# Patient Record
Sex: MALE | Race: WHITE | Employment: OTHER | URBAN - NONMETROPOLITAN AREA
[De-identification: names, ages, dates, MRNs, and addresses within clinical notes are randomized per-mention and may not be internally consistent; named-entity substitution may affect disease eponyms.]

---

## 2017-04-12 DIAGNOSIS — M19.90 OSTEOARTHRITIS, UNSPECIFIED OSTEOARTHRITIS TYPE, UNSPECIFIED SITE: ICD-10-CM

## 2017-04-12 RX ORDER — MELOXICAM 15 MG/1
TABLET ORAL
Qty: 30 TABLET | Refills: 5 | Status: SHIPPED | OUTPATIENT
Start: 2017-04-12 | End: 2017-10-23 | Stop reason: SDUPTHER

## 2017-04-17 ENCOUNTER — OFFICE VISIT (OUTPATIENT)
Dept: FAMILY MEDICINE CLINIC | Age: 62
End: 2017-04-17

## 2017-04-17 VITALS
SYSTOLIC BLOOD PRESSURE: 138 MMHG | WEIGHT: 205.6 LBS | HEART RATE: 75 BPM | BODY MASS INDEX: 27.25 KG/M2 | HEIGHT: 73 IN | DIASTOLIC BLOOD PRESSURE: 80 MMHG | OXYGEN SATURATION: 98 %

## 2017-04-17 DIAGNOSIS — R53.81 OTHER MALAISE AND FATIGUE: ICD-10-CM

## 2017-04-17 DIAGNOSIS — E11.9 TYPE 2 DIABETES MELLITUS WITHOUT COMPLICATION, WITHOUT LONG-TERM CURRENT USE OF INSULIN (HCC): Primary | ICD-10-CM

## 2017-04-17 DIAGNOSIS — I10 ESSENTIAL HYPERTENSION: ICD-10-CM

## 2017-04-17 DIAGNOSIS — R53.83 OTHER MALAISE AND FATIGUE: ICD-10-CM

## 2017-04-17 DIAGNOSIS — E11.9 TYPE 2 DIABETES MELLITUS WITHOUT COMPLICATION, WITHOUT LONG-TERM CURRENT USE OF INSULIN (HCC): ICD-10-CM

## 2017-04-17 DIAGNOSIS — G45.8 OTHER SPECIFIED TRANSIENT CEREBRAL ISCHEMIAS: ICD-10-CM

## 2017-04-17 LAB
ALBUMIN SERPL-MCNC: 4.2 G/DL (ref 3.9–4.9)
ALP BLD-CCNC: 92 U/L (ref 35–104)
ALT SERPL-CCNC: 60 U/L (ref 0–41)
ANION GAP SERPL CALCULATED.3IONS-SCNC: 12 MEQ/L (ref 7–13)
AST SERPL-CCNC: 45 U/L (ref 0–40)
BILIRUB SERPL-MCNC: 0.6 MG/DL (ref 0–1.2)
BUN BLDV-MCNC: 15 MG/DL (ref 8–23)
CALCIUM SERPL-MCNC: 9.4 MG/DL (ref 8.6–10.2)
CHLORIDE BLD-SCNC: 101 MEQ/L (ref 98–107)
CO2: 28 MEQ/L (ref 22–29)
CREAT SERPL-MCNC: 0.77 MG/DL (ref 0.7–1.2)
GFR AFRICAN AMERICAN: >60
GFR NON-AFRICAN AMERICAN: >60
GLOBULIN: 2.7 G/DL (ref 2.3–3.5)
GLUCOSE BLD-MCNC: 104 MG/DL (ref 74–109)
HBA1C MFR BLD: 7.7 % (ref 4.8–5.9)
HCT VFR BLD CALC: 40.9 % (ref 42–52)
HEMOGLOBIN: 13.8 G/DL (ref 14–18)
MCH RBC QN AUTO: 28.9 PG (ref 27–31.3)
MCHC RBC AUTO-ENTMCNC: 33.6 % (ref 33–37)
MCV RBC AUTO: 86 FL (ref 80–100)
PDW BLD-RTO: 13.9 % (ref 11.5–14.5)
PLATELET # BLD: 164 K/UL (ref 130–400)
POTASSIUM SERPL-SCNC: 3.5 MEQ/L (ref 3.5–5.1)
RBC # BLD: 4.75 M/UL (ref 4.7–6.1)
SODIUM BLD-SCNC: 141 MEQ/L (ref 132–144)
TOTAL PROTEIN: 6.9 G/DL (ref 6.4–8.1)
TSH SERPL DL<=0.05 MIU/L-ACNC: 1.76 UIU/ML (ref 0.27–4.2)
WBC # BLD: 8.1 K/UL (ref 4.8–10.8)

## 2017-04-17 PROCEDURE — 99213 OFFICE O/P EST LOW 20 MIN: CPT | Performed by: FAMILY MEDICINE

## 2017-04-17 ASSESSMENT — PATIENT HEALTH QUESTIONNAIRE - PHQ9
SUM OF ALL RESPONSES TO PHQ QUESTIONS 1-9: 0
1. LITTLE INTEREST OR PLEASURE IN DOING THINGS: 0
2. FEELING DOWN, DEPRESSED OR HOPELESS: 0
SUM OF ALL RESPONSES TO PHQ9 QUESTIONS 1 & 2: 0

## 2017-04-18 DIAGNOSIS — R79.89 ELEVATED LIVER FUNCTION TESTS: Primary | ICD-10-CM

## 2017-04-24 ENCOUNTER — HOSPITAL ENCOUNTER (OUTPATIENT)
Dept: ULTRASOUND IMAGING | Age: 62
Discharge: HOME | End: 2017-04-24
Payer: COMMERCIAL

## 2017-04-24 DIAGNOSIS — R79.89 ELEVATED LIVER FUNCTION TESTS: ICD-10-CM

## 2017-04-24 PROCEDURE — 76705 ECHO EXAM OF ABDOMEN: CPT

## 2017-05-31 ENCOUNTER — TELEPHONE (OUTPATIENT)
Dept: FAMILY MEDICINE CLINIC | Age: 62
End: 2017-05-31

## 2017-05-31 RX ORDER — AMOXICILLIN 875 MG/1
875 TABLET, COATED ORAL 2 TIMES DAILY
Qty: 20 TABLET | Refills: 0 | Status: SHIPPED | OUTPATIENT
Start: 2017-05-31 | End: 2017-06-10

## 2017-07-17 DIAGNOSIS — I10 ESSENTIAL HYPERTENSION: ICD-10-CM

## 2017-07-17 RX ORDER — AMLODIPINE BESYLATE 2.5 MG/1
TABLET ORAL
Qty: 90 TABLET | Refills: 2 | Status: SHIPPED | OUTPATIENT
Start: 2017-07-17 | End: 2018-04-22 | Stop reason: SDUPTHER

## 2017-08-28 RX ORDER — SITAGLIPTIN 100 MG/1
TABLET, FILM COATED ORAL
Qty: 30 TABLET | Refills: 3 | Status: SHIPPED | OUTPATIENT
Start: 2017-08-28 | End: 2017-11-27 | Stop reason: SDUPTHER

## 2017-08-30 DIAGNOSIS — I10 ESSENTIAL HYPERTENSION: ICD-10-CM

## 2017-08-30 DIAGNOSIS — E11.65 TYPE 2 DIABETES MELLITUS WITH HYPERGLYCEMIA, WITHOUT LONG-TERM CURRENT USE OF INSULIN (HCC): ICD-10-CM

## 2017-08-31 RX ORDER — LISINOPRIL AND HYDROCHLOROTHIAZIDE 20; 12.5 MG/1; MG/1
TABLET ORAL
Qty: 180 TABLET | Refills: 2 | Status: SHIPPED | OUTPATIENT
Start: 2017-08-31 | End: 2018-05-21 | Stop reason: SDUPTHER

## 2017-10-23 DIAGNOSIS — M19.90 OSTEOARTHRITIS, UNSPECIFIED OSTEOARTHRITIS TYPE, UNSPECIFIED SITE: ICD-10-CM

## 2017-10-23 RX ORDER — MELOXICAM 15 MG/1
TABLET ORAL
Qty: 30 TABLET | Refills: 5 | Status: SHIPPED | OUTPATIENT
Start: 2017-10-23 | End: 2017-11-27 | Stop reason: ALTCHOICE

## 2017-11-27 ENCOUNTER — OFFICE VISIT (OUTPATIENT)
Dept: FAMILY MEDICINE CLINIC | Age: 62
End: 2017-11-27

## 2017-11-27 VITALS
DIASTOLIC BLOOD PRESSURE: 72 MMHG | HEIGHT: 74 IN | TEMPERATURE: 97.3 F | HEART RATE: 84 BPM | WEIGHT: 204 LBS | BODY MASS INDEX: 26.18 KG/M2 | OXYGEN SATURATION: 98 % | SYSTOLIC BLOOD PRESSURE: 138 MMHG

## 2017-11-27 DIAGNOSIS — J01.40 ACUTE PANSINUSITIS, RECURRENCE NOT SPECIFIED: ICD-10-CM

## 2017-11-27 DIAGNOSIS — M25.551 PAIN OF RIGHT HIP JOINT: ICD-10-CM

## 2017-11-27 DIAGNOSIS — E11.9 TYPE 2 DIABETES MELLITUS WITHOUT COMPLICATION, WITHOUT LONG-TERM CURRENT USE OF INSULIN (HCC): ICD-10-CM

## 2017-11-27 DIAGNOSIS — M54.32 BILATERAL SCIATICA: Primary | ICD-10-CM

## 2017-11-27 DIAGNOSIS — M54.31 BILATERAL SCIATICA: Primary | ICD-10-CM

## 2017-11-27 LAB
ALBUMIN SERPL-MCNC: 4.5 G/DL (ref 3.9–4.9)
ALP BLD-CCNC: 102 U/L (ref 35–104)
ALT SERPL-CCNC: 61 U/L (ref 0–41)
ANION GAP SERPL CALCULATED.3IONS-SCNC: 17 MEQ/L (ref 7–13)
AST SERPL-CCNC: 45 U/L (ref 0–40)
BILIRUB SERPL-MCNC: 0.5 MG/DL (ref 0–1.2)
BUN BLDV-MCNC: 13 MG/DL (ref 8–23)
CALCIUM SERPL-MCNC: 9.5 MG/DL (ref 8.6–10.2)
CHLORIDE BLD-SCNC: 102 MEQ/L (ref 98–107)
CO2: 26 MEQ/L (ref 22–29)
CREAT SERPL-MCNC: 0.76 MG/DL (ref 0.7–1.2)
CREATININE URINE: 68.6 MG/DL
GFR AFRICAN AMERICAN: >60
GFR NON-AFRICAN AMERICAN: >60
GLOBULIN: 3 G/DL (ref 2.3–3.5)
GLUCOSE BLD-MCNC: 170 MG/DL (ref 74–109)
HBA1C MFR BLD: 6.6 % (ref 4.8–5.9)
MICROALBUMIN UR-MCNC: <1.2 MG/DL
MICROALBUMIN/CREAT UR-RTO: NORMAL MG/G (ref 0–30)
POTASSIUM SERPL-SCNC: 4.2 MEQ/L (ref 3.5–5.1)
SODIUM BLD-SCNC: 145 MEQ/L (ref 132–144)
TOTAL PROTEIN: 7.5 G/DL (ref 6.4–8.1)

## 2017-11-27 PROCEDURE — 1036F TOBACCO NON-USER: CPT | Performed by: NURSE PRACTITIONER

## 2017-11-27 PROCEDURE — G8484 FLU IMMUNIZE NO ADMIN: HCPCS | Performed by: NURSE PRACTITIONER

## 2017-11-27 PROCEDURE — 3045F PR MOST RECENT HEMOGLOBIN A1C LEVEL 7.0-9.0%: CPT | Performed by: NURSE PRACTITIONER

## 2017-11-27 PROCEDURE — 3017F COLORECTAL CA SCREEN DOC REV: CPT | Performed by: NURSE PRACTITIONER

## 2017-11-27 PROCEDURE — G8419 CALC BMI OUT NRM PARAM NOF/U: HCPCS | Performed by: NURSE PRACTITIONER

## 2017-11-27 PROCEDURE — 99214 OFFICE O/P EST MOD 30 MIN: CPT | Performed by: NURSE PRACTITIONER

## 2017-11-27 PROCEDURE — G8427 DOCREV CUR MEDS BY ELIG CLIN: HCPCS | Performed by: NURSE PRACTITIONER

## 2017-11-27 RX ORDER — AMOXICILLIN AND CLAVULANATE POTASSIUM 875; 125 MG/1; MG/1
1 TABLET, FILM COATED ORAL 2 TIMES DAILY
Qty: 20 TABLET | Refills: 0 | Status: SHIPPED | OUTPATIENT
Start: 2017-11-27 | End: 2017-12-07

## 2017-11-27 RX ORDER — METHYLPREDNISOLONE 4 MG/1
TABLET ORAL
Qty: 21 TABLET | Refills: 0 | Status: SHIPPED | OUTPATIENT
Start: 2017-11-27 | End: 2017-12-03

## 2017-11-27 RX ORDER — MELOXICAM 15 MG/1
15 TABLET ORAL DAILY
COMMUNITY
End: 2017-12-11 | Stop reason: ALTCHOICE

## 2017-11-27 ASSESSMENT — ENCOUNTER SYMPTOMS
BACK PAIN: 0
SHORTNESS OF BREATH: 0
GASTROINTESTINAL NEGATIVE: 1
SINUS PRESSURE: 1
COUGH: 0

## 2017-11-27 NOTE — PROGRESS NOTES
long-term current use of insulin (HCC)  Hemoglobin A1C    Microalbumin / Creatinine Urine Ratio    Comprehensive Metabolic Panel   4. Acute pansinusitis, recurrence not specified  amoxicillin-clavulanate (AUGMENTIN) 875-125 MG per tablet       Orders Placed This Encounter   Procedures    XR LUMBAR SPINE (2-3 VIEWS)     Standing Status:   Future     Standing Expiration Date:   11/27/2018     Order Specific Question:   Reason for exam:     Answer:   pain down both legs    XR HIP RIGHT (2-3 VIEWS)     Standing Status:   Future     Standing Expiration Date:   11/27/2018     Order Specific Question:   Reason for exam:     Answer:   pain    Hemoglobin A1C     Standing Status:   Future     Standing Expiration Date:   11/27/2018    Microalbumin / Creatinine Urine Ratio     Standing Status:   Future     Standing Expiration Date:   12/27/2017    Comprehensive Metabolic Panel     Standing Status:   Future     Standing Expiration Date:   11/27/2018       Orders Placed This Encounter   Medications    methylPREDNISolone (MEDROL DOSEPACK) 4 MG tablet     Sig: Take by mouth. Dispense:  21 tablet     Refill:  0    amoxicillin-clavulanate (AUGMENTIN) 875-125 MG per tablet     Sig: Take 1 tablet by mouth 2 times daily for 10 days     Dispense:  20 tablet     Refill:  0       discussed need for new imaging, possible referral to ortho for hip and back issues. New labs needed and importance of BP and DM meds needed at this time. It was stressed to the patient not using Mobic long term for pain. Dizziness likely a result of fluid in ears and sinus pressure. To return to clinic after merdol pack complete if no relief noted for additional referral or testing. Patient will be notified of imaging results and labs.        Side effects, adverse effects of the medication prescribed today, as well as treatment plan/ rationale and result expectations have been discussed with the patient who expresses understanding and desires to proceed. Close follow up to evaluate treatment results and for coordination of care. I have reviewed the patient's medical history in detail and updated the computerized patient record. As always, patient is advised that if symptoms worsen in any way they will proceed to the nearest emergency room.      Follow up 2 weeks    Dayana Cordova NP

## 2017-12-11 ENCOUNTER — OFFICE VISIT (OUTPATIENT)
Dept: FAMILY MEDICINE CLINIC | Age: 62
End: 2017-12-11

## 2017-12-11 VITALS
BODY MASS INDEX: 26.05 KG/M2 | TEMPERATURE: 97 F | HEART RATE: 78 BPM | SYSTOLIC BLOOD PRESSURE: 144 MMHG | OXYGEN SATURATION: 98 % | DIASTOLIC BLOOD PRESSURE: 78 MMHG | WEIGHT: 203 LBS | HEIGHT: 74 IN

## 2017-12-11 DIAGNOSIS — E11.9 TYPE 2 DIABETES MELLITUS WITHOUT COMPLICATION, WITHOUT LONG-TERM CURRENT USE OF INSULIN (HCC): Primary | ICD-10-CM

## 2017-12-11 DIAGNOSIS — Z23 NEED FOR INFLUENZA VACCINATION: ICD-10-CM

## 2017-12-11 DIAGNOSIS — M25.551 ARTHRALGIA OF RIGHT HIP: ICD-10-CM

## 2017-12-11 DIAGNOSIS — I10 ESSENTIAL HYPERTENSION: ICD-10-CM

## 2017-12-11 PROCEDURE — G8419 CALC BMI OUT NRM PARAM NOF/U: HCPCS | Performed by: NURSE PRACTITIONER

## 2017-12-11 PROCEDURE — 3017F COLORECTAL CA SCREEN DOC REV: CPT | Performed by: NURSE PRACTITIONER

## 2017-12-11 PROCEDURE — 1036F TOBACCO NON-USER: CPT | Performed by: NURSE PRACTITIONER

## 2017-12-11 PROCEDURE — G8484 FLU IMMUNIZE NO ADMIN: HCPCS | Performed by: NURSE PRACTITIONER

## 2017-12-11 PROCEDURE — 99213 OFFICE O/P EST LOW 20 MIN: CPT | Performed by: NURSE PRACTITIONER

## 2017-12-11 PROCEDURE — 90688 IIV4 VACCINE SPLT 0.5 ML IM: CPT | Performed by: NURSE PRACTITIONER

## 2017-12-11 PROCEDURE — G8427 DOCREV CUR MEDS BY ELIG CLIN: HCPCS | Performed by: NURSE PRACTITIONER

## 2017-12-11 PROCEDURE — 3044F HG A1C LEVEL LT 7.0%: CPT | Performed by: NURSE PRACTITIONER

## 2017-12-11 PROCEDURE — 90471 IMMUNIZATION ADMIN: CPT | Performed by: NURSE PRACTITIONER

## 2017-12-11 NOTE — PROGRESS NOTES
Subjective  Chief Complaint   Patient presents with    Follow-up     2 week follow up per NL    Diabetes    Hip Pain     pt states that he never heard from Ortho. notes show that they were attempting to send to [facility]?  Discuss Labs     further discuss labs and imaging        HPI   Patient here for a follow up. He reports significant improvement in his hip pain after completing the medrol alexandrea. Asked about the results of the hip xrays and referral to ortho    Asking for results of testing preformed at the last visit.   Patient states he stopped drinking pop and asking about his AIC  Also states he has been drinking a lot of coffee today- BP elevated      Patient Active Problem List    Diagnosis Date Noted    TIA (transient ischemic attack) 09/30/2014    Allergic rhinosinusitis 09/15/2014    Lumbar paraspinal muscle spasm 05/20/2014    HTN (hypertension)     Type 2 diabetes mellitus without complication ([facility] [address]      Past Medical History:   Diagnosis Date    History of skin cancer     HTN (hypertension)     Type II or unspecified type diabetes mellitus without mention of complication, not stated as uncontrolled      Past Surgical History:   Procedure Laterality Date    COLONOSCOPY  02/20/2015    DR Los Richardson    HEMORRHOID SURGERY       Family History   Problem Relation Age of Onset    Cancer Mother      lung cancer     Social History     Social History    Marital status:      Spouse name: N/A    Number of children: N/A    Years of education: N/A     Social History Main Topics    Smoking status: Never Smoker    Smokeless tobacco: Never Used    Alcohol use No    Drug use: No    Sexual activity: Yes     Partners: Female     Other Topics Concern    None     Social History Narrative    None     Current Outpatient Prescriptions on File Prior to Visit   Medication Sig Dispense Refill    SITagliptin (JANUVIA) 100 MG tablet TAKE 1 TABLET BY MOUTH DAILY 30 tablet 3    metFORMIN (GLUCOPHAGE) 1000 MG tablet TAKE 1 TABLET BY MOUTH TWICE DAILY WITH MEALS 180 tablet 2    lisinopril-hydrochlorothiazide (PRINZIDE;ZESTORETIC) 20-12.5 MG per tablet TAKE 1 TABLET BY MOUTH TWICE DAILY 180 tablet 2    amLODIPine (NORVASC) 2.5 MG tablet TAKE 1 TABLET BY MOUTH DAILY 90 tablet 2    aspirin 81 MG tablet Take 1 tablet by mouth daily. 30 tablet 3     No current facility-administered medications on file prior to visit. No Known Allergies    Review of Systems   Constitutional: Negative. HENT: Negative. Improved sinus pressure     Endocrine: Negative for polydipsia, polyphagia and polyuria. Musculoskeletal: Positive for arthralgias (right hip pain- improving). Neurological: Negative for dizziness and headaches. Objective  Vitals:    12/11/17 1534 12/11/17 1543   BP: (!) 148/80 (!) 144/78   Site: Left Arm Left Arm   Position: Sitting Sitting   Cuff Size: Medium Adult Medium Adult   Pulse: 78    Temp: 97 °F (36.1 °C)    TempSrc: Tympanic    SpO2: 98%    Weight: 203 lb (92.1 kg)    Height: 6' 1.5\" (1.867 m)      Physical Exam   Constitutional: He is oriented to person, place, and time. He appears well-developed and well-nourished. HENT:   Head: Normocephalic. Eyes: Conjunctivae are normal. Pupils are equal, round, and reactive to light. Neck: Normal range of motion. Pulmonary/Chest: Effort normal.   Abdominal: Soft. Musculoskeletal: Normal range of motion. Right hip: He exhibits tenderness. He exhibits normal range of motion and normal strength. Neurological: He is alert and oriented to person, place, and time. Skin: Skin is warm and dry. Psychiatric: He has a normal mood and affect. His behavior is normal. Judgment and thought content normal.   Nursing note and vitals reviewed. Assessment & Plan    1. Type 2 diabetes mellitus without complication, without long-term current use of insulin ([facility])  HM DIABETES FOOT EXAM   2.  Need for influenza vaccination  Gtao Bowles, 3 YRS

## 2018-04-22 DIAGNOSIS — M19.90 OSTEOARTHRITIS, UNSPECIFIED OSTEOARTHRITIS TYPE, UNSPECIFIED SITE: ICD-10-CM

## 2018-04-22 DIAGNOSIS — I10 ESSENTIAL HYPERTENSION: ICD-10-CM

## 2018-04-23 RX ORDER — MELOXICAM 15 MG/1
TABLET ORAL
Qty: 30 TABLET | Refills: 5 | Status: SHIPPED | OUTPATIENT
Start: 2018-04-23 | End: 2019-02-07 | Stop reason: SDUPTHER

## 2018-04-23 RX ORDER — AMLODIPINE BESYLATE 2.5 MG/1
TABLET ORAL
Qty: 90 TABLET | Refills: 2 | Status: SHIPPED | OUTPATIENT
Start: 2018-04-23 | End: 2019-01-26 | Stop reason: SDUPTHER

## 2018-04-23 RX ORDER — SITAGLIPTIN 100 MG/1
TABLET, FILM COATED ORAL
Qty: 30 TABLET | Refills: 3 | Status: SHIPPED | OUTPATIENT
Start: 2018-04-23 | End: 2018-08-20 | Stop reason: SDUPTHER

## 2018-05-21 DIAGNOSIS — E11.65 TYPE 2 DIABETES MELLITUS WITH HYPERGLYCEMIA, WITHOUT LONG-TERM CURRENT USE OF INSULIN (HCC): ICD-10-CM

## 2018-05-21 DIAGNOSIS — I10 ESSENTIAL HYPERTENSION: ICD-10-CM

## 2018-05-21 RX ORDER — LISINOPRIL AND HYDROCHLOROTHIAZIDE 20; 12.5 MG/1; MG/1
TABLET ORAL
Qty: 180 TABLET | Refills: 2 | Status: SHIPPED | OUTPATIENT
Start: 2018-05-21 | End: 2019-02-07 | Stop reason: SDUPTHER

## 2018-08-20 RX ORDER — SITAGLIPTIN 100 MG/1
TABLET, FILM COATED ORAL
Qty: 30 TABLET | Refills: 5 | Status: SHIPPED | OUTPATIENT
Start: 2018-08-20 | End: 2019-02-07

## 2018-11-07 LAB
AVERAGE GLUCOSE: NORMAL
HBA1C MFR BLD: 6.2 %

## 2019-01-26 DIAGNOSIS — I10 ESSENTIAL HYPERTENSION: ICD-10-CM

## 2019-01-28 RX ORDER — AMLODIPINE BESYLATE 2.5 MG/1
TABLET ORAL
Qty: 90 TABLET | Refills: 2 | Status: SHIPPED | OUTPATIENT
Start: 2019-01-28 | End: 2019-02-07 | Stop reason: SDUPTHER

## 2019-02-07 ENCOUNTER — OFFICE VISIT (OUTPATIENT)
Dept: FAMILY MEDICINE CLINIC | Age: 64
End: 2019-02-07
Payer: COMMERCIAL

## 2019-02-07 VITALS
SYSTOLIC BLOOD PRESSURE: 130 MMHG | OXYGEN SATURATION: 97 % | BODY MASS INDEX: 25.77 KG/M2 | HEART RATE: 77 BPM | DIASTOLIC BLOOD PRESSURE: 72 MMHG | HEIGHT: 74 IN | WEIGHT: 200.8 LBS

## 2019-02-07 DIAGNOSIS — I10 ESSENTIAL HYPERTENSION: ICD-10-CM

## 2019-02-07 DIAGNOSIS — M19.90 OSTEOARTHRITIS, UNSPECIFIED OSTEOARTHRITIS TYPE, UNSPECIFIED SITE: ICD-10-CM

## 2019-02-07 DIAGNOSIS — E11.65 TYPE 2 DIABETES MELLITUS WITH HYPERGLYCEMIA, WITHOUT LONG-TERM CURRENT USE OF INSULIN (HCC): ICD-10-CM

## 2019-02-07 PROCEDURE — 99213 OFFICE O/P EST LOW 20 MIN: CPT | Performed by: FAMILY MEDICINE

## 2019-02-07 RX ORDER — MELOXICAM 15 MG/1
TABLET ORAL
Qty: 30 TABLET | Refills: 5 | Status: SHIPPED | OUTPATIENT
Start: 2019-02-07 | End: 2019-09-13 | Stop reason: SDUPTHER

## 2019-02-07 RX ORDER — LISINOPRIL AND HYDROCHLOROTHIAZIDE 20; 12.5 MG/1; MG/1
TABLET ORAL
Qty: 60 TABLET | Refills: 5 | Status: SHIPPED | OUTPATIENT
Start: 2019-02-07 | End: 2019-09-13 | Stop reason: SDUPTHER

## 2019-02-07 RX ORDER — AMLODIPINE BESYLATE 2.5 MG/1
TABLET ORAL
Qty: 30 TABLET | Refills: 5 | Status: SHIPPED | OUTPATIENT
Start: 2019-02-07 | End: 2019-09-13 | Stop reason: SDUPTHER

## 2019-02-07 ASSESSMENT — PATIENT HEALTH QUESTIONNAIRE - PHQ9
SUM OF ALL RESPONSES TO PHQ QUESTIONS 1-9: 0
SUM OF ALL RESPONSES TO PHQ QUESTIONS 1-9: 0
SUM OF ALL RESPONSES TO PHQ9 QUESTIONS 1 & 2: 0
1. LITTLE INTEREST OR PLEASURE IN DOING THINGS: 0
2. FEELING DOWN, DEPRESSED OR HOPELESS: 0

## 2019-02-23 DIAGNOSIS — I10 ESSENTIAL HYPERTENSION: ICD-10-CM

## 2019-02-23 DIAGNOSIS — E11.65 TYPE 2 DIABETES MELLITUS WITH HYPERGLYCEMIA, WITHOUT LONG-TERM CURRENT USE OF INSULIN (HCC): ICD-10-CM

## 2019-02-25 RX ORDER — LISINOPRIL AND HYDROCHLOROTHIAZIDE 20; 12.5 MG/1; MG/1
TABLET ORAL
Qty: 180 TABLET | Refills: 2 | Status: SHIPPED | OUTPATIENT
Start: 2019-02-25 | End: 2019-08-08 | Stop reason: SDUPTHER

## 2019-08-08 ENCOUNTER — OFFICE VISIT (OUTPATIENT)
Dept: FAMILY MEDICINE CLINIC | Age: 64
End: 2019-08-08
Payer: COMMERCIAL

## 2019-08-08 VITALS
DIASTOLIC BLOOD PRESSURE: 62 MMHG | HEIGHT: 74 IN | WEIGHT: 197.2 LBS | OXYGEN SATURATION: 96 % | SYSTOLIC BLOOD PRESSURE: 132 MMHG | BODY MASS INDEX: 25.31 KG/M2 | HEART RATE: 87 BPM

## 2019-08-08 DIAGNOSIS — R53.83 FATIGUE, UNSPECIFIED TYPE: ICD-10-CM

## 2019-08-08 DIAGNOSIS — M19.90 OSTEOARTHRITIS, UNSPECIFIED OSTEOARTHRITIS TYPE, UNSPECIFIED SITE: ICD-10-CM

## 2019-08-08 DIAGNOSIS — Z12.5 SCREENING PSA (PROSTATE SPECIFIC ANTIGEN): ICD-10-CM

## 2019-08-08 DIAGNOSIS — C44.91 BASAL CELL CARCINOMA (BCC), UNSPECIFIED SITE: ICD-10-CM

## 2019-08-08 DIAGNOSIS — E11.65 TYPE 2 DIABETES MELLITUS WITH HYPERGLYCEMIA, WITHOUT LONG-TERM CURRENT USE OF INSULIN (HCC): Primary | ICD-10-CM

## 2019-08-08 DIAGNOSIS — F51.01 PRIMARY INSOMNIA: ICD-10-CM

## 2019-08-08 DIAGNOSIS — I10 ESSENTIAL HYPERTENSION: ICD-10-CM

## 2019-08-08 PROCEDURE — 99214 OFFICE O/P EST MOD 30 MIN: CPT | Performed by: FAMILY MEDICINE

## 2019-08-08 RX ORDER — TRAZODONE HYDROCHLORIDE 50 MG/1
50 TABLET ORAL NIGHTLY PRN
Qty: 30 TABLET | Refills: 5 | Status: SHIPPED | OUTPATIENT
Start: 2019-08-08 | End: 2019-10-04 | Stop reason: ALTCHOICE

## 2019-08-09 ENCOUNTER — TELEPHONE (OUTPATIENT)
Dept: FAMILY MEDICINE CLINIC | Age: 64
End: 2019-08-09

## 2019-08-09 DIAGNOSIS — E11.65 TYPE 2 DIABETES MELLITUS WITH HYPERGLYCEMIA, WITHOUT LONG-TERM CURRENT USE OF INSULIN (HCC): ICD-10-CM

## 2019-08-09 DIAGNOSIS — Z12.5 SCREENING PSA (PROSTATE SPECIFIC ANTIGEN): ICD-10-CM

## 2019-08-09 DIAGNOSIS — R53.83 FATIGUE, UNSPECIFIED TYPE: ICD-10-CM

## 2019-08-09 LAB
ALBUMIN SERPL-MCNC: 4.5 G/DL (ref 3.5–4.6)
ALP BLD-CCNC: 94 U/L (ref 35–104)
ALT SERPL-CCNC: 56 U/L (ref 0–41)
ANION GAP SERPL CALCULATED.3IONS-SCNC: 12 MEQ/L (ref 9–15)
AST SERPL-CCNC: 52 U/L (ref 0–40)
BILIRUB SERPL-MCNC: 0.8 MG/DL (ref 0.2–0.7)
BUN BLDV-MCNC: 24 MG/DL (ref 8–23)
CALCIUM SERPL-MCNC: 9.6 MG/DL (ref 8.5–9.9)
CHLORIDE BLD-SCNC: 104 MEQ/L (ref 95–107)
CHOLESTEROL, TOTAL: 171 MG/DL (ref 0–199)
CO2: 27 MEQ/L (ref 20–31)
CREAT SERPL-MCNC: 0.78 MG/DL (ref 0.7–1.2)
CREATININE URINE: 365.4 MG/DL
GFR AFRICAN AMERICAN: >60
GFR NON-AFRICAN AMERICAN: >60
GLOBULIN: 3.4 G/DL (ref 2.3–3.5)
GLUCOSE BLD-MCNC: 194 MG/DL (ref 70–99)
HBA1C MFR BLD: 8.3 % (ref 4.8–5.9)
HCT VFR BLD CALC: 40.6 % (ref 42–52)
HDLC SERPL-MCNC: 50 MG/DL (ref 40–59)
HEMOGLOBIN: 13.8 G/DL (ref 14–18)
LDL CHOLESTEROL CALCULATED: 101 MG/DL (ref 0–129)
MCH RBC QN AUTO: 29.4 PG (ref 27–31.3)
MCHC RBC AUTO-ENTMCNC: 33.9 % (ref 33–37)
MCV RBC AUTO: 86.8 FL (ref 80–100)
MICROALBUMIN UR-MCNC: 1.7 MG/DL
MICROALBUMIN/CREAT UR-RTO: 4.7 MG/G (ref 0–30)
PDW BLD-RTO: 14.1 % (ref 11.5–14.5)
PLATELET # BLD: 164 K/UL (ref 130–400)
POTASSIUM SERPL-SCNC: 4 MEQ/L (ref 3.4–4.9)
PROSTATE SPECIFIC ANTIGEN: 0.89 NG/ML (ref 0–5.4)
RBC # BLD: 4.67 M/UL (ref 4.7–6.1)
SODIUM BLD-SCNC: 143 MEQ/L (ref 135–144)
TOTAL PROTEIN: 7.9 G/DL (ref 6.3–8)
TRIGL SERPL-MCNC: 98 MG/DL (ref 0–150)
TSH SERPL DL<=0.05 MIU/L-ACNC: 1.54 UIU/ML (ref 0.44–3.86)
WBC # BLD: 6.3 K/UL (ref 4.8–10.8)

## 2019-08-11 LAB — TESTOSTERONE TOTAL-MALE: 577 NG/DL (ref 300–720)

## 2019-08-12 DIAGNOSIS — E11.9 TYPE 2 DIABETES MELLITUS WITHOUT COMPLICATION, WITHOUT LONG-TERM CURRENT USE OF INSULIN (HCC): Primary | ICD-10-CM

## 2019-09-13 DIAGNOSIS — E11.65 TYPE 2 DIABETES MELLITUS WITH HYPERGLYCEMIA, WITHOUT LONG-TERM CURRENT USE OF INSULIN (HCC): ICD-10-CM

## 2019-09-13 DIAGNOSIS — I10 ESSENTIAL HYPERTENSION: ICD-10-CM

## 2019-09-13 DIAGNOSIS — M19.90 OSTEOARTHRITIS, UNSPECIFIED OSTEOARTHRITIS TYPE, UNSPECIFIED SITE: ICD-10-CM

## 2019-09-16 RX ORDER — MELOXICAM 15 MG/1
TABLET ORAL
Qty: 30 TABLET | Refills: 5 | Status: SHIPPED | OUTPATIENT
Start: 2019-09-16 | End: 2020-03-02

## 2019-09-16 RX ORDER — AMLODIPINE BESYLATE 2.5 MG/1
TABLET ORAL
Qty: 30 TABLET | Refills: 5 | Status: SHIPPED | OUTPATIENT
Start: 2019-09-16 | End: 2020-03-02

## 2019-09-16 RX ORDER — LISINOPRIL AND HYDROCHLOROTHIAZIDE 20; 12.5 MG/1; MG/1
TABLET ORAL
Qty: 60 TABLET | Refills: 5 | Status: SHIPPED | OUTPATIENT
Start: 2019-09-16 | End: 2020-03-02

## 2019-10-04 ENCOUNTER — OFFICE VISIT (OUTPATIENT)
Dept: FAMILY MEDICINE CLINIC | Age: 64
End: 2019-10-04
Payer: COMMERCIAL

## 2019-10-04 ENCOUNTER — NURSE TRIAGE (OUTPATIENT)
Dept: OTHER | Facility: CLINIC | Age: 64
End: 2019-10-04

## 2019-10-04 VITALS
OXYGEN SATURATION: 98 % | BODY MASS INDEX: 25.41 KG/M2 | TEMPERATURE: 97.4 F | DIASTOLIC BLOOD PRESSURE: 84 MMHG | HEIGHT: 74 IN | HEART RATE: 78 BPM | WEIGHT: 198 LBS | SYSTOLIC BLOOD PRESSURE: 130 MMHG

## 2019-10-04 DIAGNOSIS — R20.0 NUMBNESS AND TINGLING OF BOTH LEGS: ICD-10-CM

## 2019-10-04 DIAGNOSIS — R53.83 FATIGUE, UNSPECIFIED TYPE: ICD-10-CM

## 2019-10-04 DIAGNOSIS — R20.2 NUMBNESS AND TINGLING OF BOTH LEGS: ICD-10-CM

## 2019-10-04 DIAGNOSIS — Z23 INFLUENZA VACCINE NEEDED: ICD-10-CM

## 2019-10-04 DIAGNOSIS — M54.41 CHRONIC RIGHT-SIDED LOW BACK PAIN WITH RIGHT-SIDED SCIATICA: Primary | ICD-10-CM

## 2019-10-04 DIAGNOSIS — G89.29 CHRONIC RIGHT-SIDED LOW BACK PAIN WITH RIGHT-SIDED SCIATICA: Primary | ICD-10-CM

## 2019-10-04 LAB
VITAMIN B-12: 424 PG/ML (ref 232–1245)
VITAMIN D 25-HYDROXY: 42.4 NG/ML (ref 30–100)

## 2019-10-04 PROCEDURE — 99213 OFFICE O/P EST LOW 20 MIN: CPT | Performed by: NURSE PRACTITIONER

## 2019-10-04 PROCEDURE — 90688 IIV4 VACCINE SPLT 0.5 ML IM: CPT | Performed by: NURSE PRACTITIONER

## 2019-10-04 PROCEDURE — 90471 IMMUNIZATION ADMIN: CPT | Performed by: NURSE PRACTITIONER

## 2019-10-04 ASSESSMENT — ENCOUNTER SYMPTOMS
CHEST TIGHTNESS: 0
ABDOMINAL PAIN: 0
BACK PAIN: 1
SHORTNESS OF BREATH: 0
COUGH: 0

## 2019-11-06 ENCOUNTER — OFFICE VISIT (OUTPATIENT)
Dept: FAMILY MEDICINE CLINIC | Age: 64
End: 2019-11-06
Payer: COMMERCIAL

## 2019-11-06 VITALS
TEMPERATURE: 98.5 F | WEIGHT: 202.8 LBS | SYSTOLIC BLOOD PRESSURE: 140 MMHG | BODY MASS INDEX: 26.03 KG/M2 | OXYGEN SATURATION: 98 % | DIASTOLIC BLOOD PRESSURE: 80 MMHG | HEART RATE: 91 BPM | HEIGHT: 74 IN

## 2019-11-06 DIAGNOSIS — R07.0 BURNING SENSATION OF THROAT: ICD-10-CM

## 2019-11-06 DIAGNOSIS — J06.9 URI WITH COUGH AND CONGESTION: Primary | ICD-10-CM

## 2019-11-06 PROCEDURE — 99213 OFFICE O/P EST LOW 20 MIN: CPT | Performed by: NURSE PRACTITIONER

## 2019-11-06 RX ORDER — GUAIFENESIN 600 MG/1
600 TABLET, EXTENDED RELEASE ORAL 2 TIMES DAILY
Qty: 28 TABLET | Refills: 0 | Status: SHIPPED | OUTPATIENT
Start: 2019-11-06 | End: 2019-11-20

## 2019-11-06 RX ORDER — AZITHROMYCIN 250 MG/1
TABLET, FILM COATED ORAL
Qty: 6 TABLET | Refills: 0 | Status: SHIPPED | OUTPATIENT
Start: 2019-11-06 | End: 2019-11-11

## 2019-11-06 ASSESSMENT — ENCOUNTER SYMPTOMS
SINUS PRESSURE: 1
SORE THROAT: 1
COUGH: 1
BACK PAIN: 1

## 2019-11-08 LAB — THROAT CULTURE: NORMAL

## 2019-11-18 ASSESSMENT — ENCOUNTER SYMPTOMS
DIARRHEA: 0
ABDOMINAL PAIN: 0
BLOOD IN STOOL: 0
SHORTNESS OF BREATH: 0
WHEEZING: 1
EYE ITCHING: 1

## 2019-12-10 ENCOUNTER — APPOINTMENT (OUTPATIENT)
Dept: CT IMAGING | Age: 64
DRG: 392 | End: 2019-12-10
Payer: COMMERCIAL

## 2019-12-10 ENCOUNTER — HOSPITAL ENCOUNTER (INPATIENT)
Age: 64
LOS: 1 days | Discharge: HOME | DRG: 392 | End: 2019-12-11
Attending: STUDENT IN AN ORGANIZED HEALTH CARE EDUCATION/TRAINING PROGRAM | Admitting: INTERNAL MEDICINE
Payer: COMMERCIAL

## 2019-12-10 ENCOUNTER — OFFICE VISIT (OUTPATIENT)
Dept: FAMILY MEDICINE CLINIC | Age: 64
End: 2019-12-10
Payer: COMMERCIAL

## 2019-12-10 ENCOUNTER — APPOINTMENT (OUTPATIENT)
Dept: GENERAL RADIOLOGY | Age: 64
DRG: 392 | End: 2019-12-10
Payer: COMMERCIAL

## 2019-12-10 VITALS
WEIGHT: 200.8 LBS | HEART RATE: 113 BPM | SYSTOLIC BLOOD PRESSURE: 132 MMHG | HEIGHT: 77 IN | DIASTOLIC BLOOD PRESSURE: 70 MMHG | BODY MASS INDEX: 23.71 KG/M2 | OXYGEN SATURATION: 98 % | TEMPERATURE: 100.2 F

## 2019-12-10 DIAGNOSIS — R14.0 ABDOMINAL BLOATING: Primary | ICD-10-CM

## 2019-12-10 DIAGNOSIS — K85.00 IDIOPATHIC ACUTE PANCREATITIS WITHOUT INFECTION OR NECROSIS: ICD-10-CM

## 2019-12-10 DIAGNOSIS — R11.2 INTRACTABLE VOMITING WITH NAUSEA, UNSPECIFIED VOMITING TYPE: ICD-10-CM

## 2019-12-10 DIAGNOSIS — R06.89 DYSPNEA AND RESPIRATORY ABNORMALITIES: ICD-10-CM

## 2019-12-10 DIAGNOSIS — R10.13 EPIGASTRIC BURNING SENSATION: ICD-10-CM

## 2019-12-10 DIAGNOSIS — R06.00 DYSPNEA AND RESPIRATORY ABNORMALITIES: ICD-10-CM

## 2019-12-10 DIAGNOSIS — I20.8 OTHER FORMS OF ANGINA PECTORIS (HCC): Primary | ICD-10-CM

## 2019-12-10 PROBLEM — R10.9 ABDOMINAL PAIN: Status: ACTIVE | Noted: 2019-12-10

## 2019-12-10 LAB
ALBUMIN SERPL-MCNC: 4.2 G/DL (ref 3.5–4.6)
ALP BLD-CCNC: 86 U/L (ref 35–104)
ALT SERPL-CCNC: 71 U/L (ref 0–41)
AMYLASE: 63 U/L (ref 22–93)
ANION GAP SERPL CALCULATED.3IONS-SCNC: 13 MEQ/L (ref 9–15)
APTT: 30.6 SEC (ref 24.4–36.8)
AST SERPL-CCNC: 72 U/L (ref 0–40)
BASOPHILS ABSOLUTE: 0 K/UL (ref 0–0.2)
BASOPHILS RELATIVE PERCENT: 0.3 %
BILIRUB SERPL-MCNC: 1.1 MG/DL (ref 0.2–0.7)
BUN BLDV-MCNC: 19 MG/DL (ref 8–23)
CALCIUM SERPL-MCNC: 9.3 MG/DL (ref 8.5–9.9)
CHLORIDE BLD-SCNC: 98 MEQ/L (ref 95–107)
CO2: 26 MEQ/L (ref 20–31)
CREAT SERPL-MCNC: 0.78 MG/DL (ref 0.7–1.2)
D DIMER: 0.5 MG/L FEU (ref 0–0.5)
EOSINOPHILS ABSOLUTE: 0 K/UL (ref 0–0.7)
EOSINOPHILS RELATIVE PERCENT: 0.3 %
GFR AFRICAN AMERICAN: >60
GFR NON-AFRICAN AMERICAN: >60
GLOBULIN: 3.7 G/DL (ref 2.3–3.5)
GLUCOSE BLD-MCNC: 132 MG/DL (ref 60–115)
GLUCOSE BLD-MCNC: 139 MG/DL (ref 60–115)
GLUCOSE BLD-MCNC: 179 MG/DL (ref 70–99)
HCT VFR BLD CALC: 42.6 % (ref 42–52)
HEMOGLOBIN: 14.3 G/DL (ref 14–18)
INFLUENZA A BY PCR: NEGATIVE
INFLUENZA B BY PCR: NEGATIVE
INR BLD: 1.1
LACTIC ACID: 2.6 MMOL/L (ref 0.5–2.2)
LACTIC ACID: 2.9 MMOL/L (ref 0.5–2.2)
LIPASE: 168 U/L (ref 12–95)
LYMPHOCYTES ABSOLUTE: 0.4 K/UL (ref 1–4.8)
LYMPHOCYTES RELATIVE PERCENT: 3.2 %
MAGNESIUM: 1.8 MG/DL (ref 1.7–2.4)
MCH RBC QN AUTO: 28.7 PG (ref 27–31.3)
MCHC RBC AUTO-ENTMCNC: 33.5 % (ref 33–37)
MCV RBC AUTO: 85.6 FL (ref 80–100)
MONOCYTES ABSOLUTE: 0.7 K/UL (ref 0.2–0.8)
MONOCYTES RELATIVE PERCENT: 4.9 %
NEUTROPHILS ABSOLUTE: 12.5 K/UL (ref 1.4–6.5)
NEUTROPHILS RELATIVE PERCENT: 91.3 %
PDW BLD-RTO: 14.2 % (ref 11.5–14.5)
PERFORMED ON: ABNORMAL
PERFORMED ON: ABNORMAL
PLATELET # BLD: 152 K/UL (ref 130–400)
POTASSIUM SERPL-SCNC: 3.9 MEQ/L (ref 3.4–4.9)
PROTHROMBIN TIME: 14.2 SEC (ref 12.3–14.9)
RBC # BLD: 4.98 M/UL (ref 4.7–6.1)
SODIUM BLD-SCNC: 137 MEQ/L (ref 135–144)
TOTAL CK: 145 U/L (ref 0–190)
TOTAL PROTEIN: 7.9 G/DL (ref 6.3–8)
TROPONIN: <0.01 NG/ML (ref 0–0.01)
WBC # BLD: 13.7 K/UL (ref 4.8–10.8)

## 2019-12-10 PROCEDURE — 1210000000 HC MED SURG R&B

## 2019-12-10 PROCEDURE — 96372 THER/PROPH/DIAG INJ SC/IM: CPT

## 2019-12-10 PROCEDURE — G0378 HOSPITAL OBSERVATION PER HR: HCPCS

## 2019-12-10 PROCEDURE — 96372 THER/PROPH/DIAG INJ SC/IM: CPT | Performed by: NURSE PRACTITIONER

## 2019-12-10 PROCEDURE — 96375 TX/PRO/DX INJ NEW DRUG ADDON: CPT

## 2019-12-10 PROCEDURE — 6360000002 HC RX W HCPCS: Performed by: STUDENT IN AN ORGANIZED HEALTH CARE EDUCATION/TRAINING PROGRAM

## 2019-12-10 PROCEDURE — 85025 COMPLETE CBC W/AUTO DIFF WBC: CPT

## 2019-12-10 PROCEDURE — 6360000002 HC RX W HCPCS: Performed by: NURSE PRACTITIONER

## 2019-12-10 PROCEDURE — 2580000003 HC RX 258: Performed by: NURSE PRACTITIONER

## 2019-12-10 PROCEDURE — 6370000000 HC RX 637 (ALT 250 FOR IP): Performed by: STUDENT IN AN ORGANIZED HEALTH CARE EDUCATION/TRAINING PROGRAM

## 2019-12-10 PROCEDURE — 81003 URINALYSIS AUTO W/O SCOPE: CPT

## 2019-12-10 PROCEDURE — 82550 ASSAY OF CK (CPK): CPT

## 2019-12-10 PROCEDURE — 74177 CT ABD & PELVIS W/CONTRAST: CPT

## 2019-12-10 PROCEDURE — 71275 CT ANGIOGRAPHY CHEST: CPT

## 2019-12-10 PROCEDURE — 85379 FIBRIN DEGRADATION QUANT: CPT

## 2019-12-10 PROCEDURE — 87502 INFLUENZA DNA AMP PROBE: CPT

## 2019-12-10 PROCEDURE — 83690 ASSAY OF LIPASE: CPT

## 2019-12-10 PROCEDURE — 36415 COLL VENOUS BLD VENIPUNCTURE: CPT

## 2019-12-10 PROCEDURE — 85610 PROTHROMBIN TIME: CPT

## 2019-12-10 PROCEDURE — 82150 ASSAY OF AMYLASE: CPT

## 2019-12-10 PROCEDURE — 83735 ASSAY OF MAGNESIUM: CPT

## 2019-12-10 PROCEDURE — 6360000004 HC RX CONTRAST MEDICATION: Performed by: STUDENT IN AN ORGANIZED HEALTH CARE EDUCATION/TRAINING PROGRAM

## 2019-12-10 PROCEDURE — 99212 OFFICE O/P EST SF 10 MIN: CPT | Performed by: NURSE PRACTITIONER

## 2019-12-10 PROCEDURE — 99285 EMERGENCY DEPT VISIT HI MDM: CPT

## 2019-12-10 PROCEDURE — 85730 THROMBOPLASTIN TIME PARTIAL: CPT

## 2019-12-10 PROCEDURE — 80053 COMPREHEN METABOLIC PANEL: CPT

## 2019-12-10 PROCEDURE — 84484 ASSAY OF TROPONIN QUANT: CPT

## 2019-12-10 PROCEDURE — 96374 THER/PROPH/DIAG INJ IV PUSH: CPT

## 2019-12-10 PROCEDURE — 71046 X-RAY EXAM CHEST 2 VIEWS: CPT

## 2019-12-10 PROCEDURE — 93005 ELECTROCARDIOGRAM TRACING: CPT | Performed by: STUDENT IN AN ORGANIZED HEALTH CARE EDUCATION/TRAINING PROGRAM

## 2019-12-10 PROCEDURE — 83605 ASSAY OF LACTIC ACID: CPT

## 2019-12-10 RX ORDER — ONDANSETRON 2 MG/ML
4 INJECTION INTRAMUSCULAR; INTRAVENOUS EVERY 6 HOURS PRN
Status: DISCONTINUED | OUTPATIENT
Start: 2019-12-10 | End: 2019-12-11 | Stop reason: HOSPADM

## 2019-12-10 RX ORDER — ASPIRIN 81 MG/1
81 TABLET, CHEWABLE ORAL DAILY
Status: DISCONTINUED | OUTPATIENT
Start: 2019-12-11 | End: 2019-12-11 | Stop reason: HOSPADM

## 2019-12-10 RX ORDER — SODIUM CHLORIDE 0.9 % (FLUSH) 0.9 %
10 SYRINGE (ML) INJECTION PRN
Status: DISCONTINUED | OUTPATIENT
Start: 2019-12-10 | End: 2019-12-11 | Stop reason: HOSPADM

## 2019-12-10 RX ORDER — PROMETHAZINE HYDROCHLORIDE 25 MG/ML
25 INJECTION, SOLUTION INTRAMUSCULAR; INTRAVENOUS ONCE
Status: COMPLETED | OUTPATIENT
Start: 2019-12-10 | End: 2019-12-10

## 2019-12-10 RX ORDER — KETOROLAC TROMETHAMINE 30 MG/ML
30 INJECTION, SOLUTION INTRAMUSCULAR; INTRAVENOUS EVERY 6 HOURS PRN
Status: DISCONTINUED | OUTPATIENT
Start: 2019-12-10 | End: 2019-12-11 | Stop reason: HOSPADM

## 2019-12-10 RX ORDER — ONDANSETRON 2 MG/ML
4 INJECTION INTRAMUSCULAR; INTRAVENOUS ONCE
Status: COMPLETED | OUTPATIENT
Start: 2019-12-10 | End: 2019-12-10

## 2019-12-10 RX ORDER — ACETAMINOPHEN 325 MG/1
650 TABLET ORAL EVERY 4 HOURS PRN
Status: DISCONTINUED | OUTPATIENT
Start: 2019-12-10 | End: 2019-12-11 | Stop reason: HOSPADM

## 2019-12-10 RX ORDER — ATORVASTATIN CALCIUM 40 MG/1
40 TABLET, FILM COATED ORAL NIGHTLY
Status: CANCELLED | OUTPATIENT
Start: 2019-12-10

## 2019-12-10 RX ORDER — NITROGLYCERIN 0.4 MG/1
0.4 TABLET SUBLINGUAL EVERY 5 MIN PRN
Status: DISCONTINUED | OUTPATIENT
Start: 2019-12-10 | End: 2019-12-11 | Stop reason: HOSPADM

## 2019-12-10 RX ORDER — DEXTROSE MONOHYDRATE 50 MG/ML
100 INJECTION, SOLUTION INTRAVENOUS PRN
Status: DISCONTINUED | OUTPATIENT
Start: 2019-12-10 | End: 2019-12-11 | Stop reason: HOSPADM

## 2019-12-10 RX ORDER — ASPIRIN 81 MG/1
162 TABLET, CHEWABLE ORAL ONCE
Status: COMPLETED | OUTPATIENT
Start: 2019-12-10 | End: 2019-12-10

## 2019-12-10 RX ORDER — SODIUM CHLORIDE 450 MG/100ML
INJECTION, SOLUTION INTRAVENOUS ONCE
Status: COMPLETED | OUTPATIENT
Start: 2019-12-10 | End: 2019-12-10

## 2019-12-10 RX ORDER — DEXTROSE MONOHYDRATE 25 G/50ML
12.5 INJECTION, SOLUTION INTRAVENOUS PRN
Status: DISCONTINUED | OUTPATIENT
Start: 2019-12-10 | End: 2019-12-11 | Stop reason: HOSPADM

## 2019-12-10 RX ORDER — SODIUM CHLORIDE 0.9 % (FLUSH) 0.9 %
10 SYRINGE (ML) INJECTION EVERY 12 HOURS SCHEDULED
Status: DISCONTINUED | OUTPATIENT
Start: 2019-12-10 | End: 2019-12-11 | Stop reason: HOSPADM

## 2019-12-10 RX ORDER — NICOTINE POLACRILEX 4 MG
15 LOZENGE BUCCAL PRN
Status: DISCONTINUED | OUTPATIENT
Start: 2019-12-10 | End: 2019-12-11 | Stop reason: HOSPADM

## 2019-12-10 RX ORDER — LISINOPRIL AND HYDROCHLOROTHIAZIDE 20; 12.5 MG/1; MG/1
1 TABLET ORAL DAILY
Status: DISCONTINUED | OUTPATIENT
Start: 2019-12-11 | End: 2019-12-11 | Stop reason: HOSPADM

## 2019-12-10 RX ORDER — FENTANYL CITRATE 50 UG/ML
50 INJECTION, SOLUTION INTRAMUSCULAR; INTRAVENOUS ONCE
Status: COMPLETED | OUTPATIENT
Start: 2019-12-10 | End: 2019-12-10

## 2019-12-10 RX ORDER — AMLODIPINE BESYLATE 2.5 MG/1
2.5 TABLET ORAL DAILY
Status: DISCONTINUED | OUTPATIENT
Start: 2019-12-11 | End: 2019-12-11 | Stop reason: HOSPADM

## 2019-12-10 RX ADMIN — IOPAMIDOL 100 ML: 755 INJECTION, SOLUTION INTRAVENOUS at 16:14

## 2019-12-10 RX ADMIN — SODIUM CHLORIDE: 4.5 INJECTION, SOLUTION INTRAVENOUS at 20:41

## 2019-12-10 RX ADMIN — Medication 10 ML: at 20:41

## 2019-12-10 RX ADMIN — ASPIRIN 81 MG 162 MG: 81 TABLET ORAL at 17:18

## 2019-12-10 RX ADMIN — PROMETHAZINE HYDROCHLORIDE 25 MG: 25 INJECTION, SOLUTION INTRAMUSCULAR; INTRAVENOUS at 14:39

## 2019-12-10 RX ADMIN — FENTANYL CITRATE 50 MCG: 50 INJECTION, SOLUTION INTRAMUSCULAR; INTRAVENOUS at 15:39

## 2019-12-10 RX ADMIN — ONDANSETRON 4 MG: 2 INJECTION INTRAMUSCULAR; INTRAVENOUS at 15:39

## 2019-12-10 RX ADMIN — ENOXAPARIN SODIUM 40 MG: 40 INJECTION SUBCUTANEOUS at 20:40

## 2019-12-10 ASSESSMENT — ENCOUNTER SYMPTOMS
ABDOMINAL PAIN: 1
BLOOD IN STOOL: 0
SHORTNESS OF BREATH: 1
TROUBLE SWALLOWING: 0
CONSTIPATION: 0
BACK PAIN: 0
VOMITING: 1
DIARRHEA: 1
ABDOMINAL DISTENTION: 1
ABDOMINAL PAIN: 1
NAUSEA: 1
DIARRHEA: 1
SINUS PRESSURE: 0
CHEST TIGHTNESS: 0
COUGH: 0
NAUSEA: 1
VOMITING: 1
BACK PAIN: 0

## 2019-12-10 ASSESSMENT — PAIN DESCRIPTION - LOCATION
LOCATION: BACK
LOCATION: BACK

## 2019-12-10 ASSESSMENT — PAIN SCALES - GENERAL
PAINLEVEL_OUTOF10: 4
PAINLEVEL_OUTOF10: 7
PAINLEVEL_OUTOF10: 7

## 2019-12-10 ASSESSMENT — PAIN DESCRIPTION - ORIENTATION: ORIENTATION: UPPER

## 2019-12-10 ASSESSMENT — HEART SCORE: ECG: 1

## 2019-12-11 VITALS
HEART RATE: 99 BPM | TEMPERATURE: 98.1 F | HEIGHT: 74 IN | OXYGEN SATURATION: 97 % | SYSTOLIC BLOOD PRESSURE: 143 MMHG | BODY MASS INDEX: 25.89 KG/M2 | RESPIRATION RATE: 16 BRPM | WEIGHT: 201.72 LBS | DIASTOLIC BLOOD PRESSURE: 79 MMHG

## 2019-12-11 LAB
AMYLASE: 41 U/L (ref 22–93)
ANION GAP SERPL CALCULATED.3IONS-SCNC: 13 MEQ/L (ref 9–15)
BILIRUBIN URINE: NEGATIVE
BLOOD, URINE: NEGATIVE
BUN BLDV-MCNC: 23 MG/DL (ref 8–23)
CALCIUM SERPL-MCNC: 8.3 MG/DL (ref 8.5–9.9)
CHLORIDE BLD-SCNC: 98 MEQ/L (ref 95–107)
CLARITY: CLEAR
CO2: 25 MEQ/L (ref 20–31)
COLOR: ABNORMAL
CREAT SERPL-MCNC: 0.62 MG/DL (ref 0.7–1.2)
EKG ATRIAL RATE: 100 BPM
EKG ATRIAL RATE: 91 BPM
EKG P AXIS: 59 DEGREES
EKG P AXIS: 62 DEGREES
EKG P-R INTERVAL: 144 MS
EKG P-R INTERVAL: 150 MS
EKG Q-T INTERVAL: 354 MS
EKG Q-T INTERVAL: 370 MS
EKG QRS DURATION: 88 MS
EKG QRS DURATION: 96 MS
EKG QTC CALCULATION (BAZETT): 455 MS
EKG QTC CALCULATION (BAZETT): 456 MS
EKG R AXIS: 40 DEGREES
EKG R AXIS: 49 DEGREES
EKG T AXIS: 21 DEGREES
EKG T AXIS: 55 DEGREES
EKG VENTRICULAR RATE: 100 BPM
EKG VENTRICULAR RATE: 91 BPM
GFR AFRICAN AMERICAN: >60
GFR AFRICAN AMERICAN: >60
GFR NON-AFRICAN AMERICAN: >60
GFR NON-AFRICAN AMERICAN: >60
GLUCOSE BLD-MCNC: 143 MG/DL (ref 60–115)
GLUCOSE BLD-MCNC: 145 MG/DL (ref 60–115)
GLUCOSE BLD-MCNC: 150 MG/DL (ref 70–99)
GLUCOSE BLD-MCNC: 162 MG/DL (ref 60–115)
GLUCOSE URINE: >=1000 MG/DL
HBA1C MFR BLD: 8.4 % (ref 4.8–5.9)
HCT VFR BLD CALC: 39.3 % (ref 42–52)
HEMOGLOBIN: 13.2 G/DL (ref 14–18)
KETONES, URINE: ABNORMAL MG/DL
LACTIC ACID: 1.2 MMOL/L (ref 0.5–2.2)
LEUKOCYTE ESTERASE, URINE: NEGATIVE
LIPASE: 39 U/L (ref 12–95)
LV EF: 65 %
LVEF MODALITY: NORMAL
MAGNESIUM: 1.8 MG/DL (ref 1.7–2.4)
MCH RBC QN AUTO: 28.8 PG (ref 27–31.3)
MCHC RBC AUTO-ENTMCNC: 33.7 % (ref 33–37)
MCV RBC AUTO: 85.5 FL (ref 80–100)
NITRITE, URINE: NEGATIVE
PDW BLD-RTO: 14 % (ref 11.5–14.5)
PERFORMED ON: ABNORMAL
PERFORMED ON: NORMAL
PH UA: 5 (ref 5–9)
PLATELET # BLD: 119 K/UL (ref 130–400)
POC CREATININE: 0.8 MG/DL (ref 0.8–1.3)
POC SAMPLE TYPE: NORMAL
POTASSIUM REFLEX MAGNESIUM: 2.9 MEQ/L (ref 3.4–4.9)
PROTEIN UA: NEGATIVE MG/DL
RBC # BLD: 4.59 M/UL (ref 4.7–6.1)
SLIDE REVIEW: ABNORMAL
SODIUM BLD-SCNC: 136 MEQ/L (ref 135–144)
SPECIFIC GRAVITY UA: 1.06 (ref 1–1.03)
URINE REFLEX TO CULTURE: ABNORMAL
UROBILINOGEN, URINE: 0.2 E.U./DL
WBC # BLD: 6.8 K/UL (ref 4.8–10.8)

## 2019-12-11 PROCEDURE — 36415 COLL VENOUS BLD VENIPUNCTURE: CPT

## 2019-12-11 PROCEDURE — 6360000002 HC RX W HCPCS: Performed by: INTERNAL MEDICINE

## 2019-12-11 PROCEDURE — 6370000000 HC RX 637 (ALT 250 FOR IP): Performed by: INTERNAL MEDICINE

## 2019-12-11 PROCEDURE — 6370000000 HC RX 637 (ALT 250 FOR IP): Performed by: NURSE PRACTITIONER

## 2019-12-11 PROCEDURE — 83036 HEMOGLOBIN GLYCOSYLATED A1C: CPT

## 2019-12-11 PROCEDURE — 99255 IP/OBS CONSLTJ NEW/EST HI 80: CPT | Performed by: INTERNAL MEDICINE

## 2019-12-11 PROCEDURE — G0378 HOSPITAL OBSERVATION PER HR: HCPCS

## 2019-12-11 PROCEDURE — 93306 TTE W/DOPPLER COMPLETE: CPT

## 2019-12-11 PROCEDURE — APPNB45 APP NON BILLABLE 31-45 MINUTES: Performed by: PHYSICIAN ASSISTANT

## 2019-12-11 PROCEDURE — 80048 BASIC METABOLIC PNL TOTAL CA: CPT

## 2019-12-11 PROCEDURE — 83605 ASSAY OF LACTIC ACID: CPT

## 2019-12-11 PROCEDURE — 82150 ASSAY OF AMYLASE: CPT

## 2019-12-11 PROCEDURE — 83690 ASSAY OF LIPASE: CPT

## 2019-12-11 PROCEDURE — 93005 ELECTROCARDIOGRAM TRACING: CPT | Performed by: NURSE PRACTITIONER

## 2019-12-11 PROCEDURE — 6360000002 HC RX W HCPCS: Performed by: NURSE PRACTITIONER

## 2019-12-11 PROCEDURE — 83735 ASSAY OF MAGNESIUM: CPT

## 2019-12-11 PROCEDURE — 96372 THER/PROPH/DIAG INJ SC/IM: CPT

## 2019-12-11 PROCEDURE — 85027 COMPLETE CBC AUTOMATED: CPT

## 2019-12-11 RX ORDER — POTASSIUM CHLORIDE 20 MEQ/1
60 TABLET, EXTENDED RELEASE ORAL ONCE
Status: COMPLETED | OUTPATIENT
Start: 2019-12-11 | End: 2019-12-11

## 2019-12-11 RX ORDER — POTASSIUM CHLORIDE 7.45 MG/ML
10 INJECTION INTRAVENOUS
Status: COMPLETED | OUTPATIENT
Start: 2019-12-11 | End: 2019-12-11

## 2019-12-11 RX ADMIN — ASPIRIN 81 MG 81 MG: 81 TABLET ORAL at 08:51

## 2019-12-11 RX ADMIN — ENOXAPARIN SODIUM 40 MG: 40 INJECTION SUBCUTANEOUS at 08:51

## 2019-12-11 RX ADMIN — POTASSIUM CHLORIDE 10 MEQ: 7.46 INJECTION, SOLUTION INTRAVENOUS at 12:37

## 2019-12-11 RX ADMIN — POTASSIUM CHLORIDE 60 MEQ: 20 TABLET, EXTENDED RELEASE ORAL at 10:32

## 2019-12-11 RX ADMIN — POTASSIUM CHLORIDE 10 MEQ: 7.46 INJECTION, SOLUTION INTRAVENOUS at 14:00

## 2019-12-11 RX ADMIN — AMLODIPINE BESYLATE 2.5 MG: 2.5 TABLET ORAL at 08:50

## 2019-12-11 RX ADMIN — POTASSIUM CHLORIDE 10 MEQ: 7.46 INJECTION, SOLUTION INTRAVENOUS at 11:28

## 2019-12-11 RX ADMIN — METFORMIN HYDROCHLORIDE 1000 MG: 500 TABLET ORAL at 08:50

## 2019-12-11 RX ADMIN — LISINOPRIL AND HYDROCHLOROTHIAZIDE 1 TABLET: 12.5; 2 TABLET ORAL at 08:50

## 2019-12-11 RX ADMIN — LINAGLIPTIN 5 MG: 5 TABLET, FILM COATED ORAL at 08:52

## 2019-12-11 ASSESSMENT — ENCOUNTER SYMPTOMS
DIARRHEA: 1
VOMITING: 1
ABDOMINAL PAIN: 1
NAUSEA: 1
SHORTNESS OF BREATH: 0
CHEST TIGHTNESS: 0
ABDOMINAL DISTENTION: 1
COLOR CHANGE: 0

## 2019-12-12 PROCEDURE — 93010 ELECTROCARDIOGRAM REPORT: CPT | Performed by: INTERNAL MEDICINE

## 2019-12-17 ENCOUNTER — ANESTHESIA (OUTPATIENT)
Dept: OPERATING ROOM | Age: 64
End: 2019-12-17
Payer: COMMERCIAL

## 2019-12-17 ENCOUNTER — ANESTHESIA EVENT (OUTPATIENT)
Dept: OPERATING ROOM | Age: 64
End: 2019-12-17
Payer: COMMERCIAL

## 2019-12-17 ENCOUNTER — HOSPITAL ENCOUNTER (OUTPATIENT)
Age: 64
Setting detail: OUTPATIENT SURGERY
Discharge: HOME | End: 2019-12-17
Attending: INTERNAL MEDICINE | Admitting: INTERNAL MEDICINE
Payer: COMMERCIAL

## 2019-12-17 VITALS
TEMPERATURE: 97.9 F | WEIGHT: 200 LBS | HEIGHT: 74 IN | HEART RATE: 75 BPM | BODY MASS INDEX: 25.67 KG/M2 | RESPIRATION RATE: 16 BRPM | OXYGEN SATURATION: 99 % | DIASTOLIC BLOOD PRESSURE: 78 MMHG | SYSTOLIC BLOOD PRESSURE: 145 MMHG

## 2019-12-17 VITALS — DIASTOLIC BLOOD PRESSURE: 62 MMHG | SYSTOLIC BLOOD PRESSURE: 129 MMHG | OXYGEN SATURATION: 99 %

## 2019-12-17 LAB
GLUCOSE BLD-MCNC: 129 MG/DL (ref 60–115)
GLUCOSE BLD-MCNC: 162 MG/DL (ref 60–115)
PERFORMED ON: ABNORMAL
PERFORMED ON: ABNORMAL

## 2019-12-17 PROCEDURE — 88305 TISSUE EXAM BY PATHOLOGIST: CPT

## 2019-12-17 PROCEDURE — 7100000001 HC PACU RECOVERY - ADDTL 15 MIN: Performed by: INTERNAL MEDICINE

## 2019-12-17 PROCEDURE — 2500000003 HC RX 250 WO HCPCS: Performed by: NURSE ANESTHETIST, CERTIFIED REGISTERED

## 2019-12-17 PROCEDURE — 3700000001 HC ADD 15 MINUTES (ANESTHESIA): Performed by: INTERNAL MEDICINE

## 2019-12-17 PROCEDURE — 43242 EGD US FINE NEEDLE BX/ASPIR: CPT | Performed by: INTERNAL MEDICINE

## 2019-12-17 PROCEDURE — 3609018500 HC EGD US SCOPE W/ADJACENT STRUCTURES: Performed by: INTERNAL MEDICINE

## 2019-12-17 PROCEDURE — 2580000003 HC RX 258: Performed by: NURSE ANESTHETIST, CERTIFIED REGISTERED

## 2019-12-17 PROCEDURE — 7100000011 HC PHASE II RECOVERY - ADDTL 15 MIN: Performed by: INTERNAL MEDICINE

## 2019-12-17 PROCEDURE — 7100000010 HC PHASE II RECOVERY - FIRST 15 MIN: Performed by: INTERNAL MEDICINE

## 2019-12-17 PROCEDURE — 3700000000 HC ANESTHESIA ATTENDED CARE: Performed by: INTERNAL MEDICINE

## 2019-12-17 PROCEDURE — 2709999900 HC NON-CHARGEABLE SUPPLY: Performed by: INTERNAL MEDICINE

## 2019-12-17 PROCEDURE — C1753 CATH, INTRAVAS ULTRASOUND: HCPCS | Performed by: INTERNAL MEDICINE

## 2019-12-17 PROCEDURE — 7100000000 HC PACU RECOVERY - FIRST 15 MIN: Performed by: INTERNAL MEDICINE

## 2019-12-17 PROCEDURE — 88342 IMHCHEM/IMCYTCHM 1ST ANTB: CPT

## 2019-12-17 PROCEDURE — 88173 CYTOPATH EVAL FNA REPORT: CPT

## 2019-12-17 PROCEDURE — 88341 IMHCHEM/IMCYTCHM EA ADD ANTB: CPT

## 2019-12-17 PROCEDURE — 6360000002 HC RX W HCPCS: Performed by: NURSE ANESTHETIST, CERTIFIED REGISTERED

## 2019-12-17 RX ORDER — LIDOCAINE HYDROCHLORIDE 10 MG/ML
1 INJECTION, SOLUTION EPIDURAL; INFILTRATION; INTRACAUDAL; PERINEURAL
Status: DISCONTINUED | OUTPATIENT
Start: 2019-12-17 | End: 2019-12-17 | Stop reason: HOSPADM

## 2019-12-17 RX ORDER — SODIUM CHLORIDE 0.9 % (FLUSH) 0.9 %
10 SYRINGE (ML) INJECTION PRN
Status: DISCONTINUED | OUTPATIENT
Start: 2019-12-17 | End: 2019-12-17 | Stop reason: HOSPADM

## 2019-12-17 RX ORDER — ONDANSETRON 2 MG/ML
4 INJECTION INTRAMUSCULAR; INTRAVENOUS
Status: DISCONTINUED | OUTPATIENT
Start: 2019-12-17 | End: 2019-12-17 | Stop reason: HOSPADM

## 2019-12-17 RX ORDER — LIDOCAINE HYDROCHLORIDE 20 MG/ML
INJECTION, SOLUTION INFILTRATION; PERINEURAL PRN
Status: DISCONTINUED | OUTPATIENT
Start: 2019-12-17 | End: 2019-12-17 | Stop reason: SDUPTHER

## 2019-12-17 RX ORDER — SODIUM CHLORIDE 0.9 % (FLUSH) 0.9 %
10 SYRINGE (ML) INJECTION EVERY 12 HOURS SCHEDULED
Status: DISCONTINUED | OUTPATIENT
Start: 2019-12-17 | End: 2019-12-17 | Stop reason: HOSPADM

## 2019-12-17 RX ORDER — MEPERIDINE HYDROCHLORIDE 25 MG/ML
12.5 INJECTION INTRAMUSCULAR; INTRAVENOUS; SUBCUTANEOUS EVERY 5 MIN PRN
Status: DISCONTINUED | OUTPATIENT
Start: 2019-12-17 | End: 2019-12-17 | Stop reason: HOSPADM

## 2019-12-17 RX ORDER — SODIUM CHLORIDE 9 MG/ML
INJECTION, SOLUTION INTRAVENOUS CONTINUOUS PRN
Status: DISCONTINUED | OUTPATIENT
Start: 2019-12-17 | End: 2019-12-17 | Stop reason: SDUPTHER

## 2019-12-17 RX ORDER — DIPHENHYDRAMINE HYDROCHLORIDE 50 MG/ML
12.5 INJECTION INTRAMUSCULAR; INTRAVENOUS
Status: DISCONTINUED | OUTPATIENT
Start: 2019-12-17 | End: 2019-12-17 | Stop reason: HOSPADM

## 2019-12-17 RX ORDER — PROPOFOL 10 MG/ML
INJECTION, EMULSION INTRAVENOUS PRN
Status: DISCONTINUED | OUTPATIENT
Start: 2019-12-17 | End: 2019-12-17 | Stop reason: SDUPTHER

## 2019-12-17 RX ORDER — SODIUM CHLORIDE 9 MG/ML
INJECTION, SOLUTION INTRAVENOUS CONTINUOUS
Status: DISCONTINUED | OUTPATIENT
Start: 2019-12-17 | End: 2019-12-17 | Stop reason: HOSPADM

## 2019-12-17 RX ORDER — SODIUM CHLORIDE, SODIUM LACTATE, POTASSIUM CHLORIDE, CALCIUM CHLORIDE 600; 310; 30; 20 MG/100ML; MG/100ML; MG/100ML; MG/100ML
INJECTION, SOLUTION INTRAVENOUS CONTINUOUS
Status: CANCELLED | OUTPATIENT
Start: 2019-12-17

## 2019-12-17 RX ORDER — METOCLOPRAMIDE HYDROCHLORIDE 5 MG/ML
10 INJECTION INTRAMUSCULAR; INTRAVENOUS
Status: DISCONTINUED | OUTPATIENT
Start: 2019-12-17 | End: 2019-12-17 | Stop reason: HOSPADM

## 2019-12-17 RX ADMIN — SODIUM CHLORIDE: 9 INJECTION, SOLUTION INTRAVENOUS at 14:19

## 2019-12-17 RX ADMIN — PROPOFOL 1450 MG: 10 INJECTION, EMULSION INTRAVENOUS at 14:22

## 2019-12-17 RX ADMIN — LIDOCAINE HYDROCHLORIDE 60 MG: 20 INJECTION, SOLUTION INFILTRATION; PERINEURAL at 14:22

## 2019-12-17 RX ADMIN — SODIUM CHLORIDE: 9 INJECTION, SOLUTION INTRAVENOUS at 14:06

## 2019-12-17 ASSESSMENT — PULMONARY FUNCTION TESTS
PIF_VALUE: 0
PIF_VALUE: 1
PIF_VALUE: 0
PIF_VALUE: 1
PIF_VALUE: 0

## 2019-12-20 ENCOUNTER — TELEPHONE (OUTPATIENT)
Dept: GASTROENTEROLOGY | Age: 64
End: 2019-12-20

## 2020-01-06 NOTE — PROGRESS NOTES
[facility]) Surgical Oncology  Jayshree Moreno    HPI: Dear Dr. Andrey Wright, Thank you for referring Mr. Kerri Arenas for management of a Neuroendocrine tumor. Angeles Basilio was seen in the ER on December 12th with complaint's of abdominal pain that radiates to his back, nausea, vomiting and loose stool. Patient also states he was having chest heaviness that goes into between his shoulder blades with SOB. A CT ABD/Pelvis showed a 2 cm soft tissue density nodule with small calcifications inferiorly adjacent to the tail of the pancrease and adjacent stomach. Patient had an EUS by Dr Andrey Wright on 12/17/19 and cytology confirmed a neuroendocrine tumor. Patient states having bloating for last 6 months. Occasional RUQ pain with some foods. Having intermittent diarrhea for last 6 months. Past Medical History:   Diagnosis Date    History of skin cancer     HTN (hypertension)     Type II or unspecified type diabetes mellitus without mention of complication, not stated as uncontrolled      Past Surgical History:   Procedure Laterality Date    COLONOSCOPY  02/20/2015    DR Dalia Bishop    ENDOSCOPIC ULTRASOUND (LOWER) N/A 12/17/2019    EUS performed by Lolita Medellin MD at [address] [place] [address]       Social:   Social History     Tobacco Use    Smoking status: Never Smoker    Smokeless tobacco: Never Used   Substance Use Topics    Alcohol use: No    Drug use: No     Family History   Problem Relation Age of Onset    Cancer Mother         lung cancer     Allergies: Patient has no known allergies. Current Outpatient Medications   Medication Sig Dispense Refill    metFORMIN (GLUCOPHAGE) 1000 MG tablet TAKE 1 TABLET BY MOUTH TWICE DAILY WITH MEALS. 60 tablet 5    amLODIPine (NORVASC) 2.5 MG tablet TAKE 1 TABLET BY MOUTH DAILY. 30 tablet 5    lisinopril-hydrochlorothiazide (PRINZIDE;ZESTORETIC) 20-12.5 MG per tablet TAKE 1 TABLET BY MOUTH TWICE DAILY. 60 tablet 5    meloxicam (MOBIC) 15 MG tablet TAKE 1 TABLET BY MOUTH DAILY.  30 tablet 5  SITagliptin (JANUVIA) 100 MG tablet Take 1 tablet by mouth daily 30 tablet 5     No current facility-administered medications for this visit. Review of Systems: See HPI  All other systems reviewed and are negative. Vitals:    01/07/20 1133 01/07/20 1137   BP: (!) 157/90 (!) 151/83   Pulse: 92    Temp: 98.4 °F (36.9 °C)    TempSrc: Oral    SpO2: 96%    Weight: 201 lb (91.2 kg)    Height: 6' 1.5\" (1.867 m)      Wt Readings from Last 3 Encounters:   01/07/20 201 lb (91.2 kg)   12/17/19 200 lb (90.7 kg)   12/10/19 201 lb 11.5 oz (91.5 kg)     Body mass index is 26.16 kg/m². Physical Exam:   Constitutional: He is oriented to person, place, and time. He appears well-developed and well-nourished. No distress. HENT: Moist mucus membranes  Head: Normocephalic and atraumatic. Eyes: EOM are normal. Pupils are equal, round, and no icterus. Neck: Normal range of motion. Neck supple. No thyromegaly present. Cardiovascular: Normal rate and regular rhythm by peripheral pulses. Pulmonary/Chest: No respiratory distress. Bilateral symmetrical chest rise. Abdominal: Soft. He exhibits no distension and no mass. There is no hepatosplenomegaly. No tenderness. Extremities: He exhibits no bilateral edema. Lymphadenopathy: He has no bilateral cervical adenopathy. Neurological: Grossly intact motor and sensory exam.   Skin: Skin is warm and dry. Psychiatric: He has a normal mood and affect. Appropriate thought process and judgement capacity. Cytology 12/17/19:     A.  AND B.  FNA, PANCREAS:   WELL DIFFERENTIATED NEUROENDOCRINE TUMOR    Pathology FINAL DIAGNOSIS:    FNA PANCREAS:  WELL-DIFFERENTIATED NEUROENDOCRINE TUMOR    CT ABD/Pelvis 12/10/19 FINAL IMPRESSION:      NO EVIDENCE OF PULMONARY EMBOLI OR ACUTE FINDINGS IN THE CHEST.     NONSPECIFIC 2 CM SOFT TISSUE DENSITY NODULE WITH SMALL CALCIFICATIONS INFERIORLY ADJACENT TO THE TAIL OF PANCREAS AND ADJACENT STOMACH.     MOST LIKELY POSSIBILITIES ARE A JUST TUMOR, PANCREATIC NEOPLASM OR LESS LIKELY SPLENULE.     MILD FATTY INFILTRATION OF THE LIVER WITH FINDINGS SUGGESTING CIRRHOSIS.     MILD SPLENOMEGALY. Assessment/Plan:   Diagnosis Orders   1. Neuroendocrine tumor       I discussed with the Maico Sullivan about the diagnosis and management options. Based on the available information patient appears to have a neuroendocrine tumor. I explained him about laparoscopic and open pancreatic resection and splenectomy. Discussed possible drain placement. Printed information was given. All the complications including bleeding, infection, clot's, leak, pneumonia, heart attack and stroke were explained. Risks, benefits and alternatives of surgery explained to the patient. All the questions of the patient are answered to his apparent satisfaction. Patient verbalized understanding of the management plan. Pathology and imaging reviewed with patient and his wife. Discussed splenectomy vaccines prior to or after surgery. Encouraged patient to have a colonoscopy. Discussed a Medical Oncology referral.  Will obtain blood work. Discussed obtaining an Octreotide or PET scan. Follow up with Dr Birdie Gomez to evaluate liver cirrhosis for better risk stratification. Dany Kelly MD  Surgical Oncologist    Sharrell Koyanagi RN, am scribing for and in the presence of Dr Dany Kelly. Jackie Wakefield RN    I, Dr. Dany Kelly, personally performed the services described in this documentation as scribed by Jackie Wakefield RN in my presence, and it is both accurate and complete.      Dany Kelly MD  Surgery Attending

## 2020-01-07 ENCOUNTER — OFFICE VISIT (OUTPATIENT)
Dept: SURGERY | Age: 65
End: 2020-01-07
Payer: COMMERCIAL

## 2020-01-07 VITALS
DIASTOLIC BLOOD PRESSURE: 83 MMHG | SYSTOLIC BLOOD PRESSURE: 151 MMHG | HEIGHT: 74 IN | WEIGHT: 201 LBS | HEART RATE: 92 BPM | BODY MASS INDEX: 25.8 KG/M2 | OXYGEN SATURATION: 96 % | TEMPERATURE: 98.4 F

## 2020-01-07 DIAGNOSIS — D3A.8 NEUROENDOCRINE TUMOR: ICD-10-CM

## 2020-01-07 LAB
REASON FOR REJECTION: NORMAL
REJECTED TEST: NORMAL

## 2020-01-07 PROCEDURE — G8419 CALC BMI OUT NRM PARAM NOF/U: HCPCS | Performed by: SURGERY

## 2020-01-07 PROCEDURE — G8482 FLU IMMUNIZE ORDER/ADMIN: HCPCS | Performed by: SURGERY

## 2020-01-07 PROCEDURE — 99245 OFF/OP CONSLTJ NEW/EST HI 55: CPT | Performed by: SURGERY

## 2020-01-07 PROCEDURE — G8427 DOCREV CUR MEDS BY ELIG CLIN: HCPCS | Performed by: SURGERY

## 2020-01-08 ENCOUNTER — TELEPHONE (OUTPATIENT)
Dept: SURGERY | Age: 65
End: 2020-01-08

## 2020-01-08 NOTE — TELEPHONE ENCOUNTER
Will inform Dr Yayo Crabtree that the only order being processed is the Chromogranin A. The Glucagon and the Vasoactive Intestinal Peptide were collected in the wrong tubes.

## 2020-01-08 NOTE — TELEPHONE ENCOUNTER
Please contact Melinda Rose at the [facility] # [phone] regarding wrong tube used for specimen.

## 2020-01-10 ENCOUNTER — OFFICE VISIT (OUTPATIENT)
Dept: FAMILY MEDICINE CLINIC | Age: 65
End: 2020-01-10
Payer: COMMERCIAL

## 2020-01-10 VITALS
DIASTOLIC BLOOD PRESSURE: 80 MMHG | WEIGHT: 200 LBS | TEMPERATURE: 97.9 F | SYSTOLIC BLOOD PRESSURE: 144 MMHG | BODY MASS INDEX: 25.67 KG/M2 | HEART RATE: 82 BPM | OXYGEN SATURATION: 98 % | HEIGHT: 74 IN

## 2020-01-10 LAB — CHROMOGRANIN A: 61 NG/ML (ref 0–160)

## 2020-01-10 PROCEDURE — 99213 OFFICE O/P EST LOW 20 MIN: CPT | Performed by: NURSE PRACTITIONER

## 2020-01-10 ASSESSMENT — ENCOUNTER SYMPTOMS
NAUSEA: 1
COUGH: 0
SHORTNESS OF BREATH: 0

## 2020-01-10 ASSESSMENT — PATIENT HEALTH QUESTIONNAIRE - PHQ9: DEPRESSION UNABLE TO ASSESS: URGENT/EMERGENT SITUATION

## 2020-01-10 NOTE — PROGRESS NOTES
Subjective  Chief Complaint   Patient presents with    Referral - General     pt found out that he has pancreatic cancer and needs to consult with you on referral for gastro and a liver specialist.       HPI    Pt here to discuss pancreatic surgery. Diagnosed with pancreatic CA via CT scan in December. Saw specialist in [place] who will be doing a surgery including a splenectomy. First, he wants pt to have colonoscopy and liver evaluation to ensure no colon or liver involvement. Pt needs referral for this.     Past Medical History:   Diagnosis Date    History of skin cancer     HTN (hypertension)     Type II or unspecified type diabetes mellitus without mention of complication, not stated as uncontrolled      Patient Active Problem List    Diagnosis Date Noted    Abdominal pain 12/10/2019    TIA (transient ischemic attack) 09/30/2014    Allergic rhinosinusitis 09/15/2014    Lumbar paraspinal muscle spasm 05/20/2014    HTN (hypertension)     Type 2 diabetes mellitus without complication [facility])      Past Surgical History:   Procedure Laterality Date    COLONOSCOPY  02/20/2015    DR Medardo Campa    ENDOSCOPIC ULTRASOUND (LOWER) N/A 12/17/2019    EUS performed by Ivory Tovar MD at [address]       Family History   Problem Relation Age of Onset    Cancer Mother         lung cancer     Social History     Socioeconomic History    Marital status:      Spouse name: None    Number of children: None    Years of education: None    Highest education level: None   Occupational History    None   Social Needs    Financial resource strain: None    Food insecurity:     Worry: None     Inability: None    Transportation needs:     Medical: None     Non-medical: None   Tobacco Use    Smoking status: Never Smoker    Smokeless tobacco: Never Used   Substance and Sexual Activity    Alcohol use: No    Drug use: No    Sexual activity: Yes     Partners: Female   Lifestyle    Physical 98%    Weight: 200 lb (90.7 kg)    Height: 6' 1.5\" (1.867 m)      Physical Exam  Constitutional:       General: He is not in acute distress. Appearance: Normal appearance. He is normal weight. He is not ill-appearing, toxic-appearing or diaphoretic. HENT:      Head: Normocephalic. Right Ear: External ear normal.      Left Ear: External ear normal.   Neck:      Musculoskeletal: Normal range of motion. Cardiovascular:      Rate and Rhythm: Normal rate and regular rhythm. Pulses: Normal pulses. Heart sounds: Normal heart sounds. No murmur. No friction rub. Pulmonary:      Effort: Pulmonary effort is normal. No respiratory distress. Breath sounds: Normal breath sounds. No stridor. No wheezing, rhonchi or rales. Chest:      Chest wall: No tenderness. Skin:     General: Skin is warm and dry. Capillary Refill: Capillary refill takes less than 2 seconds. Neurological:      General: No focal deficit present. Mental Status: He is alert and oriented to person, place, and time. Mental status is at baseline. Psychiatric:         Mood and Affect: Mood normal.         Behavior: Behavior normal.         Thought Content: Thought content normal.         Judgment: Judgment normal.         Assessment& Plan     Diagnosis Orders   1. Neuroendocrine tumor  Pierre Almanza MD, [facility]ology[facility]ake   2. Colon cancer screening  Pierre Almanza MD, Gastroenterology, [facility]     Referral to Dr. Franki Bailey as ordered. Fu PRN. Side effects, adverse effects of the medication prescribed today, as well as treatment plan/ rationale and result expectations have been discussed with the patient who expresses understanding and desires to proceed. Close follow up to evaluate treatment results and for coordination of care. I have reviewed the patient's medical history in detail and updated the computerized patient record.     As always, patient is advised that if symptoms worsen in any way they

## 2020-01-15 ENCOUNTER — OFFICE VISIT (OUTPATIENT)
Dept: GASTROENTEROLOGY | Age: 65
End: 2020-01-15
Payer: COMMERCIAL

## 2020-01-15 VITALS
SYSTOLIC BLOOD PRESSURE: 134 MMHG | OXYGEN SATURATION: 98 % | HEIGHT: 74 IN | HEART RATE: 77 BPM | BODY MASS INDEX: 25.93 KG/M2 | WEIGHT: 202 LBS | DIASTOLIC BLOOD PRESSURE: 80 MMHG

## 2020-01-15 DIAGNOSIS — K75.81 NASH (NONALCOHOLIC STEATOHEPATITIS): ICD-10-CM

## 2020-01-15 DIAGNOSIS — K86.89 PANCREATIC MASS: ICD-10-CM

## 2020-01-15 LAB
ALBUMIN SERPL-MCNC: 4.5 G/DL (ref 3.5–4.6)
ALP BLD-CCNC: 76 U/L (ref 35–104)
ALT SERPL-CCNC: 48 U/L (ref 0–41)
ANION GAP SERPL CALCULATED.3IONS-SCNC: 13 MEQ/L (ref 9–15)
AST SERPL-CCNC: 49 U/L (ref 0–40)
BILIRUB SERPL-MCNC: 0.7 MG/DL (ref 0.2–0.7)
BUN BLDV-MCNC: 19 MG/DL (ref 8–23)
CALCIUM SERPL-MCNC: 9.7 MG/DL (ref 8.5–9.9)
CHLORIDE BLD-SCNC: 100 MEQ/L (ref 95–107)
CO2: 27 MEQ/L (ref 20–31)
CREAT SERPL-MCNC: 0.77 MG/DL (ref 0.7–1.2)
GFR AFRICAN AMERICAN: >60
GFR NON-AFRICAN AMERICAN: >60
GLOBULIN: 3.3 G/DL (ref 2.3–3.5)
GLUCOSE BLD-MCNC: 95 MG/DL (ref 70–99)
HAV IGM SER IA-ACNC: ABNORMAL
HCT VFR BLD CALC: 40.9 % (ref 42–52)
HEMOGLOBIN: 14 G/DL (ref 14–18)
HEPATITIS B CORE IGM ANTIBODY: ABNORMAL
HEPATITIS B SURFACE ANTIGEN INTERPRETATION: ABNORMAL
HEPATITIS C ANTIBODY INTERPRETATION: ABNORMAL
HEPATITIS INTERPRETATION:: ABNORMAL
INR BLD: 1
MCH RBC QN AUTO: 29 PG (ref 27–31.3)
MCHC RBC AUTO-ENTMCNC: 34.3 % (ref 33–37)
MCV RBC AUTO: 84.8 FL (ref 80–100)
PDW BLD-RTO: 14.1 % (ref 11.5–14.5)
PLATELET # BLD: 192 K/UL (ref 130–400)
POTASSIUM SERPL-SCNC: 3.9 MEQ/L (ref 3.4–4.9)
PROTHROMBIN TIME: 13.7 SEC (ref 12.3–14.9)
RBC # BLD: 4.83 M/UL (ref 4.7–6.1)
SODIUM BLD-SCNC: 140 MEQ/L (ref 135–144)
TOTAL PROTEIN: 7.8 G/DL (ref 6.3–8)
WBC # BLD: 7.4 K/UL (ref 4.8–10.8)

## 2020-01-15 PROCEDURE — 99213 OFFICE O/P EST LOW 20 MIN: CPT | Performed by: NURSE PRACTITIONER

## 2020-01-15 RX ORDER — SODIUM, POTASSIUM,MAG SULFATES 17.5-3.13G
SOLUTION, RECONSTITUTED, ORAL ORAL
Qty: 1 BOTTLE | Refills: 0 | Status: SHIPPED | OUTPATIENT
Start: 2020-01-15 | End: 2020-02-24

## 2020-01-15 NOTE — PROGRESS NOTES
Gastroenterology Clinic Follow up Visit    Azalia Garcia  [id number]  Chief Complaint   Patient presents with    Follow-up    Abdominal Pain    Diarrhea    Nausea     HPI: Last seen in the hospital on 12/11/19 with abdominal pain, pancreatic mass, liver disease. Interval change: Patient presents to the clinic to discuss getting further work-up before proceed with with surgical intervention for a neuroendocrine tumor on the tail of the pancreas. Patient seeing Dr. Aster Watson, surgical oncologist.   Patient without any complaints at this time. No F/C. No Cp,SOB, or palpitation. Occasional RUQ pain with certain foods. Intermittent diarrhea over the past 6 months. No melena or hematochezia. HPI and A/P at last visits summarized below:  Presented to the hospital owing to abdominal pain. Patient reported that over the last week or so she been having epigastric pain associated with nausea vomiting and diarrhea. Symptoms seems has improving since admission. Patient currently denies any nausea, vomiting or diarrhea. Abdominal pain seems had resolved. Patient CT scan that was suggestive of cirrhosis. CT scan also showed lesions around 2 cm between the gastric wall and the pancreas. Review of lab work showed that patient had abnormal transaminases at least since 2017 with evidence of steatosis. Subsequently GI consult was obtained for further evaluation and management. 1-Abdominal pain, nausea, vomiting and diarrhea  Acute symptoms seems resolved at this time  Continue supportive measures, IV fluid and advance diet as tolerated.   2- Abnormal CT imaging /?  Pancreatic mass  CAT scan with contrast none clear that that showed a 2 cm soft tissue density with calcification near the tail of pancreas and adjacent to this gastric wall.?  GIST tumor  Proceed with EGD/EUS for further evaluation as outpatient   3- Radiological evidence of cirrhosis/ Early cirrhosis  Patient has no clinical findings to suggest advanced liver disease  However review of serial lab work since 2017 showed Abnnormal transaminases and subsequent reversal of AST/ALT ratio in addition to thrombocytopenia  The Apri score for the patient most suggestive of advanced disease/ cirrhosis (76% sensitive and 72% specific for cirrhosis)  Patient's lab and radiological findings suggestive of advanced liver disease  Likely etiology of advanced fibrosis will be [address] given the associated metabolic syndrome with hypertension dyslipidemia  Will proceed with correlation with FibroScan as outpatient and further work-up that may include viral further viral studies as well as other metabolic work-up. Review of Systems   All other systems reviewed and are negative. Past medical history, past surgical history, medication list, social and familyhistory reviewed    Blood pressure 134/80, pulse 77, height 6' 1.5\" (1.867 m), weight 202 lb (91.6 kg), SpO2 98 %. Physical Exam  Vitals signs reviewed. Constitutional:       General: He is not in acute distress. Appearance: He is well-developed. He is not diaphoretic. HENT:      Head: Normocephalic and atraumatic. Eyes:      General: No scleral icterus. Conjunctiva/sclera: Conjunctivae normal.      Pupils: Pupils are equal, round, and reactive to light. Neck:      Musculoskeletal: Normal range of motion and neck supple. Cardiovascular:      Rate and Rhythm: Normal rate and regular rhythm. Pulmonary:      Effort: Pulmonary effort is normal. No respiratory distress. Breath sounds: Normal breath sounds. No wheezing or rales. Chest:      Chest wall: No tenderness. Abdominal:      General: Bowel sounds are normal. There is no distension. Palpations: Abdomen is soft. There is no mass. Tenderness: There is no tenderness. There is no guarding or rebound. Comments: Slight central obesity    Genitourinary:     Comments: deferred  Musculoskeletal: Normal range of motion.    Lymphadenopathy:

## 2020-01-20 ENCOUNTER — HOSPITAL ENCOUNTER (OUTPATIENT)
Age: 65
Setting detail: OUTPATIENT SURGERY
Discharge: HOME | End: 2020-01-20
Attending: INTERNAL MEDICINE | Admitting: INTERNAL MEDICINE
Payer: COMMERCIAL

## 2020-01-20 ENCOUNTER — ANESTHESIA (OUTPATIENT)
Dept: ENDOSCOPY | Age: 65
End: 2020-01-20
Payer: COMMERCIAL

## 2020-01-20 ENCOUNTER — ANESTHESIA EVENT (OUTPATIENT)
Dept: ENDOSCOPY | Age: 65
End: 2020-01-20
Payer: COMMERCIAL

## 2020-01-20 VITALS
OXYGEN SATURATION: 98 % | RESPIRATION RATE: 21 BRPM | DIASTOLIC BLOOD PRESSURE: 78 MMHG | SYSTOLIC BLOOD PRESSURE: 126 MMHG

## 2020-01-20 VITALS
SYSTOLIC BLOOD PRESSURE: 126 MMHG | OXYGEN SATURATION: 96 % | HEIGHT: 74 IN | BODY MASS INDEX: 25.03 KG/M2 | WEIGHT: 195 LBS | TEMPERATURE: 97.6 F | DIASTOLIC BLOOD PRESSURE: 79 MMHG | HEART RATE: 77 BPM | RESPIRATION RATE: 16 BRPM

## 2020-01-20 PROCEDURE — 6360000002 HC RX W HCPCS: Performed by: NURSE ANESTHETIST, CERTIFIED REGISTERED

## 2020-01-20 PROCEDURE — 45378 DIAGNOSTIC COLONOSCOPY: CPT | Performed by: INTERNAL MEDICINE

## 2020-01-20 PROCEDURE — 2580000003 HC RX 258: Performed by: NURSE ANESTHETIST, CERTIFIED REGISTERED

## 2020-01-20 PROCEDURE — 3609027000 HC COLONOSCOPY: Performed by: INTERNAL MEDICINE

## 2020-01-20 PROCEDURE — 7100000011 HC PHASE II RECOVERY - ADDTL 15 MIN: Performed by: INTERNAL MEDICINE

## 2020-01-20 PROCEDURE — 2500000003 HC RX 250 WO HCPCS: Performed by: NURSE ANESTHETIST, CERTIFIED REGISTERED

## 2020-01-20 PROCEDURE — 3700000000 HC ANESTHESIA ATTENDED CARE: Performed by: INTERNAL MEDICINE

## 2020-01-20 PROCEDURE — 2580000003 HC RX 258: Performed by: INTERNAL MEDICINE

## 2020-01-20 PROCEDURE — 7100000010 HC PHASE II RECOVERY - FIRST 15 MIN: Performed by: INTERNAL MEDICINE

## 2020-01-20 PROCEDURE — 3700000001 HC ADD 15 MINUTES (ANESTHESIA): Performed by: INTERNAL MEDICINE

## 2020-01-20 RX ORDER — SODIUM CHLORIDE 9 MG/ML
INJECTION, SOLUTION INTRAVENOUS CONTINUOUS PRN
Status: DISCONTINUED | OUTPATIENT
Start: 2020-01-20 | End: 2020-01-20 | Stop reason: SDUPTHER

## 2020-01-20 RX ORDER — SODIUM CHLORIDE 9 MG/ML
INJECTION, SOLUTION INTRAVENOUS CONTINUOUS
Status: DISCONTINUED | OUTPATIENT
Start: 2020-01-20 | End: 2020-01-20 | Stop reason: HOSPADM

## 2020-01-20 RX ORDER — LIDOCAINE HYDROCHLORIDE 10 MG/ML
1 INJECTION, SOLUTION EPIDURAL; INFILTRATION; INTRACAUDAL; PERINEURAL
Status: DISCONTINUED | OUTPATIENT
Start: 2020-01-20 | End: 2020-01-20 | Stop reason: HOSPADM

## 2020-01-20 RX ORDER — PROPOFOL 10 MG/ML
INJECTION, EMULSION INTRAVENOUS PRN
Status: DISCONTINUED | OUTPATIENT
Start: 2020-01-20 | End: 2020-01-20 | Stop reason: SDUPTHER

## 2020-01-20 RX ORDER — ONDANSETRON 2 MG/ML
4 INJECTION INTRAMUSCULAR; INTRAVENOUS
Status: DISCONTINUED | OUTPATIENT
Start: 2020-01-20 | End: 2020-01-20 | Stop reason: HOSPADM

## 2020-01-20 RX ORDER — SODIUM CHLORIDE 0.9 % (FLUSH) 0.9 %
10 SYRINGE (ML) INJECTION EVERY 12 HOURS SCHEDULED
Status: DISCONTINUED | OUTPATIENT
Start: 2020-01-20 | End: 2020-01-20 | Stop reason: HOSPADM

## 2020-01-20 RX ORDER — LIDOCAINE HYDROCHLORIDE 20 MG/ML
INJECTION, SOLUTION INFILTRATION; PERINEURAL PRN
Status: DISCONTINUED | OUTPATIENT
Start: 2020-01-20 | End: 2020-01-20 | Stop reason: SDUPTHER

## 2020-01-20 RX ORDER — SODIUM CHLORIDE 0.9 % (FLUSH) 0.9 %
10 SYRINGE (ML) INJECTION PRN
Status: DISCONTINUED | OUTPATIENT
Start: 2020-01-20 | End: 2020-01-20 | Stop reason: HOSPADM

## 2020-01-20 RX ADMIN — SODIUM CHLORIDE: 9 INJECTION, SOLUTION INTRAVENOUS at 09:27

## 2020-01-20 RX ADMIN — SODIUM CHLORIDE: 9 INJECTION, SOLUTION INTRAVENOUS at 09:06

## 2020-01-20 RX ADMIN — PROPOFOL 400 MG: 10 INJECTION, EMULSION INTRAVENOUS at 09:33

## 2020-01-20 RX ADMIN — LIDOCAINE HYDROCHLORIDE 60 MG: 20 INJECTION, SOLUTION INFILTRATION; PERINEURAL at 09:33

## 2020-01-20 ASSESSMENT — PAIN SCALES - GENERAL: PAINLEVEL_OUTOF10: 0

## 2020-01-20 NOTE — ANESTHESIA PRE PROCEDURE
Department of Anesthesiology  Preprocedure Note       Name:  Aki Lugo   Age:  59 y.o.  :  1955                                          MRN:  [id number]         Date:  2020      Surgeon: Erich Walker):  Tori Sesay MD    Procedure: COLONOSCOPY DIAGNOSTIC (N/A )    Medications prior to admission:   Prior to Admission medications    Medication Sig Start Date End Date Taking? Authorizing Provider   Na Sulfate-K Sulfate-Mg Sulf 17.5-3.13-1.6 GM/177ML SOLN As directed 1/15/20   MANUEL Bowers - CNP   metFORMIN (GLUCOPHAGE) 1000 MG tablet TAKE 1 TABLET BY MOUTH TWICE DAILY WITH MEALS. 19   Erika Bhardwaj MD   amLODIPine (NORVASC) 2.5 MG tablet TAKE 1 TABLET BY MOUTH DAILY. 19   Erika Bhardwaj MD   lisinopril-hydrochlorothiazide (PRINZIDE;ZESTORETIC) 20-12.5 MG per tablet TAKE 1 TABLET BY MOUTH TWICE DAILY. 19   Erika Bhardwaj MD   meloxicam (MOBIC) 15 MG tablet TAKE 1 TABLET BY MOUTH DAILY. 19   Erika Bhardwaj MD   SITagliptin (JANUVIA) 100 MG tablet Take 1 tablet by mouth daily 19   Erika Bhardwaj MD       Current medications:    No current facility-administered medications for this visit. No current outpatient medications on file.      Facility-Administered Medications Ordered in Other Visits   Medication Dose Route Frequency Provider Last Rate Last Dose    ondansetron (ZOFRAN) injection 4 mg  4 mg Intravenous Once PRN Tori Sesay MD           Allergies:  No Known Allergies    Problem List:    Patient Active Problem List   Diagnosis Code    HTN (hypertension) I10    Type 2 diabetes mellitus without complication ([facility] [address] V21.9    Lumbar paraspinal muscle spasm M62.830    Allergic rhinosinusitis J30.9    TIA (transient ischemic attack) G45.9    Abdominal pain R10.9       Past Medical History:        Diagnosis Date    History of skin cancer     HTN (hypertension)     Type II or unspecified type diabetes mellitus without mention of complication, not stated as uncontrolled [id number], HYJ1XFP, BEART, [id number]     Type & Screen (If Applicable):  No results found for: LABABO, [address]    Anesthesia Evaluation  Patient summary reviewed and Nursing notes reviewed no history of anesthetic complications:   Airway: Mallampati: II  TM distance: >3 FB   Neck ROM: full  Mouth opening: > = 3 FB Dental:          Pulmonary:Negative Pulmonary ROS                              Cardiovascular:    (+) hypertension:,       ECG reviewed      Echocardiogram reviewed                  Neuro/Psych:   (+) TIA,             GI/Hepatic/Renal:   (+) bowel prep,           Endo/Other:    (+) DiabetesType II DM, well controlled, , .                 Abdominal:           Vascular: negative vascular ROS. Anesthesia Plan      MAC     ASA 3       Induction: intravenous. Anesthetic plan and risks discussed with patient. Plan discussed with CRNA.                   Haley Casillas, APRN - CRNA   1/20/2020

## 2020-01-20 NOTE — H&P
History of allergic reaction to anesthesia:  No    Past Medical History:  Past Medical History:   Diagnosis Date    Cancer ([facility] [address]     Pancreatic    History of skin cancer     HTN (hypertension)     Type II or unspecified type diabetes mellitus without mention of complication, not stated as uncontrolled        Past Surgical History:  Past Surgical History:   Procedure Laterality Date    COLONOSCOPY  02/20/2015    DR Erasmo Frazier    ENDOSCOPIC ULTRASOUND (LOWER) N/A 12/17/2019    EUS performed by Apolonia Arthur MD at [address] [place] [address] History:  Social History     Tobacco Use    Smoking status: Never Smoker    Smokeless tobacco: Never Used   Substance Use Topics    Alcohol use: No    Drug use: No       Vital Signs:   Vitals:    01/20/20 0902   BP: (!) 160/77   Pulse: 74   Resp: 18   Temp: 97.6 °F (36.4 °C)   SpO2: 98%        Physical Exam:  Cardiac:  [x]WNL  []Comments:  Pulmonary:  [x]WNL   []Comments:   Neuro/Mental Status:  [x]WNL  []Comments:  Abdominal:  [x]WNL    []Comments:  Other:   []WNL  []Comments:    Informed Consent:  The risks and benefits of the procedure have been discussed with either the patient or if they cannot consent, their representative. Assessment:  Patient examined and appropriate for planned sedation and procedure. Plan:  Proceed with planned sedation and procedure as above.     Apolonia Arthur MD  9:26 AM

## 2020-01-22 ENCOUNTER — ANCILLARY PROCEDURE (OUTPATIENT)
Dept: ENDOSCOPY | Age: 65
End: 2020-01-22
Payer: COMMERCIAL

## 2020-01-22 PROCEDURE — 91200 LIVER ELASTOGRAPHY: CPT

## 2020-01-22 NOTE — PROGRESS NOTES
Velocity Controlled Transient Elastography (Fibroscan)      :  LMRASHAD  Attending:  Charlene Bansal MD    Patient was identified via name and . Agnes Gerardo presents to endoscopy suite for VCTE. Referring Physician:  COSME Sanchez CNP  Diagnosis:    1. MCNAMARA (nonalcoholic steatohepatitis)        Probe Used:  M     Pre-procedure Checklist:    Presence of ascites:  NO  Fasting for at least two hours:   Yes  Alcohol Use:  No  History of heart failure:  No    Recent Labs     01/15/20  1426   ALT 48*   AST 49*   ALKPHOS 76   BILITOT 0.7       Lab Results   Component Value Date     01/15/2020     (L) 2019     12/10/2019       Findings:    Median kPa:  27.5  IQR/med (%):  17    Controlled Attenuation Paramater (CAP)  Median (dB/m):  288  IQR (%):  55    Interpretation:  pending    Tito Miranda RN

## 2020-01-23 ENCOUNTER — TELEPHONE (OUTPATIENT)
Dept: GASTROENTEROLOGY | Age: 65
End: 2020-01-23

## 2020-01-24 ENCOUNTER — TELEPHONE (OUTPATIENT)
Dept: GASTROENTEROLOGY | Age: 65
End: 2020-01-24

## 2020-01-24 NOTE — TELEPHONE ENCOUNTER
Velocity Controlled Transient Elastography (Fibroscan)      :  LMM  Attending:  Nahum Hyman MD     Patient was identified via name and . Gavi Longoria presents to endoscopy suite for VCTE.     Referring Physician:  COSME Sanchez CNP  Diagnosis:    1. MCNAMARA (nonalcoholic steatohepatitis)          Probe Used: M      Pre-procedure Checklist:     Presence of ascites:  NO  Fasting for at least two hours: Yes  Alcohol Use:  No  History of heart failure:   No         Recent Labs     01/15/20  1426   ALT 48*   AST 49*   ALKPHOS 76   BILITOT 0.7               Lab Results   Component Value Date      01/15/2020      (L) 2019      12/10/2019         Findings:     Median kPa:  27.5  IQR/med (%):  17     Controlled Attenuation Paramater (CAP)  Median (dB/m):  288  IQR (%):  55     Interpretation:  Appropriate reading, Based on LSM of 27.5 Kpa, Evidence of advanced fibrosis / Cirrhosis   Fibrosis stage IV- Cirrhosis  / F 4   Based on CAP of 288 db/M,  Mod-Severe Steatosis ( >33%)  Clinical correlation indicated     Nahum Hyman MD  [facility])

## 2020-02-03 RX ORDER — SITAGLIPTIN 100 MG/1
TABLET, FILM COATED ORAL
Qty: 30 TABLET | Refills: 0 | Status: SHIPPED | OUTPATIENT
Start: 2020-02-03 | End: 2020-03-26

## 2020-02-05 ENCOUNTER — TELEPHONE (OUTPATIENT)
Dept: GASTROENTEROLOGY | Age: 65
End: 2020-02-05

## 2020-02-11 NOTE — TELEPHONE ENCOUNTER
Kathy Arciniega at [phone] she said they need a referral from Dr Dayana Marks. So the patient don't have to pay a big co-pay.

## 2020-02-12 NOTE — TELEPHONE ENCOUNTER
Kranthi Comment called stating that Dr. Don Bourne is not in Dr[facility] network. Do we have another physician we can refer to?  Please advise, thanks

## 2020-02-24 ENCOUNTER — OFFICE VISIT (OUTPATIENT)
Dept: FAMILY MEDICINE CLINIC | Age: 65
End: 2020-02-24
Payer: COMMERCIAL

## 2020-02-24 VITALS
DIASTOLIC BLOOD PRESSURE: 66 MMHG | SYSTOLIC BLOOD PRESSURE: 130 MMHG | OXYGEN SATURATION: 97 % | HEART RATE: 78 BPM | WEIGHT: 193.2 LBS | HEIGHT: 77 IN | BODY MASS INDEX: 22.81 KG/M2

## 2020-02-24 DIAGNOSIS — E11.65 TYPE 2 DIABETES MELLITUS WITH HYPERGLYCEMIA, WITHOUT LONG-TERM CURRENT USE OF INSULIN (HCC): ICD-10-CM

## 2020-02-24 LAB — HBA1C MFR BLD: 7.6 % (ref 4.8–5.9)

## 2020-02-24 PROCEDURE — 99213 OFFICE O/P EST LOW 20 MIN: CPT | Performed by: FAMILY MEDICINE

## 2020-02-24 ASSESSMENT — PATIENT HEALTH QUESTIONNAIRE - PHQ9
SUM OF ALL RESPONSES TO PHQ QUESTIONS 1-9: 0
SUM OF ALL RESPONSES TO PHQ QUESTIONS 1-9: 0
2. FEELING DOWN, DEPRESSED OR HOPELESS: 0
1. LITTLE INTEREST OR PLEASURE IN DOING THINGS: 0
SUM OF ALL RESPONSES TO PHQ9 QUESTIONS 1 & 2: 0

## 2020-02-24 NOTE — PROGRESS NOTES
Sexual Activity    Alcohol use: No    Drug use: No    Sexual activity: Yes     Partners: Female   Lifestyle    Physical activity:     Days per week: None     Minutes per session: None    Stress: None   Relationships    Social connections:     Talks on phone: None     Gets together: None     Attends Anabaptism service: None     Active member of club or organization: None     Attends meetings of clubs or organizations: None     Relationship status: None    Intimate partner violence:     Fear of current or ex partner: None     Emotionally abused: None     Physically abused: None     Forced sexual activity: None   Other Topics Concern    None   Social History Narrative    None     Current Outpatient Medications   Medication Sig Dispense Refill    JANUVIA 100 MG tablet TAKE 1 TABLET BY MOUTH DAILY 30 tablet 0    metFORMIN (GLUCOPHAGE) 1000 MG tablet TAKE 1 TABLET BY MOUTH TWICE DAILY WITH MEALS. 60 tablet 5    amLODIPine (NORVASC) 2.5 MG tablet TAKE 1 TABLET BY MOUTH DAILY. 30 tablet 5    lisinopril-hydrochlorothiazide (PRINZIDE;ZESTORETIC) 20-12.5 MG per tablet TAKE 1 TABLET BY MOUTH TWICE DAILY. 60 tablet 5    meloxicam (MOBIC) 15 MG tablet TAKE 1 TABLET BY MOUTH DAILY. 30 tablet 5     No current facility-administered medications for this visit. No Known Allergies    Review of Systems:   General ROS: negative for - nausea, vomiting, chills, fatigue, fever, malaise, weight gain or weight loss  Respiratory ROS: no cough, shortness of breath, or wheezing  Cardiovascular ROS: no chest pain or dyspnea on exertion  Gastrointestinal ROS: no abdominal pain, change in bowel habits, or black or bloody stools  Genito-Urinary ROS: no dysuria, trouble voiding, or hematuria  Musculoskeletal ROS: negative for - gait disturbance, joint pain or joint stiffness  Neurological ROS: negative for - behavioral changes, memory loss, numbness/tingling, tremors or weakness    In general patient otherwise reports feeling well.

## 2020-03-02 RX ORDER — LISINOPRIL AND HYDROCHLOROTHIAZIDE 20; 12.5 MG/1; MG/1
TABLET ORAL
Qty: 60 TABLET | Refills: 5 | Status: SHIPPED | OUTPATIENT
Start: 2020-03-02 | End: 2020-09-30

## 2020-03-02 RX ORDER — AMLODIPINE BESYLATE 2.5 MG/1
TABLET ORAL
Qty: 30 TABLET | Refills: 5 | Status: SHIPPED | OUTPATIENT
Start: 2020-03-02 | End: 2020-09-30

## 2020-03-02 RX ORDER — MELOXICAM 15 MG/1
TABLET ORAL
Qty: 30 TABLET | Refills: 5 | Status: SHIPPED | OUTPATIENT
Start: 2020-03-02 | End: 2020-09-30

## 2020-03-26 RX ORDER — SITAGLIPTIN 100 MG/1
TABLET, FILM COATED ORAL
Qty: 30 TABLET | Refills: 0 | Status: SHIPPED | OUTPATIENT
Start: 2020-03-26 | End: 2020-04-22

## 2020-04-22 RX ORDER — SITAGLIPTIN 100 MG/1
TABLET, FILM COATED ORAL
Qty: 30 TABLET | Refills: 0 | Status: SHIPPED | OUTPATIENT
Start: 2020-04-22 | End: 2020-05-26

## 2020-07-10 ENCOUNTER — OFFICE VISIT (OUTPATIENT)
Dept: FAMILY MEDICINE CLINIC | Age: 65
End: 2020-07-10
Payer: MEDICARE

## 2020-07-10 VITALS
SYSTOLIC BLOOD PRESSURE: 150 MMHG | WEIGHT: 192.3 LBS | BODY MASS INDEX: 22.71 KG/M2 | HEIGHT: 77 IN | DIASTOLIC BLOOD PRESSURE: 82 MMHG | OXYGEN SATURATION: 96 % | HEART RATE: 83 BPM

## 2020-07-10 PROCEDURE — 99213 OFFICE O/P EST LOW 20 MIN: CPT | Performed by: FAMILY MEDICINE

## 2020-07-10 PROCEDURE — 3051F HG A1C>EQUAL 7.0%<8.0%: CPT | Performed by: FAMILY MEDICINE

## 2020-07-10 RX ORDER — PEN NEEDLE, DIABETIC 31 G X1/4"
1 NEEDLE, DISPOSABLE MISCELLANEOUS DAILY
Qty: 100 EACH | Refills: 3 | Status: SHIPPED | OUTPATIENT
Start: 2020-07-10 | End: 2020-10-15 | Stop reason: SDUPTHER

## 2020-07-10 RX ORDER — BLOOD-GLUCOSE METER
1 KIT MISCELLANEOUS DAILY PRN
Qty: 1 KIT | Refills: 0 | Status: SHIPPED | OUTPATIENT
Start: 2020-07-10 | End: 2020-10-15

## 2020-07-10 RX ORDER — INSULIN GLARGINE 100 [IU]/ML
15 INJECTION, SOLUTION SUBCUTANEOUS NIGHTLY
Qty: 5 PEN | Refills: 2 | Status: SHIPPED | OUTPATIENT
Start: 2020-07-10 | End: 2020-07-30

## 2020-07-10 RX ORDER — GLUCOSAMINE HCL/CHONDROITIN SU 500-400 MG
1 CAPSULE ORAL DAILY
Qty: 400 STRIP | Refills: 3 | Status: SHIPPED | OUTPATIENT
Start: 2020-07-10 | End: 2020-10-15

## 2020-07-10 NOTE — PROGRESS NOTES
Chief Complaint   Patient presents with    Blood Sugar Problem     chemo making his sugars running 200 points higher than normal        HPI:  Dev Puente is a 72 y.o. male    Follow up DM    Pancreatic cancer  Dr. Barbara Duarte  Stage 4 liver cirrhosis  had Partial Pancreatectomy/splenectomy  Surgery was stopped prison through    Active chemo now to treat cancer then return to surgery    Reportedly is successful so far    Not eating sugar    Sugars ranging 200-400 now consistently    Metformin/januvia for DM  Norvasc/prinzide for HTN    Wt Readings from Last 3 Encounters:   07/10/20 192 lb 4.8 oz (87.2 kg)   02/24/20 193 lb 3.2 oz (87.6 kg)   01/20/20 195 lb (88.5 kg)       Lab Results   Component Value Date    LABA1C 7.6 (H) 02/24/2020     No results found for: EAG            Past Medical History:   Diagnosis Date    Cancer ([facility] [address]     Pancreatic    History of skin cancer     HTN (hypertension)     Type II or unspecified type diabetes mellitus without mention of complication, not stated as uncontrolled      Past Surgical History:   Procedure Laterality Date    COLONOSCOPY  02/20/2015    DR Gabino Murdock    COLONOSCOPY N/A 1/20/2020    COLONOSCOPY DIAGNOSTIC performed by Tiara Ruth MD at [address] [place] [address] ([facility]) N/A 12/17/2019    EUS performed by Tiara Ruth MD at [address]       Family History   Problem Relation Age of Onset    Cancer Mother         lung cancer    Colon Cancer Neg Hx     Celiac Disease Neg Hx     Crohn's Disease Neg Hx      Social History     Socioeconomic History    Marital status:      Spouse name: None    Number of children: None    Years of education: None    Highest education level: None   Occupational History    None   Social Needs    Financial resource strain: None    Food insecurity     Worry: None     Inability: None    Transportation needs     Medical: None     Non-medical: None   Tobacco Use    Smoking status: Never Smoker    Smokeless tobacco: Never Used   Substance and Sexual Activity    Alcohol use: No    Drug use: No    Sexual activity: Yes     Partners: Female   Lifestyle    Physical activity     Days per week: None     Minutes per session: None    Stress: None   Relationships    Social connections     Talks on phone: None     Gets together: None     Attends Adventism service: None     Active member of club or organization: None     Attends meetings of clubs or organizations: None     Relationship status: None    Intimate partner violence     Fear of current or ex partner: None     Emotionally abused: None     Physically abused: None     Forced sexual activity: None   Other Topics Concern    None   Social History Narrative    None     Current Outpatient Medications   Medication Sig Dispense Refill    Blood Glucose Monitoring Suppl (ONE TOUCH ULTRA MINI) w/Device KIT 1 kit by Does not apply route daily as needed (test 4x daily) 1 kit 0    ONE TOUCH LANCETS MISC 1 each by Does not apply route daily Test 4x/day 400 each 3    blood glucose monitor strips 1 strip by Other route daily Test 4 times a day & as needed for symptoms of irregular blood glucose. 400 strip 3    insulin glargine (LANTUS SOLOSTAR) 100 UNIT/ML injection pen Inject 15 Units into the skin nightly 5 pen 2    Insulin Pen Needle ([employer] PEN NEEDLES) 31G X 6 MM MISC 1 each by Does not apply route daily 100 each 3    JANUVIA 100 MG tablet TAKE 1 TABLET BY MOUTH DAILY 30 tablet 5    metFORMIN (GLUCOPHAGE) 1000 MG tablet take 1 tablet by mouth twice a day with meals 60 tablet 5    amLODIPine (NORVASC) 2.5 MG tablet take 1 tablet by mouth once daily 30 tablet 5    meloxicam (MOBIC) 15 MG tablet take 1 tablet by mouth once daily 30 tablet 5    lisinopril-hydroCHLOROthiazide (PRINZIDE;ZESTORETIC) 20-12.5 MG per tablet take 1 tablet by mouth twice a day 60 tablet 5     No current facility-administered medications for this visit.       No Known Allergies    Review of Systems:   General ROS: negative for - nausea, vomiting, chills, fatigue, fever, malaise, weight gain or weight loss  Respiratory ROS: no cough, shortness of breath, or wheezing  Cardiovascular ROS: no chest pain or dyspnea on exertion  Gastrointestinal ROS: no abdominal pain, change in bowel habits, or black or bloody stools  Genito-Urinary ROS: no dysuria, trouble voiding, or hematuria  Musculoskeletal ROS: negative for - gait disturbance, joint pain or joint stiffness  Neurological ROS: negative for - behavioral changes, memory loss, numbness/tingling, tremors or weakness    In general patient otherwise reports feeling well. In general patient otherwise reports feeling well. Physical Exam:  BP (!) 150/82 (Site: Left Upper Arm, Position: Sitting, Cuff Size: Medium Adult)   Pulse 83   Ht 6' 5\" (1.956 m)   Wt 192 lb 4.8 oz (87.2 kg)   SpO2 96%   BMI 22.80 kg/m²     Gen: Well, NAD, Alert, Oriented x 3   HEENT: EOMI, eyes clear, MMM  Skin: concerning lesion upper chest, red, raised, about 1cm  Neck: no significant lymphadenopathy or thyromegaly  Lungs: CTA B w/out Rales/Wheezes/Rhonchi, Good respiratory effort   Heart: RRR, S1S2, w/out M/R/G, non-displaced PMI   Ext: no CCE  Neuro: nonfocal            Lab Results   Component Value Date    WBC 6.6 07/04/2020    HGB 12.9 (L) 07/04/2020    HCT 39.2 (L) 07/04/2020     (L) 07/04/2020    CHOL 171 08/09/2019    TRIG 98 08/09/2019    HDL 50 08/09/2019    ALT 67 (H) 07/04/2020    AST 70 (H) 07/04/2020     07/04/2020    K 4.6 07/04/2020    CL 98 07/04/2020    CREATININE 0.93 07/04/2020    BUN 19 01/15/2020    CO2 27 01/15/2020    TSH 1.540 08/09/2019    PSA 0.89 08/09/2019    INR 1.2 (H) 07/04/2020    LABA1C 7.6 (H) 02/24/2020    LABMICR 1.70 08/09/2019         A&P   Diagnosis Orders   1.  Type 2 diabetes mellitus with hyperglycemia, without long-term current use of insulin (HCC)  Blood Glucose Monitoring Suppl (ONE TOUCH ULTRA MINI) w/Device KIT    ONE TOUCH LANCETS MISC    blood glucose monitor strips    insulin glargine (LANTUS SOLOSTAR) 100 UNIT/ML injection pen    Insulin Pen Needle ([employer] PEN NEEDLES) 31G X 6 MM MISC   2. Neuroendocrine tumor     3.  Malignant neoplasm of pancreas, unspecified location of malignancy ([facility] [address]         Continue to cut down on sugar    F/u with oncology    lantus to start at 15 and instructed on titration    He will contact through [employer] about how his sugars are    Maico Paulino MD

## 2020-07-18 ENCOUNTER — OFFICE VISIT (OUTPATIENT)
Dept: FAMILY MEDICINE CLINIC | Age: 65
End: 2020-07-18
Payer: MEDICARE

## 2020-07-18 VITALS
HEART RATE: 97 BPM | BODY MASS INDEX: 25.1 KG/M2 | TEMPERATURE: 97.7 F | OXYGEN SATURATION: 99 % | SYSTOLIC BLOOD PRESSURE: 172 MMHG | HEIGHT: 74 IN | WEIGHT: 195.6 LBS | DIASTOLIC BLOOD PRESSURE: 80 MMHG

## 2020-07-18 LAB
CHP ED QC CHECK: ABNORMAL
GLUCOSE BLD-MCNC: 393 MG/DL

## 2020-07-18 PROCEDURE — 99214 OFFICE O/P EST MOD 30 MIN: CPT | Performed by: PHYSICIAN ASSISTANT

## 2020-07-18 PROCEDURE — 82962 GLUCOSE BLOOD TEST: CPT | Performed by: PHYSICIAN ASSISTANT

## 2020-07-18 RX ORDER — PEN NEEDLE, DIABETIC 31 G X1/4"
1 NEEDLE, DISPOSABLE MISCELLANEOUS DAILY
Qty: 100 EACH | Refills: 3 | Status: SHIPPED | OUTPATIENT
Start: 2020-07-18 | End: 2020-07-30

## 2020-07-18 RX ORDER — INSULIN LISPRO 100 [IU]/ML
1 INJECTION, SOLUTION INTRAVENOUS; SUBCUTANEOUS SEE ADMIN INSTRUCTIONS
Qty: 10 PEN | Refills: 5 | Status: SHIPPED | OUTPATIENT
Start: 2020-07-18 | End: 2021-08-06 | Stop reason: SDUPTHER

## 2020-07-18 ASSESSMENT — ENCOUNTER SYMPTOMS
EYE REDNESS: 0
SHORTNESS OF BREATH: 0
NAUSEA: 0
PHOTOPHOBIA: 0
EYE PAIN: 0
VOMITING: 0
EYE DISCHARGE: 0
BLOOD IN STOOL: 0

## 2020-07-18 NOTE — PATIENT INSTRUCTIONS
Please check your blood suger 4 times daily -   Before each meal/snack and at bedtime  Please give yourself the humalog insulin according to the scale shown below:    Blood sugar  150-200: 2 units  Blood sugar  201-250: 4 units  Blood sugar  251-300: 5 units  Blood sugar  301-350: 6 units  Blood sugar  351-400: 7 units  Blood sugar  greater than 400: 9 units and notify provider

## 2020-07-18 NOTE — PROGRESS NOTES
Subjective:      Patient ID: Elizabeth Fernandez is a 72 y.o. male who presents today for:  Chief Complaint   Patient presents with    Blood Sugar Problem     pt states that it was 549 10 min prior to arriving. 3 hrs he ate . 5 cup of cornflake NO SUGAR OR MILK. was 178 and 3 hrs later was 349. after 349 he walked 2 miles 523.  Cancer     presently undergoing treatment for pancreatic cancer        Pt having uncontrolled blood sugars at home despite lantus use. Within 5 minutes pt states glucose went from 178 to 500s. Pt is now 387 in office. He denies any dizziness, diaphoresis, vision changes. He is concerned about cost of medications.       Past Medical History:   Diagnosis Date    Cancer [facility])     Pancreatic    History of skin cancer     HTN (hypertension)     Type II or unspecified type diabetes mellitus without mention of complication, not stated as uncontrolled      Past Surgical History:   Procedure Laterality Date    COLONOSCOPY  02/20/2015    DR Annye Goldmann    COLONOSCOPY N/A 1/20/2020    COLONOSCOPY DIAGNOSTIC performed by Nadia Solo MD at [address] [place] [address] ([facility]) N/A 12/17/2019    EUS performed by Nadia Solo MD at [address] [facility]       Family History   Problem Relation Age of Onset    Cancer Mother         lung cancer    Colon Cancer Neg Hx     Celiac Disease Neg Hx     Crohn's Disease Neg Hx      Social History     Socioeconomic History    Marital status:      Spouse name: Not on file    Number of children: Not on file    Years of education: Not on file    Highest education level: Not on file   Occupational History    Not on file   Social Needs    Financial resource strain: Not on file    Food insecurity     Worry: Not on file     Inability: Not on file    Transportation needs     Medical: Not on file     Non-medical: Not on file   Tobacco Use    Smoking status: Never Smoker    Smokeless tobacco: Never Used   Substance and Sexual Activity    Alcohol use: No    Drug use: No    Sexual activity: Yes     Partners: Female   Lifestyle    Physical activity     Days per week: Not on file     Minutes per session: Not on file    Stress: Not on file   Relationships    Social connections     Talks on phone: Not on file     Gets together: Not on file     Attends Evangelical service: Not on file     Active member of club or organization: Not on file     Attends meetings of clubs or organizations: Not on file     Relationship status: Not on file    Intimate partner violence     Fear of current or ex partner: Not on file     Emotionally abused: Not on file     Physically abused: Not on file     Forced sexual activity: Not on file   Other Topics Concern    Not on file   Social History Narrative    Not on file     Current Outpatient Medications on File Prior to Visit   Medication Sig Dispense Refill    Blood Glucose Monitoring Suppl (ONE TOUCH ULTRA MINI) w/Device KIT 1 kit by Does not apply route daily as needed (test 4x daily) 1 kit 0    ONE TOUCH LANCETS MISC 1 each by Does not apply route daily Test 4x/day 400 each 3    blood glucose monitor strips 1 strip by Other route daily Test 4 times a day & as needed for symptoms of irregular blood glucose. 400 strip 3    insulin glargine (LANTUS SOLOSTAR) 100 UNIT/ML injection pen Inject 15 Units into the skin nightly (Patient taking differently: Inject 18 Units into the skin nightly ) 5 pen 2    Insulin Pen Needle ([employer] PEN NEEDLES) 31G X 6 MM MISC 1 each by Does not apply route daily 100 each 3    amLODIPine (NORVASC) 2.5 MG tablet take 1 tablet by mouth once daily 30 tablet 5    meloxicam (MOBIC) 15 MG tablet take 1 tablet by mouth once daily 30 tablet 5    lisinopril-hydroCHLOROthiazide (PRINZIDE;ZESTORETIC) 20-12.5 MG per tablet take 1 tablet by mouth twice a day 60 tablet 5     No current facility-administered medications on file prior to visit. Patient has no known allergies.     Review of Systems   HENT: Negative for ear discharge, ear pain, hearing loss, nosebleeds and tinnitus. Eyes: Negative for photophobia, pain, discharge and redness. Respiratory: Negative for shortness of breath. Cardiovascular: Negative for chest pain. Gastrointestinal: Negative for blood in stool, nausea and vomiting. Endocrine: Negative for polydipsia. Musculoskeletal: Negative for myalgias. Skin: Negative for rash. Neurological: Negative for headaches. Hematological: Does not bruise/bleed easily. Psychiatric/Behavioral: Negative for hallucinations and suicidal ideas. All other systems reviewed and are negative. Objective:   BP (!) 172/80 (Site: Left Upper Arm, Position: Sitting, Cuff Size: Medium Adult)   Pulse 97   Temp 97.7 °F (36.5 °C)   Ht 6' 1.5\" (1.867 m)   Wt 195 lb 9.6 oz (88.7 kg)   SpO2 99%   BMI 25.46 kg/m²     Physical Exam  Vitals signs and nursing note reviewed. Constitutional:       Appearance: He is well-developed. HENT:      Head: Normocephalic and atraumatic. Nose: Nose normal.   Eyes:      General: No scleral icterus. Conjunctiva/sclera: Conjunctivae normal.      Pupils: Pupils are equal, round, and reactive to light. Neck:      Musculoskeletal: Normal range of motion and neck supple. Thyroid: No thyromegaly. Vascular: No JVD. Trachea: No tracheal deviation. Cardiovascular:      Rate and Rhythm: Normal rate and regular rhythm. Heart sounds: Normal heart sounds. No murmur. No friction rub. No gallop. Pulmonary:      Effort: Pulmonary effort is normal. No respiratory distress. Breath sounds: Normal breath sounds. No wheezing. Abdominal:      General: Bowel sounds are normal. There is no distension. Palpations: Abdomen is soft. Tenderness: There is no abdominal tenderness. Musculoskeletal: Normal range of motion. General: No deformity. Skin:     General: Skin is warm and dry.       Findings: No erythema or rash. Neurological:      Mental Status: He is alert and oriented to person, place, and time. Cranial Nerves: No cranial nerve deficit. Assessment:       Diagnosis Orders   1. Diabetes mellitus due to underlying condition, uncontrolled, with hyperglycemia ([facility] [address]  insulin lispro, 1 Unit Dial, (HUMALOG KWIKPEN) 100 UNIT/ML SOPN    Insulin Pen Needle ([employer] PEN NEEDLES) 31G X 6 MM MISC   2. Elevated blood sugar  POCT Glucose       Plan:      Orders Placed This Encounter   Procedures    POCT Glucose       Orders Placed This Encounter   Medications    insulin lispro, 1 Unit Dial, (HUMALOG KWIKPEN) 100 UNIT/ML SOPN     Sig: Inject 1 Units into the skin See Admin Instructions     Dispense:  10 pen     Refill:  5    Insulin Pen Needle ([employer] PEN NEEDLES) 31G X 6 MM MISC     Si each by Does not apply route daily     Dispense:  100 each     Refill:  3       Return for follow up w/PCP in 1-2 weeks. Reviewed with the patient: current clinicalstatus, medications, activities and diet. Side effects, adverse effects of the medication prescribedtoday, as well as treatment plan/ rationale and result expectations have been discussedwith the patient who expresses understanding and desires to proceed. Close follow upto evaluate treatment results and for coordination of care. I have reviewedthe patient's medical history in detail and updated the computerized patient record.     Ibis Wiley PA-C

## 2020-07-23 ENCOUNTER — CARE COORDINATION (OUTPATIENT)
Dept: CARE COORDINATION | Age: 65
End: 2020-07-23

## 2020-07-30 ENCOUNTER — OFFICE VISIT (OUTPATIENT)
Dept: FAMILY MEDICINE CLINIC | Age: 65
End: 2020-07-30
Payer: MEDICARE

## 2020-07-30 VITALS
OXYGEN SATURATION: 98 % | DIASTOLIC BLOOD PRESSURE: 70 MMHG | HEART RATE: 78 BPM | HEIGHT: 74 IN | BODY MASS INDEX: 24.85 KG/M2 | SYSTOLIC BLOOD PRESSURE: 130 MMHG | WEIGHT: 193.6 LBS | TEMPERATURE: 97.3 F

## 2020-07-30 PROCEDURE — 99213 OFFICE O/P EST LOW 20 MIN: CPT | Performed by: FAMILY MEDICINE

## 2020-07-30 RX ORDER — INSULIN GLARGINE 100 [IU]/ML
22 INJECTION, SOLUTION SUBCUTANEOUS NIGHTLY
COMMUNITY
End: 2020-08-18 | Stop reason: SDUPTHER

## 2020-07-30 NOTE — PROGRESS NOTES
Chief Complaint   Patient presents with    Diabetes     insulin check up       HPI:  Cristhian Salinas is a 72 y.o. male    Follow up DM    Pancreatic cancer  Dr. Lunsford Asa  Stage 4 liver cirrhosis  had Partial Pancreatectomy/splenectomy  Surgery was stopped [facility] through    Active chemo now to treat cancer then return to surgery    Reportedly is successful so far  Not eating sugar    Sugars still over 200  avg 270  70 tests      Wt Readings from Last 3 Encounters:   07/30/20 193 lb 9.6 oz (87.8 kg)   07/18/20 195 lb 9.6 oz (88.7 kg)   07/10/20 192 lb 4.8 oz (87.2 kg)       Lab Results   Component Value Date    LABA1C 7.6 (H) 02/24/2020     No results found for: EAG            Past Medical History:   Diagnosis Date    Cancer [facility])     Pancreatic    History of skin cancer     HTN (hypertension)     Type II or unspecified type diabetes mellitus without mention of complication, not stated as uncontrolled      Past Surgical History:   Procedure Laterality Date    COLONOSCOPY  02/20/2015    DR Jacklyn Smith    COLONOSCOPY N/A 1/20/2020    COLONOSCOPY DIAGNOSTIC performed by Gifford Hatchet, MD at [address] [place] [address] ([facility]) N/A 12/17/2019    EUS performed by Gifford Hatchet, MD at [address] [place] [address]       Family History   Problem Relation Age of Onset    Cancer Mother         lung cancer    Colon Cancer Neg Hx     Celiac Disease Neg Hx     Crohn's Disease Neg Hx      Social History     Socioeconomic History    Marital status:      Spouse name: None    Number of children: None    Years of education: None    Highest education level: None   Occupational History    None   Social Needs    Financial resource strain: None    Food insecurity     Worry: None     Inability: None    Transportation needs     Medical: None     Non-medical: None   Tobacco Use    Smoking status: Never Smoker    Smokeless tobacco: Never Used   Substance and Sexual Activity    Alcohol use: No    Drug use: No    Sexual activity: Yes     Partners: Female   Lifestyle    Physical activity     Days per week: None     Minutes per session: None    Stress: None   Relationships    Social connections     Talks on phone: None     Gets together: None     Attends [employer] service: None     Active member of club or organization: None     Attends meetings of clubs or organizations: None     Relationship status: None    Intimate partner violence     Fear of current or ex partner: None     Emotionally abused: None     Physically abused: None     Forced sexual activity: None   Other Topics Concern    None   Social History Narrative    None     Current Outpatient Medications   Medication Sig Dispense Refill    insulin glargine (BASAGLAR KWIKPEN) 100 UNIT/ML injection pen Inject 22 Units into the skin nightly      insulin lispro, 1 Unit Dial, (HUMALOG KWIKPEN) 100 UNIT/ML SOPN Inject 1 Units into the skin See Admin Instructions 10 pen 5    Blood Glucose Monitoring Suppl (ONE TOUCH ULTRA MINI) w/Device KIT 1 kit by Does not apply route daily as needed (test 4x daily) 1 kit 0    ONE TOUCH LANCETS MISC 1 each by Does not apply route daily Test 4x/day 400 each 3    blood glucose monitor strips 1 strip by Other route daily Test 4 times a day & as needed for symptoms of irregular blood glucose. 400 strip 3    Insulin Pen Needle (KR[employer]ER PEN NEEDLES) 31G X 6 MM MISC 1 each by Does not apply route daily 100 each 3    amLODIPine (NORVASC) 2.5 MG tablet take 1 tablet by mouth once daily 30 tablet 5    meloxicam (MOBIC) 15 MG tablet take 1 tablet by mouth once daily 30 tablet 5    lisinopril-hydroCHLOROthiazide (PRINZIDE;ZESTORETIC) 20-12.5 MG per tablet take 1 tablet by mouth twice a day 60 tablet 5     No current facility-administered medications for this visit.       No Known Allergies    Review of Systems:   General ROS: negative for - nausea, vomiting, chills, fatigue, fever, malaise, weight gain or weight loss  Respiratory ROS: no cough, shortness of breath, or wheezing  Cardiovascular ROS: no chest pain or dyspnea on exertion  Gastrointestinal ROS: no abdominal pain, change in bowel habits, or black or bloody stools  Genito-Urinary ROS: no dysuria, trouble voiding, or hematuria  Musculoskeletal ROS: negative for - gait disturbance, joint pain or joint stiffness  Neurological ROS: negative for - behavioral changes, memory loss, numbness/tingling, tremors or weakness    In general patient otherwise reports feeling well. In general patient otherwise reports feeling well. Physical Exam:  /70 (Site: Left Upper Arm, Position: Sitting, Cuff Size: Medium Adult)   Pulse 78   Temp 97.3 °F (36.3 °C)   Ht 6' 1.5\" (1.867 m)   Wt 193 lb 9.6 oz (87.8 kg)   SpO2 98%   BMI 25.20 kg/m²     Gen: Well, NAD, Alert, Oriented x 3   HEENT: EOMI, eyes clear, MMM  Skin: concerning lesion upper chest, red, raised, about 1cm  Neck: no significant lymphadenopathy or thyromegaly  Lungs: CTA B w/out Rales/Wheezes/Rhonchi, Good respiratory effort   Heart: RRR, S1S2, w/out M/R/G, non-displaced PMI   Ext: no CCE  Neuro: nonfocal        Lab Results   Component Value Date    WBC 5.4 07/20/2020    HGB 13.5 07/20/2020    HCT 42.0 07/20/2020     07/20/2020    CHOL 171 08/09/2019    TRIG 98 08/09/2019    HDL 50 08/09/2019    ALT 66 (H) 07/20/2020    AST 47 (H) 07/20/2020     07/20/2020    K 3.6 07/20/2020     07/20/2020    CREATININE 0.70 07/20/2020    BUN 19 01/15/2020    CO2 27 01/15/2020    TSH 1.540 08/09/2019    PSA 0.89 08/09/2019    INR 1.2 (H) 07/04/2020    LABA1C 7.6 (H) 02/24/2020    LABMICR 1.70 08/09/2019         A&P   Diagnosis Orders   1.  Diabetes mellitus due to underlying condition, uncontrolled, with hyperglycemia ([address]         F/u with oncology    Basal insulin to continue to titrate up    5 units humalog with meals plus sliding scale    He will contact through [employer] about how his sugars are Nadia Deal MD

## 2020-08-07 LAB
SARS-COV-2, PCR: NOT DETECTED
SPECIMEN SOURCE: NORMAL

## 2020-08-09 LAB — PROCALCITONIN: 0.08 NG/ML

## 2020-08-10 ENCOUNTER — CARE COORDINATION (OUTPATIENT)
Dept: CARE COORDINATION | Age: 65
End: 2020-08-10

## 2020-08-10 NOTE — CARE COORDINATION
Called to follow up with patient. Inquired if patient received PAP applications that were mailed out. Patient shared that he did receive them, however did not fill them out because he had been in the hospital with a fever. Patient reports he was at [address] [place] [address] for about a week. Encouraged patient to contact this writer if he needed any further assistance.

## 2020-08-18 ENCOUNTER — PATIENT MESSAGE (OUTPATIENT)
Dept: FAMILY MEDICINE CLINIC | Age: 65
End: 2020-08-18

## 2020-08-18 RX ORDER — INSULIN GLARGINE 100 [IU]/ML
35 INJECTION, SOLUTION SUBCUTANEOUS NIGHTLY
Qty: 5 PEN | Refills: 3 | Status: SHIPPED | OUTPATIENT
Start: 2020-08-18 | End: 2020-10-15 | Stop reason: SDUPTHER

## 2020-08-18 NOTE — TELEPHONE ENCOUNTER
From: Jolynn Waite  To: Sofia Grijalva MD  Sent: 8/18/2020 9:35 AM EDT  Subject: Prescription Question    I need my long term insulin refilled. I am using about 35 units per day to control sugar. Pharmacist says I need a bigger script written    I have enough for 3 more days.

## 2020-09-04 ENCOUNTER — TELEPHONE (OUTPATIENT)
Dept: FAMILY MEDICINE CLINIC | Age: 65
End: 2020-09-04

## 2020-09-04 RX ORDER — FLASH GLUCOSE SENSOR
KIT MISCELLANEOUS
Qty: 6 EACH | Refills: 3 | Status: SHIPPED | OUTPATIENT
Start: 2020-09-04 | End: 2021-07-02

## 2020-09-04 RX ORDER — FLASH GLUCOSE SCANNING READER
EACH MISCELLANEOUS
Qty: 1 DEVICE | Refills: 0 | Status: SHIPPED | OUTPATIENT
Start: 2020-09-04

## 2020-09-04 NOTE — TELEPHONE ENCOUNTER
Orders Placed This Encounter   Medications    Continuous Blood Gluc  (FREESTYLE NATALY 14 DAY READER) KISHA     Sig: Test daily and prn. Dx: DM2     Dispense:  1 Device     Refill:  0    Continuous Blood Gluc Sensor (FREESTYLE NATALY 14 DAY SENSOR) MISC     Sig: Test daily and prn. Change every 2 weeks. Dx: DM2     Dispense:  6 each     Refill:  3       The above med(s) were e-scripted to the patient's pharmacy.    Please advise patient  Ashley Sierra MD

## 2020-09-04 NOTE — TELEPHONE ENCOUNTER
[facility] calling about pt's test strips, pt states he is having trouble with sugar levels so he is testing 7 times a day instead of the 4 tunes a day. He is out of strips so needs a new order with new testing directions.  The pharm tech did bring up the freestyle in case you wanted to try to do that

## 2020-09-10 ENCOUNTER — OFFICE VISIT (OUTPATIENT)
Dept: FAMILY MEDICINE CLINIC | Age: 65
End: 2020-09-10
Payer: MEDICARE

## 2020-09-10 VITALS
DIASTOLIC BLOOD PRESSURE: 80 MMHG | OXYGEN SATURATION: 98 % | HEIGHT: 74 IN | WEIGHT: 195.4 LBS | SYSTOLIC BLOOD PRESSURE: 132 MMHG | TEMPERATURE: 97.1 F | HEART RATE: 79 BPM | BODY MASS INDEX: 25.08 KG/M2

## 2020-09-10 DIAGNOSIS — E11.65 TYPE 2 DIABETES MELLITUS WITH HYPERGLYCEMIA, WITHOUT LONG-TERM CURRENT USE OF INSULIN (HCC): ICD-10-CM

## 2020-09-10 LAB
CHOLESTEROL, TOTAL: 154 MG/DL (ref 0–199)
CREATININE URINE: 190.3 MG/DL
HBA1C MFR BLD: 9 % (ref 4.8–5.9)
HDLC SERPL-MCNC: 45 MG/DL (ref 40–59)
LDL CHOLESTEROL CALCULATED: 91 MG/DL (ref 0–129)
MICROALBUMIN UR-MCNC: <1.2 MG/DL
MICROALBUMIN/CREAT UR-RTO: NORMAL MG/G (ref 0–30)
TRIGL SERPL-MCNC: 90 MG/DL (ref 0–150)

## 2020-09-10 PROCEDURE — 3051F HG A1C>EQUAL 7.0%<8.0%: CPT | Performed by: FAMILY MEDICINE

## 2020-09-10 PROCEDURE — 99213 OFFICE O/P EST LOW 20 MIN: CPT | Performed by: FAMILY MEDICINE

## 2020-09-10 RX ORDER — AZITHROMYCIN 250 MG/1
TABLET, FILM COATED ORAL
Qty: 1 PACKET | Refills: 0 | Status: SHIPPED | OUTPATIENT
Start: 2020-09-10 | End: 2020-09-20

## 2020-09-10 NOTE — PROGRESS NOTES
Chief Complaint   Patient presents with    Diabetes     6 week    Sinus Problem     feel starting to get a cold, in between chemo treatments, nasal drainage        HPI:  Clair Altman is a 72 y.o. male    Follow up DM  6 weeks    Pancreatic cancer  Dr. Sheri Rojas 4 liver cirrhosis  had Partial Pancreatectomy/splenectomy  Surgery was stopped [facility] through     chemo now to treat cancer then return to surgery  Currently in between treatments  Reportedly is successful so far    avg BG down to 194    Was still up over 300 a couple of weeks ago    Doing much better now that he's in between treatments    Up to 48 units basaglar    Not having to use mealtime insulin recently    Reports todd sinus drainage    Wt Readings from Last 3 Encounters:   09/10/20 195 lb 6.4 oz (88.6 kg)   07/30/20 193 lb 9.6 oz (87.8 kg)   07/18/20 195 lb 9.6 oz (88.7 kg)       Lab Results   Component Value Date    LABA1C 7.6 (H) 02/24/2020     No results found for: EAG            Past Medical History:   Diagnosis Date    [facility]     Pancreatic    History of skin cancer     HTN (hypertension)     Type II or unspecified type diabetes mellitus without mention of complication, not stated as uncontrolled      Past Surgical History:   Procedure Laterality Date    COLONOSCOPY  02/20/2015    DR Alcira Rodriguez    COLONOSCOPY N/A 1/20/2020    COLONOSCOPY DIAGNOSTIC performed by Vanessa Suero MD at [address] [place] [address] ([facility]) N/A 12/17/2019    EUS performed by Vanessa Suero MD at [address] [place] [address]       Family History   Problem Relation Age of Onset    Cancer Mother         lung cancer    Colon Cancer Neg Hx     Celiac Disease Neg Hx     Crohn's Disease Neg Hx      Social History     Socioeconomic History    Marital status:      Spouse name: None    Number of children: None    Years of education: None    Highest education level: None   Occupational History    None   Social Needs    Financial resource strain: None    Food insecurity     Worry: None     Inability: None    Transportation needs     Medical: None     Non-medical: None   Tobacco Use    Smoking status: Never Smoker    Smokeless tobacco: Never Used   Substance and Sexual Activity    Alcohol use: No    Drug use: No    Sexual activity: Yes     Partners: Female   Lifestyle    Physical activity     Days per week: None     Minutes per session: None    Stress: None   Relationships    Social connections     Talks on phone: None     Gets together: None     Attends [employer] service: None     Active member of club or organization: None     Attends meetings of clubs or organizations: None     Relationship status: None    Intimate partner violence     Fear of current or ex partner: None     Emotionally abused: None     Physically abused: None     Forced sexual activity: None   Other Topics Concern    None   Social History Narrative    None     Current Outpatient Medications   Medication Sig Dispense Refill    azithromycin (ZITHROMAX) 250 MG tablet 2 tabs orally on first day, then one tab daily for four days 1 packet 0    Continuous Blood Gluc  (FREESTYLE NATALY 14 DAY READER) KISHA Test daily and prn. Dx: DM2 1 Device 0    Continuous Blood Gluc Sensor (FREESTYLE NATALY 14 DAY SENSOR) MISC Test daily and prn. Change every 2 weeks.   Dx: DM2 6 each 3    insulin glargine (BASAGLAR KWIKPEN) 100 UNIT/ML injection pen Inject 35 Units into the skin nightly 5 pen 3    insulin lispro, 1 Unit Dial, (HUMALOG KWIKPEN) 100 UNIT/ML SOPN Inject 1 Units into the skin See Admin Instructions 10 pen 5    Blood Glucose Monitoring Suppl (ONE TOUCH ULTRA MINI) w/Device KIT 1 kit by Does not apply route daily as needed (test 4x daily) 1 kit 0    ONE TOUCH LANCETS MISC 1 each by Does not apply route daily Test 4x/day 400 each 3    blood glucose monitor strips 1 strip by Other route daily Test 4 times a day & as needed for symptoms of irregular blood glucose. 400 strip 3    Insulin Pen Needle ([employer]ER PEN NEEDLES) 31G X 6 MM MISC 1 each by Does not apply route daily 100 each 3    amLODIPine (NORVASC) 2.5 MG tablet take 1 tablet by mouth once daily 30 tablet 5    meloxicam (MOBIC) 15 MG tablet take 1 tablet by mouth once daily 30 tablet 5    lisinopril-hydroCHLOROthiazide (PRINZIDE;ZESTORETIC) 20-12.5 MG per tablet take 1 tablet by mouth twice a day 60 tablet 5     No current facility-administered medications for this visit. No Known Allergies    Review of Systems:   General ROS: negative for - nausea, vomiting, chills, fatigue, fever, malaise, weight gain or weight loss  Respiratory ROS: no cough, shortness of breath, or wheezing  Cardiovascular ROS: no chest pain or dyspnea on exertion  Gastrointestinal ROS: no abdominal pain, change in bowel habits, or black or bloody stools  Genito-Urinary ROS: no dysuria, trouble voiding, or hematuria  Musculoskeletal ROS: negative for - gait disturbance, joint pain or joint stiffness  Neurological ROS: negative for - behavioral changes, memory loss, numbness/tingling, tremors or weakness    In general patient otherwise reports feeling well. In general patient otherwise reports feeling well.      Physical Exam:  /80 (Site: Right Upper Arm)   Pulse 79   Temp 97.1 °F (36.2 °C)   Ht 6' 1.5\" (1.867 m)   Wt 195 lb 6.4 oz (88.6 kg)   SpO2 98%   BMI 25.43 kg/m²     Gen: Well, NAD, Alert, Oriented x 3   HEENT: EOMI, eyes clear, MMM  Skin: concerning lesion upper chest, red, raised, about 1cm  Neck: no significant lymphadenopathy or thyromegaly  Lungs: CTA B w/out Rales/Wheezes/Rhonchi, Good respiratory effort   Heart: RRR, S1S2, w/out M/R/G, non-displaced PMI   Ext: no CCE  Neuro: nonfocal        Lab Results   Component Value Date    WBC 6.8 08/10/2020    HGB 13.0 (L) 08/10/2020    HCT 39.3 (L) 08/10/2020     08/10/2020    CHOL 171 08/09/2019    TRIG 98 08/09/2019    HDL 50 08/09/2019    ALT 54 (H) 08/10/2020    AST 50 (H) 08/10/2020     (L) 08/10/2020    K 3.2 (L) 08/10/2020     08/10/2020    CREATININE 0.86 08/10/2020    BUN 19 01/15/2020    CO2 27 01/15/2020    TSH 1.540 08/09/2019    PSA 0.89 08/09/2019    INR 1.2 (H) 08/06/2020    LABA1C 7.6 (H) 02/24/2020    LABMICR 1.70 08/09/2019         A&P   Diagnosis Orders   1. Type 2 diabetes mellitus with hyperglycemia, without long-term current use of insulin (HCC)  Lipid Panel    Microalbumin / Creatinine Urine Ratio    Hemoglobin A1C   2. Malignant neoplasm of pancreas, unspecified location of malignancy ([facility] [address]     3.  Acute bacterial sinusitis  azithromycin (ZITHROMAX) 250 MG tablet     F/u with oncology    Sugars much better    5 units humalog with meals plus sliding scale when needed    Will resume chemo next week      Maico Paulino MD

## 2020-09-30 RX ORDER — AMLODIPINE BESYLATE 2.5 MG/1
TABLET ORAL
Qty: 30 TABLET | Refills: 5 | Status: SHIPPED | OUTPATIENT
Start: 2020-09-30 | End: 2020-12-29 | Stop reason: SDUPTHER

## 2020-09-30 RX ORDER — LISINOPRIL AND HYDROCHLOROTHIAZIDE 20; 12.5 MG/1; MG/1
TABLET ORAL
Qty: 60 TABLET | Refills: 5 | Status: SHIPPED | OUTPATIENT
Start: 2020-09-30 | End: 2021-10-04

## 2020-09-30 RX ORDER — MELOXICAM 15 MG/1
TABLET ORAL
Qty: 30 TABLET | Refills: 5 | Status: SHIPPED | OUTPATIENT
Start: 2020-09-30 | End: 2021-04-07 | Stop reason: ALTCHOICE

## 2020-10-15 ENCOUNTER — OFFICE VISIT (OUTPATIENT)
Dept: FAMILY MEDICINE CLINIC | Age: 65
End: 2020-10-15
Payer: MEDICARE

## 2020-10-15 VITALS
HEIGHT: 72 IN | TEMPERATURE: 97 F | BODY MASS INDEX: 27.22 KG/M2 | SYSTOLIC BLOOD PRESSURE: 130 MMHG | HEART RATE: 80 BPM | WEIGHT: 201 LBS | DIASTOLIC BLOOD PRESSURE: 82 MMHG | OXYGEN SATURATION: 97 %

## 2020-10-15 PROCEDURE — G0008 ADMIN INFLUENZA VIRUS VAC: HCPCS | Performed by: FAMILY MEDICINE

## 2020-10-15 PROCEDURE — 90694 VACC AIIV4 NO PRSRV 0.5ML IM: CPT | Performed by: FAMILY MEDICINE

## 2020-10-15 PROCEDURE — 99213 OFFICE O/P EST LOW 20 MIN: CPT | Performed by: FAMILY MEDICINE

## 2020-10-15 PROCEDURE — 3052F HG A1C>EQUAL 8.0%<EQUAL 9.0%: CPT | Performed by: FAMILY MEDICINE

## 2020-10-15 RX ORDER — GLUCOSAMINE HCL/CHONDROITIN SU 500-400 MG
1 CAPSULE ORAL DAILY
Qty: 500 STRIP | Refills: 3 | Status: SHIPPED | OUTPATIENT
Start: 2020-10-15 | End: 2021-01-15

## 2020-10-15 RX ORDER — INSULIN GLARGINE 100 [IU]/ML
60 INJECTION, SOLUTION SUBCUTANEOUS NIGHTLY
Qty: 6 PEN | Refills: 3 | Status: SHIPPED | OUTPATIENT
Start: 2020-10-15 | End: 2021-01-15

## 2020-10-15 RX ORDER — PEN NEEDLE, DIABETIC 31 G X1/4"
1 NEEDLE, DISPOSABLE MISCELLANEOUS DAILY
Qty: 100 EACH | Refills: 5 | Status: SHIPPED | OUTPATIENT
Start: 2020-10-15 | End: 2022-01-27 | Stop reason: ALTCHOICE

## 2020-10-15 NOTE — PROGRESS NOTES
Chief Complaint   Patient presents with    Diabetes     3 month     Discuss Medications     needs more pens        HPI:  Armani Garcia is a 72 y.o. male    Follow up DM  6 weeks    Pancreatic cancer  Dr. Jennifer Mai  Stage 4 liver cirrhosis  had Partial Pancreatectomy/splenectomy  Surgery [facility] stopped [facility]    Chemo now to treat cancer then return to surgery  Currently in between treatments  Reportedly is successful so far    Everything is shrinking  Back on chemo  Will be undergoing surgery in the near future    avg BG down to 185  Was in 300-400    Doing much better now that he's in between treatments    Up to 60 units basaglar    Uses about 30 total units of mealtime insulin      Wt Readings from Last 3 Encounters:   10/15/20 201 lb (91.2 kg)   09/10/20 195 lb 6.4 oz (88.6 kg)   07/30/20 193 lb 9.6 oz (87.8 kg)       Lab Results   Component Value Date    LABA1C 9.0 (H) 09/10/2020     No results found for: EAG            Past Medical History:   Diagnosis Date    Cancer ([facility] [address]     Pancreatic    History of skin cancer     HTN (hypertension)     Type II or unspecified type diabetes mellitus without mention of complication, not stated as uncontrolled      Past Surgical History:   Procedure Laterality Date    COLONOSCOPY  02/20/2015    DR Rhonda Doshi    COLONOSCOPY N/A 1/20/2020    COLONOSCOPY DIAGNOSTIC performed by Cara Fuller MD at [address] [place] [address] ([facility]) N/A 12/17/2019    EUS performed by Cara Fuller MD at [address] [place] [address]       Family History   Problem Relation Age of Onset    Cancer Mother         lung cancer    Colon Cancer Neg Hx     Celiac Disease Neg Hx     Crohn's Disease Neg Hx      Social History     Socioeconomic History    Marital status:      Spouse name: None    Number of children: None    Years of education: None    Highest education level: None   Occupational History    None   Social Needs    Financial resource strain: None    Food insecurity     Worry: None     Inability: None    Transportation needs     Medical: None     Non-medical: None   Tobacco Use    Smoking status: Never Smoker    Smokeless tobacco: Never Used   Substance and Sexual Activity    Alcohol use: No    Drug use: No    Sexual activity: Yes     Partners: Female   Lifestyle    Physical activity     Days per week: None     Minutes per session: None    Stress: None   Relationships    Social connections     Talks on phone: None     Gets together: None     Attends [employer] service: None     Active member of club or organization: None     Attends meetings of clubs or organizations: None     Relationship status: None    Intimate partner violence     Fear of current or ex partner: None     Emotionally abused: None     Physically abused: None     Forced sexual activity: None   Other Topics Concern    None   Social History Narrative    None     Current Outpatient Medications   Medication Sig Dispense Refill    blood glucose monitor strips 1 strip by Other route daily Test 4 times a day & as needed for symptoms of irregular blood glucose. 500 strip 3    insulin glargine (BASAGLAR KWIKPEN) 100 UNIT/ML injection pen Inject 60 Units into the skin nightly 6 pen 3    Insulin Pen Needle ([employer]ER PEN NEEDLES) 31G X 6 MM MISC 1 each by Does not apply route daily 100 each 5    meloxicam (MOBIC) 15 MG tablet take 1 tablet by mouth once daily 30 tablet 5    lisinopril-hydroCHLOROthiazide (PRINZIDE;ZESTORETIC) 20-12.5 MG per tablet take 1 tablet by mouth twice a day 60 tablet 5    amLODIPine (NORVASC) 2.5 MG tablet take 1 tablet by mouth once daily 30 tablet 5    Continuous Blood Gluc  (FREESTYLE NATALY 14 DAY READER) KISHA Test daily and prn. Dx: DM2 1 Device 0    Continuous Blood Gluc Sensor (FREESTYLE NATALY 14 DAY SENSOR) MISC Test daily and prn. Change every 2 weeks.   Dx: DM2 6 each 3    insulin lispro, 1 Unit Dial, (HUMALOG KWIKPEN) 100 UNIT/ML SOPN Inject 1 Units into the skin See Admin Instructions 10 pen 5     No current facility-administered medications for this visit. No Known Allergies    Review of Systems:   General ROS: negative for - nausea, vomiting, chills, fatigue, fever, malaise, weight gain or weight loss  Respiratory ROS: no cough, shortness of breath, or wheezing  Cardiovascular ROS: no chest pain or dyspnea on exertion  Gastrointestinal ROS: no abdominal pain, change in bowel habits, or black or bloody stools  Genito-Urinary ROS: no dysuria, trouble voiding, or hematuria  Musculoskeletal ROS: negative for - gait disturbance, joint pain or joint stiffness  Neurological ROS: negative for - behavioral changes, memory loss, numbness/tingling, tremors or weakness    In general patient otherwise reports feeling well. In general patient otherwise reports feeling well. Physical Exam:  /82 (Site: Right Upper Arm)   Pulse 80   Temp 97 °F (36.1 °C)   Ht 5' 11.5\" (1.816 m)   Wt 201 lb (91.2 kg)   SpO2 97%   BMI 27.64 kg/m²     Gen: Well, NAD, Alert, Oriented x 3   HEENT: EOMI, eyes clear, MMM  Skin: concerning lesion upper chest, red, raised, about 1cm  Neck: no significant lymphadenopathy or thyromegaly  Lungs: CTA B w/out Rales/Wheezes/Rhonchi, Good respiratory effort   Heart: RRR, S1S2, w/out M/R/G, non-displaced PMI   Ext: no CCE  Neuro: nonfocal        Lab Results   Component Value Date    WBC 4.8 10/12/2020    HGB 13.5 10/12/2020    HCT 41.0 10/12/2020     (L) 10/12/2020    CHOL 154 09/10/2020    TRIG 90 09/10/2020    HDL 45 09/10/2020    ALT 55 (H) 10/12/2020    AST 50 (H) 10/12/2020     10/12/2020    K 4.1 10/12/2020    CL 98 10/12/2020    CREATININE 0.80 10/12/2020    BUN 19 01/15/2020    CO2 27 01/15/2020    TSH 1.540 08/09/2019    PSA 0.89 08/09/2019    INR 1.2 (H) 08/06/2020    LABA1C 9.0 (H) 09/10/2020    LABMICR <1.20 09/10/2020         A&P   Diagnosis Orders   1.  Type 2 diabetes mellitus with hyperglycemia, without long-term current use of insulin (HCC)  blood glucose monitor strips    insulin glargine (BASAGLAR KWIKPEN) 100 UNIT/ML injection pen    Insulin Pen Needle (KROGER PEN NEEDLES) 31G X 6 MM MISC   2. Needs flu shot  INFLUENZA, QUADV, ADJUVANTED, 65 YRS =, IM, PF, PREFILL SYR, 0.5ML (FLUAD)   3.  Malignant neoplasm of pancreas, unspecified location of malignancy ([facility] [address]       F/u with oncology    Sugars much better    5 units humalog with meals plus sliding scale when needed    60 units basaglar    a1c in 2 mos      Ansley Kaba MD

## 2020-10-15 NOTE — PROGRESS NOTES
After obtaining consent, and per orders of Dr. Ruben Sanches, injection of flu given in Left deltoid by Elida Nyhan. Patient instructed to remain in clinic for 20 minutes afterwards, and to report any adverse reaction to me immediately. Vaccine Information Sheet, \"Influenza - Inactivated\"  given to Vi Aguila, or parent/legal guardian of  Vi Aguila and verbalized understanding. Patient responses:    Have you ever had a reaction to a flu vaccine? No  Are you able to eat eggs without adverse effects? Yes  Do you have any current illness? No  Have you ever had [place] Syndrome? No    Flu vaccine given per order. Please see immunization tab.

## 2020-10-25 ENCOUNTER — TELEPHONE (OUTPATIENT)
Dept: FAMILY MEDICINE CLINIC | Age: 65
End: 2020-10-25

## 2020-11-04 ENCOUNTER — HOSPITAL ENCOUNTER (EMERGENCY)
Age: 65
Discharge: HOME | End: 2020-11-05
Payer: MEDICARE

## 2020-11-04 LAB
BASOPHILS ABSOLUTE: 0 K/UL (ref 0–0.2)
BASOPHILS RELATIVE PERCENT: 0.4 %
CHP ED QC CHECK: NORMAL
EOSINOPHILS ABSOLUTE: 0.1 K/UL (ref 0–0.7)
EOSINOPHILS RELATIVE PERCENT: 2.7 %
GLUCOSE BLD-MCNC: 164 MG/DL
GLUCOSE BLD-MCNC: 185 MG/DL
GLUCOSE BLD-MCNC: 185 MG/DL (ref 60–115)
GLUCOSE BLD-MCNC: 191 MG/DL
GLUCOSE BLD-MCNC: 191 MG/DL (ref 60–115)
GLUCOSE BLD-MCNC: 203 MG/DL (ref 60–115)
GLUCOSE BLD-MCNC: 215 MG/DL (ref 60–115)
HCT VFR BLD CALC: 39.9 % (ref 42–52)
HEMOGLOBIN: 13.3 G/DL (ref 14–18)
LYMPHOCYTES ABSOLUTE: 0.6 K/UL (ref 1–4.8)
LYMPHOCYTES RELATIVE PERCENT: 13.2 %
MCH RBC QN AUTO: 31.3 PG (ref 27–31.3)
MCHC RBC AUTO-ENTMCNC: 33.4 % (ref 33–37)
MCV RBC AUTO: 93.7 FL (ref 80–100)
MONOCYTES ABSOLUTE: 0.4 K/UL (ref 0.2–0.8)
MONOCYTES RELATIVE PERCENT: 8.9 %
NEUTROPHILS ABSOLUTE: 3.6 K/UL (ref 1.4–6.5)
NEUTROPHILS RELATIVE PERCENT: 74.8 %
PDW BLD-RTO: 18.7 % (ref 11.5–14.5)
PERFORMED ON: ABNORMAL
PLATELET # BLD: 151 K/UL (ref 130–400)
RBC # BLD: 4.26 M/UL (ref 4.7–6.1)
WBC # BLD: 4.7 K/UL (ref 4.8–10.8)

## 2020-11-04 PROCEDURE — 99284 EMERGENCY DEPT VISIT MOD MDM: CPT

## 2020-11-04 PROCEDURE — 36415 COLL VENOUS BLD VENIPUNCTURE: CPT

## 2020-11-04 PROCEDURE — 85025 COMPLETE CBC W/AUTO DIFF WBC: CPT

## 2020-11-04 PROCEDURE — 2580000003 HC RX 258: Performed by: PERSONAL EMERGENCY RESPONSE ATTENDANT

## 2020-11-04 RX ORDER — 0.9 % SODIUM CHLORIDE 0.9 %
1000 INTRAVENOUS SOLUTION INTRAVENOUS ONCE
Status: COMPLETED | OUTPATIENT
Start: 2020-11-04 | End: 2020-11-05

## 2020-11-04 RX ADMIN — SODIUM CHLORIDE 1000 ML: 9 INJECTION, SOLUTION INTRAVENOUS at 23:06

## 2020-11-04 ASSESSMENT — ENCOUNTER SYMPTOMS
VOMITING: 0
COLOR CHANGE: 0
ABDOMINAL PAIN: 0
NAUSEA: 0
DIARRHEA: 0
RHINORRHEA: 0
COUGH: 0
SHORTNESS OF BREATH: 0
BLOOD IN STOOL: 0
SORE THROAT: 0

## 2020-11-05 VITALS
RESPIRATION RATE: 18 BRPM | BODY MASS INDEX: 26.51 KG/M2 | SYSTOLIC BLOOD PRESSURE: 150 MMHG | WEIGHT: 200 LBS | HEIGHT: 73 IN | HEART RATE: 95 BPM | DIASTOLIC BLOOD PRESSURE: 82 MMHG | OXYGEN SATURATION: 98 % | TEMPERATURE: 98 F

## 2020-11-05 LAB
ANION GAP SERPL CALCULATED.3IONS-SCNC: 8 MEQ/L (ref 9–15)
BUN BLDV-MCNC: 17 MG/DL (ref 8–23)
CALCIUM SERPL-MCNC: 8.8 MG/DL (ref 8.5–9.9)
CHLORIDE BLD-SCNC: 108 MEQ/L (ref 95–107)
CHP ED QC CHECK: NORMAL
CHP ED QC CHECK: YES
CO2: 26 MEQ/L (ref 20–31)
CREAT SERPL-MCNC: 0.85 MG/DL (ref 0.7–1.2)
GFR AFRICAN AMERICAN: >60
GFR NON-AFRICAN AMERICAN: >60
GLUCOSE BLD-MCNC: 100 MG/DL (ref 60–115)
GLUCOSE BLD-MCNC: 124 MG/DL
GLUCOSE BLD-MCNC: 124 MG/DL (ref 60–115)
GLUCOSE BLD-MCNC: 127 MG/DL
GLUCOSE BLD-MCNC: 127 MG/DL (ref 60–115)
GLUCOSE BLD-MCNC: 128 MG/DL
GLUCOSE BLD-MCNC: 128 MG/DL (ref 60–115)
GLUCOSE BLD-MCNC: 136 MG/DL
GLUCOSE BLD-MCNC: 136 MG/DL (ref 60–115)
GLUCOSE BLD-MCNC: 137 MG/DL (ref 70–99)
GLUCOSE BLD-MCNC: 141 MG/DL
GLUCOSE BLD-MCNC: 141 MG/DL (ref 60–115)
GLUCOSE BLD-MCNC: 164 MG/DL (ref 60–115)
GLUCOSE BLD-MCNC: 97 MG/DL (ref 60–115)
PERFORMED ON: ABNORMAL
PERFORMED ON: NORMAL
PERFORMED ON: NORMAL
POTASSIUM SERPL-SCNC: 3.4 MEQ/L (ref 3.4–4.9)
REASON FOR REJECTION: NORMAL
REJECTED TEST: NORMAL
SODIUM BLD-SCNC: 142 MEQ/L (ref 135–144)

## 2020-11-05 PROCEDURE — 80048 BASIC METABOLIC PNL TOTAL CA: CPT

## 2020-11-05 PROCEDURE — 36415 COLL VENOUS BLD VENIPUNCTURE: CPT

## 2020-11-05 NOTE — ED NOTES
POCT glucose rechecked. Result 128. Pt states he feels good and would like to go home. States he will continue to monitor his glucose at home closely. Pt given DC instructions, verbalized understanding. Pt ambulatory with steady gait. Skin warm and dry, resps even and unlabored. No acute distress noted at time of DC.        Zahida Ratliff RN  11/05/20 2244

## 2020-11-05 NOTE — ED TRIAGE NOTES
Pt states he accidentally took 60 units of short acting insulin instead of his long acting insulin. Pt states he drank a Pepsi before coming to the ED. States he feels like his blood sugar is getting low. OT in triage 215. Pt states he has pancreatic cancer and is on chemo. Pt taken to ED bed 2. Pt alert and oriented x 4. Skin pink, warm, dry. Respirations even and unlabored. No distress noted at this time.   Dr. Suad Sow notified of pt's arrival and complaint

## 2020-11-05 NOTE — ED NOTES
Blood glucose 100. Pt stable, 0 c/o, 0 distress, resting in bed, watching tv.      John Tripp RN  11/05/20 3651

## 2020-11-05 NOTE — ED NOTES
Pt stable, a&ox4, skin w/d/pink, pulses palp. Pt watching tv, 0 c/o, 0 distress.      Amy Chen RN  11/05/20 0996

## 2020-11-05 NOTE — ED PROVIDER NOTES
[address] [facility] ED  eMERGENCY dEPARTMENT eNCOUnter      Pt Name: Marla Feldman  MRN: [id number]  [place] 1955  Date of evaluation: 11/4/2020  Provider: CR Gomez      HISTORY OF PRESENT ILLNESS    Marla Feldman is a 72 y.o. male with PMHx of pancreatic CA with DM2, TIA, HTN, skin cancer presents to the emergency department with insulin overdose. Patient states 30 minutes prior to arrival, he accidentally took his Humalog instead of his glargine (long acting). He takes 62 units and noticed this after he placed his insulin down. His blood sugar was 110-140 at home and he was feeling shaky. He drank a Pepsi on the way to the ER.  215 blood sugar upon arrival to the ER. He denies any current physical complaints. He is receiving chemo for pancreatic cancer, 14 days on, 14 days off. Patient does chemo again in 1 week. He denies fevers, cough, chest pain, shortness of breath, nausea, vomiting. HPI    Nursing Notes were reviewed. REVIEW OF SYSTEMS       Review of Systems   Constitutional: Negative for appetite change, chills and fever. HENT: Negative for congestion, rhinorrhea and sore throat. Respiratory: Negative for cough and shortness of breath. Cardiovascular: Negative for chest pain. Gastrointestinal: Negative for abdominal pain, blood in stool, diarrhea, nausea and vomiting. Genitourinary: Negative for difficulty urinating. Musculoskeletal: Negative for neck stiffness. Skin: Negative for color change and rash. Neurological: Positive for tremors. Negative for dizziness, syncope, weakness, light-headedness, numbness and headaches. All other systems reviewed and are negative.             PAST MEDICAL HISTORY     Past Medical History:   Diagnosis Date    Cancer [facility])     Pancreatic    History of skin cancer     HTN (hypertension)     Type II or unspecified type diabetes mellitus without mention of complication, not stated as uncontrolled          SURGICAL HISTORY Past Surgical History:   Procedure Laterality Date    ABDOMEN SURGERY      COLONOSCOPY  02/20/2015    DR Farhat Klein COLONOSCOPY N/A 1/20/2020    COLONOSCOPY DIAGNOSTIC performed by Dorina Dickson MD at [address] [place] [address]) N/A 12/17/2019    EUS performed by Dorina Dickson MD at [facility]       Previous Medications    AMLODIPINE (NORVASC) 2.5 MG TABLET    take 1 tablet by mouth once daily    BLOOD GLUCOSE MONITOR STRIPS    1 strip by Other route daily Test 4 times a day & as needed for symptoms of irregular blood glucose. CONTINUOUS BLOOD GLUC  (FREESTYLE NATALY 14 DAY READER) KISHA    Test daily and prn. Dx: DM2    CONTINUOUS BLOOD GLUC SENSOR (FREESTYLE NATALY 14 DAY SENSOR) MISC    Test daily and prn. Change every 2 weeks. Dx: DM2    INSULIN GLARGINE (BASAGLAR KWIKPEN) 100 UNIT/ML INJECTION PEN    Inject 60 Units into the skin nightly    INSULIN LISPRO, 1 UNIT DIAL, (HUMALOG KWIKPEN) 100 UNIT/ML SOPN    Inject 1 Units into the skin See Admin Instructions    INSULIN PEN NEEDLE ([employer] PEN NEEDLES) 31G X 6 MM MISC    1 each by Does not apply route daily    LISINOPRIL-HYDROCHLOROTHIAZIDE (PRINZIDE;ZESTORETIC) 20-12.5 MG PER TABLET    take 1 tablet by mouth twice a day    MELOXICAM (MOBIC) 15 MG TABLET    take 1 tablet by mouth once daily       ALLERGIES     Patient has no known allergies.     FAMILY HISTORY       Family History   Problem Relation Age of Onset    Cancer Mother         lung cancer    Colon Cancer Neg Hx     Celiac Disease Neg Hx     Crohn's Disease Neg Hx           SOCIAL HISTORY       Social History     Socioeconomic History    Marital status:      Spouse name: Not on file    Number of children: Not on file    Years of education: Not on file    Highest education level: Not on file   Occupational History    Not on file   Social Needs    Financial resource strain: Not on file   [employer] insecurity     Worry: Not on file     Inability: Not on file    Transportation needs     Medical: Not on file     Non-medical: Not on file   Tobacco Use    Smoking status: Never Smoker    Smokeless tobacco: Never Used   Substance and Sexual Activity    Alcohol use: No    Drug use: No    Sexual activity: Yes     Partners: Female   Lifestyle    Physical activity     Days per week: Not on file     Minutes per session: Not on file    Stress: Not on file   Relationships    Social connections     Talks on phone: Not on file     Gets together: Not on file     Attends [employer] service: Not on file     Active member of club or organization: Not on file     Attends meetings of clubs or organizations: Not on file     Relationship status: Not on file    Intimate partner violence     Fear of current or ex partner: Not on file     Emotionally abused: Not on file     Physically abused: Not on file     Forced sexual activity: Not on file   Other Topics Concern    Not on file   Social History Narrative    Not on file         PHYSICAL EXAM         ED Triage Vitals [11/04/20 2239]   BP Temp Temp Source Pulse Resp SpO2 Height Weight   (!) 189/108 98 °F (36.7 °C) Oral 107 16 97 % 6' 1\" (1.854 m) 200 lb (90.7 kg)       Physical Exam  Constitutional:       Appearance: He is well-developed. HENT:      Head: Normocephalic and atraumatic. Eyes:      Conjunctiva/sclera: Conjunctivae normal.      Pupils: Pupils are equal, round, and reactive to light. Neck:      Musculoskeletal: Normal range of motion and neck supple. Trachea: No tracheal deviation. Cardiovascular:      Heart sounds: Normal heart sounds. Pulmonary:      Effort: Pulmonary effort is normal. No respiratory distress. Breath sounds: Normal breath sounds. No stridor. Abdominal:      General: Bowel sounds are normal. There is no distension. Palpations: Abdomen is soft. There is no mass. Tenderness: There is no abdominal tenderness.  There is no guarding or rebound. Musculoskeletal: Normal range of motion. Skin:     General: Skin is warm and dry. Capillary Refill: Capillary refill takes less than 2 seconds. Findings: No rash. Neurological:      Mental Status: He is alert and oriented to person, place, and time. Deep Tendon Reflexes: Reflexes are normal and symmetric. Psychiatric:         Behavior: Behavior normal.         Thought Content:  Thought content normal.         Judgment: Judgment normal.         DIAGNOSTIC RESULTS     EKG:All EKG's are interpreted by the Emergency Department Physician who either signs or Co-signs this chart in the absence of a cardiologist.        RADIOLOGY:   Non-plain film images such as CT, Ultrasound and MRI are read by theradiologist. Plain radiographic images are visualized and preliminarily interpreted by the emergency physician with the below findings:    Interpretation per theRadiologist below, if available at the time of this note:    No orders to display           LABS:  Labs Reviewed   CBC WITH AUTO DIFFERENTIAL - Abnormal; Notable for the following components:       Result Value    WBC 4.7 (*)     RBC 4.26 (*)     Hemoglobin 13.3 (*)     Hematocrit 39.9 (*)     RDW 18.7 (*)     Lymphocytes Absolute 0.6 (*)     All other components within normal limits   BASIC METABOLIC PANEL - Abnormal; Notable for the following components:    Chloride 108 (*)     Anion Gap 8 (*)     Glucose 137 (*)     All other components within normal limits   POCT GLUCOSE - Abnormal; Notable for the following components:    POC Glucose 215 (*)     All other components within normal limits   POCT GLUCOSE - Abnormal; Notable for the following components:    POC Glucose 203 (*)     All other components within normal limits   POCT GLUCOSE - Abnormal; Notable for the following components:    POC Glucose 191 (*)     All other components within normal limits   POCT GLUCOSE - Abnormal; Notable for the following components: POC Glucose 185 (*)     All other components within normal limits   POCT GLUCOSE - Abnormal; Notable for the following components:    POC Glucose 164 (*)     All other components within normal limits   POCT GLUCOSE - Normal   POCT GLUCOSE - Normal   POCT GLUCOSE - Normal   POCT GLUCOSE - Normal   POCT GLUCOSE - Normal   POCT GLUCOSE - Normal   SPECIMEN REJECTION   POCT GLUCOSE   POCT GLUCOSE       All other labs were within normal range or not returned as of this dictation. EMERGENCY DEPARTMENT COURSE and DIFFERENTIAL DIAGNOSIS/MDM:   Vitals:    Vitals:    11/04/20 2300 11/04/20 2330 11/04/20 2353 11/05/20 0000   BP: (!) 167/85 (!) 167/83 (!) 174/84 (!) 172/82   Pulse: 98 97 93 92   Resp: 17 17 17 18   Temp:       TempSrc:       SpO2: 98% 100% 98% 98%   Weight:       Height:             MDM    Lab work unremarkable, pancytopenia expected with chemo. Blood sugars have been within normal limits throughout his visit. He has drank some pop or juice while in the ER. Patient will be OBS for ~4.5 hours to monitor for peak of humalog and hypoglycemia. Lowest BS was 90 and patient did eat sandwich at this time and given other beverages. Pt would like to leave and states he will continue to monitor his BS at home. Pt denies any physical complaints. Standard anticipatory guidance given to patient upon discharge. Have given them a specific time frame in which to follow-up and who to follow-up with. I have also advised them that they should return to the emergency department if they get worse, or not getting better or develop any new or concerning symptoms. Patient demonstrates understanding. CRITICAL CARE TIME   Total Critical Caretime was 0 minutes, excluding separately reportable procedures. There was a high probability of clinically significant/life threatening deterioration in the patient's condition which required my urgent intervention. Procedures    FINAL IMPRESSION      1.  Accidental drug overdose, initial encounter          DISPOSITION/PLAN   DISPOSITION Discharge - Pending Orders Complete 11/05/2020 12:57:19 AM      PATIENT REFERRED TO:  Qi Flannery MD  [address] [place] [address] Road  [phone]    In 2 days  As needed      DISCHARGE MEDICATIONS:  New Prescriptions    No medications on file          (Please notethat portions of this note were completed with a voice recognition program.  Efforts were made to edit the dictations but occasionally words are mis-transcribed. )    CR Stewart (electronically signed)  Emergency Physician Assistant         Bulmaro Odell  11/05/20 CR Rogers  11/05/20 0410

## 2020-11-05 NOTE — ED NOTES
Blood glucose 97, pt having turkey wrap. 0 c/o, 0 distress, 0 problems, a&ox4, skin w/d/pink. Will monitor.      Maryann Clinton RN  11/05/20 2081

## 2020-11-05 NOTE — ED NOTES
Pt stable, resting in bed, a&ox4, skin w/d/pink, 0 distress, 0 n&v, 0 sob, 0 problems. Will monitor.      Brigid Lutz RN  11/05/20 1073

## 2020-11-05 NOTE — ED NOTES
Obt. Blood to lab, pt medicated per orders, tj. Jerod. Pt talking to wife over the phone, pt a&ox4,s kin w/d/pink, pulses palp, msp's intact, 0 problems.      Rosa Carvajal RN  11/04/20 2185

## 2020-12-29 ENCOUNTER — TELEPHONE (OUTPATIENT)
Dept: FAMILY MEDICINE CLINIC | Age: 65
End: 2020-12-29

## 2020-12-29 DIAGNOSIS — I10 ESSENTIAL HYPERTENSION: ICD-10-CM

## 2020-12-29 RX ORDER — AMLODIPINE BESYLATE 5 MG/1
TABLET ORAL
Qty: 30 TABLET | Refills: 5 | Status: SHIPPED | OUTPATIENT
Start: 2020-12-29 | End: 2021-07-05

## 2020-12-29 NOTE — TELEPHONE ENCOUNTER
Pt states he is taking the lisinopril hct bid along with the amlodipine.  Just took his bp and it is 154/105

## 2021-01-08 DIAGNOSIS — E11.65 TYPE 2 DIABETES MELLITUS WITH HYPERGLYCEMIA, WITHOUT LONG-TERM CURRENT USE OF INSULIN (HCC): ICD-10-CM

## 2021-01-08 RX ORDER — INSULIN GLARGINE 100 [IU]/ML
60 INJECTION, SOLUTION SUBCUTANEOUS NIGHTLY
Qty: 10 PEN | Refills: 5 | Status: SHIPPED | OUTPATIENT
Start: 2021-01-08 | End: 2022-01-27 | Stop reason: ALTCHOICE

## 2021-01-08 NOTE — TELEPHONE ENCOUNTER
Pt's ins is asking if we can send Lantus 100 Unit/ML injection pens. It can't be Basaglar. DAYA.  It's cheaper for the pt.   lov 10/15/2020

## 2021-01-08 NOTE — TELEPHONE ENCOUNTER
Orders Placed This Encounter   Medications    insulin glargine (LANTUS SOLOSTAR) 100 UNIT/ML injection pen     Sig: Inject 60 Units into the skin nightly     Dispense:  10 pen     Refill:  5       The above med(s) were e-scripted to the patient's pharmacy.    Please advise patient  Teena Rider MD

## 2021-01-15 ENCOUNTER — OFFICE VISIT (OUTPATIENT)
Dept: FAMILY MEDICINE CLINIC | Age: 66
End: 2021-01-15
Payer: MEDICARE

## 2021-01-15 VITALS
OXYGEN SATURATION: 98 % | HEART RATE: 81 BPM | SYSTOLIC BLOOD PRESSURE: 136 MMHG | WEIGHT: 204 LBS | BODY MASS INDEX: 27.63 KG/M2 | HEIGHT: 72 IN | TEMPERATURE: 98.6 F | DIASTOLIC BLOOD PRESSURE: 79 MMHG

## 2021-01-15 DIAGNOSIS — Z23 NEED FOR PROPHYLACTIC VACCINATION AGAINST STREPTOCOCCUS PNEUMONIAE (PNEUMOCOCCUS): ICD-10-CM

## 2021-01-15 DIAGNOSIS — Z00.00 ROUTINE GENERAL MEDICAL EXAMINATION AT A HEALTH CARE FACILITY: Primary | ICD-10-CM

## 2021-01-15 DIAGNOSIS — I10 ESSENTIAL HYPERTENSION: ICD-10-CM

## 2021-01-15 DIAGNOSIS — C25.7 MALIGNANT NEOPLASM OF OTHER PARTS OF PANCREAS (HCC): ICD-10-CM

## 2021-01-15 DIAGNOSIS — E11.65 TYPE 2 DIABETES MELLITUS WITH HYPERGLYCEMIA, WITHOUT LONG-TERM CURRENT USE OF INSULIN (HCC): ICD-10-CM

## 2021-01-15 PROCEDURE — G0009 ADMIN PNEUMOCOCCAL VACCINE: HCPCS | Performed by: FAMILY MEDICINE

## 2021-01-15 PROCEDURE — 90732 PPSV23 VACC 2 YRS+ SUBQ/IM: CPT | Performed by: FAMILY MEDICINE

## 2021-01-15 PROCEDURE — G0402 INITIAL PREVENTIVE EXAM: HCPCS | Performed by: FAMILY MEDICINE

## 2021-01-15 ASSESSMENT — PATIENT HEALTH QUESTIONNAIRE - PHQ9
SUM OF ALL RESPONSES TO PHQ QUESTIONS 1-9: 2
SUM OF ALL RESPONSES TO PHQ QUESTIONS 1-9: 2

## 2021-01-15 NOTE — PATIENT INSTRUCTIONS
Personalized Preventive Plan for Tori Kwong - 1/15/2021  Medicare offers a range of preventive health benefits. Some of the tests and screenings are paid in full while other may be subject to a deductible, co-insurance, and/or copay. Some of these benefits include a comprehensive review of your medical history including lifestyle, illnesses that may run in your family, and various assessments and screenings as appropriate. After reviewing your medical record and screening and assessments performed today your provider may have ordered immunizations, labs, imaging, and/or referrals for you. A list of these orders (if applicable) as well as your Preventive Care list are included within your After Visit Summary for your review. Other Preventive Recommendations:    · A preventive eye exam performed by an eye specialist is recommended every 1-2 years to screen for glaucoma; cataracts, macular degeneration, and other eye disorders. · A preventive dental visit is recommended every 6 months. · Try to get at least 150 minutes of exercise per week or 10,000 steps per day on a pedometer . · Order or download the FREE \"Exercise & Physical Activity: Your Everyday Guide\" from [employer]. Call 4-497.848.3489 or search [employer] online. · You need 5671-0226 mg of calcium and 8427-1243 IU of vitamin D per day. It is possible to meet your calcium requirement with diet alone, but a vitamin D supplement is usually necessary to meet this goal.  · When exposed to the sun, use a sunscreen that protects against both UVA and UVB radiation with an SPF of 30 or greater. Reapply every 2 to 3 hours or after sweating, drying off with a towel, or swimming. · Always wear a seat belt when traveling in a car. Always wear a helmet when riding a bicycle or motorcycle.

## 2021-01-15 NOTE — PROGRESS NOTES
After obtaining consent, and per orders of Dr. Simran Pace, injection of [id number] given in Left deltoid by Irwin Chu. Patient instructed to remain in clinic for 20 minutes afterwards, and to report any adverse reaction to me immediately.

## 2021-01-15 NOTE — PROGRESS NOTES
Medicare Annual Wellness Visit  Name: Suyapa Danielle Date: 1/15/2021   MRN: [id number] Sex: Male   Age: 72 y.o. Ethnicity: Non-/Non    : 1955 Race: Merna Velasquez is here for Medicare AWV    Screenings for behavioral, psychosocial and functional/safety risks, and cognitive dysfunction are all negative except as indicated below. These results, as well as other patient data from the [address] form, are documented in Flowsheets linked to this Encounter. Pancreatic cancer  Told yesterday he can't have surgery on his pancreatic cancer  Cancer thankfully has been responding to chemo    Liver cirrhosis Stage 4  Surgery too risky  Will be going to liver specialist next week    BG avg 166 lately    Was higher  Last a1c 9.5    He is much better when on break from chemo      Patient Active Problem List   Diagnosis    HTN (hypertension)    Type 2 diabetes mellitus without complication ([address]    Lumbar paraspinal muscle spasm    Allergic rhinosinusitis    TIA (transient ischemic attack)    Abdominal pain    Malignant neoplasm of other parts of pancreas ([address]         No Known Allergies      Prior to Visit Medications    Medication Sig Taking? Authorizing Provider   insulin glargine (LANTUS SOLOSTAR) 100 UNIT/ML injection pen Inject 60 Units into the skin nightly Yes Evelin Segundo MD   amLODIPine (NORVASC) 5 MG tablet take 1 tablet by mouth once daily Yes Evelin Segundo MD   Insulin Pen Needle ([employer] PEN NEEDLES) 31G X 6 MM MISC 1 each by Does not apply route daily Yes Evelin Segundo MD   meloxicam (MOBIC) 15 MG tablet take 1 tablet by mouth once daily Yes Evelin Segundo MD   lisinopril-hydroCHLOROthiazide (PRINZIDE;ZESTORETIC) 20-12.5 MG per tablet take 1 tablet by mouth twice a day Yes Evelin Segundo MD   Continuous Blood Gluc  (FREESTYLE NATALY 14 DAY READER) KISHA Test daily and prn.  Dx: DM2 Yes Evelin Segundo MD   Continuous Blood Gluc Sensor (FREESTYLE NATALY 14 DAY SENSOR) MISC Test daily and prn. Change every 2 weeks. Dx: DM2 Yes Stephany Azar MD   insulin lispro, 1 Unit Dial, (HUMALOG KWIKPEN) 100 UNIT/ML SOPN Inject 1 Units into the skin See Admin Instructions Yes Jair Sparrow PA-C         Past Medical History:   Diagnosis Date    Cancer [facility])     Pancreatic    History of skin cancer     HTN (hypertension)     Malignant neoplasm of other parts of pancreas ([address]     Type II or unspecified type diabetes mellitus without mention of complication, not stated as uncontrolled        Past Surgical History:   Procedure Laterality Date    ABDOMEN SURGERY      COLONOSCOPY  02/20/2015    DR Juan Antonio Flowers    COLONOSCOPY N/A 1/20/2020    COLONOSCOPY DIAGNOSTIC performed by Seferino Lu MD at [address] [place] [address] ([facility]) N/A 12/17/2019    EUS performed by Seferino Lu MD at [address] [place] [address]           Family History   Problem Relation Age of Onset    Cancer Mother         lung cancer    Colon Cancer Neg Hx     Celiac Disease Neg Hx     Crohn's Disease Neg Hx        CareTeam (Including outside providers/suppliers regularly involved in providing care):   Patient Care Team:  Stephany Azar MD as PCP - General (Family Medicine)  Stephany Azar MD as PCP - [facility] Empaneled Provider  Carolann Reid MD (Gastroenterology)    Wt Readings from Last 3 Encounters:   01/15/21 204 lb (92.5 kg)   11/04/20 200 lb (90.7 kg)   10/15/20 201 lb (91.2 kg)     Vitals:    01/15/21 1518   BP: 136/79   Site: Left Upper Arm   Pulse: 81   Temp: 98.6 °F (37 °C)   SpO2: 98%   Weight: 204 lb (92.5 kg)   Height: 5' 11.5\" (1.816 m)     Body mass index is 28.06 kg/m². Based upon direct observation of the patient, evaluation of cognition reveals recent and remote memory intact.     Physical Exam:  /79 (Site: Left Upper Arm)   Pulse 81   Temp 98.6 °F (37 °C)   Ht 5' 11.5\" (1.816 m)   Wt 204 lb (92.5 kg)   SpO2 98%   BMI 28.06 kg/m²     Gen: Well, NAD, Alert, Oriented x 3 HEENT: EOMI, eyes clear, MMM  Skin: without rash or jaundice  Neck: no significant lymphadenopathy or thyromegaly  Lungs: CTA B w/out Rales/Wheezes/Rhonchi, Good respiratory effort   Heart: RRR, S1S2, w/out M/R/G, non-displaced PMI   Ext: No C/C/E Bilaterally. Neuro: Neurovascularly intact w/ Sensory/Motor intact UE/LE Bilaterally. Patient's complete Health Risk Assessment and screening values have been reviewed and are found in Flowsheets. The following problems were reviewed today and where indicated follow up appointments were made and/or referrals ordered. Positive Risk Factor Screenings with Interventions:            General Health and ACP:  General  In general, how would you say your health is?: (!) Poor  In the past 7 days, have you experienced any of the following?  New or Increased Pain, New or Increased Fatigue, Loneliness, Social Isolation, Stress or Anger?: (!) Stress  Do you get the social and emotional support that you need?: Yes  Do you have a Living Will?: Yes  Advance Directives     Power of  Living Will ACP-Advance Directive ACP-Power of     Not on File Filed on 01/21/20 Filed Not on File      General Health Risk Interventions:  · declines intervention    Health Habits/Nutrition:  Health Habits/Nutrition  Do you exercise for at least 20 minutes 2-3 times per week?: Yes  Have you lost any weight without trying in the past 3 months?: No  Do you eat fewer than 2 meals per day?: No  Have you seen a dentist within the past year?: (!) No  Body mass index: (!) 28.05  Health Habits/Nutrition Interventions:  · Consider dental appt     Safety:  Safety  Do you have working smoke detectors?: Yes  Have all throw rugs been removed or fastened?: Yes  Do you have non-slip mats or surfaces in all bathtubs/showers?: (!) No  Do all of your stairways have a railing or banister?: Yes  Are your doorways, halls and stairs free of clutter?: Yes  Do you always fasten your seatbelt when you are in a car?: Yes  Safety Interventions:  · Home safety tips provided     Personalized Preventive Plan   Current Health Maintenance Status  Immunization History   Administered Date(s) Administered    Influenza A (L3T2-70) Vaccine PF IM 02/09/2010    Influenza Vaccine, unspecified formulation 10/01/2018    Influenza Virus Vaccine 12/15/2014    Influenza, Larisa Kalata, IM, (6 mo and older Fluzone, Flulaval, Fluarix and 3 yrs and older Afluria) 09/29/2016, 12/11/2017, 10/04/2019    Influenza, Quadv, adjuvanted, 65 yrs +, IM, PF (Fluad) 10/15/2020        Health Maintenance   Topic Date Due    DTaP/Tdap/Td vaccine (1 - Tdap) 05/21/1974    Shingles Vaccine (1 of 2) 05/21/2005    Diabetic retinal exam  06/28/2015    Pneumococcal 65+ years Vaccine (1 of 1 - PPSV23) 05/21/2020    Annual Wellness Visit (AWV)  07/10/2020    A1C test (Diabetic or Prediabetic)  12/10/2020    Diabetic foot exam  02/24/2021    Diabetic microalbuminuria test  09/10/2021    Lipid screen  09/10/2021    Potassium monitoring  01/04/2022    Creatinine monitoring  01/04/2022    Colon cancer screen colonoscopy  01/20/2030    Flu vaccine  Completed    Hepatitis C screen  Completed    HIV screen  Completed    Hepatitis A vaccine  Aged Out    Hib vaccine  Aged Out    Meningococcal (ACWY) vaccine  Aged Out     Recommendations for All Campus Due: see orders and patient instructions/AVS.  . Recommended screening schedule for the next 5-10 years is provided to the patient in written form: see Patient Jeevan Service was seen today for medicare awv.     Diagnoses and all orders for this visit:    Routine general medical examination at a health care facility    Malignant neoplasm of other parts of pancreas ([address]    Type 2 diabetes mellitus with hyperglycemia, without long-term current use of insulin ([address]    Essential hypertension    Need for prophylactic vaccination against Streptococcus pneumoniae (pneumococcus)  -     PNEUMOVAX 23 subcutaneous/IM (Pneumococcal polysaccharide vaccine 23-valent >= 1yo)           Will see endocrinology for DM    Renae Rowe MD

## 2021-04-07 ENCOUNTER — OFFICE VISIT (OUTPATIENT)
Dept: FAMILY MEDICINE CLINIC | Age: 66
End: 2021-04-07
Payer: MEDICARE

## 2021-04-07 VITALS
DIASTOLIC BLOOD PRESSURE: 66 MMHG | OXYGEN SATURATION: 97 % | WEIGHT: 206.4 LBS | SYSTOLIC BLOOD PRESSURE: 148 MMHG | TEMPERATURE: 97.7 F | HEIGHT: 72 IN | HEART RATE: 98 BPM | BODY MASS INDEX: 27.96 KG/M2

## 2021-04-07 DIAGNOSIS — I10 ESSENTIAL HYPERTENSION: ICD-10-CM

## 2021-04-07 DIAGNOSIS — C25.7 MALIGNANT NEOPLASM OF OTHER PARTS OF PANCREAS (HCC): ICD-10-CM

## 2021-04-07 DIAGNOSIS — E11.65 TYPE 2 DIABETES MELLITUS WITH HYPERGLYCEMIA, WITHOUT LONG-TERM CURRENT USE OF INSULIN (HCC): ICD-10-CM

## 2021-04-07 DIAGNOSIS — M19.90 OSTEOARTHRITIS, UNSPECIFIED OSTEOARTHRITIS TYPE, UNSPECIFIED SITE: ICD-10-CM

## 2021-04-07 DIAGNOSIS — M16.11 OSTEOARTHRITIS OF RIGHT HIP, UNSPECIFIED OSTEOARTHRITIS TYPE: Primary | ICD-10-CM

## 2021-04-07 PROCEDURE — 3051F HG A1C>EQUAL 7.0%<8.0%: CPT | Performed by: FAMILY MEDICINE

## 2021-04-07 PROCEDURE — 99213 OFFICE O/P EST LOW 20 MIN: CPT | Performed by: FAMILY MEDICINE

## 2021-04-07 SDOH — ECONOMIC STABILITY: TRANSPORTATION INSECURITY
IN THE PAST 12 MONTHS, HAS LACK OF TRANSPORTATION KEPT YOU FROM MEETINGS, WORK, OR FROM GETTING THINGS NEEDED FOR DAILY LIVING?: NO

## 2021-04-07 SDOH — ECONOMIC STABILITY: FOOD INSECURITY: WITHIN THE PAST 12 MONTHS, THE FOOD YOU BOUGHT JUST DIDN'T LAST AND YOU DIDN'T HAVE MONEY TO GET MORE.: NEVER TRUE

## 2021-04-07 NOTE — PROGRESS NOTES
Chief Complaint   Patient presents with    Pre-op Exam     hip replacement, clearence       HPI:  Nathan Vergara is a 72 y.o. male    Pre op exam for hip replacement  Right TKA   [facility] 4/26/21  Dr. Yuan Haskins     Pancreatic cancer  Dr. Kishan Naidu 4 liver cirrhosis  had Partial Pancreatectomy/splenectomy  Surgery was stopped [facility] through    Currently on a reduced chemo regimen  Trying to maintain    bp up a little because of his right hip pain   Normally better     Following with endocrinology now     He is avoiding asa/nsaids    Had first covid vaccine, gets second this Saturday    Wt Readings from Last 3 Encounters:   04/07/21 206 lb 6.4 oz (93.6 kg)   01/15/21 204 lb (92.5 kg)   11/04/20 200 lb (90.7 kg)       Lab Results   Component Value Date    LABA1C 7.4 03/08/2021     Lab Results   Component Value Date     03/08/2021               Past Medical History:   Diagnosis Date    Cancer [facility])     Pancreatic    History of skin cancer     HTN (hypertension)     Malignant neoplasm of other parts of pancreas ([facility] [address]     Type II or unspecified type diabetes mellitus without mention of complication, not stated as uncontrolled      Past Surgical History:   Procedure Laterality Date    ABDOMEN SURGERY      COLONOSCOPY  02/20/2015    DR Juan Alberto Delarosa    COLONOSCOPY N/A 1/20/2020    COLONOSCOPY DIAGNOSTIC performed by Hollis Handy MD at [address] [place] [address]) N/A 12/17/2019    EUS performed by Hollis Handy MD at [address] [place] [address]       Family History   Problem Relation Age of Onset    Cancer Mother         lung cancer    Colon Cancer Neg Hx     Celiac Disease Neg Hx     Crohn's Disease Neg Hx      Social History     Socioeconomic History    Marital status:      Spouse name: None    Number of children: None    Years of education: None    Highest education level: None   Occupational History    None   Social Needs    Financial resource strain: Somewhat hard    Food insecurity     Worry: Never true     Inability: Never true    Transportation needs     Medical: No     Non-medical: No   Tobacco Use    Smoking status: Never Smoker    Smokeless tobacco: Never Used   Substance and Sexual Activity    Alcohol use: No    Drug use: No    Sexual activity: Yes     Partners: Female   Lifestyle    Physical activity     Days per week: None     Minutes per session: None    Stress: None   Relationships    Social connections     Talks on phone: None     Gets together: None     Attends Restorationism service: None     Active member of club or organization: None     Attends meetings of clubs or organizations: None     Relationship status: None    Intimate partner violence     Fear of current or ex partner: None     Emotionally abused: None     Physically abused: None     Forced sexual activity: None   Other Topics Concern    None   Social History Narrative    None     Current Outpatient Medications   Medication Sig Dispense Refill    insulin glargine (LANTUS SOLOSTAR) 100 UNIT/ML injection pen Inject 60 Units into the skin nightly 10 pen 5    amLODIPine (NORVASC) 5 MG tablet take 1 tablet by mouth once daily 30 tablet 5    Insulin Pen Needle (KROGER PEN NEEDLES) 31G X 6 MM MISC 1 each by Does not apply route daily 100 each 5    lisinopril-hydroCHLOROthiazide (PRINZIDE;ZESTORETIC) 20-12.5 MG per tablet take 1 tablet by mouth twice a day 60 tablet 5    Continuous Blood Gluc  (FREESTYLE NATALY 14 DAY READER) KISHA Test daily and prn. Dx: DM2 1 Device 0    Continuous Blood Gluc Sensor (FREESTYLE NATALY 14 DAY SENSOR) MISC Test daily and prn. Change every 2 weeks. Dx: DM2 6 each 3    insulin lispro, 1 Unit Dial, (HUMALOG KWIKPEN) 100 UNIT/ML SOPN Inject 1 Units into the skin See Admin Instructions 10 pen 5     No current facility-administered medications for this visit.       No Known Allergies    Review of Systems:   General ROS: negative for - nausea, vomiting, chills, fatigue, fever, malaise, weight gain or weight loss  Respiratory ROS: no cough, shortness of breath, or wheezing  Cardiovascular ROS: no chest pain or dyspnea on exertion  Gastrointestinal ROS: no abdominal pain, change in bowel habits, or black or bloody stools  Genito-Urinary ROS: no dysuria, trouble voiding, or hematuria  Musculoskeletal ROS: right hip pain   Neurological ROS: negative for - behavioral changes, memory loss, numbness/tingling, tremors or weakness    In general patient otherwise reports feeling well. In general patient otherwise reports feeling well. Physical Exam:  BP (!) 148/66 (Site: Right Upper Arm, Position: Sitting, Cuff Size: Medium Adult)   Pulse 98   Temp 97.7 °F (36.5 °C)   Ht 5' 11.5\" (1.816 m)   Wt 206 lb 6.4 oz (93.6 kg)   SpO2 97%   BMI 28.39 kg/m²     Gen: Well, NAD, Alert, Oriented x 3   HEENT: EOMI, eyes clear, MMM  Skin: concerning lesion upper chest, red, raised, about 1cm  Neck: no significant lymphadenopathy or thyromegaly  Lungs: CTA B w/out Rales/Wheezes/Rhonchi, Good respiratory effort   Heart: RRR, S1S2, w/out M/R/G, non-displaced PMI   Ext: no CCE  Neuro: nonfocal  Psych: euthymic      Lab Results   Component Value Date    WBC 5.8 04/05/2021    HGB 14.5 04/05/2021    HCT 43.9 04/05/2021     (L) 04/05/2021    CHOL 154 09/10/2020    TRIG 90 09/10/2020    HDL 45 09/10/2020    ALT 46 04/05/2021    AST 43 (H) 04/05/2021     04/05/2021    K 3.5 04/05/2021     04/05/2021    CREATININE 0.90 04/05/2021    BUN 17 11/05/2020    CO2 26 11/05/2020    TSH 1.40 03/08/2021    PSA 0.89 08/09/2019    INR 1.2 (H) 08/06/2020    LABA1C 7.4 03/08/2021    LABMICR <1.20 09/10/2020         A&P   Diagnosis Orders   1. Osteoarthritis of right hip, unspecified osteoarthritis type     2. Type 2 diabetes mellitus with hyperglycemia, without long-term current use of insulin (HCC)     3. Malignant neoplasm of other parts of pancreas ([facility] [address]     4. Essential hypertension     5.  Osteoarthritis, unspecified osteoarthritis type, unspecified site       F/u with oncology/endocrinology    Sugars controlled on current regimen    Patient is clear for surgery    Will monitor bp, may need to add med or increase norvasc    Continue to avoid Jeison Nuñez MD

## 2021-04-14 DIAGNOSIS — M19.90 OSTEOARTHRITIS, UNSPECIFIED OSTEOARTHRITIS TYPE, UNSPECIFIED SITE: ICD-10-CM

## 2021-04-14 DIAGNOSIS — I10 ESSENTIAL HYPERTENSION: ICD-10-CM

## 2021-04-14 RX ORDER — AMLODIPINE BESYLATE 2.5 MG/1
TABLET ORAL
Qty: 30 TABLET | Refills: 5 | OUTPATIENT
Start: 2021-04-14

## 2021-04-15 RX ORDER — MELOXICAM 15 MG/1
TABLET ORAL
Qty: 30 TABLET | Refills: 5 | Status: SHIPPED | OUTPATIENT
Start: 2021-04-15 | End: 2022-01-27 | Stop reason: SDUPTHER

## 2021-07-02 DIAGNOSIS — E08.65 DIABETES MELLITUS DUE TO UNDERLYING CONDITION, UNCONTROLLED, WITH HYPERGLYCEMIA (HCC): ICD-10-CM

## 2021-07-02 DIAGNOSIS — R73.9 ELEVATED BLOOD SUGAR: ICD-10-CM

## 2021-07-02 RX ORDER — FLASH GLUCOSE SENSOR
KIT MISCELLANEOUS
Qty: 100 EACH | Refills: 0 | Status: SHIPPED | OUTPATIENT
Start: 2021-07-02 | End: 2021-07-07

## 2021-07-02 NOTE — TELEPHONE ENCOUNTER
Future Appointments    This patient does not currently have any appointments scheduled.   Recent Visits    04/07/2021 Osteoarthritis of right hip, unspecified osteoarthritis type MD Alysha    01/15/2021 Routine general medical examination at a health care facility MD Alysha    10/15/2020 Type 2 diabetes mellitus with hyperglycemia, without long-term current use of insulin [facility]) MD Alysha      Sending [employer] [employer] reminder for appt

## 2021-07-05 DIAGNOSIS — I10 ESSENTIAL HYPERTENSION: ICD-10-CM

## 2021-07-05 NOTE — TELEPHONE ENCOUNTER
Pharmacy requesting medication refill. Please approve or deny this request.    Rx requested:  Requested Prescriptions     Pending Prescriptions Disp Refills    amLODIPine (NORVASC) 5 MG tablet [Pharmacy Med Name: AMLODIPINE BESYLATE 5 MG TAB] 30 tablet 5     Sig: Take 1 tablet by mouth daily take 1 tablet by mouth once daily         Last Office Visit:   4/7/2021      Next Visit Date:  No future appointments.

## 2021-07-06 DIAGNOSIS — R73.9 ELEVATED BLOOD SUGAR: ICD-10-CM

## 2021-07-06 DIAGNOSIS — E08.65 DIABETES MELLITUS DUE TO UNDERLYING CONDITION, UNCONTROLLED, WITH HYPERGLYCEMIA (HCC): ICD-10-CM

## 2021-07-06 RX ORDER — AMLODIPINE BESYLATE 5 MG/1
5 TABLET ORAL DAILY
Qty: 30 TABLET | Refills: 5 | Status: SHIPPED | OUTPATIENT
Start: 2021-07-06 | End: 2021-12-28

## 2021-07-07 RX ORDER — FLASH GLUCOSE SENSOR
KIT MISCELLANEOUS
Qty: 1 EACH | Refills: 0 | Status: SHIPPED | OUTPATIENT
Start: 2021-07-07 | End: 2022-07-11

## 2021-08-06 DIAGNOSIS — E08.65 DIABETES MELLITUS DUE TO UNDERLYING CONDITION, UNCONTROLLED, WITH HYPERGLYCEMIA (HCC): ICD-10-CM

## 2021-08-06 DIAGNOSIS — E11.65 TYPE 2 DIABETES MELLITUS WITH HYPERGLYCEMIA, WITHOUT LONG-TERM CURRENT USE OF INSULIN (HCC): ICD-10-CM

## 2021-08-06 RX ORDER — INSULIN LISPRO 100 [IU]/ML
1 INJECTION, SOLUTION INTRAVENOUS; SUBCUTANEOUS SEE ADMIN INSTRUCTIONS
Qty: 10 PEN | Refills: 5 | Status: SHIPPED | OUTPATIENT
Start: 2021-08-06 | End: 2022-01-27 | Stop reason: ALTCHOICE

## 2021-08-10 ENCOUNTER — TELEPHONE (OUTPATIENT)
Dept: FAMILY MEDICINE CLINIC | Age: 66
End: 2021-08-10

## 2021-08-10 NOTE — TELEPHONE ENCOUNTER
Please ask patient how he is currently dosing the humalog.       He was adjusting and following a sliding scale and it is not documented what his current usage is

## 2021-09-02 ENCOUNTER — OFFICE VISIT (OUTPATIENT)
Dept: FAMILY MEDICINE CLINIC | Age: 66
End: 2021-09-02
Payer: MEDICARE

## 2021-09-02 ENCOUNTER — TELEPHONE (OUTPATIENT)
Dept: FAMILY MEDICINE CLINIC | Age: 66
End: 2021-09-02

## 2021-09-02 VITALS
HEIGHT: 72 IN | HEART RATE: 85 BPM | WEIGHT: 201.4 LBS | SYSTOLIC BLOOD PRESSURE: 150 MMHG | BODY MASS INDEX: 27.28 KG/M2 | OXYGEN SATURATION: 97 % | DIASTOLIC BLOOD PRESSURE: 66 MMHG | TEMPERATURE: 98 F

## 2021-09-02 DIAGNOSIS — E11.65 TYPE 2 DIABETES MELLITUS WITH HYPERGLYCEMIA, WITHOUT LONG-TERM CURRENT USE OF INSULIN (HCC): Primary | ICD-10-CM

## 2021-09-02 DIAGNOSIS — C25.7 MALIGNANT NEOPLASM OF OTHER PARTS OF PANCREAS (HCC): ICD-10-CM

## 2021-09-02 PROCEDURE — 3051F HG A1C>EQUAL 7.0%<8.0%: CPT | Performed by: FAMILY MEDICINE

## 2021-09-02 PROCEDURE — 99213 OFFICE O/P EST LOW 20 MIN: CPT | Performed by: FAMILY MEDICINE

## 2021-09-02 RX ORDER — IBUPROFEN 200 MG
1 TABLET ORAL DAILY
Qty: 100 EACH | Refills: 3 | Status: SHIPPED | OUTPATIENT
Start: 2021-09-02 | End: 2022-01-27 | Stop reason: ALTCHOICE

## 2021-09-02 NOTE — PROGRESS NOTES
Chief Complaint   Patient presents with    Diabetes     insulin nor working        HPI:  Marla Feldman is a 77 y.o. male      Pancreatic cancer  Dr. Indiana Beard  Stage 4 liver cirrhosis  had Partial Pancreatectomy/splenectomy  Surgery was stopped senior living through        Following with endocrinology now     He is avoiding asa/nsaids    Had first covid vaccine, gets second this Saturday    Wt Readings from Last 3 Encounters:   09/02/21 201 lb 6.4 oz (91.4 kg)   04/07/21 206 lb 6.4 oz (93.6 kg)   01/15/21 204 lb (92.5 kg)       Lab Results   Component Value Date    LABA1C 7.9 05/17/2021     Lab Results   Component Value Date     05/17/2021               Past Medical History:   Diagnosis Date    Cancer [facility])     Pancreatic    History of skin cancer     HTN (hypertension)     Malignant neoplasm of other parts of pancreas ([facility] [address]     Type II or unspecified type diabetes mellitus without mention of complication, not stated as uncontrolled      Past Surgical History:   Procedure Laterality Date    ABDOMEN SURGERY      COLONOSCOPY  02/20/2015    DR Anthony Sapp    COLONOSCOPY N/A 1/20/2020    COLONOSCOPY DIAGNOSTIC performed by Ericka Steward MD at [address] [place] [address] ([facility]) N/A 12/17/2019    EUS performed by Ericka Steward MD at [address] [place] [address]       Family History   Problem Relation Age of Onset    Cancer Mother         lung cancer    Colon Cancer Neg Hx     Celiac Disease Neg Hx     Crohn's Disease Neg Hx      Social History     Socioeconomic History    Marital status:      Spouse name: None    Number of children: None    Years of education: None    Highest education level: None   Occupational History    None   Tobacco Use    Smoking status: Never Smoker    Smokeless tobacco: Never Used   Substance and Sexual Activity    Alcohol use: No    Drug use: No    Sexual activity: Yes     Partners: Female   Other Topics Concern    None   Social History Narrative  None     Social Determinants of Health     Financial Resource Strain: Medium Risk    Difficulty of Paying Living Expenses: Somewhat hard   Food Insecurity: No Food Insecurity    Worried About Running Out of Food in the Last Year: Never true    Alejandra of Food in the Last Year: Never true   Transportation Needs: No Transportation Needs    Lack of Transportation (Medical): No    Lack of Transportation (Non-Medical): No   Physical Activity:     Days of Exercise per Week:     Minutes of Exercise per Session:    Stress:     Feeling of Stress :    Social Connections:     Frequency of Communication with Friends and Family:     Frequency of Social Gatherings with Friends and Family:     Attends Episcopal Services:     Active Member of Clubs or Organizations:     Attends Club or Organization Meetings:     Marital Status:    Intimate Partner Violence:     Fear of Current or Ex-Partner:     Emotionally Abused:     Physically Abused:     Sexually Abused:      Current Outpatient Medications   Medication Sig Dispense Refill    insulin 70-30 (NOVOLIN 70/30) (70-30) 100 UNIT per ML injection vial Inject 40 Units into the skin 2 times daily 30 mL 5    INSULIN SYRINGE 1CC/29G (KROGER INS SYRINGE 1CC/29G) 29G X 1/2\" 1 ML MISC 1 each by Does not apply route daily 100 each 3    insulin lispro, 1 Unit Dial, (HUMALOG KWIKPEN) 100 UNIT/ML SOPN Inject 1 Units into the skin See Admin Instructions 10 pen 5    Continuous Blood Gluc Sensor (FREESTYLE NATALY 14 DAY SENSOR) MISC TEST DAILY AND AS NEEDED. CHANGE EVERY 2 WEEKS.  1 each 0    amLODIPine (NORVASC) 5 MG tablet Take 1 tablet by mouth daily take 1 tablet by mouth once daily 30 tablet 5    meloxicam (MOBIC) 15 MG tablet take 1 tablet by mouth once daily 30 tablet 5    insulin glargine (LANTUS SOLOSTAR) 100 UNIT/ML injection pen Inject 60 Units into the skin nightly 10 pen 5    Insulin Pen Needle (KROGER PEN NEEDLES) 31G X 6 MM MISC 1 each by Does not apply route daily 100 each 5    lisinopril-hydroCHLOROthiazide (PRINZIDE;ZESTORETIC) 20-12.5 MG per tablet take 1 tablet by mouth twice a day 60 tablet 5    Continuous Blood Gluc  (FREESTYLE NATALY 14 DAY READER) KISHA Test daily and prn. Dx: DM2 1 Device 0     No current facility-administered medications for this visit. No Known Allergies    Review of Systems:   General ROS: negative for - nausea, vomiting, chills, fatigue, fever, malaise, weight gain or weight loss  Respiratory ROS: no cough, shortness of breath, or wheezing  Cardiovascular ROS: no chest pain or dyspnea on exertion  Gastrointestinal ROS: no abdominal pain, change in bowel habits, or black or bloody stools  Genito-Urinary ROS: no dysuria, trouble voiding, or hematuria  Musculoskeletal ROS: right hip pain   Neurological ROS: negative for - behavioral changes, memory loss, numbness/tingling, tremors or weakness    In general patient otherwise reports feeling well. In general patient otherwise reports feeling well.      Physical Exam:  BP (!) 150/66 (Site: Right Upper Arm, Position: Sitting, Cuff Size: Large Adult)   Pulse 85   Temp 98 °F (36.7 °C)   Ht 5' 11.5\" (1.816 m)   Wt 201 lb 6.4 oz (91.4 kg)   SpO2 97%   BMI 27.70 kg/m²     Gen: Well, NAD, Alert, Oriented x 3   HEENT: EOMI, eyes clear, MMM  Skin: concerning lesion upper chest, red, raised, about 1cm  Neck: no significant lymphadenopathy or thyromegaly  Lungs: CTA B w/out Rales/Wheezes/Rhonchi, Good respiratory effort   Heart: RRR, S1S2, w/out M/R/G, non-displaced PMI   Ext: no CCE  Neuro: nonfocal  Psych: euthymic      Lab Results   Component Value Date    WBC 5.4 08/18/2021    HGB 12.7 (L) 08/18/2021    HCT 39.6 (L) 08/18/2021     (L) 08/18/2021    CHOL 164 05/17/2021    TRIG 106 05/17/2021    HDL 47.0 05/17/2021    ALT 28 08/18/2021    AST 29 08/18/2021     08/18/2021    K 3.6 08/18/2021     08/18/2021    CREATININE 0.86 08/18/2021    BUN 17 11/05/2020 CO2 26 11/05/2020    TSH 1.60 05/17/2021    PSA 0.89 08/09/2019    INR 1.2 (H) 04/12/2021    LABA1C 7.9 05/17/2021    LABMICR <1.20 09/10/2020         A&P   Diagnosis Orders   1. Type 2 diabetes mellitus with hyperglycemia, without long-term current use of insulin (HCC)  insulin 70-30 (NOVOLIN 70/30) (70-30) 100 UNIT per ML injection vial    INSULIN SYRINGE 1CC/29G (KR[employer]ER INS SYRINGE 1CC/29G) 29G X 1/2\" 1 ML MISC   2.  Malignant neoplasm of other parts of pancreas [facility])       F/u with oncology/endocrinology    Sees oncology all of the time     Change to 70/30 40 units BID     bp still running high      Ansley Kaba MD

## 2021-09-02 NOTE — TELEPHONE ENCOUNTER
Please call [address] [place] [address] back at [phone] in regards to insulin needle size that was ordered today.

## 2021-10-03 DIAGNOSIS — I10 ESSENTIAL HYPERTENSION: ICD-10-CM

## 2021-10-04 RX ORDER — LISINOPRIL AND HYDROCHLOROTHIAZIDE 20; 12.5 MG/1; MG/1
TABLET ORAL
Qty: 60 TABLET | Refills: 5 | Status: SHIPPED | OUTPATIENT
Start: 2021-10-04 | End: 2022-04-19

## 2021-12-28 DIAGNOSIS — I10 ESSENTIAL HYPERTENSION: ICD-10-CM

## 2021-12-28 RX ORDER — AMLODIPINE BESYLATE 5 MG/1
TABLET ORAL
Qty: 180 TABLET | Refills: 2 | Status: SHIPPED | OUTPATIENT
Start: 2021-12-28 | End: 2022-04-27 | Stop reason: ALTCHOICE

## 2021-12-28 NOTE — TELEPHONE ENCOUNTER
Requested Prescriptions     Name from pharmacy: AMLODIPINE BESYLATE 5 MG TAB         Will file in chart as: amLODIPine (NORVASC) 5 MG tablet    Sig: take 1 tablet by mouth once daily    Disp:  180 tablet    Refills:  Not specified    Start: 12/28/2021    Class: Normal    Non-formulary For: Essential hypertension    Last ordered: 5 months ago by Bethany Rueda MD Last refill: 10/3/2021    Rx #: [id number]    Calcium-Channel Blockers Protocol Passed 12/28/2021 09:42 AM   Protocol Details  Last Pulse reading greater than 50 recorded within past year    Visit with authorizing provider in past 9 months or upcoming 90 days      To be filled at: [address] [place] [address] [phone] - F [phone]             Medication Refill    Andrew, Surescripts In routed conversation to Iveth Sherman Pcp Clinical Staff 56 minutes ago (9:42 AM)                 Future Appointments    This patient does not currently have any appointments scheduled.     Recent Visits    09/02/2021 Type 2 diabetes mellitus with hyperglycemia, without long-term current use of insulin [facility])   Linda Snyder MD   04/07/2021 Osteoarthritis of right hip, unspecified osteoarthritis type   Linda Snyder MD   01/15/2021 Routine general medical examination at a health care facility   Linda Snyder MD   10/15/2020 Type 2 diabetes mellitus with hyperglycemia, without long-term current use of insulin [facility])   Linda Snyder MD   09/10/2020 Type 2 diabetes mellitus with hyperglycemia, without long-term current use of insulin [facility])   Linda Snyder MD

## 2022-01-03 ENCOUNTER — TELEPHONE (OUTPATIENT)
Dept: GASTROENTEROLOGY | Age: 67
End: 2022-01-03

## 2022-01-03 NOTE — TELEPHONE ENCOUNTER
The patient was seen by a liver specialist in [facility], they recommended f/u  with Dr. Chantale Ag for cirrhosis of the liver. Does the patient need to schedule with Dr. Brown Winn.  Please advise

## 2022-01-03 NOTE — TELEPHONE ENCOUNTER
Patient is followed by Dr. Kayla Cho, please schedule a follow-up with Dr. Kayla Cho or his nurse practitioner.   Thanks

## 2022-01-20 ENCOUNTER — OFFICE VISIT (OUTPATIENT)
Dept: GASTROENTEROLOGY | Age: 67
End: 2022-01-20
Payer: MEDICARE

## 2022-01-20 VITALS — WEIGHT: 209 LBS | OXYGEN SATURATION: 100 % | BODY MASS INDEX: 26.82 KG/M2 | HEIGHT: 74 IN | HEART RATE: 62 BPM

## 2022-01-20 DIAGNOSIS — K74.00 HEPATIC FIBROSIS: ICD-10-CM

## 2022-01-20 DIAGNOSIS — K76.6 PORTAL HYPERTENSION (HCC): ICD-10-CM

## 2022-01-20 DIAGNOSIS — K59.00 CONSTIPATION, UNSPECIFIED CONSTIPATION TYPE: ICD-10-CM

## 2022-01-20 DIAGNOSIS — K76.0 HEPATIC STEATOSIS: ICD-10-CM

## 2022-01-20 DIAGNOSIS — D3A.8 NEUROENDOCRINE TUMOR OF PANCREAS: Primary | ICD-10-CM

## 2022-01-20 DIAGNOSIS — R16.1 SPLENOMEGALY: ICD-10-CM

## 2022-01-20 PROCEDURE — 99214 OFFICE O/P EST MOD 30 MIN: CPT | Performed by: INTERNAL MEDICINE

## 2022-01-20 NOTE — PROGRESS NOTES
Gastroenterology Clinic Follow up Visit    Honorio Villalta  [id number]  Chief Complaint   Patient presents with    Follow-up     HPI: 77 y.o. male following up after last GI clinic on 1/15/2020. Interval change: Patient reports following up in the GI clinic regarding primary pancreatic neuroendocrine tumor, diagnosed in 2019. States he was previously referred to Dr. Efrain Urban for possible resection, however he was also found to have neuroendocrine tumor of the liver during the operation along with portal hypertension and it was decided the surgery was too risky at that time so he was closed up without any tumor resections. States he was placed on chemotherapy. States he has been on oral chemo and now gets 1 IV per month every 4 weeks. He was referred to Dr. Dru Marquez for further management of his nonalcoholic cirrhosis of the liver and portal hypertension. Patient reports Dr. Dru Marquez states it is reasonable for him to come back to our GI clinic for further management of his liver disease so patient does not have to drive so far. Patient denies upper GI symptoms. He endorses fatigue and constipation. States he will have a dark green bowel movement every 2 days (small in caliber). He endorses every couple of weeks he will have a very large/hard bowel movement. States he has tried MiraLAX in the past, however this causes him to have \"brown splatter\" in the toilet. States he takes Gas-X for excessive bloating with relief. HPI from last GI clinic visit on 1/15/2020 summarized below:  Patient presents to the clinic to discuss getting further work-up before proceed with with surgical intervention for a neuroendocrine tumor on the tail of the pancreas. Patient seeing Dr. Sofia Milian, surgical oncologist.   Patient without any complaints at this time. No F/C. No Cp,SOB, or palpitation. Occasional RUQ pain with certain foods. Intermittent diarrhea over the past 6 months.  No melena or hematochezia.      HPI ultrasound 12/17/19- - 21 x 15 mm hypoechoic mass at the tail of the pancreas, normal common bile duct, parenchymal changes suggestive of chronic pancreatitis, dilated pancreatic duct head of the pancreas, normal pancreatic duct body. FibroScan 1/22/2020:   Appropriate reading, Based on LSM of 27.5 Kpa, Evidence of advanced    fibrosis / Cirrhosis    Fibrosis stage IV- Cirrhosis  / F 4    Based on CAP of 288 db/M,  Mod-Severe Steatosis ( >33%)     Review of Systems   All other systems reviewed and are negative. Past medical history, past surgical history, medication list, social and familyhistory reviewed    Pulse 62, height 6' 1.5\" (1.867 m), weight 209 lb (94.8 kg), SpO2 100 %. Physical Exam  Constitutional:       General: He is not in acute distress. Appearance: Normal appearance. He is well-developed. Eyes:      General: No scleral icterus. Cardiovascular:      Rate and Rhythm: Normal rate and regular rhythm. Pulmonary:      Effort: Pulmonary effort is normal.      Breath sounds: Normal breath sounds. Abdominal:      General: Bowel sounds are normal. There is no distension. Palpations: Abdomen is soft. There is no mass. Tenderness: There is no abdominal tenderness. There is no guarding or rebound. Musculoskeletal:         General: Normal range of motion. Lymphadenopathy:      Cervical: No cervical adenopathy. Skin:     General: Skin is warm and dry. Coloration: Skin is not jaundiced. Neurological:      Mental Status: He is alert and oriented to person, place, and time. Psychiatric:         Behavior: Behavior normal.         Thought Content: Thought content normal.         Judgment: Judgment normal.       Laboratory, Pathology, Radiology reviewed in detail with relevantimportant investigations summarized below:    No results for input(s): WBC, HGB, HCT, MCV, PLT in the last 720 hours.    Lab Results   Component Value Date    ALT 40 12/20/2021    AST 34 12/20/2021 ALKPHOS 99 12/20/2021    BILITOT 0.7 12/20/2021      MRI abdomen with and without contrast 12/16/2021: Overall stable exam.  Redemonstration of an approximate 1.4 x 2.2 cm enhancing mass at the distal pancreatic tail with not significantly changed. No new evidence to suggest metastatic disease throughout the abdomen/pelvis. Cirrhosis. Approximate 4 mm arterial enhancing focus in the segment VII not significantly changed. Previously described 4 mm enhancing nodular focus of segment II is not seen on the current exam.  No new arterial enhancing hepatic lesion identified. Splenomegaly as well as small caliber torturous varices near the GE junction, likely related to portal HTN. Small low signal intensity gallstones in the dependent portion of the gallbladder towards the fundus. No intrahepatic or extrahepatic biliary dilation. Assessment and Plan:  Honorio Villalta 77 y.o. male patient with known history of well differentiated pancreatic neuroendocrine tumor with metastasis to his liver. Previously referred to Dr. Efrain Urban for possible resection. However, during surgery it was discovered that it had spread to his liver and patient also with evidence of liver cirrhosis/portal HTN therefore resection was aborted time. Patient follows with hematology, on oral and IV chemotherapy with no further evidence of continued spread per recent MRI 12/16/2021. [place] [facility] [place] of [place] with likely etiology being MCNAMARA given the associated metabolic syndrome with HTN/dyslipidemia and fibroScan 1/22/2020 revealing stage IV fibrosis F4, moderate to severe steatosis. 1. Neuroendocrine tumor of pancreas  -Recommend continued follow-up with patient's oncologist [address]. 2. Hepatic fibrosis  3. Hepatic steatosis  4.  Portal hypertension (HCC)  5. Splenomegaly  Cause of liver disease: Likely MCNAMARA  - Hx of SBP: no   - EGD for Variceal screening: to be discussed further at follow up in 6 weeks  - [facility] screening/surveillance: Patient with history of neuroendocrine tumor with metastasis to the liver, no new lesions identified on recent MRI 12/2021.  - Hepatic encephalopathy: No history  - Advised to limit NSAID use   - Complete alcohol abstinence. - Smoking cessation counseling provided to patient   - Hepatitis status:    Hep A immunity: reactive 6/30/2020   Hep B immunity: non-reactive 6/30/2020   Hep C Status: non-reactive 6/30/2020    6. Constipation, unspecified constipation type  -Trial of MiraLAX 17 g by mouth daily  -Add fiber supplementation (Metamucil/Benefiber) 1 spoonful daily, titrate as needed  -Trial of stool softeners by mouth daily    Return in about 6 weeks (around 3/3/2022). Allyssa Schmidt MD   Staff Gastroenterologist  [facility]    Please note this report has been partially produced using speech recognition software and may cause/contain errors related to that system including grammar, punctuation and spelling as well as words andphrases that may seem inappropriate. If there are questions or concerns please feel free to contact me to clarify.

## 2022-01-27 ENCOUNTER — OFFICE VISIT (OUTPATIENT)
Dept: FAMILY MEDICINE CLINIC | Age: 67
End: 2022-01-27
Payer: MEDICARE

## 2022-01-27 VITALS
WEIGHT: 209.2 LBS | DIASTOLIC BLOOD PRESSURE: 72 MMHG | SYSTOLIC BLOOD PRESSURE: 120 MMHG | HEIGHT: 74 IN | OXYGEN SATURATION: 97 % | TEMPERATURE: 97.3 F | HEART RATE: 77 BPM | BODY MASS INDEX: 26.85 KG/M2

## 2022-01-27 DIAGNOSIS — E08.65 DIABETES MELLITUS DUE TO UNDERLYING CONDITION, UNCONTROLLED, WITH HYPERGLYCEMIA (HCC): ICD-10-CM

## 2022-01-27 DIAGNOSIS — I10 ESSENTIAL HYPERTENSION: ICD-10-CM

## 2022-01-27 DIAGNOSIS — E11.65 TYPE 2 DIABETES MELLITUS WITH HYPERGLYCEMIA, WITHOUT LONG-TERM CURRENT USE OF INSULIN (HCC): ICD-10-CM

## 2022-01-27 DIAGNOSIS — M19.90 OSTEOARTHRITIS, UNSPECIFIED OSTEOARTHRITIS TYPE, UNSPECIFIED SITE: ICD-10-CM

## 2022-01-27 DIAGNOSIS — Z00.00 ROUTINE GENERAL MEDICAL EXAMINATION AT A HEALTH CARE FACILITY: Primary | ICD-10-CM

## 2022-01-27 DIAGNOSIS — C25.7 MALIGNANT NEOPLASM OF OTHER PARTS OF PANCREAS (HCC): ICD-10-CM

## 2022-01-27 DIAGNOSIS — Z23 NEEDS FLU SHOT: ICD-10-CM

## 2022-01-27 PROCEDURE — G0438 PPPS, INITIAL VISIT: HCPCS | Performed by: FAMILY MEDICINE

## 2022-01-27 PROCEDURE — 90694 VACC AIIV4 NO PRSRV 0.5ML IM: CPT | Performed by: FAMILY MEDICINE

## 2022-01-27 PROCEDURE — G0008 ADMIN INFLUENZA VIRUS VAC: HCPCS | Performed by: FAMILY MEDICINE

## 2022-01-27 RX ORDER — HUMAN INSULIN 100 [IU]/ML
40 INJECTION, SUSPENSION SUBCUTANEOUS 2 TIMES DAILY
COMMUNITY
End: 2022-01-27

## 2022-01-27 RX ORDER — MELOXICAM 15 MG/1
TABLET ORAL
Qty: 30 TABLET | Refills: 5 | Status: SHIPPED | OUTPATIENT
Start: 2022-01-27

## 2022-01-27 RX ORDER — LANREOTIDE ACETATE 120 MG/.5ML
120 INJECTION SUBCUTANEOUS ONCE
COMMUNITY

## 2022-01-27 ASSESSMENT — PATIENT HEALTH QUESTIONNAIRE - PHQ9
SUM OF ALL RESPONSES TO PHQ QUESTIONS 1-9: 0
2. FEELING DOWN, DEPRESSED OR HOPELESS: 0
1. LITTLE INTEREST OR PLEASURE IN DOING THINGS: 0
SUM OF ALL RESPONSES TO PHQ QUESTIONS 1-9: 0
SUM OF ALL RESPONSES TO PHQ9 QUESTIONS 1 & 2: 0

## 2022-01-27 ASSESSMENT — LIFESTYLE VARIABLES: HOW OFTEN DO YOU HAVE A DRINK CONTAINING ALCOHOL: 0

## 2022-01-27 NOTE — PROGRESS NOTES
After obtaining consent, and per orders of Dr. Venita Dawn, injection of flu given in Left deltoid by Juancho Gonzalez CMA ([facility]). Patient instructed to remain in clinic for 20 minutes afterwards, and to report any adverse reaction to me immediately. Vaccine Information Sheet, \"Influenza - Inactivated\"  given to Luverne Sandifer, or parent/legal guardian of  Luverne Sandifer and verbalized understanding. Patient responses:    Have you ever had a reaction to a flu vaccine? No  Are you able to eat eggs without adverse effects? Yes  Do you have any current illness? No  Have you ever had Guillian [place] Syndrome? No    Flu vaccine given per order. Please see immunization tab.

## 2022-01-27 NOTE — PATIENT INSTRUCTIONS
Personalized Preventive Plan for Steve Ernst - 1/27/2022  Medicare offers a range of preventive health benefits. Some of the tests and screenings are paid in full while other may be subject to a deductible, co-insurance, and/or copay. Some of these benefits include a comprehensive review of your medical history including lifestyle, illnesses that may run in your family, and various assessments and screenings as appropriate. After reviewing your medical record and screening and assessments performed today your provider may have ordered immunizations, labs, imaging, and/or referrals for you. A list of these orders (if applicable) as well as your Preventive Care list are included within your After Visit Summary for your review. Other Preventive Recommendations:    · A preventive eye exam performed by an eye specialist is recommended every 1-2 years to screen for glaucoma; cataracts, macular degeneration, and other eye disorders. · A preventive dental visit is recommended every 6 months. · Try to get at least 150 minutes of exercise per week or 10,000 steps per day on a pedometer . · Order or download the FREE \"Exercise & Physical Activity: Your Everyday Guide\" from [employer]. Call 3-280.785.4167 or search [employer] online. · You need 8982-0159 mg of calcium and 1070-0861 IU of vitamin D per day. It is possible to meet your calcium requirement with diet alone, but a vitamin D supplement is usually necessary to meet this goal.  · When exposed to the sun, use a sunscreen that protects against both UVA and UVB radiation with an SPF of 30 or greater. Reapply every 2 to 3 hours or after sweating, drying off with a towel, or swimming. · Always wear a seat belt when traveling in a car. Always wear a helmet when riding a bicycle or motorcycle.

## 2022-01-27 NOTE — PROGRESS NOTES
Diagnosis Date    Cancer [facility])     Pancreatic    History of skin cancer     HTN (hypertension)     Malignant neoplasm of other parts of pancreas (HCC)     Type II or unspecified type diabetes mellitus without mention of complication, not stated as uncontrolled        Past Surgical History:   Procedure Laterality Date    ABDOMEN SURGERY      COLONOSCOPY  02/20/2015    DR Zhen Hyatt    COLONOSCOPY N/A 1/20/2020    COLONOSCOPY DIAGNOSTIC performed by Álvaro Jamison MD at [address] [place] [address] ([facility]) N/A 12/17/2019    EUS performed by Álvaro Jamison MD at [facility]           Family History   Problem Relation Age of Onset    Cancer Mother         lung cancer    Colon Cancer Neg Hx     Celiac Disease Neg Hx     Crohn's Disease Neg Hx        CareTeam (Including outside providers/suppliers regularly involved in providing care):   Patient Care Team:  Jeison Yepez MD as PCP - General (Family Medicine)  Jeison Yepez MD as PCP - [facility] Empaneled Provider  Magalis Toribio MD (Gastroenterology)    Wt Readings from Last 3 Encounters:   01/27/22 209 lb 3.2 oz (94.9 kg)   01/20/22 209 lb (94.8 kg)   09/02/21 201 lb 6.4 oz (91.4 kg)     Vitals:    01/27/22 1359   BP: 120/72   Site: Left Upper Arm   Pulse: 77   Temp: 97.3 °F (36.3 °C)   SpO2: 97%   Weight: 209 lb 3.2 oz (94.9 kg)   Height: 6' 1.5\" (1.867 m)     Body mass index is 27.23 kg/m². Based upon direct observation of the patient, evaluation of cognition reveals recent and remote memory intact.     Physical Exam:  /72 (Site: Left Upper Arm)   Pulse 77   Temp 97.3 °F (36.3 °C)   Ht 6' 1.5\" (1.867 m)   Wt 209 lb 3.2 oz (94.9 kg)   SpO2 97%   BMI 27.23 kg/m²     Gen: Well, NAD, Alert, Oriented x 3   HEENT: EOMI, eyes clear, MMM  Skin: without rash or jaundice  Neck: no significant lymphadenopathy or thyromegaly  Lungs: CTA B w/out Rales/Wheezes/Rhonchi, Good respiratory effort   Heart: RRR, S1S2, w/out M/R/G, non-displaced PMI   Ext: No C/C/E Bilaterally. Neuro: Neurovascularly intact w/ Sensory/Motor intact UE/LE Bilaterally. Lab Results   Component Value Date    WBC 5.7 12/20/2021    HGB 12.8 (L) 12/20/2021    HCT 39.3 (L) 12/20/2021     (L) 12/20/2021    CHOL 164 05/17/2021    TRIG 106 05/17/2021    HDL 47.0 05/17/2021    ALT 40 12/20/2021    AST 34 12/20/2021     12/20/2021    K 3.8 12/20/2021     12/20/2021    CREATININE 0.84 12/20/2021    BUN 17 11/05/2020    CO2 26 11/05/2020    TSH 1.60 05/17/2021    PSA 0.89 08/09/2019    INR 1.1 09/07/2021    LABA1C 7.9 05/17/2021    LABMICR <1.20 09/10/2020         Patient's complete Health Risk Assessment and screening values have been reviewed and are found in Flowsheets. The following problems were reviewed today and where indicated follow up appointments were made and/or referrals ordered. Positive Risk Factor Screenings with Interventions:              General Health and ACP:  General  In general, how would you say your health is?: (!) Poor  In the past 7 days, have you experienced any of the following?  New or Increased Pain, New or Increased Fatigue, Loneliness, Social Isolation, Stress or Anger?: None of These  Do you get the social and emotional support that you need?: Yes  Do you have a Living Will?: Yes  Advance Directives     Power of  Living Will ACP-Advance Directive ACP-Power of     Not on File Filed on 01/21/20 Filed Not on File      General Health Risk Interventions:  · Ongoing treatment for pancreatic cancer     Health Habits/Nutrition:  Health Habits/Nutrition  Do you exercise for at least 20 minutes 2-3 times per week?: (!) No  Have you lost any weight without trying in the past 3 months?: No  Do you eat only one meal per day?: No  Have you seen the dentist within the past year?: Yes  Body mass index: (!) 27.22  Health Habits/Nutrition Interventions:  · Inadequate physical activity:  educational materials meloxicam (MOBIC) 15 MG tablet; take 1 tablet by mouth once daily    Other orders  -     INFLUENZA, QUADV, ADJUVANTED, 65 YRS =, IM, PF, PREFILL SYR, 0.5ML (FLUAD)               Chronic conditions are stable  Continue current regimen  Follow up with appropriate specialists and here routinely for ongoing monitoring of chronic conditions      Mallory Hudson MD

## 2022-02-09 DIAGNOSIS — E11.65 TYPE 2 DIABETES MELLITUS WITH HYPERGLYCEMIA, WITHOUT LONG-TERM CURRENT USE OF INSULIN (HCC): ICD-10-CM

## 2022-03-03 ENCOUNTER — OFFICE VISIT (OUTPATIENT)
Dept: GASTROENTEROLOGY | Age: 67
End: 2022-03-03
Payer: MEDICARE

## 2022-03-03 VITALS — HEART RATE: 61 BPM | OXYGEN SATURATION: 100 % | HEIGHT: 74 IN | WEIGHT: 206 LBS | BODY MASS INDEX: 26.44 KG/M2

## 2022-03-03 DIAGNOSIS — K76.0 HEPATIC STEATOSIS: ICD-10-CM

## 2022-03-03 DIAGNOSIS — D3A.8 NEUROENDOCRINE TUMOR OF PANCREAS: ICD-10-CM

## 2022-03-03 DIAGNOSIS — K74.00 HEPATIC FIBROSIS: Primary | ICD-10-CM

## 2022-03-03 DIAGNOSIS — K59.00 CONSTIPATION, UNSPECIFIED CONSTIPATION TYPE: ICD-10-CM

## 2022-03-03 DIAGNOSIS — K76.6 PORTAL HYPERTENSION (HCC): ICD-10-CM

## 2022-03-03 DIAGNOSIS — R16.1 SPLENOMEGALY: ICD-10-CM

## 2022-03-03 PROCEDURE — 99214 OFFICE O/P EST MOD 30 MIN: CPT | Performed by: NURSE PRACTITIONER

## 2022-03-03 NOTE — PROGRESS NOTES
Gastroenterology Clinic Follow up Visit    Myranda Hayes  [id number]  Chief Complaint   Patient presents with    Follow-up     Cirrhosis, constipation     HPI: 77 y.o. male following up after last GI clinic on 1/20/2022. Interval change: Patient presents to the GI clinic, recently back from [place], with significant improvement in constipation symptoms. Having a bowel movement daily to every other day while taking MiraLAX and Metamucil. He endorses abdominal bloating/gas after eating a meal, states he takes Gas-X with relief of symptoms. He does report feeling fatigued more than usual.  States this began happening since he started chemotherapy around 9 months ago. States he currently receives injections every month. He does report longstanding history of difficulty with sleep for many years. States some nights he is a restless he just gets up and does not sleep hardly at all. However, lately he states he has been taking more naps (short 10-minute naps) throughout the day. Denies overt signs of HE (confusion, slurred speech or lethargy). He denies increasing abdominal girth other than feeling bloated after eating (relief w/gasx). He denies lower extremity edema. He does report following with endocrinology every month, he continues to have trouble with controlling his glucose levels (reports highs and lows). He denies GERD symptoms, nausea/vomiting, hematemesis, dysphagia, abdominal pain, hematochezia, melena or weight loss. Patient denies chest pain, shortness of breath or palpitations. Smoking status: denies  Alcohol use: denies  Illicit drug use: denies  NSAID use: takes meloxicam daily, occasional ibuprofen when he has pain, around 1 time per month. HPI from last GI clinic visit on 1/20/2022  summarized below:  Patient reports following up in the GI clinic regarding primary pancreatic neuroendocrine tumor, diagnosed in 2019.   States he was previously referred to Dr. Torsten Rodriguez for possible resection, however he was also found to have neuroendocrine tumor of the liver during the operation along with portal hypertension and it was decided the surgery was too risky at that time so he was closed up without any tumor resections. States he was placed on chemotherapy. States he has been on oral chemo and now gets 1 IV per month every 4 weeks. He was referred to Dr. Dru Marquez for further management of his nonalcoholic cirrhosis of the liver and portal hypertension. Patient reports Dr. Dru Marquez states it is reasonable for him to come back to our GI clinic for further management of his liver disease so patient does not have to drive so far. Patient denies upper GI symptoms. He endorses fatigue and constipation. States he will have a dark green bowel movement every 2 days (small in caliber). He endorses every couple of weeks he will have a very large/hard bowel movement. States he has tried MiraLAX in the past, however this causes him to have \"brown splatter\" in the toilet. States he takes Gas-X for excessive bloating with relief.     HPI from last GI clinic visit on 1/15/2020 summarized below:  Patient presents to the clinic to discuss getting further work-up before proceed with with surgical intervention for a neuroendocrine tumor on the tail of the pancreas.   Patient seeing Dr. Sofia Milian, surgical oncologist.   Patient without any complaints at this time. No F/C. No Cp,SOB, or palpitation. Occasional RUQ pain with certain foods. Intermittent diarrhea over the past 6 months. No melena or hematochezia.      HPI and A/P at last visits summarized below:  Presented to the hospital owing to abdominal pain. Patient reported that over the last week or so she been having epigastric pain associated with nausea vomiting and diarrhea. Symptoms seems has improving since admission. Patient currently denies any nausea, vomiting or diarrhea. Abdominal pain seems had resolved.  Patient CT scan that was suggestive of cirrhosis. CT scan also showed lesions around 2 cm between the gastric wall and the pancreas. Review of lab work showed that patient had abnormal transaminases at least since 2017 with evidence of steatosis. Subsequently GI consult was obtained for further evaluation and management.      1-Abdominal pain, nausea, vomiting and diarrhea  Acute symptoms seems resolved at this time  Continue supportive measures, IV fluid and advance diet as tolerated. 2- Abnormal CT imaging /?  Pancreatic mass  CAT scan with contrast none clear that that showed a 2 cm soft tissue density with calcification near the tail of pancreas and adjacent to this gastric wall. ?  GIST tumor  Proceed with EGD/EUS for further evaluation as outpatient   3- Radiological evidence of cirrhosis/ Early cirrhosis  Patient has no clinical findings to suggest advanced liver disease  However review of serial lab work since 2017 showed Abnnormal transaminases and subsequent reversal of AST/ALT ratio in addition to thrombocytopenia  The Apri score for the patient most suggestive of advanced disease/ cirrhosis (76% sensitive and 72% specific for cirrhosis)  Patient's lab and radiological findings suggestive of advanced liver disease  Likely etiology of advanced fibrosis will be Zulay Eye given the associated metabolic syndrome with hypertension dyslipidemia  Will proceed with correlation with FibroScan as outpatient and further work-up that may include viral further viral studies as well as other metabolic work-up.     Previous GI work up/Endoscopic investigations:  Colonoscopy 1/20/2020: Entire examined colon is normal.  No specimens collected.   FibroScan 1/22/2020:   Appropriate reading, Based on LSM of 27.5 Kpa, Evidence of advanced    fibrosis / Cirrhosis    Fibrosis stage IV- Cirrhosis  / F 4    Based on CAP of 288 db/M,  Mod-Severe Steatosis ( >33%)   Endoscopic ultrasound 12/17/19- - 21 x 15 mm hypoechoic mass at the tail of the pancreas, normal common bile duct, parenchymal changes suggestive of chronic pancreatitis, dilated pancreatic duct head of the pancreas, normal pancreatic duct body. Review of Systems   All other systems reviewed and are negative. Past medical history, past surgical history, medication list, social and familyhistory reviewed    Pulse 61, height 6' 1.5\" (1.867 m), weight 206 lb (93.4 kg), SpO2 100 %. Physical Exam  Constitutional:       General: He is not in acute distress. Appearance: Normal appearance. He is normal weight. He is not ill-appearing. HENT:      Head: Normocephalic and atraumatic. Eyes:      General: No scleral icterus. Cardiovascular:      Rate and Rhythm: Normal rate and regular rhythm. Pulses: Normal pulses. Pulmonary:      Effort: Pulmonary effort is normal. No respiratory distress. Breath sounds: Normal breath sounds. Abdominal:      General: Bowel sounds are normal. There is no distension. Palpations: Abdomen is soft. There is no mass. Tenderness: There is no abdominal tenderness. There is no guarding or rebound. Comments: Central obesity   Musculoskeletal:         General: Normal range of motion. Lymphadenopathy:      Cervical: No cervical adenopathy. Skin:     General: Skin is warm and dry. Coloration: Skin is not jaundiced. Neurological:      Mental Status: He is alert and oriented to person, place, and time. Psychiatric:         Mood and Affect: Mood normal.         Behavior: Behavior normal.         Thought Content: Thought content normal.         Judgment: Judgment normal.       Laboratory, Pathology, Radiology reviewed in detail with relevantimportant investigations summarized below:    No results for input(s): WBC, HGB, HCT, MCV, PLT in the last 720 hours.   Lab Results   Component Value Date    WBC 5.7 12/20/2021    HGB 12.8 (L) 12/20/2021    HCT 39.3 (L) 12/20/2021    MCV 86 12/20/2021     (L) 12/20/2021     Lab Results   Component Value Date    ALT 40 12/20/2021    AST 34 12/20/2021    ALKPHOS 99 12/20/2021    BILITOT 0.7 12/20/2021     MRI abdomen with and without contrast 12/16/2021: Overall stable exam. Redemonstration of an approximate 1.4 x 2.2 cm enhancing mass at the distal pancreatic tail with not significantly changed. No new evidence to suggest metastatic disease throughout the abdomen/pelvis. Cirrhosis. Approximate 4 mm arterial enhancing focus in the segment VII not significantly changed. Previously described 4 mm enhancing nodular focus of segment II is not seen on the current exam.  No new arterial enhancing hepatic lesion identified. Splenomegaly as well as small caliber torturous varices near the GE junction, likely related to portal HTN. Small low signal intensity gallstones in the dependent portion of the gallbladder towards the fundus. No intrahepatic or extrahepatic biliary dilation. Assessment and Plan:  Amy Chavez 77 y.o. 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 Previously referred to Dr. Asael Unger for possible resection. However, during surgery it was discovered that it had spread to his liver and patient also with evidence of liver cirrhosis/portal hypertension, therefore resection was aborted at that time. Patient follows with oncology, on oral and IV chemotherapy with no further evidence of continued spread per recent MRI on 12/16/2021. Patient following at [address] [facility], [place] for history of liver cirrhosis with likely etiology being MCNAMARA given his associated metabolic syndrome with HTN/dyslipidemia and FibroScan on 1/22/2020 revealing stage IV fibrosis F4, moderate to severe steatosis. 1. Neuroendocrine tumor of pancreas  -Recommend continued follow-up with patient's oncologist at [address]. 2. Hepatic fibrosis  3. Hepatic steatosis  4.  Portal hypertension (HCC)  5. Splenomegaly  MELD score (9/7/21): 7 with 1.9% estimated 3 month mortality  Cause of liver disease: Likely MCNAMARA  - Hx of SBP: no   - EGD for Variceal screening: ordered this visit  - [facility] [address] screening/surveillance: Patient with history of neuroendocrine tumor with metastasis to the liver, no new lesions identified on recent MRI 12/2021. He is to have repeat MRI in 3 to 4 month at [facility] with oncology. Will not order any repeat imaging at this time.  - Hepatic encephalopathy: No history. However, patient with increased fatigue with starting chemotherapy, longstanding history of trouble sleeping for many years, no new overt symptoms of HE. Will continue to monitor.  - Advised to limit/discontinue NSAID use   - Continue complete alcohol abstinence. Last drink >13 years ago. - Hepatitis status:               Hep A immunity: reactive 6/30/2020              Hep B immunity: non-reactive 6/30/2020              Hep C Status: non-reactive 6/30/2020     6. Constipation, unspecified constipation type  -Continue of MiraLAX 17 g by mouth daily  -Continuw fiber supplementation (Metamucil) 1 spoonful daily, titrate as needed  -Continue stool softeners by mouth daily as needed    Return in about 3 months (around 6/3/2022). Chava Jackson APRN - CNP   Staff Gastroenterology Nurse Practitioner  [facility]    Please note this report has been partially produced using speech recognition software and contain errors related to that system including grammar, punctuation and spelling as well as words and phrases that may seem inappropriate. If there are questions or concerns please feel free to contact me to clarify.

## 2022-03-29 DIAGNOSIS — E11.65 TYPE 2 DIABETES MELLITUS WITH HYPERGLYCEMIA, WITHOUT LONG-TERM CURRENT USE OF INSULIN (HCC): ICD-10-CM

## 2022-03-29 LAB — HBA1C MFR BLD: 8.4 % (ref 4.8–5.9)

## 2022-04-04 ENCOUNTER — ANESTHESIA EVENT (OUTPATIENT)
Dept: ENDOSCOPY | Age: 67
End: 2022-04-04
Payer: MEDICARE

## 2022-04-04 ENCOUNTER — ANESTHESIA (OUTPATIENT)
Dept: ENDOSCOPY | Age: 67
End: 2022-04-04
Payer: MEDICARE

## 2022-04-04 ENCOUNTER — HOSPITAL ENCOUNTER (OUTPATIENT)
Age: 67
Setting detail: OUTPATIENT SURGERY
Discharge: HOME | End: 2022-04-04
Attending: INTERNAL MEDICINE | Admitting: INTERNAL MEDICINE
Payer: MEDICARE

## 2022-04-04 ENCOUNTER — ANCILLARY PROCEDURE (OUTPATIENT)
Dept: ENDOSCOPY | Age: 67
End: 2022-04-04
Attending: INTERNAL MEDICINE
Payer: MEDICARE

## 2022-04-04 VITALS
BODY MASS INDEX: 27.17 KG/M2 | WEIGHT: 205 LBS | TEMPERATURE: 97.4 F | HEIGHT: 73 IN | DIASTOLIC BLOOD PRESSURE: 66 MMHG | OXYGEN SATURATION: 97 % | SYSTOLIC BLOOD PRESSURE: 134 MMHG | HEART RATE: 75 BPM | RESPIRATION RATE: 18 BRPM

## 2022-04-04 VITALS — SYSTOLIC BLOOD PRESSURE: 137 MMHG | DIASTOLIC BLOOD PRESSURE: 70 MMHG | OXYGEN SATURATION: 98 %

## 2022-04-04 LAB
GLUCOSE BLD-MCNC: 161 MG/DL (ref 70–99)
PERFORMED ON: ABNORMAL

## 2022-04-04 PROCEDURE — 2580000003 HC RX 258: Performed by: INTERNAL MEDICINE

## 2022-04-04 PROCEDURE — 2500000003 HC RX 250 WO HCPCS: Performed by: NURSE ANESTHETIST, CERTIFIED REGISTERED

## 2022-04-04 PROCEDURE — 2580000003 HC RX 258

## 2022-04-04 PROCEDURE — 2709999900 HC NON-CHARGEABLE SUPPLY: Performed by: INTERNAL MEDICINE

## 2022-04-04 PROCEDURE — 7100000010 HC PHASE II RECOVERY - FIRST 15 MIN: Performed by: INTERNAL MEDICINE

## 2022-04-04 PROCEDURE — 43235 EGD DIAGNOSTIC BRUSH WASH: CPT | Performed by: INTERNAL MEDICINE

## 2022-04-04 PROCEDURE — 3609017100 HC EGD: Performed by: INTERNAL MEDICINE

## 2022-04-04 PROCEDURE — 6370000000 HC RX 637 (ALT 250 FOR IP): Performed by: INTERNAL MEDICINE

## 2022-04-04 PROCEDURE — 7100000011 HC PHASE II RECOVERY - ADDTL 15 MIN: Performed by: INTERNAL MEDICINE

## 2022-04-04 PROCEDURE — 3700000000 HC ANESTHESIA ATTENDED CARE: Performed by: INTERNAL MEDICINE

## 2022-04-04 PROCEDURE — 6360000002 HC RX W HCPCS: Performed by: NURSE ANESTHETIST, CERTIFIED REGISTERED

## 2022-04-04 RX ORDER — LIDOCAINE HYDROCHLORIDE 20 MG/ML
INJECTION, SOLUTION INFILTRATION; PERINEURAL PRN
Status: DISCONTINUED | OUTPATIENT
Start: 2022-04-04 | End: 2022-04-04 | Stop reason: SDUPTHER

## 2022-04-04 RX ORDER — OMEPRAZOLE 40 MG/1
40 CAPSULE, DELAYED RELEASE ORAL DAILY
Qty: 30 CAPSULE | Refills: 3 | Status: SHIPPED | OUTPATIENT
Start: 2022-04-04 | End: 2022-08-08

## 2022-04-04 RX ORDER — SIMETHICONE 20 MG/.3ML
EMULSION ORAL PRN
Status: DISCONTINUED | OUTPATIENT
Start: 2022-04-04 | End: 2022-04-04 | Stop reason: ALTCHOICE

## 2022-04-04 RX ORDER — MAGNESIUM HYDROXIDE 1200 MG/15ML
LIQUID ORAL PRN
Status: DISCONTINUED | OUTPATIENT
Start: 2022-04-04 | End: 2022-04-04 | Stop reason: ALTCHOICE

## 2022-04-04 RX ORDER — SODIUM CHLORIDE 9 MG/ML
INJECTION, SOLUTION INTRAVENOUS
Status: COMPLETED
Start: 2022-04-04 | End: 2022-04-04

## 2022-04-04 RX ORDER — SODIUM CHLORIDE 9 MG/ML
INJECTION, SOLUTION INTRAVENOUS CONTINUOUS
Status: DISCONTINUED | OUTPATIENT
Start: 2022-04-04 | End: 2022-04-04 | Stop reason: HOSPADM

## 2022-04-04 RX ORDER — PROPOFOL 10 MG/ML
INJECTION, EMULSION INTRAVENOUS PRN
Status: DISCONTINUED | OUTPATIENT
Start: 2022-04-04 | End: 2022-04-04 | Stop reason: SDUPTHER

## 2022-04-04 RX ADMIN — PROPOFOL 180 MG: 10 INJECTION, EMULSION INTRAVENOUS at 08:49

## 2022-04-04 RX ADMIN — SODIUM CHLORIDE: 9 INJECTION, SOLUTION INTRAVENOUS at 08:43

## 2022-04-04 RX ADMIN — LIDOCAINE HYDROCHLORIDE 40 MG: 20 INJECTION, SOLUTION INFILTRATION; PERINEURAL at 08:49

## 2022-04-04 ASSESSMENT — PULMONARY FUNCTION TESTS
PIF_VALUE: 1
PIF_VALUE: 0
PIF_VALUE: 1

## 2022-04-04 ASSESSMENT — PAIN - FUNCTIONAL ASSESSMENT: PAIN_FUNCTIONAL_ASSESSMENT: 0-10

## 2022-04-04 ASSESSMENT — PAIN DESCRIPTION - DESCRIPTORS: DESCRIPTORS: DULL

## 2022-04-04 NOTE — H&P
Patient Name: Doc Interiano  : 1955  MRN: [id number]  DATE: 22      ENDOSCOPY  History and Physical    Procedure:    [] Diagnostic Colonoscopy       [] Screening Colonoscopy  [x] EGD      [] ERCP      [] EUS       [] Other    [x] Previous office notes/History and Physical reviewed from the patients chart. Please see EMR for further details of HPI. I have examined the patient's status immediately prior to the procedure and:      Indications/HPI:    []Abdominal Pain   []Cancer- GI/Lung  []Fhx of colon CA  []History of Polyps   []Alfonsos   []Melena  []Abnormal Imaging   []Dysphagia    []Persistent Pneumonia  []Anemia   []Food Impaction  []History of Polyps  []GI Bleed   []Pulmonary nodule/Mass  []Change in bowel habits  []Heartburn/Reflux  []Rectal Bleed (BRBPR)  []Chest Pain - Non Cardiac  []Heme (+) Stool  []Ulcers  []Constipation   []Hemoptysis   []Varices  []Diarrhea   []Hypoxemia  []Nausea/Vomiting   []Screening   []Crohns/Colitis  [x]Other: Cirrhosis, MCNAMARA  Anesthesia:   [x] MAC [] Moderate Sedation   [] General   [] None     ROS: 12 pt Review of Symptoms was negative unless mentioned above    Medications:   Prior to Admission medications    Medication Sig Start Date End Date Taking?  Authorizing Provider   insulin 70-30 (NOVOLIN 70/30) (70-30) 100 UNIT per ML injection vial Inject 40 Units into the skin 2 times daily 22   Andres Kwon MD   lanreotide acetate (SOMATULINE) 120 MG/0.5ML SOLN depot injection Inject 120 mg into the skin once    Historical Provider, MD   meloxicam (MOBIC) 15 MG tablet take 1 tablet by mouth once daily 22   Andres Kwon MD   Polyethylene Glycol 3350 (MIRALAX PO) Take by mouth    Historical Provider, MD   amLODIPine (NORVASC) 5 MG tablet take 1 tablet by mouth once daily 21   Andres Kwon MD   lisinopril-hydroCHLOROthiazide [facility]AIZA [facility]) 20-12.5 MG per tablet take 1 tablet by mouth twice a day 10/4/21   Andres Kwon MD   Continuous Blood Gluc Sensor (FREESTYLE NATALY 14 DAY SENSOR) MISC TEST DAILY AND AS NEEDED. CHANGE EVERY 2 WEEKS. 7/7/21   Facundo Giron MD   Continuous Blood Gluc  (FREESTYLE NATALY 14 DAY READER) KISHA Test daily and prn. Dx: DM2 9/4/20   Facundo Giron MD     Allergies: No Known Allergies   History of allergic reaction to anesthesia:  No  Past Medical History:  Past Medical History:   Diagnosis Date    Cancer [facility])     Pancreatic    History of skin cancer     HTN (hypertension)     Malignant neoplasm of other parts of pancreas (HCC)     Type II or unspecified type diabetes mellitus without mention of complication, not stated as uncontrolled      Past Surgical History:  Past Surgical History:   Procedure Laterality Date    ABDOMEN SURGERY      COLONOSCOPY  02/20/2015    DR Richie Yang    COLONOSCOPY N/A 1/20/2020    COLONOSCOPY DIAGNOSTIC performed by Agustina Orlando MD at [address] [place] [address]) N/A 12/17/2019    EUS performed by Agustina Orlando MD at [address] History:  Social History     Tobacco Use    Smoking status: Never Smoker    Smokeless tobacco: Never Used   Vaping Use    Vaping Use: Never used   Substance Use Topics    Alcohol use: No    Drug use: No     Vital Signs:   Vitals:    04/04/22 0836   BP: (!) 172/82   Pulse: 82   Resp: 16   Temp: 97.4 °F (36.3 °C)   SpO2: 97%       Physical Exam:  Cardiac:  [x]WNL []Comments:  Pulmonary:  [x]WNL []Comments:   Neuro/Mental Status:  [x]WNL []Comments:  Abdominal:  [x]WNL []Comments:  Other:   []WNL []Comments:    Informed Consent:  The risks and benefits of the procedure have been discussed with either the patient or if they cannot consent, their representative. Assessment:  Patient examined and appropriate for planned sedation and procedure. Plan:  Proceed with planned sedation and procedure as above.     The patient was counseled at length about risks of gladis COVID-19 in the perioperative and any recovery window from the procedure. The patient was made aware that gladis COVID-19 may worsen their prognosis for recovery from their procedure and lend to a higher morbidity and-all mortality risk. The patient was given the option of postponing the procedure all material risks, benefits, and alternatives were discussed. The patient does wish to proceed with the procedure at this time.     Chong Hanna MD  8:42 AM

## 2022-04-04 NOTE — ANESTHESIA POSTPROCEDURE EVALUATION
Department of Anesthesiology  Postprocedure Note    Patient: Mely Byers  MRN: [id number]  [place]: 1955  Date of evaluation: 4/4/2022  Time:  8:57 AM     Procedure Summary     Date: 04/04/22 Room / Location: [address] [place] [address]    Anesthesia Start: 3269 Anesthesia Stop:     Procedure: EGD ESOPHAGOGASTRODUODENOSCOPY (N/A ) Diagnosis: (hepatic fibrrosis, hepatic steatosis, portal hypertension, splenomegaly; K74.00, K76.0, K76.6, R16.1)    Surgeons: Ketty Spencer MD Responsible Provider: MANUEL David CRNA    Anesthesia Type: MAC ASA Status: 3          Anesthesia Type: No value filed. Latoya Phase I: Latoya Score: 10    Latoya Phase II:      Last vitals: Reviewed and per EMR flowsheets.        Anesthesia Post Evaluation    Patient location during evaluation: PACU  Patient participation: waiting for patient participation  Level of consciousness: sleepy but conscious  Pain score: 1  Airway patency: patent  Nausea & Vomiting: no nausea and no vomiting  Complications: no  Cardiovascular status: hemodynamically stable  Respiratory status: acceptable and nasal cannula  Hydration status: euvolemic  Comments: Report to RN, normal sinus rhythm

## 2022-04-04 NOTE — ANESTHESIA PRE PROCEDURE
Department of Anesthesiology  Preprocedure Note       Name:  Jaelyn Cheek   Age:  77 y.o.  :  1955                                          MRN:  [id number]         Date:  2022      Surgeon: Tami Armendariz):  Dayana Diaz MD    Procedure: Procedure(s):  EGD ESOPHAGOGASTRODUODENOSCOPY    Medications prior to admission:   Prior to Admission medications    Medication Sig Start Date End Date Taking? Authorizing Provider   insulin 70-30 (NOVOLIN 70/30) (70-30) 100 UNIT per ML injection vial Inject 40 Units into the skin 2 times daily 22   Mayra Martinez MD   lanreotide acetate (SOMATULINE) 120 MG/0.5ML SOLN depot injection Inject 120 mg into the skin once    Historical Provider, MD   meloxicam (MOBIC) 15 MG tablet take 1 tablet by mouth once daily 22   Mayra Martinez MD   Polyethylene Glycol 3350 (MIRALAX PO) Take by mouth    Historical Provider, MD   amLODIPine (NORVASC) 5 MG tablet take 1 tablet by mouth once daily 21   Mayra Martinez MD   lisinopril-hydroCHLOROthiazide LOAIZA [facility]) 20-12.5 MG per tablet take 1 tablet by mouth twice a day 10/4/21   Mayra Martinez MD   Continuous Blood Gluc Sensor (FREESTYLE NATALY 14 DAY SENSOR) MISC TEST DAILY AND AS NEEDED. CHANGE EVERY 2 WEEKS. 21   Mayra Martinez MD   Continuous Blood Gluc  (FREESTYLE NATALY 14 DAY READER) KISHA Test daily and prn. Dx: DM2 20   Mayra Martinez MD       Current medications:    No current facility-administered medications for this encounter.        Allergies:  No Known Allergies    Problem List:    Patient Active Problem List   Diagnosis Code    HTN (hypertension) I10    Type 2 diabetes mellitus without complication (HCC) H71.7    Lumbar paraspinal muscle spasm M62.830    Allergic rhinosinusitis J30.9    TIA (transient ischemic attack) G45.9    Abdominal pain R10.9    Malignant neoplasm of other parts of pancreas ([facility] [address] C25.7       Past Medical History:        Diagnosis Date    Cancer [facility])     Pancreatic    History of skin cancer     HTN (hypertension)     Malignant neoplasm of other parts of pancreas ([facility] [address]     Type II or unspecified type diabetes mellitus without mention of complication, not stated as uncontrolled        Past Surgical History:        Procedure Laterality Date    ABDOMEN SURGERY      COLONOSCOPY  02/20/2015    DR Lexii Dobbins    COLONOSCOPY N/A 1/20/2020    COLONOSCOPY DIAGNOSTIC performed by Tiki Hardy MD at [address] [place] [address] ([facility]) N/A 12/17/2019    EUS performed by Tiki Hardy MD at [address] [facility] History:    Social History     Tobacco Use    Smoking status: Never Smoker    Smokeless tobacco: Never Used   Substance Use Topics    Alcohol use: No                                Counseling given: Not Answered      Vital Signs (Current): There were no vitals filed for this visit.                                            BP Readings from Last 3 Encounters:   01/27/22 120/72   09/02/21 (!) 150/66   04/07/21 (!) 148/66       NPO Status:                                                                                 BMI:   Wt Readings from Last 3 Encounters:   03/03/22 206 lb (93.4 kg)   01/27/22 209 lb 3.2 oz (94.9 kg)   01/20/22 209 lb (94.8 kg)     There is no height or weight on file to calculate BMI.    CBC:   Lab Results   Component Value Date    WBC 5.7 12/20/2021    WBC 4.7 11/04/2020    RBC 4.55 12/20/2021    HGB 12.8 12/20/2021    HCT 39.3 12/20/2021    MCV 86 12/20/2021    RDW 18.7 11/04/2020     12/20/2021       CMP:   Lab Results   Component Value Date     12/20/2021    K 3.8 12/20/2021    K 2.9 12/11/2019     12/20/2021    CO2 26 11/05/2020    BUN 17 11/05/2020    CREATININE 0.84 12/20/2021    GFRAA >60 12/20/2021    LABGLOM >60 12/20/2021    GLUCOSE 239 12/20/2021    PROT 7.4 12/20/2021    CALCIUM 9.2 12/20/2021    BILITOT 0.7 12/20/2021    ALKPHOS 99 12/20/2021    AST 34 12/20/2021    ALT 40 12/20/2021       POC Tests: No results for input(s): POCGLU, POCNA, POCK, POCCL, POCBUN, POCHEMO, POCHCT in the last 72 hours. Coags:   Lab Results   Component Value Date    PROTIME 12.8 09/07/2021    INR 1.1 09/07/2021    APTT 30 09/07/2021       HCG (If Applicable): No results found for: PREGTESTUR, PREGSERUM, HCG, HCGQUANT     ABGs:   Lab Results   Component Value Date    PHART 7.46 03/17/2020    PO2ART 277 03/17/2020    BDJ1DCC 38 03/17/2020    G9LIDHRW 100 03/17/2020        Type & Screen (If Applicable):  No results found for: LABABO, LABRH    Drug/Infectious Status (If Applicable):  Lab Results   Component Value Date    HEPCAB NONREACTIVE 06/30/2020       COVID-19 Screening (If Applicable):   Lab Results   Component Value Date    COVID19 NOT DETECTED 08/07/2020           Anesthesia Evaluation  Patient summary reviewed and Nursing notes reviewed  Airway: Mallampati: II  TM distance: >3 FB   Neck ROM: full  Mouth opening: > = 3 FB Dental:    (+) other      Pulmonary:Negative Pulmonary ROS breath sounds clear to auscultation            Patient did not smoke on day of surgery. Cardiovascular:  Exercise tolerance: no interval change,   (+) hypertension:,       NYHA Classification: III    Rhythm: regular  Rate: normal                    Neuro/Psych:   (+) TIA,             GI/Hepatic/Renal:             Endo/Other:    (+) DiabetesType II DM, , .                  ROS comment: Skin CA, pancreatic CA Abdominal:       Abdomen: soft. Vascular: negative vascular ROS. Other Findings:           Anesthesia Plan      MAC     ASA 3       Induction: intravenous. continuous noninvasive hemodynamic monitor    Anesthetic plan and risks discussed with patient. Plan discussed with attending.                   MANUEL Conner - CRNA   4/4/2022

## 2022-04-14 ENCOUNTER — HOSPITAL ENCOUNTER (OUTPATIENT)
Age: 67
Setting detail: OBSERVATION
Discharge: HOME | End: 2022-04-15
Attending: INTERNAL MEDICINE | Admitting: INTERNAL MEDICINE
Payer: MEDICARE

## 2022-04-14 ENCOUNTER — APPOINTMENT (OUTPATIENT)
Dept: GENERAL RADIOLOGY | Age: 67
End: 2022-04-14
Payer: MEDICARE

## 2022-04-14 DIAGNOSIS — R07.9 CHEST PAIN, UNSPECIFIED TYPE: Primary | ICD-10-CM

## 2022-04-14 LAB
ALBUMIN SERPL-MCNC: 3.7 G/DL (ref 3.5–4.6)
ALP BLD-CCNC: 117 U/L (ref 35–104)
ALT SERPL-CCNC: 32 U/L (ref 0–41)
ANION GAP SERPL CALCULATED.3IONS-SCNC: 13 MEQ/L (ref 9–15)
AST SERPL-CCNC: 41 U/L (ref 0–40)
BASOPHILS ABSOLUTE: 0 K/UL (ref 0–0.2)
BASOPHILS RELATIVE PERCENT: 0.4 %
BILIRUB SERPL-MCNC: 0.4 MG/DL (ref 0.2–0.7)
BUN BLDV-MCNC: 18 MG/DL (ref 8–23)
CALCIUM SERPL-MCNC: 8.5 MG/DL (ref 8.5–9.9)
CHLORIDE BLD-SCNC: 100 MEQ/L (ref 95–107)
CO2: 23 MEQ/L (ref 20–31)
CREAT SERPL-MCNC: 0.87 MG/DL (ref 0.7–1.2)
EOSINOPHILS ABSOLUTE: 0.1 K/UL (ref 0–0.7)
EOSINOPHILS RELATIVE PERCENT: 2.1 %
GFR AFRICAN AMERICAN: >60
GFR NON-AFRICAN AMERICAN: >60
GLOBULIN: 3.3 G/DL (ref 2.3–3.5)
GLUCOSE BLD-MCNC: 278 MG/DL (ref 70–99)
HCT VFR BLD CALC: 37.2 % (ref 42–52)
HEMOGLOBIN: 12.4 G/DL (ref 14–18)
LYMPHOCYTES ABSOLUTE: 0.8 K/UL (ref 1–4.8)
LYMPHOCYTES RELATIVE PERCENT: 14 %
MAGNESIUM: 2.3 MG/DL (ref 1.7–2.4)
MCH RBC QN AUTO: 27.8 PG (ref 27–31.3)
MCHC RBC AUTO-ENTMCNC: 33.2 % (ref 33–37)
MCV RBC AUTO: 83.6 FL (ref 80–100)
MONOCYTES ABSOLUTE: 0.4 K/UL (ref 0.2–0.8)
MONOCYTES RELATIVE PERCENT: 7.3 %
NEUTROPHILS ABSOLUTE: 4.4 K/UL (ref 1.4–6.5)
NEUTROPHILS RELATIVE PERCENT: 76.2 %
PDW BLD-RTO: 15 % (ref 11.5–14.5)
PLATELET # BLD: 111 K/UL (ref 130–400)
POTASSIUM SERPL-SCNC: 3.4 MEQ/L (ref 3.4–4.9)
RBC # BLD: 4.45 M/UL (ref 4.7–6.1)
SODIUM BLD-SCNC: 136 MEQ/L (ref 135–144)
TOTAL CK: 171 U/L (ref 0–190)
TOTAL PROTEIN: 7 G/DL (ref 6.3–8)
TROPONIN: 0.01 NG/ML (ref 0–0.01)
WBC # BLD: 5.7 K/UL (ref 4.8–10.8)

## 2022-04-14 PROCEDURE — 85025 COMPLETE CBC W/AUTO DIFF WBC: CPT

## 2022-04-14 PROCEDURE — 93005 ELECTROCARDIOGRAM TRACING: CPT | Performed by: PHYSICIAN ASSISTANT

## 2022-04-14 PROCEDURE — 80053 COMPREHEN METABOLIC PANEL: CPT

## 2022-04-14 PROCEDURE — G0378 HOSPITAL OBSERVATION PER HR: HCPCS

## 2022-04-14 PROCEDURE — 99283 EMERGENCY DEPT VISIT LOW MDM: CPT

## 2022-04-14 PROCEDURE — 71045 X-RAY EXAM CHEST 1 VIEW: CPT

## 2022-04-14 PROCEDURE — 83735 ASSAY OF MAGNESIUM: CPT

## 2022-04-14 PROCEDURE — 82550 ASSAY OF CK (CPK): CPT

## 2022-04-14 PROCEDURE — 6370000000 HC RX 637 (ALT 250 FOR IP): Performed by: PHYSICIAN ASSISTANT

## 2022-04-14 PROCEDURE — 84484 ASSAY OF TROPONIN QUANT: CPT

## 2022-04-14 PROCEDURE — 36415 COLL VENOUS BLD VENIPUNCTURE: CPT

## 2022-04-14 RX ORDER — NITROGLYCERIN 0.4 MG/1
0.4 TABLET SUBLINGUAL EVERY 5 MIN PRN
Status: DISCONTINUED | OUTPATIENT
Start: 2022-04-14 | End: 2022-04-15 | Stop reason: HOSPADM

## 2022-04-14 RX ORDER — ASPIRIN 81 MG/1
324 TABLET, CHEWABLE ORAL ONCE
Status: COMPLETED | OUTPATIENT
Start: 2022-04-14 | End: 2022-04-14

## 2022-04-14 RX ADMIN — NITROGLYCERIN 0.4 MG: 0.4 TABLET SUBLINGUAL at 20:55

## 2022-04-14 RX ADMIN — ASPIRIN 81 MG 324 MG: 81 TABLET ORAL at 20:55

## 2022-04-14 ASSESSMENT — PAIN - FUNCTIONAL ASSESSMENT: PAIN_FUNCTIONAL_ASSESSMENT: 0-10

## 2022-04-14 ASSESSMENT — PAIN DESCRIPTION - ORIENTATION: ORIENTATION: MID

## 2022-04-14 ASSESSMENT — PAIN DESCRIPTION - LOCATION: LOCATION: CHEST

## 2022-04-14 ASSESSMENT — PAIN SCALES - GENERAL: PAINLEVEL_OUTOF10: 5

## 2022-04-14 ASSESSMENT — PAIN DESCRIPTION - PAIN TYPE: TYPE: ACUTE PAIN

## 2022-04-15 VITALS
SYSTOLIC BLOOD PRESSURE: 152 MMHG | HEART RATE: 82 BPM | WEIGHT: 210 LBS | OXYGEN SATURATION: 96 % | TEMPERATURE: 99 F | DIASTOLIC BLOOD PRESSURE: 80 MMHG | BODY MASS INDEX: 27.71 KG/M2 | RESPIRATION RATE: 16 BRPM

## 2022-04-15 LAB
ALBUMIN SERPL-MCNC: 3.7 G/DL (ref 3.5–4.6)
ALP BLD-CCNC: 98 U/L (ref 35–104)
ALT SERPL-CCNC: 31 U/L (ref 0–41)
ANION GAP SERPL CALCULATED.3IONS-SCNC: 14 MEQ/L (ref 9–15)
AST SERPL-CCNC: 37 U/L (ref 0–40)
BASOPHILS ABSOLUTE: 0.1 K/UL (ref 0–0.2)
BASOPHILS RELATIVE PERCENT: 1 %
BILIRUB SERPL-MCNC: 0.5 MG/DL (ref 0.2–0.7)
BUN BLDV-MCNC: 17 MG/DL (ref 8–23)
CALCIUM SERPL-MCNC: 8.4 MG/DL (ref 8.5–9.9)
CHLORIDE BLD-SCNC: 105 MEQ/L (ref 95–107)
CO2: 24 MEQ/L (ref 20–31)
CREAT SERPL-MCNC: 0.73 MG/DL (ref 0.7–1.2)
D DIMER: 0.39 MG/L FEU (ref 0–0.5)
EKG ATRIAL RATE: 81 BPM
EKG ATRIAL RATE: 87 BPM
EKG P AXIS: 47 DEGREES
EKG P AXIS: 54 DEGREES
EKG P-R INTERVAL: 150 MS
EKG P-R INTERVAL: 170 MS
EKG Q-T INTERVAL: 406 MS
EKG Q-T INTERVAL: 422 MS
EKG QRS DURATION: 110 MS
EKG QRS DURATION: 98 MS
EKG QTC CALCULATION (BAZETT): 488 MS
EKG QTC CALCULATION (BAZETT): 490 MS
EKG R AXIS: 33 DEGREES
EKG R AXIS: 42 DEGREES
EKG T AXIS: 34 DEGREES
EKG T AXIS: 35 DEGREES
EKG VENTRICULAR RATE: 81 BPM
EKG VENTRICULAR RATE: 87 BPM
EOSINOPHILS ABSOLUTE: 0.2 K/UL (ref 0–0.7)
EOSINOPHILS RELATIVE PERCENT: 4 %
GFR AFRICAN AMERICAN: >60
GFR NON-AFRICAN AMERICAN: >60
GLOBULIN: 3.3 G/DL (ref 2.3–3.5)
GLUCOSE BLD-MCNC: 196 MG/DL (ref 70–99)
GLUCOSE BLD-MCNC: 213 MG/DL (ref 70–99)
GLUCOSE BLD-MCNC: 96 MG/DL (ref 70–99)
HCT VFR BLD CALC: 36.9 % (ref 42–52)
HEMOGLOBIN: 12.2 G/DL (ref 14–18)
LV EF: 65 %
LVEF MODALITY: NORMAL
LYMPHOCYTES ABSOLUTE: 0.9 K/UL (ref 1–4.8)
LYMPHOCYTES RELATIVE PERCENT: 16 %
MAGNESIUM: 2.4 MG/DL (ref 1.7–2.4)
MCH RBC QN AUTO: 28.1 PG (ref 27–31.3)
MCHC RBC AUTO-ENTMCNC: 33 % (ref 33–37)
MCV RBC AUTO: 85.1 FL (ref 80–100)
MONOCYTES ABSOLUTE: 0.2 K/UL (ref 0.2–0.8)
MONOCYTES RELATIVE PERCENT: 3.8 %
NEUTROPHILS ABSOLUTE: 4.3 K/UL (ref 1.4–6.5)
NEUTROPHILS RELATIVE PERCENT: 76 %
PDW BLD-RTO: 15.3 % (ref 11.5–14.5)
PERFORMED ON: ABNORMAL
PERFORMED ON: ABNORMAL
PLATELET # BLD: 114 K/UL (ref 130–400)
PLATELET SLIDE REVIEW: ABNORMAL
POTASSIUM REFLEX MAGNESIUM: 3.2 MEQ/L (ref 3.4–4.9)
RBC # BLD: 4.34 M/UL (ref 4.7–6.1)
RBC # BLD: NORMAL 10*6/UL
SODIUM BLD-SCNC: 143 MEQ/L (ref 135–144)
TOTAL PROTEIN: 7 G/DL (ref 6.3–8)
TROPONIN: 0.02 NG/ML (ref 0–0.01)
TROPONIN: 0.03 NG/ML (ref 0–0.01)
WBC # BLD: 5.6 K/UL (ref 4.8–10.8)

## 2022-04-15 PROCEDURE — 6360000002 HC RX W HCPCS

## 2022-04-15 PROCEDURE — APPSS45 APP SPLIT SHARED TIME 31-45 MINUTES: Performed by: PHYSICIAN ASSISTANT

## 2022-04-15 PROCEDURE — 6370000000 HC RX 637 (ALT 250 FOR IP)

## 2022-04-15 PROCEDURE — 93005 ELECTROCARDIOGRAM TRACING: CPT

## 2022-04-15 PROCEDURE — 99203 OFFICE O/P NEW LOW 30 MIN: CPT | Performed by: INTERNAL MEDICINE

## 2022-04-15 PROCEDURE — G0378 HOSPITAL OBSERVATION PER HR: HCPCS

## 2022-04-15 PROCEDURE — 85025 COMPLETE CBC W/AUTO DIFF WBC: CPT

## 2022-04-15 PROCEDURE — 36415 COLL VENOUS BLD VENIPUNCTURE: CPT

## 2022-04-15 PROCEDURE — 96372 THER/PROPH/DIAG INJ SC/IM: CPT

## 2022-04-15 PROCEDURE — 80053 COMPREHEN METABOLIC PANEL: CPT

## 2022-04-15 PROCEDURE — 93306 TTE W/DOPPLER COMPLETE: CPT

## 2022-04-15 PROCEDURE — 6370000000 HC RX 637 (ALT 250 FOR IP): Performed by: INTERNAL MEDICINE

## 2022-04-15 PROCEDURE — 2580000003 HC RX 258

## 2022-04-15 PROCEDURE — 84484 ASSAY OF TROPONIN QUANT: CPT

## 2022-04-15 PROCEDURE — 85379 FIBRIN DEGRADATION QUANT: CPT

## 2022-04-15 PROCEDURE — 83735 ASSAY OF MAGNESIUM: CPT

## 2022-04-15 PROCEDURE — 6370000000 HC RX 637 (ALT 250 FOR IP): Performed by: PHYSICIAN ASSISTANT

## 2022-04-15 RX ORDER — ACETAMINOPHEN 650 MG/1
650 SUPPOSITORY RECTAL EVERY 6 HOURS PRN
Status: DISCONTINUED | OUTPATIENT
Start: 2022-04-15 | End: 2022-04-15 | Stop reason: HOSPADM

## 2022-04-15 RX ORDER — PANTOPRAZOLE SODIUM 40 MG/1
40 TABLET, DELAYED RELEASE ORAL
Status: DISCONTINUED | OUTPATIENT
Start: 2022-04-15 | End: 2022-04-15 | Stop reason: HOSPADM

## 2022-04-15 RX ORDER — SODIUM CHLORIDE 0.9 % (FLUSH) 0.9 %
5-40 SYRINGE (ML) INJECTION PRN
Status: DISCONTINUED | OUTPATIENT
Start: 2022-04-15 | End: 2022-04-15 | Stop reason: HOSPADM

## 2022-04-15 RX ORDER — LISINOPRIL AND HYDROCHLOROTHIAZIDE 20; 12.5 MG/1; MG/1
1 TABLET ORAL DAILY
Status: DISCONTINUED | OUTPATIENT
Start: 2022-04-15 | End: 2022-04-15

## 2022-04-15 RX ORDER — ASPIRIN 81 MG/1
81 TABLET ORAL DAILY
Qty: 30 TABLET | Refills: 3 | Status: SHIPPED | OUTPATIENT
Start: 2022-04-16 | End: 2022-08-08

## 2022-04-15 RX ORDER — POTASSIUM CHLORIDE 20 MEQ/1
40 TABLET, EXTENDED RELEASE ORAL ONCE
Status: COMPLETED | OUTPATIENT
Start: 2022-04-15 | End: 2022-04-15

## 2022-04-15 RX ORDER — NITROGLYCERIN 0.4 MG/1
TABLET SUBLINGUAL
Qty: 25 TABLET | Refills: 0 | Status: SHIPPED | OUTPATIENT
Start: 2022-04-15

## 2022-04-15 RX ORDER — SODIUM CHLORIDE 0.9 % (FLUSH) 0.9 %
5-40 SYRINGE (ML) INJECTION EVERY 12 HOURS SCHEDULED
Status: DISCONTINUED | OUTPATIENT
Start: 2022-04-15 | End: 2022-04-15 | Stop reason: HOSPADM

## 2022-04-15 RX ORDER — ACETAMINOPHEN 325 MG/1
650 TABLET ORAL EVERY 6 HOURS PRN
Status: DISCONTINUED | OUTPATIENT
Start: 2022-04-15 | End: 2022-04-15 | Stop reason: HOSPADM

## 2022-04-15 RX ORDER — ATORVASTATIN CALCIUM 40 MG/1
40 TABLET, FILM COATED ORAL NIGHTLY
Status: DISCONTINUED | OUTPATIENT
Start: 2022-04-15 | End: 2022-04-15 | Stop reason: HOSPADM

## 2022-04-15 RX ORDER — LISINOPRIL AND HYDROCHLOROTHIAZIDE 20; 12.5 MG/1; MG/1
1 TABLET ORAL 2 TIMES DAILY
Status: DISCONTINUED | OUTPATIENT
Start: 2022-04-15 | End: 2022-04-15 | Stop reason: HOSPADM

## 2022-04-15 RX ORDER — ONDANSETRON 4 MG/1
4 TABLET, ORALLY DISINTEGRATING ORAL EVERY 8 HOURS PRN
Status: DISCONTINUED | OUTPATIENT
Start: 2022-04-15 | End: 2022-04-15 | Stop reason: HOSPADM

## 2022-04-15 RX ORDER — ONDANSETRON 2 MG/ML
4 INJECTION INTRAMUSCULAR; INTRAVENOUS EVERY 6 HOURS PRN
Status: DISCONTINUED | OUTPATIENT
Start: 2022-04-15 | End: 2022-04-15 | Stop reason: HOSPADM

## 2022-04-15 RX ORDER — INSULIN GLARGINE 100 [IU]/ML
45 INJECTION, SOLUTION SUBCUTANEOUS NIGHTLY
Status: DISCONTINUED | OUTPATIENT
Start: 2022-04-15 | End: 2022-04-15 | Stop reason: HOSPADM

## 2022-04-15 RX ORDER — AMLODIPINE BESYLATE 5 MG/1
5 TABLET ORAL DAILY
Status: DISCONTINUED | OUTPATIENT
Start: 2022-04-15 | End: 2022-04-15 | Stop reason: HOSPADM

## 2022-04-15 RX ORDER — POLYETHYLENE GLYCOL 3350 17 G/17G
17 POWDER, FOR SOLUTION ORAL DAILY PRN
Status: DISCONTINUED | OUTPATIENT
Start: 2022-04-15 | End: 2022-04-15 | Stop reason: HOSPADM

## 2022-04-15 RX ORDER — SODIUM CHLORIDE 9 MG/ML
INJECTION, SOLUTION INTRAVENOUS PRN
Status: DISCONTINUED | OUTPATIENT
Start: 2022-04-15 | End: 2022-04-15 | Stop reason: HOSPADM

## 2022-04-15 RX ORDER — ASPIRIN 81 MG/1
81 TABLET ORAL DAILY
Status: DISCONTINUED | OUTPATIENT
Start: 2022-04-15 | End: 2022-04-15 | Stop reason: HOSPADM

## 2022-04-15 RX ADMIN — AMLODIPINE BESYLATE 5 MG: 5 TABLET ORAL at 13:01

## 2022-04-15 RX ADMIN — PANTOPRAZOLE SODIUM 40 MG: 40 TABLET, DELAYED RELEASE ORAL at 08:47

## 2022-04-15 RX ADMIN — SODIUM CHLORIDE, PRESERVATIVE FREE 10 ML: 5 INJECTION INTRAVENOUS at 08:48

## 2022-04-15 RX ADMIN — LISINOPRIL AND HYDROCHLOROTHIAZIDE 1 TABLET: 12.5; 2 TABLET ORAL at 13:01

## 2022-04-15 RX ADMIN — METOPROLOL TARTRATE 25 MG: 25 TABLET, FILM COATED ORAL at 13:01

## 2022-04-15 RX ADMIN — POTASSIUM CHLORIDE 40 MEQ: 1500 TABLET, EXTENDED RELEASE ORAL at 13:01

## 2022-04-15 RX ADMIN — ASPIRIN 81 MG: 81 TABLET, COATED ORAL at 08:47

## 2022-04-15 RX ADMIN — ENOXAPARIN SODIUM 40 MG: 40 INJECTION SUBCUTANEOUS at 08:46

## 2022-04-15 ASSESSMENT — ENCOUNTER SYMPTOMS
PHOTOPHOBIA: 0
ABDOMINAL PAIN: 1
EYE DISCHARGE: 0
CHEST TIGHTNESS: 0
ABDOMINAL DISTENTION: 0
BACK PAIN: 0
APNEA: 0
NAUSEA: 0
TROUBLE SWALLOWING: 0
SHORTNESS OF BREATH: 0
VOMITING: 0
BLOOD IN STOOL: 0
SORE THROAT: 0
CONSTIPATION: 1
NAUSEA: 1
VOICE CHANGE: 0
ANAL BLEEDING: 0
RHINORRHEA: 0
COUGH: 0
COLOR CHANGE: 0
DIARRHEA: 0
SHORTNESS OF BREATH: 1

## 2022-04-15 ASSESSMENT — PAIN SCALES - GENERAL: PAINLEVEL_OUTOF10: 0

## 2022-04-15 NOTE — CARE COORDINATION
[facility] AT [place] Case Management Initial Discharge Assessment    Met with Patient to discuss discharge plan. PCP: Vikas Bautista MD                                Date of Last Visit: 1 MO AGO    [facility] Patient: No        [facility] Notified: no    If no PCP, list provided? N/A    Discharge Planning    Living Arrangements: independently at home    Who do you live with? SPOUSE    Who helps you with your care:  self    If lives at home:     Do you have any barriers navigating in your home? no    Patient can perform ADL? Yes    Current Services (outpatient and in home) : INSURANCE RN ONCE A MONTH    Dialysis: No    Is transportation available to get to your appointments? Yes    DME Equipment:  no    Respiratory equipment: None    Respiratory provider:  no     Pharmacy:  yes -  Anupam Mcgregor with Medication Assistance Program?  No      Patient agreeable to Perla Reaves? Declined    Patient agreeable to SNF/Rehab? N/A    Other discharge needs identified? N/A    Initial Discharge Plan? (Note: please see concurrent daily documentation for any updates after initial note). HOME, DENIES NEEDS.  DECLINED NEED FOR C    Readmission Risk              Risk of Unplanned Readmission:  0         Electronically signed by Clint Lagunas RN on 4/15/2022 at 12:09 PM

## 2022-04-15 NOTE — ED TRIAGE NOTES
Patient c/o chest pain and shortness of breath that began when walking. Pain improved with rest.    Patient describes pain as sharp, radiating to back and neck.

## 2022-04-15 NOTE — PROGRESS NOTES
Hospitalist Progress Note      PCP: Zakia Patino MD    Date of Admission: 4/14/2022    Chief Complaint:    Chief Complaint   Patient presents with    Chest Pain     Subjective:  Patient no longer has chest pain; denies fevers, chills, sweats. 12 point ROS negative other than mentioned above     Medications:  Reviewed    Infusion Medications    sodium chloride       Scheduled Medications    sodium chloride flush  5-40 mL IntraVENous 2 times per day    enoxaparin  40 mg SubCUTAneous Daily    pantoprazole  40 mg Oral QAM AC    insulin lispro  8 Units SubCUTAneous TID WC    insulin glargine  45 Units SubCUTAneous Nightly    aspirin  81 mg Oral Daily    potassium chloride  40 mEq Oral Once    amLODIPine  5 mg Oral Daily    metoprolol tartrate  25 mg Oral BID    lisinopril-hydroCHLOROthiazide  1 tablet Oral BID     PRN Meds: sodium chloride flush, sodium chloride, ondansetron **OR** ondansetron, acetaminophen **OR** acetaminophen, polyethylene glycol, nitroGLYCERIN    No intake or output data in the 24 hours ending 04/15/22 1300    Exam:    BP (!) 141/76   Pulse 86   Temp 97.9 °F (36.6 °C) (Oral)   Resp 20   Wt 210 lb (95.3 kg)   SpO2 97%   BMI 27.71 kg/m²     General appearance: No apparent distress, appears stated age and cooperative. HEENT:  Conjunctivae/corneas clear. Neck:  Trachea midline. Respiratory:  Normal respiratory effort. Clear to auscultation  Cardiovascular: Regular rate and rhythm   Abdomen: Soft, non-tender, non-distended with normal bowel sounds. Musculoskeletal: No clubbing, cyanosis or edema bilaterally.    Neuro: Non Focal.   Capillary Refill: Brisk,< 3 seconds   Peripheral Pulses: +2 palpable, equal bilaterally     Labs:   Recent Labs     04/14/22  2045 04/15/22  0456   WBC 5.7 5.6   HGB 12.4* 12.2*   HCT 37.2* 36.9*   * 114*     Recent Labs     04/14/22  2045 04/15/22  0457    143   K 3.4 3.2*    105   CO2 23 24   BUN 18 17   CREATININE 0.87 0.73   CALCIUM 8.5 8.4*     Recent Labs     04/14/22  2045 04/15/22  0457   AST 41* 37   ALT 32 31   BILITOT 0.4 0.5   ALKPHOS 117* 98     No results for input(s): INR in the last 72 hours. Recent Labs     04/14/22  2045 04/15/22  0314 04/15/22  0910   CKTOTAL 171  --   --    TROPONINI 0.013* 0.027* 0.018*     Urinalysis:      Lab Results   Component Value Date    NITRU NEGATIVE 04/12/2021    WBCUA 7 04/12/2021    BACTERIA 1+ 04/12/2021    RBCUA 2 04/12/2021    BLOODU NEGATIVE 04/12/2021    SPECGRAV 1.062 12/10/2019    GLUCOSEU >=1000 12/10/2019     Radiology:  XR CHEST PORTABLE   Final Result      No radiographic evidence of acute intrathoracic process. Assessment/Plan:    #Chest pain; Angina Class III     - cardiology is managing; plan for echo and if WMA may need LHC otherwise they are considering outpatient follow up    #HTN/HLD    - continue home meds with adjustments per caridology    #DMII    - [place] with SSI    #GERD    - continue omeprazole    Active Hospital Problems    Diagnosis Date Noted    Chest pain [R07.9] 04/14/2022    Gastroesophageal reflux disease without esophagitis [K21.9]     HTN (hypertension) [I10]     Type 2 diabetes mellitus without complication ([facility] [address] [M33.2]      Additional work up or/and treatment plan may be added today or then after based on clinical progression. I am managing a portion of pt care. Some medical issues are handled by other specialists. Additional work up and treatment should be done in out pt setting by pt PCP and other out pt providers. In addition to examining and evaluating pt, I spent additional time explaining care, normal and abnormal findings, and treatment plan. All of pt questions were answered. Counseling, diet and education were  provided. Case will be discussed with nursing staff when appropriate. Family will be updated if and when appropriate. Diet: ADULT DIET; Regular; 4 carb choices (60 gm/meal);  No Caffeine    Code Status: Full Code    Electronically signed by Paula Kimbrough MD on 4/15/2022 at 1:00 PM yes

## 2022-04-15 NOTE — H&P
[facility] MEDICINE    HISTORY AND PHYSICAL EXAM    PATIENT NAME:  Antonio Hernandez    MRN:  [id number]  SERVICE DATE:  4/14/2022   SERVICE TIME:  11:58 PM    Primary Care Physician: Taylor Fox MD     SUBJECTIVE  CHIEF COMPLAINT:  Chest Pain       HPI: The patient is a 77 y.o. male who presents with chest pain. This patient has had 3 episodes of CP when exerting himself. After the first episode, he was already scheduled for an EGD, and Dr. London Ingram found inflammation and gave him omeprazole for GERD. Patient thought it was helping then today had 2 additional episodes. He states they are 10/10 and his legs go weak. He says that it is sharp, non-radiating pressure and is accompanied by HTN. Blood pressure today during episode was  222/117 per patients home BP monitoring. The cp is relieved by rest and activity makes it worse. He is a currently receiving treatment for cancer (Metastatic Well differentiated Pancreatic Neuroendocrine tumor with liver mets), and has no previous cardiac history. he has a past medical history of Cancer ([facility] [address] History of skin cancer, HTN (hypertension), Malignant neoplasm of other parts of pancreas ([facility] [address] and Type II or unspecified type diabetes mellitus without mention of complication, not stated as uncontrolled.     PAST MEDICAL HISTORY:    Past Medical History:   Diagnosis Date    Cancer [facility])     Pancreatic    History of skin cancer     HTN (hypertension)     Malignant neoplasm of other parts of pancreas ([facility] [address]     Type II or unspecified type diabetes mellitus without mention of complication, not stated as uncontrolled      PAST SURGICAL HISTORY:    Past Surgical History:   Procedure Laterality Date    ABDOMEN SURGERY      COLONOSCOPY  02/20/2015    DR Singh Marking    COLONOSCOPY N/A 1/20/2020    COLONOSCOPY DIAGNOSTIC performed by Kisha Rahman MD at [address] [place] [address]) N/A 12/17/2019    [facility] at [facility]  HEMORRHOID SURGERY      UPPER GASTROINTESTINAL ENDOSCOPY N/A 4/4/2022    EGD ESOPHAGOGASTRODUODENOSCOPY performed by Rich Tang MD at [address] [facility]:    Family History   Problem Relation Age of Onset    Cancer Mother         lung cancer    Colon Cancer Neg Hx     Celiac Disease Neg Hx     Crohn's Disease Neg Hx      SOCIAL HISTORY:    Social History     Socioeconomic History    Marital status:      Spouse name: Not on file    Number of children: Not on file    Years of education: Not on file    Highest education level: Not on file   Occupational History    Not on file   Tobacco Use    Smoking status: Never Smoker    Smokeless tobacco: Never Used   Vaping Use    Vaping Use: Never used   Substance and Sexual Activity    Alcohol use: No    Drug use: No    Sexual activity: Yes     Partners: Female   Other Topics Concern    Not on file   Social History Narrative    Not on file     Social Determinants of Health     Financial Resource Strain:     Difficulty of Paying Living Expenses: Not on file   Food Insecurity:     Worried About Running Out of Food in the Last Year: Not on file    Alejandra of Food in the Last Year: Not on file   Transportation Needs:     Lack of Transportation (Medical): Not on file    Lack of Transportation (Non-Medical):  Not on file   Physical Activity:     Days of Exercise per Week: Not on file    Minutes of Exercise per Session: Not on file   Stress:     Feeling of Stress : Not on file   Social Connections:     Frequency of Communication with Friends and Family: Not on file    Frequency of Social Gatherings with Friends and Family: Not on file    Attends Adventism Services: Not on file    Active Member of Clubs or Organizations: Not on file    Attends Club or Organization Meetings: Not on file    Marital Status: Not on file   Intimate Partner Violence:     Fear of Current or Ex-Partner: Not on file    Emotionally Abused: Not on file    Physically Abused: Not on file    Sexually Abused: Not on file   Housing Stability:     Unable to Pay for Housing in the Last Year: Not on file    Number of Places Lived in the Last Year: Not on file    Unstable Housing in the Last Year: Not on file     MEDICATIONS:   Prior to Admission medications    Medication Sig Start Date End Date Taking? Authorizing Provider   omeprazole (PRILOSEC) 40 MG delayed release capsule Take 1 capsule by mouth daily 4/4/22   Yahir Still MD   insulin 70-30 (NOVOLIN 70/30) (70-30) 100 UNIT per ML injection vial Inject 40 Units into the skin 2 times daily 2/9/22   Mark Biswas MD   lanreotide acetate (SOMATULINE) 120 MG/0.5ML SOLN depot injection Inject 120 mg into the skin once    Historical Provider, MD   meloxicam (MOBIC) 15 MG tablet take 1 tablet by mouth once daily 1/27/22   Mark Biswas MD   Polyethylene Glycol 3350 (MIRALAX PO) Take by mouth    Historical Provider, MD   amLODIPine (NORVASC) 5 MG tablet take 1 tablet by mouth once daily 12/28/21   Mark Biswas MD   lisinopril-hydroCHLOROthiazide LOAIZA [facility]) 20-12.5 MG per tablet take 1 tablet by mouth twice a day 10/4/21   Mark Biswas MD   Continuous Blood Gluc Sensor (FREESTYLE NATALY 14 DAY SENSOR) MISC TEST DAILY AND AS NEEDED. CHANGE EVERY 2 WEEKS. 7/7/21   Mark Biswas MD   Continuous Blood Gluc  (FREESTYLE NATALY 14 DAY READER) KISHA Test daily and prn. Dx: DM2 9/4/20   Mark Biswas MD       ALLERGIES: Patient has no known allergies. REVIEW OF SYSTEM:   Review of Systems   Constitutional: Negative for chills, fatigue and fever. HENT: Negative for congestion, rhinorrhea, sore throat and trouble swallowing. Eyes: Negative for photophobia and visual disturbance. Respiratory: Negative for cough, chest tightness and shortness of breath. Cardiovascular: Positive for chest pain. Negative for palpitations and leg swelling.    Gastrointestinal: Positive for abdominal pain, constipation and seconds. Neurological:      Mental Status: He is alert and oriented to person, place, and time. Mental status is at baseline. Motor: Weakness present. Psychiatric:         Attention and Perception: Attention and perception normal.         Mood and Affect: Mood and affect normal.         Speech: Speech normal.         Behavior: Behavior is cooperative. Thought Content: Thought content normal.         Cognition and Memory: Cognition and memory normal.         Judgment: Judgment normal.       Neurologic Exam     Mental Status   Oriented to person, place, and time. Speech: speech is normal        DATA:     Diagnostic tests reviewed for today's visit:    Most recent labs and imaging results reviewed.      LABS:    Recent Results (from the past 24 hour(s))   EKG 12 Lead - Chest Pain    Collection Time: 04/14/22  8:21 PM   Result Value Ref Range    Ventricular Rate 87 BPM    Atrial Rate 87 BPM    P-R Interval 150 ms    QRS Duration 98 ms    Q-T Interval 406 ms    QTc Calculation (Bazett) 488 ms    P Axis 54 degrees    R Axis 42 degrees    T Axis 35 degrees   Comprehensive Metabolic Panel    Collection Time: 04/14/22  8:45 PM   Result Value Ref Range    Sodium 136 135 - 144 mEq/L    Potassium 3.4 3.4 - 4.9 mEq/L    Chloride 100 95 - 107 mEq/L    CO2 23 20 - 31 mEq/L    Anion Gap 13 9 - 15 mEq/L    Glucose 278 (H) 70 - 99 mg/dL    BUN 18 8 - 23 mg/dL    CREATININE 0.87 0.70 - 1.20 mg/dL    GFR Non-African American >60.0 >60    GFR  >60.0 >60    Calcium 8.5 8.5 - 9.9 mg/dL    Total Protein 7.0 6.3 - 8.0 g/dL    Albumin 3.7 3.5 - 4.6 g/dL    Total Bilirubin 0.4 0.2 - 0.7 mg/dL    Alkaline Phosphatase 117 (H) 35 - 104 U/L    ALT 32 0 - 41 U/L    AST 41 (H) 0 - 40 U/L    Globulin 3.3 2.3 - 3.5 g/dL   CBC with Auto Differential    Collection Time: 04/14/22  8:45 PM   Result Value Ref Range    WBC 5.7 4.8 - 10.8 K/uL    RBC 4.45 (L) 4.70 - 6.10 M/uL    Hemoglobin 12.4 (L) 14.0 - 18.0 g/dL Hematocrit 37.2 (L) 42.0 - 52.0 %    MCV 83.6 80.0 - 100.0 fL    MCH 27.8 27.0 - 31.3 pg    MCHC 33.2 33.0 - 37.0 %    RDW 15.0 (H) 11.5 - 14.5 %    Platelets 096 (L) 054 - 400 K/uL    Neutrophils % 76.2 %    Lymphocytes % 14.0 %    Monocytes % 7.3 %    Eosinophils % 2.1 %    Basophils % 0.4 %    Neutrophils Absolute 4.4 1.4 - 6.5 K/uL    Lymphocytes Absolute 0.8 (L) 1.0 - 4.8 K/uL    Monocytes Absolute 0.4 0.2 - 0.8 K/uL    Eosinophils Absolute 0.1 0.0 - 0.7 K/uL    Basophils Absolute 0.0 0.0 - 0.2 K/uL   Magnesium    Collection Time: 04/14/22  8:45 PM   Result Value Ref Range    Magnesium 2.3 1.7 - 2.4 mg/dL   Troponin    Collection Time: 04/14/22  8:45 PM   Result Value Ref Range    Troponin 0.013 (HH) 0.000 - 0.010 ng/mL   CK    Collection Time: 04/14/22  8:45 PM   Result Value Ref Range    Total  0 - 190 U/L       IMAGING:   No results found. VTE Prophylaxis: low molecular weight heparin -  start     ASSESSMENT AND PLAN     Principal Problem:    Chest pain - 3 episodes of chest pain with exertion that was relieved by rest, BP during last episode was 222/117 at home, currently on medication to treat cancer. Troponin 0.013 in ED  Plan: admit, consult cardiology, telemetry, EKG in am, cycle troponin    Active Problems:      HTN (hypertension) - /87, having intermittent chest pain, denies blurred vision, takes Ace. Plan: Will resume home meds, as tolerated. Type 2 diabetes mellitus without complication (HCC) - has monitor in skin, takes 70/30 BID, glucose was 278 in ed, last A1C 8.4 on 3/29/2022. Plan: restart 70/30 BID      Gastroesophageal reflux disease without esophagitis - recent EGD on 4/4/2022, per patient Dr. London Ingram said he had inflammation in esophagus that could be causing chest pain, started on omeprazole  Plan: Will resume home meds, as tolerated.     Plan of care discussed with: patient    SIGNATURE: MANUEL Bauer CNP  DATE: April 14, 2022  TIME: 11:58 PM     Freya James, MD - supervising physician

## 2022-04-15 NOTE — FLOWSHEET NOTE
Patient discharged to home as ordered patient a&ox4 up ad lokesh with steady gait denies pain or discomfort resp easy even unlabored on RA discharge instructions/summary provided including medications, follow-up, disease process management, when to seek 911/ER assist patient verbalizes good understanding wife/spouse present at bedside to transport home patient discharged with all personal belongings transported off of unit via wheelchair transport

## 2022-04-15 NOTE — ED PROVIDER NOTES
[facility]  eMERGENCY dEPARTMENT eNCOUnter      Pt Name: Melinda Patel  MRN: [id number]  [place] 1955  Date of evaluation: 4/14/2022  Provider: Lizett Barbosa PA-C    CHIEF COMPLAINT       Chief Complaint   Patient presents with    Chest Pain         HISTORY OF PRESENT ILLNESS   (Location/Symptom, Timing/Onset,Context/Setting, Quality, Duration, Modifying Factors, Severity)  Note limiting factors. Melinda Patel is a 77 y.o. male who presents to the emergency department patient presents with chest pain he said 3 episodes in the past 2 weeks all when ambulating he had 2 today one earlier today and 1 prior to arrival he still has some chest discomfort after arrival patient does have history of elevated glucose secondary to his pancreatic cancer hypertension but does not have any cardiac history he denies fever chills nausea vomiting he has some current chest discomfort walking worsen symptoms rest improves    HPI    NursingNotes were reviewed. REVIEW OF SYSTEMS    (2-9 systems for level 4, 10 or more for level 5)     Review of Systems   Constitutional: Negative for activity change, appetite change, fever and unexpected weight change. HENT: Negative for ear discharge, nosebleeds and voice change. Eyes: Negative for discharge. Respiratory: Negative for apnea, cough and shortness of breath. Cardiovascular: Positive for chest pain. Gastrointestinal: Negative for abdominal distention, anal bleeding, nausea and vomiting. Genitourinary: Negative for dysuria and hematuria. Musculoskeletal: Negative for back pain and neck pain. Skin: Negative for pallor. Neurological: Negative for seizures and facial asymmetry. Psychiatric/Behavioral: Negative for behavioral problems, self-injury and sleep disturbance. All other systems reviewed and are negative. Except as noted above the remainder of the review of systems was reviewed and negative.        PAST MEDICAL HISTORY     Past Medical History:   Diagnosis Date    Cancer [facility])     Pancreatic    History of skin cancer     HTN (hypertension)     Malignant neoplasm of other parts of pancreas ([facility] [address]     Type II or unspecified type diabetes mellitus without mention of complication, not stated as uncontrolled          SURGICALHISTORY       Past Surgical History:   Procedure Laterality Date    ABDOMEN SURGERY      COLONOSCOPY  02/20/2015    DR Radha Bloom    COLONOSCOPY N/A 1/20/2020    COLONOSCOPY DIAGNOSTIC performed by Ranulfo Garcia MD at [address] [place] [address] ([facility]) N/A 12/17/2019    EUS performed by Ranulfo Garcia MD at [address] [facility] ENDOSCOPY N/A 4/4/2022    EGD ESOPHAGOGASTRODUODENOSCOPY performed by Ranulfo Garcia MD at [address] [facility]       Discharge Medication List as of 4/15/2022  4:26 PM      CONTINUE these medications which have NOT CHANGED    Details   omeprazole (PRILOSEC) 40 MG delayed release capsule Take 1 capsule by mouth daily, Disp-30 capsule, R-3Normal      insulin 70-30 (NOVOLIN 70/30) (70-30) 100 UNIT per ML injection vial Inject 40 Units into the skin 2 times daily, Disp-30 mL, R-5Normal      lanreotide acetate (SOMATULINE) 120 MG/0.5ML SOLN depot injection Inject 120 mg into the skin onceHistorical Med      meloxicam (MOBIC) 15 MG tablet take 1 tablet by mouth once daily, Disp-30 tablet, R-5Normal      Polyethylene Glycol 3350 (MIRALAX PO) Take by mouthHistorical Med      amLODIPine (NORVASC) 5 MG tablet take 1 tablet by mouth once daily, Disp-180 tablet, R-2Normal      lisinopril-hydroCHLOROthiazide (PRINZIDE;ZESTORETIC) 20-12.5 MG per tablet take 1 tablet by mouth twice a day, Disp-60 tablet, R-5Normal      Continuous Blood Gluc Sensor (FREESTYLE NATALY 14 DAY SENSOR) MISC TEST DAILY AND AS NEEDED.  CHANGE EVERY 2 WEEKS., Disp-1 each, R-0Normal      Continuous Blood Gluc  (FREESTYLE NATALY 14 DAY READER) KISHA Test daily and prn. Dx: DM2, Disp-1 Device,R-0Normal                  Patient has no known allergies. FAMILY HISTORY       Family History   Problem Relation Age of Onset    Cancer Mother         lung cancer    Colon Cancer Neg Hx     Celiac Disease Neg Hx     Crohn's Disease Neg Hx     Coronary Art Dis Neg Hx           SOCIAL HISTORY       Social History     Socioeconomic History    Marital status:      Spouse name: None    Number of children: None    Years of education: None    Highest education level: None   Occupational History    None   Tobacco Use    Smoking status: Never Smoker    Smokeless tobacco: Never Used   Vaping Use    Vaping Use: Never used   Substance and Sexual Activity    Alcohol use: No    Drug use: No    Sexual activity: Yes     Partners: Female   Other Topics Concern    None   Social History Narrative    None     Social Determinants of Health     Financial Resource Strain: Low Risk     Difficulty of Paying Living Expenses: Not hard at all   Food Insecurity: No Food Insecurity    Worried About Running Out of Food in the Last Year: Never true    Alejandra of Food in the Last Year: Never true   Transportation Needs: No Transportation Needs    Lack of Transportation (Medical): No    Lack of Transportation (Non-Medical):  No   Physical Activity:     Days of Exercise per Week: Not on file    Minutes of Exercise per Session: Not on file   Stress:     Feeling of Stress : Not on file   Social Connections:     Frequency of Communication with Friends and Family: Not on file    Frequency of Social Gatherings with Friends and Family: Not on file    Attends Nondenominational Services: Not on file    Active Member of Clubs or Organizations: Not on file    Attends Club or Organization Meetings: Not on file    Marital Status: Not on file   Intimate Partner Violence:     Fear of Current or Ex-Partner: Not on file    Emotionally Abused: Not on file    Physically Abused: Not on file   [employer] Sexually Abused: Not on file   Housing Stability:     Unable to Pay for Housing in the Last Year: Not on file    Number of Places Lived in the Last Year: Not on file    Unstable Housing in the Last Year: Not on file       SCREENINGS   Reji Coma Scale  Eye Opening: Spontaneous  Best Verbal Response: Oriented  Best Motor Response: Obeys commands  [place] Coma Scale Score: 15  Ebola Virus Disease (EVD) Screening   Temp: 99 °F (37.2 °C)  CIWA Assessment  BP: (!) 152/80  Pulse: 82    PHYSICAL EXAM    (up to 7 for level 4, 8 or more for level 5)     ED Triage Vitals   BP Temp Temp Source Pulse Resp SpO2 Height Weight   04/14/22 2015 04/14/22 2028 04/14/22 2028 04/14/22 2015 04/14/22 2015 04/14/22 2015 -- 04/14/22 2015   (!) 199/103 98.5 °F (36.9 °C) Tympanic 97 20 96 %  210 lb (95.3 kg)       Physical Exam  Vitals and nursing note reviewed. Constitutional:       General: He is not in acute distress. Appearance: He is well-developed. HENT:      Head: Normocephalic and atraumatic. Right Ear: External ear normal.      Left Ear: External ear normal.      Nose: Nose normal.      Mouth/Throat:      Mouth: Mucous membranes are moist.   Eyes:      General:         Right eye: No discharge. Left eye: No discharge. Pupils: Pupils are equal, round, and reactive to light. Cardiovascular:      Rate and Rhythm: Normal rate and regular rhythm. Pulses: Normal pulses. Heart sounds: Normal heart sounds. Pulmonary:      Effort: Pulmonary effort is normal. No respiratory distress. Breath sounds: Normal breath sounds. No stridor. Abdominal:      General: Bowel sounds are normal. There is no distension. Palpations: Abdomen is soft. Musculoskeletal:         General: Normal range of motion. Cervical back: Normal range of motion and neck supple. Skin:     General: Skin is warm. Findings: No erythema.    Neurological:      Mental Status: He is alert and oriented to person, place, and time. Psychiatric:         Mood and Affect: Mood normal.         RESULTS     EKG: All EKG's are interpreted by the Emergency Department Physician who either signs or Co-signsthis chart in the absence of a cardiologist.    EKG normal sinus rhythm rate 87  ms  ms  ms PRT axes    RADIOLOGY:   Non-plain filmimages such as CT, Ultrasound and MRI are read by the radiologist. Plain radiographic images are visualized and preliminarily interpreted by the emergency physician with the below findings:     see rad report    Interpretation per the Radiologist below, if available at the time ofthis note:    XR CHEST PORTABLE   Final Result      No radiographic evidence of acute intrathoracic process.             NM MYOCARDIAL SPECT REST EXERCISE OR RX    (Results Pending)         ED BEDSIDE ULTRASOUND:   Performed by ED Physician - none    LABS:  Labs Reviewed   COMPREHENSIVE METABOLIC PANEL - Abnormal; Notable for the following components:       Result Value    Glucose 278 (*)     Alkaline Phosphatase 117 (*)     AST 41 (*)     All other components within normal limits   CBC WITH AUTO DIFFERENTIAL - Abnormal; Notable for the following components:    RBC 4.45 (*)     Hemoglobin 12.4 (*)     Hematocrit 37.2 (*)     RDW 15.0 (*)     Platelets 826 (*)     Lymphocytes Absolute 0.8 (*)     All other components within normal limits   TROPONIN - Abnormal; Notable for the following components:    Troponin 0.013 (*)     All other components within normal limits    Narrative:     Cathleen Bermudez tel. [phone],  Chemistry results called to and read back by Chip Ruth RN, 04/14/2022 21:55, by  [facility]   TROPONIN - Abnormal; Notable for the following components:    Troponin 0.027 (*)     All other components within normal limits    Narrative:     Judy Gates tel. [phone],  TROP results called to and read back by Rodríguez Wang RN, 04/15/2022 04:29, by  Akilah Easley   TROPONIN - Abnormal; Notable for the following components:    Troponin 0.018 (*)     All other components within normal limits    Narrative:     CALL  James  LC1W tel. [phone],  Trop results called to and read back by Jaki, 04/15/2022 10:11, by POCDE   CBC WITH AUTO DIFFERENTIAL - Abnormal; Notable for the following components:    RBC 4.34 (*)     Hemoglobin 12.2 (*)     Hematocrit 36.9 (*)     RDW 15.3 (*)     Platelets 457 (*)     Lymphocytes Absolute 0.9 (*)     All other components within normal limits   COMPREHENSIVE METABOLIC PANEL W/ REFLEX TO MG FOR LOW K - Abnormal; Notable for the following components:    Potassium reflex Magnesium 3.2 (*)     Calcium 8.4 (*)     All other components within normal limits   POCT GLUCOSE - Abnormal; Notable for the following components:    POC Glucose 213 (*)     All other components within normal limits   POCT GLUCOSE - Abnormal; Notable for the following components:    POC Glucose 196 (*)     All other components within normal limits   MAGNESIUM   CK   D-DIMER, QUANTITATIVE   MAGNESIUM       All other labs were within normal range or not returned as of this dictation. EMERGENCY DEPARTMENT COURSE and DIFFERENTIAL DIAGNOSIS/MDM:   Vitals:    Vitals:    04/15/22 0850 04/15/22 1301 04/15/22 1303 04/15/22 1509   BP:  (!) 160/73  (!) 152/80   Pulse: 86   82   Resp:   18 16   Temp:    99 °F (37.2 °C)   TempSrc:    Oral   SpO2:    96%   Weight:                MDM  Number of Diagnoses or Management Options  Diagnosis management comments: Elevated troponin chest pain. Discussed with hospitalist as patient has multimedical problems will admit       Amount and/or Complexity of Data Reviewed  Clinical lab tests: reviewed and ordered  Tests in the radiology section of CPT®: reviewed and ordered  Discuss the patient with other providers: yes        CRITICAL CARE TIME           CONSULTS:  IP CONSULT TO CARDIOLOGY    PROCEDURES:  Unless otherwise noted below, none     Procedures    FINAL IMPRESSION      1.  Chest pain, unspecified type          DISPOSITION/PLAN   DISPOSITION Admitted 04/14/2022 10:37:56 PM      PATIENT REFERRED TO:  Belkis Jain MD  [address] [facility] [address] [facility] [address]  [phone]    Schedule an appointment as soon as possible for a visit on 4/27/2022  Call to schedule cardiology follow-[address] [employer], MD  [address]  [phone]    On 4/26/2022  4:30PM      DISCHARGE MEDICATIONS:  Discharge Medication List as of 4/15/2022  4:26 PM      START taking these medications    Details   aspirin 81 MG EC tablet Take 1 tablet by mouth daily, Disp-30 tablet, R-3Normal      nitroGLYCERIN (NITROSTAT) 0.4 MG SL tablet up to max of 3 total doses.  If no relief after 1 dose, call 911., Disp-25 tablet, R-0Normal      metoprolol tartrate (LOPRESSOR) 25 MG tablet Take 1 tablet by mouth 2 times daily, Disp-60 tablet, R-3Normal                (Please note that portions of this note were completed with a voice recognition program.  Efforts were made to edit the dictations but occasionally words are mis-transcribed.)    Patrica Galan PA-C (electronically signed)  Attending Emergency Physician       Patrica Galan PA-C  04/15/22 [facility] LEONARDO Arevalo  05/01/22 4122

## 2022-04-15 NOTE — CONSULTS
Consult Note  Patient: Sophie Franco  Unit/Bed: I784/G597-83  YOB: 1955  MRN: [id number]  Acct: [de-identified]   Admitting Diagnosis: Chest pain [R07.9]  Date:  4/14/2022  Hospital Day: 0      Chief Complaint:  Chest pain    History of Present Illness: This is a pleasant 77-year-old  male with past medical history significant for hypertension, diabetes, portal HTN, cirrhosis of liver, and history of neuroendocrine tumor/pancreatic cancer diagnosed in 2019 for which he previously received chemotherapy and is currently receiving lanreotide acetate injections every 2 weeks and following with [facility] oncology who presented to the emergency room yesterday evening with chief complaint of chest pain. Patient states he had taken his trash cans down the driveway yesterday and come back inside and then walked into the garage at which time he experienced an onset of severe midsternal chest discomfort which radiated into his back. He describes the chest pain as both a burning and a pressure-like discomfort with some radiation into his neck. This pain was associated with mild shortness of breath as well as lightheadedness and dizziness. He had a similar episode last Saturday which occurred with exertion when walking with his granddaughter outside. The episode last week radiated into his neck and down his left arm. Both episodes alleviated after rest.  He denies any significant exertional shortness of breath, palpitations, diaphoresis, paroxysmal nocturnal dyspnea, orthopnea, lower extremity edema, syncope, fever or chills. Due to this recurrent episode of chest pain he presented to ER for further evaluation. On presentation to the emergency room, he was hypertensive with blood pressure 199/103, heart rate 97, respiratory rate 20, pulse ox 96%, temperature of 98.5 °F.  Sodium 136, potassium 3.4, chloride 100, total CO2 23, BUN 18, creatinine 0.87, GFR greater than 60, glucose elevated 278.   Magnesium 2.3.  Initial troponin mildly elevated 0.013. ALT 32, AST mildly elevated 41. WBC 5.7, hemoglobin 12.4, hematocrit 37.2, platelets low 584. D-dimer within normal range at 0.39. Chest x-ray revealed no radiographic evidence of acute intrathoracic process. EKG showed sinus rhythm with ventricular rate 81 bpm, nonspecific ST changes, QTC of 490 ms. He was admitted for further evaluation. At time of evaluation today, patient resting comfortably and in no acute distress. He denies any recurrent chest pain episodes since admission or any other complaints at this time. Recent blood pressure improved to 141/76 this morning. Will be resumed on home antihypertensive medications including amlodipine 5 mg daily and lisinopril/hydrochlorothiazide 1 tablet p.o. twice daily. Serial troponins have been mildly elevated in a flat pattern at 0.013, 0.027 and 0.018. Currently on telemetry, he is maintaining sinus rhythm with heart rate in the 80s. He has no known prior cardiac history including history myocardial infarction, congestive heart failure or arrhythmia. He denies any history of CVA or TIA. No recent stress test or prior cardiac catheterization.       No Known Allergies    Current Facility-Administered Medications   Medication Dose Route Frequency Provider Last Rate Last Admin    sodium chloride flush 0.9 % injection 5-40 mL  5-40 mL IntraVENous 2 times per day Tanvir Rosin, APRN - CNP   10 mL at 04/15/22 0848    sodium chloride flush 0.9 % injection 5-40 mL  5-40 mL IntraVENous PRN MANUEL Snow - CNP        0.9 % sodium chloride infusion   IntraVENous PRN Tanvir Rosin, APRN - CNP        enoxaparin (LOVENOX) injection 40 mg  40 mg SubCUTAneous Daily Rose Chiu APRN - CNP   40 mg at 04/15/22 0846    ondansetron (ZOFRAN-ODT) disintegrating tablet 4 mg  4 mg Oral Q8H PRN Tanvir Rosin, APRN - CNP        Or    ondansetron (ZOFRAN) injection 4 mg  4 mg IntraVENous Q6H PRN Tanvir Rosin, APRN - CNP  acetaminophen (TYLENOL) tablet 650 mg  650 mg Oral Q6H PRN MANUEL Strange CNP        Or    acetaminophen (TYLENOL) suppository 650 mg  650 mg Rectal Q6H PRN MANUEL Snow CNP        polyethylene glycol (GLYCOLAX) packet 17 g  17 g Oral Daily PRN MANUEL Strange CNP        pantoprazole (PROTONIX) tablet 40 mg  40 mg Oral QAM AC MANUEL Snow CNP   40 mg at 04/15/22 0847    insulin lispro (HUMALOG) injection vial 8 Units  8 Units SubCUTAneous TID WC MANUEL Snow CNP        insulin glargine (LANTUS) injection vial 45 Units  45 Units SubCUTAneous Nightly MANUEL Snow CNP        aspirin EC tablet 81 mg  81 mg Oral Daily Trang Gudino MD   81 mg at 04/15/22 0847    nitroGLYCERIN (NITROSTAT) SL tablet 0.4 mg  0.4 mg SubLINGual Q5 Min PRN Eda Jeans, PA-C   0.4 mg at 04/14/22 2055       PMHx:  Past Medical History:   Diagnosis Date    Cancer [facility])     Pancreatic    History of skin cancer     HTN (hypertension)     Malignant neoplasm of other parts of pancreas (HCC)     Type II or unspecified type diabetes mellitus without mention of complication, not stated as uncontrolled        PSHx:  Past Surgical History:   Procedure Laterality Date    ABDOMEN SURGERY      COLONOSCOPY  02/20/2015    DR Sergio Barbosa    COLONOSCOPY N/A 1/20/2020    COLONOSCOPY DIAGNOSTIC performed by Dayana Diaz MD at [address] [place] [address]) N/A 12/17/2019    EUS performed by Dayana Diaz MD at [address] [facility] ENDOSCOPY N/A 4/4/2022    EGD ESOPHAGOGASTRODUODENOSCOPY performed by Dayana Diaz MD at [address] [facility] Hx:  Social History     Socioeconomic History    Marital status:      Spouse name: None    Number of children: None    Years of education: None    Highest education level: None   Occupational History    None   Tobacco Use    Smoking status: Never Smoker    Smokeless tobacco: Never Used   Vaping Use    Vaping Use: Never used   Substance and Sexual Activity    Alcohol use: No    Drug use: No    Sexual activity: Yes     Partners: Female   Other Topics Concern    None   Social History Narrative    None     Social Determinants of Health     Financial Resource Strain:     Difficulty of Paying Living Expenses: Not on file   Food Insecurity:     Worried About Running Out of Food in the Last Year: Not on file    Alejandra of Food in the Last Year: Not on file   Transportation Needs:     Lack of Transportation (Medical): Not on file    Lack of Transportation (Non-Medical): Not on file   Physical Activity:     Days of Exercise per Week: Not on file    Minutes of Exercise per Session: Not on file   Stress:     Feeling of Stress : Not on file   Social Connections:     Frequency of Communication with Friends and Family: Not on file    Frequency of Social Gatherings with Friends and Family: Not on file    Attends Taoism Services: Not on file    Active Member of [address] [place] [address] or Organizations: Not on file    Attends Club or Organization Meetings: Not on file    Marital Status: Not on file   Intimate Partner Violence:     Fear of Current or Ex-Partner: Not on file    Emotionally Abused: Not on file    Physically Abused: Not on file    Sexually Abused: Not on file   Housing Stability:     Unable to Pay for Housing in the Last Year: Not on file    Number of [place] in the Last Year: Not on file    Unstable Housing in the Last Year: Not on file       Family Hx:  Family History   Problem Relation Age of Onset    Cancer Mother         lung cancer    Colon Cancer Neg Hx     Celiac Disease Neg Hx     Crohn's Disease Neg Hx        Review of Systems:   Review of Systems   Constitutional: Negative for activity change, fatigue and fever. HENT: Negative for congestion. Respiratory: Positive for shortness of breath (with CP episodes).     Cardiovascular: Positive for chest pain ( with exertion x 2 in past 1 week). Negative for palpitations and leg swelling. Gastrointestinal: Positive for abdominal pain (chronic abdominal pressure). Negative for blood in stool, nausea and vomiting. Genitourinary: Negative for difficulty urinating. Musculoskeletal: Negative for arthralgias and myalgias. Skin: Negative for color change. Neurological: Positive for light-headedness (with CP episode). Negative for syncope. Psychiatric/Behavioral: Negative for agitation and behavioral problems. Physical Examination:    BP (!) 141/76   Pulse 79   Temp 97.9 °F (36.6 °C) (Oral)   Resp 20   Wt 210 lb (95.3 kg)   SpO2 97%   BMI 27.71 kg/m²    Physical Exam  Constitutional:       General: He is not in acute distress. HENT:      Head: Normocephalic and atraumatic. Cardiovascular:      Rate and Rhythm: Normal rate and regular rhythm. Pulmonary:      Effort: Pulmonary effort is normal. No respiratory distress. Breath sounds: No wheezing, rhonchi or rales. Abdominal:      Palpations: Abdomen is soft. Tenderness: There is no abdominal tenderness. Musculoskeletal:         General: Normal range of motion. Right lower leg: No edema. Left lower leg: No edema. Skin:     General: Skin is warm and dry. Neurological:      General: No focal deficit present. Mental Status: He is alert and oriented to person, place, and time. Cranial Nerves: No cranial nerve deficit.    Psychiatric:         Mood and Affect: Mood normal.         Behavior: Behavior normal.         LABS:  CBC:  Lab Results   Component Value Date    WBC 5.6 04/15/2022    RBC 4.34 04/15/2022    HGB 12.2 04/15/2022    HCT 36.9 04/15/2022    MCV 85.1 04/15/2022    MCH 28.1 04/15/2022    MCHC 33.0 04/15/2022    RDW 15.3 04/15/2022     04/15/2022    MPV 9.3 04/08/2015     CBC with Differential:   Lab Results   Component Value Date    WBC 5.6 04/15/2022    RBC 4.34 04/15/2022    HGB 12.2 04/15/2022    HCT 36.9 04/15/2022     04/15/2022    MCV 85.1 04/15/2022    MCH 28.1 04/15/2022    MCHC 33.0 04/15/2022    RDW 15.3 04/15/2022    NRBC 0.0 08/10/2020    LYMPHOPCT 16.0 04/15/2022    MONOPCT 3.8 04/15/2022    BASOPCT 1.0 04/15/2022    MONOSABS 0.2 04/15/2022    LYMPHSABS 0.9 04/15/2022    EOSABS 0.2 04/15/2022    BASOSABS 0.1 04/15/2022     CMP:    Lab Results   Component Value Date     04/15/2022    K 3.2 04/15/2022     04/15/2022    CO2 24 04/15/2022    BUN 17 04/15/2022    CREATININE 0.73 04/15/2022    GFRAA >60.0 04/15/2022    LABGLOM >60.0 04/15/2022    GLUCOSE 96 04/15/2022    GLUCOSE 167 04/08/2022    PROT 7.0 04/15/2022    LABALBU 3.7 04/15/2022    LABALBU 3.9 04/08/2022    CALCIUM 8.4 04/15/2022    BILITOT 0.5 04/15/2022    ALKPHOS 98 04/15/2022    AST 37 04/15/2022    ALT 31 04/15/2022     BMP:    Lab Results   Component Value Date     04/15/2022    K 3.2 04/15/2022     04/15/2022    CO2 24 04/15/2022    BUN 17 04/15/2022    LABALBU 3.7 04/15/2022    LABALBU 3.9 04/08/2022    CREATININE 0.73 04/15/2022    CALCIUM 8.4 04/15/2022    GFRAA >60.0 04/15/2022    LABGLOM >60.0 04/15/2022    GLUCOSE 96 04/15/2022    GLUCOSE 167 04/08/2022     Magnesium:    Lab Results   Component Value Date    MG 2.4 04/15/2022     Troponin:    Lab Results   Component Value Date    TROPONINI 0.018 04/15/2022       Radiology:  XR CHEST PORTABLE    Result Date: 4/15/2022  Exam: XR CHEST PORTABLE History:  chest pain Technique: AP portable view of the chest obtained. Comparison: none Chest x-ray portable Findings: The cardiomediastinal silhouette is within normal limits. There are no infiltrates, consolidations or effusions. . Bones of the thorax appear intact. No radiographic evidence of acute intrathoracic process.         EKG 4/15/22: SR 81, nonspecific ST changes, QTc 490ms    Telemetry 4/15/22: SR 70s-80s      Assessment:    Active Hospital Problems    Diagnosis Date Noted    Chest pain [R07.9] 04/14/2022    Gastroesophageal reflux disease without esophagitis [K21.9]     HTN (hypertension) [I10]     Type 2 diabetes mellitus without complication (HCC) [O56.5]      1. Chest pain/Angina class III  2. Elevated troponin   3. Hypokalemia  4. Hx pancreatic CA/neuroendocrine tumors--management per  Heme-onc  5. HTN/HTN urgency on presentation  6. DM  7. Hx portal HTN  8. Hx cirrhosis of liver    Plan:  1. Maximize medical therapy- aspirin 81 mg p.o. daily, add Lopressor 25 mg p.o. twice daily, resume lisinopril/hydrochlorothiazide 20/12.5 mg tablet p.o. twice daily, resume amlodipine 5 mg p.o. daily, will give potassium chloride 40 mEq p.o. x1 now followed by 40 mEq p.o. daily, insulin as directed, DVT prophylaxis with Lovenox 40 mg subcu daily, sublingual nitroglycerin as needed for chest pain  2. Will avoid statin for now secondary to history of cirrhosis of the liver  3. Cardiac diet recommended  4. Check echocardiogram  5. Monitor on telemetry for any tachycardia or bradycardia arrhythmias  6. Maintain potassium greater than 4, magnesium greater than 2  7. GI/DVT prophylaxis  8. Coronary evaluation per Dr. Bryant Hicks--? Stress test versus cardiac catheterization. If echocardiogram unremarkable and patient remains chest pain free and without any recurrent chest pain with ambulation, will possibly plan for discharge home later today and follow-up closely as outpatient for ischemic evaluation  9. Further recommendations to follow            Electronically signed by CR Goetz on 4/15/2022 at 10:41 AM      Attending Supervising Physician's Attestation Statement  I have performed a history and physical examination of the patient. I discussed the case with the physician assistant. I reviewed the patient's Past Medical History, Past Surgical History, Medications, and Allergies.      Physical Exam:  Vitals:    04/14/22 2330 04/15/22 0614 04/15/22 0850 04/15/22 1301   BP: (!) 140/87 (!) 141/76  (!) 160/73   Pulse: 72 79 86    Resp: 14 20     Temp:  97.9 °F (36.6 °C)     TempSrc:  Oral     SpO2: 97%      Weight:           Review of Systems - Respiratory ROS: no cough, shortness of breath, or wheezing  Cardiovascular ROS: positive for - chest pain  Gastrointestinal ROS: no abdominal pain, change in bowel habits, or black or bloody stools    Pulmonary/Chest: clear to auscultation bilaterally- no wheezes, rales or rhonchi, normal air movement, no respiratory distress  Cardiovascular: normal rate, normal S1 and S2, no gallops, intact distal pulses and no carotid bruits  Abdomen: soft, non-tender, non-distended, normal bowel sounds, no masses or organomegaly    Active Hospital Problems    Diagnosis Date Noted    Chest pain [R07.9] 04/14/2022     Priority: Low    Gastroesophageal reflux disease without esophagitis [K21.9]      Priority: Low    HTN (hypertension) [I10]      Priority: Low    Type [facility] diabetes [facility] [X55.6]      Priority: Low        I reviewed and agree with the findings and plan documented in her note . ID  78-year-old male without prior cardiac history but significant for pancreatic cancer (per oncology team in remission life expectancy greater than 10 years), diabetes, hypertension, hyperlipidemia, liver cirrhosis. Came to the hospital for chest pain    Troponins mildly detected at 0.027 and subsequent troponin 0.018  EKG without active signs of ischemia, normal sinus rhythm    Impression/Plan  Chest pain. Negative troponins X2 ECG with no signs of ischemia.   History of pancreatic cancer  Liver cirrhosis  Hypertension  Hyperlipidemia  Diabetes    Plan:  Continue telemetry  Obtain an echocardiogram. If echo is normal ok to discharge patient home with cardiology follow-up with me in 1 week  At that time I will arrange a nuclear stress test as an outpatient  Continue ASA and Atorva  Continue lisinopril  Start low dose BB  Keep Hg >8  Keep K 4-5 and Mg >2 Electronically signed by Cheryl Shafer MD on 4/15/22 at 1:08 PM EDT

## 2022-04-17 NOTE — DISCHARGE SUMMARY
Hospital Medicine Discharge Summary    Nadine Bunn  :  1955  MRN:  [id number]    Admit date:  2022  Discharge date:  4/15/22    Admitting Physician: Vishal Barnett MD  Primary Care Physician:  Bethany Landry MD    Discharge Diagnoses:    Chest pain; Angina Class III    Chief Complaint   Patient presents with    Chest Pain     Hospital Course:   Patient presented with Chest pain; Angina Class III. Evaluated by cardiology who did an echo that did not show WMA so cardiology recommended discharge and outpatient workup. Patient was seen by the following consultants   Consults:  IP CONSULT TO CARDIOLOGY    Significant Diagnostic Studies:    Refer to chart     Please refer to chart if no studies are shown here    ECHO Complete 2D W Doppler W Color    Result Date: 4/15/2022  Transthoracic Echocardiography Report (TTE)  Demographics   Patient Name     Sugar Morales Gender                Male   Patient Number   [id number]    Race                                                  Ethnicity   Visit Number     [id number]   Room Number           X889   Corporate ID                 Date of Study         04/15/2022   Accession Number [id number]  Referring Physician   Date of Birth    1955  Sonographer           Gricelda Eddy   Age              77 year(s)  Interpreting          [facility]) Cardiology                               Physician             Corazon Dalal  Procedure Type of Study   TTE procedure:ECHO COMPLETE 2D W/DOP W/COLOR. Procedure Date Date: 04/15/2022 Start: 02:16 PM Study Location: Portable Technical Quality: Adequate visualization Indications:Chest pain. Patient Status: Routine Height: 72 inches Weight: 210 pounds BSA: 2.18 m^2 BMI: 28.48 kg/m^2 BP: 141/76 mmHg  Conclusions   Summary  Left ventricular ejection fraction is visually estimated at 65%. E/A flow reversal noted. Suggestive of diastolic dysfunction. Mild left atrial enlargement.   No hemodynamic evidence of significant valve disease   Signature   ----------------------------------------------------------------  Electronically signed by Isai PattersonInterpreting physician)  on 04/15/2022 03:33 PM  ----------------------------------------------------------------   Findings  Left Ventricle Left ventricular ejection fraction is visually estimated at 65%. E/A flow reversal noted. Suggestive of diastolic dysfunction. Right Ventricle Normal right ventricle structure and function. Normal right ventricle systolic pressure. Left Atrium Mild left atrial enlargement. Right Atrium Normal right atrium. Mitral Valve Normal mitral valve structure and function. Tricuspid Valve Normal tricuspid valve structure and function. Aortic Valve Normal aortic valve structure and function. Pulmonic Valve Normal pulmonic valve structure and function. Pericardial Effusion No evidence of pericardial effusion. Aorta \ Miscellaneous Dilation of aortic root 3.57cm M-Mode Measurements (cm)   LVIDd: 3.41 cm                         LVIDs: 1.87 cm  IVSd: 1.67 cm                          IVSs: 2.24 cm  LVPWd: 1.4 cm                          LVPWs: 1.93 cm  Rt. Vent.  Dimension: 2.11 cm           AO Root Dimension: 3.57 cm                                         ACS: 1.98 cm                                         LVOT: 1.78 cm  Doppler Measurements:   AV Velocity:0.02 m/s                    MV Peak E-Wave: 0.46 m/s  AV Peak Gradient: 3.75 mmHg             MV Peak A-Wave: 0.66 m/s  AV Mean Gradient: 2.37 mmHg  AV Area (Continuity):2.06 cm^2  TR Velocity:1.27 m/s                    Estimated RAP:5 mmHg  TR Gradient:6.48 mmHg                   RVSP:11.48 mmHg  Valves  Mitral Valve   Peak E-Wave: 0.46 m/s           Peak A-Wave: 0.66 m/s                                  E/A Ratio: 0.7                                  Peak Gradient: 0.85 mmHg                                  Deceleration Time: 109.3 msec   Aortic Valve   Peak Velocity: 0.97 m/s                Mean Velocity: 0.73 m/s  Peak Gradient: 3.75 mmHg               Mean Gradient: 2.37 mmHg  Area (continuity): 2.06 cm^2  AV VTI: 20.52 cm   Cusp Separation: 1.98 cm   Tricuspid Valve   Estimated RVSP: 11.48 mmHg               Estimated RAP: 5 mmHg  TR Velocity: 1.27 m/s                    TR Gradient: 6.48 mmHg   Pulmonic Valve            Estimated PASP: 11.48 mmHg   LVOT   Peak Velocity: 0.86 m/s              Mean Velocity: 0.57 m/s  Peak Gradient: 2.56 mmHg             Mean Gradient: 1.48 mmHg  LVOT Diameter: 1.78 cm               LVOT VTI: 17 cm  Structures  Left Atrium   LA Volume/Index: 69.47 ml /32 m^2             LA Area: 22.33 cm^2   Left Ventricle   Diastolic Dimension: 9.22 cm          Systolic Dimension: 5.05 cm  Septum Diastolic: 1.50 cm             Septum Systolic: 8.51 cm  PW Diastolic: 1.4 cm                  PW Systolic: 8.91 cm                                        FS: 45.2 %  LV EDV/LV EDV Index: 47.88 ml/22 m^2  LV ESV/LV ESV Index: 10.79 ml/5 m^2  EF Calculated: 77.5 %   LVOT Diameter: 1.78 cm   Right Atrium   RA Systolic Pressure: 5 mmHg   Right Ventricle   Diastolic Dimension: 9.08 cm                                    RV Systolic Pressure: 01.69 mmHg  Aorta/ Miscellaneous Aorta   Aortic Root: 3.57 cm  LVOT Diameter: 1.78 cm      XR CHEST PORTABLE    Result Date: 4/15/2022  Exam: XR CHEST PORTABLE History:  chest pain Technique: AP portable view of the chest obtained. Comparison: none Chest x-ray portable Findings: The cardiomediastinal silhouette is within normal limits. There are no infiltrates, consolidations or effusions. . Bones of the thorax appear intact. No radiographic evidence of acute intrathoracic process.        Discharge Medications:         Medication List      START taking these medications    aspirin 81 MG EC tablet  Take 1 tablet by mouth daily     metoprolol tartrate 25 MG tablet  Commonly known as: LOPRESSOR  Take 1 tablet by mouth 2 times daily     nitroGLYCERIN 0.4 MG SL tablet  Commonly known as: NITROSTAT  up to max of 3 total doses. If no relief after 1 dose, call 911. CONTINUE taking these medications    amLODIPine 5 MG tablet  Commonly known as: NORVASC  take 1 tablet by mouth once daily     FreeStyle Velia 14 Day [facility]  Test daily and prn. Dx: DM2     FreeStyle Velia 14 Day Sensor Misc  TEST DAILY AND AS NEEDED. CHANGE EVERY 2 WEEKS. insulin 70-30 (70-30) 100 UNIT per ML injection vial  Commonly known as: NovoLIN 70/30  Inject 40 Units into the skin 2 times daily     lanreotide acetate 120 MG/0.5ML Soln depot injection  Commonly known as: SOMATULINE     lisinopril-hydroCHLOROthiazide 20-12.5 MG per tablet  Commonly known as: PRINZIDE;ZESTORETIC  take 1 tablet by mouth twice a day     meloxicam 15 MG tablet  Commonly known as: MOBIC  take 1 tablet by mouth once daily     MIRALAX PO     omeprazole 40 MG delayed release capsule  Commonly known as: PRILOSEC  Take 1 capsule by mouth daily           Where to Get Your Medications      These medications were sent to [address] [place] [address] [phone] - [phone]  [address] [address]    Phone: [phone]   · aspirin 81 MG EC tablet  · metoprolol tartrate 25 MG tablet  · nitroGLYCERIN 0.4 MG SL tablet         Disposition:   If discharged to Home, Any [place] [address] needs that were indicated and/or required as been addressed and set up by Social Work. Condition at discharge: good     Activity: activity as tolerated    Total time taken for discharging this patient: 40 minutes. Greater than 70% of time was spent focused exclusively on this patient. Time was taken to review chart, discuss plans with consultants, reconciling medications, discussing plan answering questions with patient.      Signed:  Gisell Artis MD  4/17/2022, 1:32 PM  ----------------------------------------------------------------------------------------------------------------------    Kate Plunkett,

## 2022-04-18 ENCOUNTER — CARE COORDINATION (OUTPATIENT)
Dept: CASE MANAGEMENT | Age: 67
End: 2022-04-18

## 2022-04-18 DIAGNOSIS — R07.9 CHEST PAIN, UNSPECIFIED TYPE: Primary | ICD-10-CM

## 2022-04-18 PROCEDURE — 1111F DSCHRG MED/CURRENT MED MERGE: CPT | Performed by: FAMILY MEDICINE

## 2022-04-18 NOTE — CARE COORDINATION
Alejandro [facility] Transitions Initial Follow Up Call    Call within 2 business days of discharge: Yes    Patient: Mely Byers Patient : 1955   MRN: [id number]  Reason for Admission: Chest Pain  Discharge Date: 4/15/22 RARS: No data recorded    Last Discharge [facility]       Complaint Diagnosis Description Type Department Provider    22 Chest Pain Chest pain, unspecified type ED to Hosp-Admission (Discharged) (ADMITTED) James Donovan MD; Alisson Zafar... Transitions of Care Initial Call    Was this an external facility discharge? No Discharge Facility: [place] [address] to be reviewed by the provider   Additional needs identified to be addressed with provider: No  none  Denies any needs at this time             Method of communication with provider : none      Advance Care Planning:   Does patient have an Advance Directive: reviewed and current. Was this a readmission? No  Patient stated reason for admission: chest pain  Patients top risk factors for readmission: medical condition-DM, pancreatic cancer and liver cirrhosis  polypharmacy    Care Transition Nurse (CTN) contacted the patient by telephone to perform post hospital discharge assessment. Verified name and  with patient as identifiers. Provided introduction to self, and explanation of the CTN role. CTN reviewed discharge instructions, medical action plan and red flags with patient who verbalized understanding. Patient given an opportunity to ask questions and does not have any further questions or concerns at this time. Were discharge instructions available to patient? Yes. Reviewed appropriate site of care based on symptoms and resources available to patient including: PCP  Specialist  Urgent care clinics  When to call 911  Condition related references. The patient agrees to contact the PCP office for questions related to their healthcare.      Medication reconciliation was performed with patient, who verbalizes understanding of administration of home medications. Advised obtaining a 90-day supply of all daily and as-needed medications. Covid Risk Education     Educated patient about risk for severe COVID-19 due to risk factors according to CDC guidelines. CTN reviewed discharge instructions, medical action plan and red flag symptoms with the patient who verbalized understanding. Discussed COVID vaccination status: Yes. Education provided on COVID-19 vaccination as appropriate. Discussed exposure protocols and quarantine with CDC Guidelines. Patient was given an opportunity to verbalize any questions and concerns and agrees to contact CTN or health care provider for questions related to their healthcare. Reviewed and educated patient on any new and changed medications related to discharge diagnosis. Was patient discharged with a pulse oximeter? No Discussed and confirmed pulse oximeter discharge instructions and when to notify provider or seek emergency care. CTN provided contact information. Plan for follow-up call in 5-7 days based on severity of symptoms and risk factors. Non-face-to-face services provided:  Scheduled appointment with PCP-CTN confirmed with patient he is scheduled for HFU appt with Dr. Cheo Clemons PCP) on 4/26/22 at 4:30 pm.   Scheduled appointment with Specialist-CTN confirmed with patient he is scheudled to follow up with Dr. Gene Whyte (cardio) on 4/27/22 at 11:45 am.   Obtained and reviewed discharge summary and/or continuity of care documents  Education of patient/family/caregiver/guardian to support self-management-Discussed s/s of hyperglycemia, action steps and DM zone tool  Assessment and support for treatment adherence and medication management-Discussed NTG for chest pain protocol and knowing when to seek immediate medical attention.      Care Transitions 24 Hour Call    Do you have any ongoing symptoms?: No  Do you have a copy of your discharge instructions?: Yes  Do you have all of your prescriptions and are they filled?: Yes  Have you been contacted by a [address]?: No  Have you scheduled your follow up appointment?: Yes  How are you going to get to your appointment?: Car - drive self  Were you discharged with any Home Care or Post Acute Services: No  Do you feel like you have everything you need to keep you well at home?: Yes  Care Transitions Interventions  No Identified Needs       Spoke with patient today 4/18/22 for TCM/hospital discharge follow up for chest pain. Patient states he was having centrally located chest pain that warranted him to seek medical attention which has since subsided since discharge. Patient states he is doing better since discharge and offers no complaints at this time. Denies any shortness of breath, chest pain or chest discomfort. Noted Troponin markers were elevated times three and patient underwent an Echo while IP which revealed an EF of 65%. Patient discharged and recommended to follow up with Dr. Ed Kendrick (interventional cardiology) on 4/27/22. Patient states last BG reading was 230 this morning as he monitors frequently throughout the day as he has a Velia monitoring device. States BG runs high because of having pancreatic cancer. Reviewed s/s of hyperglycemia, action steps and DM zone tool. Provided a complete review of home meds with patient who confirms he obtained new meds ordered on discharge. Reviewed NTG for chest pain protocol and knowing when to seek immediate medical attention. 1111F code entered. Confirmed with patient he follows with Dr. [address] for (Hem/Onc) at [facility] in [facility] and is scheduled for a visit. Patient states he is finisehd with chemo and is active with Lanreotide Acetate (Somatuline) injections monthly. Confirmed with patient he is scheduled for HFU appt with Dr. Yariel Cardenas (PCP) on 4/26/22 at 4:40 pm and with Dr. Ed Kendrick (cardio) on 4/27/22.  Patient states he is independent in driving but wife can drive too. Denies any needs or concerns at this time. Patient is receptive to subsequent call. CTN will continue to follow for Care Transition.      Follow Up  Future Appointments   Date Time Provider Fiorella Horan   4/26/2022  4:30 PM Tracy Min MD [facility] [place]   4/27/2022 11:45 AM Bonnie Sharma MD [address] [place] [address]   5/31/2022  2:00 PM MANUEL Jones - CNP [facility]       MANUEL Post

## 2022-04-19 DIAGNOSIS — I10 ESSENTIAL HYPERTENSION: ICD-10-CM

## 2022-04-19 RX ORDER — LISINOPRIL AND HYDROCHLOROTHIAZIDE 20; 12.5 MG/1; MG/1
TABLET ORAL
Qty: 60 TABLET | Refills: 5 | Status: SHIPPED | OUTPATIENT
Start: 2022-04-19 | End: 2022-04-27 | Stop reason: SINTOL

## 2022-04-25 ENCOUNTER — CARE COORDINATION (OUTPATIENT)
Dept: CASE MANAGEMENT | Age: 67
End: 2022-04-25

## 2022-04-25 NOTE — CARE COORDINATION
Alejandro 45 Transitions Follow Up Call    2022    Patient: Donnell Wolff  Patient : 1955   MRN: [id number]  Reason for Admission: Chest Pain  Discharge Date: 4/15/22 RARS: No data recorded    Care Transitions Follow Up Call    Needs to be reviewed by the provider   Additional needs identified to be addressed with provider: No  none             Method of communication with provider : none      Care Transition Nurse (CTN) contacted the patient by telephone to follow up after admission on 4/15/22. Verified name and  with patient as identifiers. Addressed changes since last contact: none  Discussed follow-up appointments. If no appointment was previously scheduled, appointment scheduling offered: Yes. Is follow up appointment scheduled within 7 days of discharge? Yes and CTN confirmed with patient he is scheduled to follow up with Dr. Mahi Wayne (PCP) tomorrow on 22. Advance Care Planning:   Does patient have an Advance Directive: reviewed and current. CTN reviewed discharge instructions, medical action plan and red flags with patient and discussed any barriers to care and/or understanding of plan of care after discharge. Discussed appropriate site of care based on symptoms and resources available to patient including: PCP  Specialist  Urgent care clinics  When to call 911. The patient agrees to contact the PCP office for questions related to their healthcare. Patients top risk factors for readmission: medical condition-Dm, history of pancreatic cancer and liver cirrhosis  polypharmacy    Non-[facility] follow up appointment(s): Confirmed with patient he is scheduled to follow up with Ellen Stafford (cardiology) this week on 22. Plan for follow-up call in 5-7 days based on severity of symptoms and risk factors. Care Transitions Subsequent and Final Call    Subsequent and Final Calls  Do you have any ongoing symptoms?: Yes  Onset of Patient-reported symptoms:  In the past 7 days  Patient-reported symptoms: Chest Pain  Have your medications changed?: No  Do you have any questions related to your medications?: No  Do you currently have any active services?: No  Do you have any needs or concerns that I can assist you with?: No  Identified Barriers: Lack of Education  Care Transitions Interventions  No Identified Needs  Other Interventions:         Spoke with patient today 4/25/2 for TCM/hospital discharge follow up sub call for chest pain. Patient states having chest pain episodes ascociated with exertion such as when lifting and putting baby car seat in back of car, when walking to  trash can and when washing car. Patient states chest pain is centrally located which radiates to back \"in between shoulder blades\". States last episode was Saturday. Denies having to use any NTG but carries with him in case of unrelieved chest pain. Denies any shortness of breath, nausea, vomiting, diarrhea, chills or fever. CTN reiterated NTG protocol and knowing when to seek immediate medical attention. Confirmed with patient last BG was 80 which he has a CBGMD Francine Flores). Patient admits BG fluctuates with history of pancreatic cancer. Confirmed with patient he is scheduled to follow up with PCP tomorrow and cardiology on Wednesday. Denies any other complaints at this time. CTN will continue to follow for Care Transition.      Follow Up  Future Appointments   Date Time Provider Fiorella Horan   4/26/2022  4:30 PM Fidelina Seals MD [facility] [place]   4/27/2022 11:45 AM Lena Villavicencio MD [facility]   6/13/2022  2:00 PM MANUEL Niño - CNP [facility]     CTN provided contact information for future needs    MANUEL Donaldson

## 2022-04-26 ENCOUNTER — OFFICE VISIT (OUTPATIENT)
Dept: FAMILY MEDICINE CLINIC | Age: 67
End: 2022-04-26
Payer: MEDICARE

## 2022-04-26 VITALS
HEART RATE: 69 BPM | WEIGHT: 211.4 LBS | OXYGEN SATURATION: 97 % | DIASTOLIC BLOOD PRESSURE: 76 MMHG | HEIGHT: 74 IN | TEMPERATURE: 97.7 F | SYSTOLIC BLOOD PRESSURE: 160 MMHG | BODY MASS INDEX: 27.13 KG/M2

## 2022-04-26 DIAGNOSIS — C25.7 MALIGNANT NEOPLASM OF OTHER PARTS OF PANCREAS (HCC): ICD-10-CM

## 2022-04-26 DIAGNOSIS — R73.9 ELEVATED BLOOD SUGAR: ICD-10-CM

## 2022-04-26 DIAGNOSIS — Z09 HOSPITAL DISCHARGE FOLLOW-UP: Primary | ICD-10-CM

## 2022-04-26 DIAGNOSIS — E11.65 TYPE 2 DIABETES MELLITUS WITH HYPERGLYCEMIA, WITHOUT LONG-TERM CURRENT USE OF INSULIN (HCC): ICD-10-CM

## 2022-04-26 DIAGNOSIS — R07.9 CHEST PAIN, UNSPECIFIED TYPE: ICD-10-CM

## 2022-04-26 PROCEDURE — 1111F DSCHRG MED/CURRENT MED MERGE: CPT | Performed by: FAMILY MEDICINE

## 2022-04-26 PROCEDURE — 99495 TRANSJ CARE MGMT MOD F2F 14D: CPT | Performed by: FAMILY MEDICINE

## 2022-04-26 SDOH — ECONOMIC STABILITY: FOOD INSECURITY: WITHIN THE PAST 12 MONTHS, THE FOOD YOU BOUGHT JUST DIDN'T LAST AND YOU DIDN'T HAVE MONEY TO GET MORE.: NEVER TRUE

## 2022-04-26 SDOH — ECONOMIC STABILITY: FOOD INSECURITY: WITHIN THE PAST 12 MONTHS, YOU WORRIED THAT YOUR FOOD WOULD RUN OUT BEFORE YOU GOT MONEY TO BUY MORE.: NEVER TRUE

## 2022-04-26 SDOH — ECONOMIC STABILITY: TRANSPORTATION INSECURITY
IN THE PAST 12 MONTHS, HAS THE LACK OF TRANSPORTATION KEPT YOU FROM MEDICAL APPOINTMENTS OR FROM GETTING MEDICATIONS?: NO

## 2022-04-26 ASSESSMENT — PATIENT HEALTH QUESTIONNAIRE - PHQ9
SUM OF ALL RESPONSES TO PHQ9 QUESTIONS 1 & 2: 0
SUM OF ALL RESPONSES TO PHQ QUESTIONS 1-9: 0
1. LITTLE INTEREST OR PLEASURE IN DOING THINGS: 0
SUM OF ALL RESPONSES TO PHQ QUESTIONS 1-9: 0
2. FEELING DOWN, DEPRESSED OR HOPELESS: 0
SUM OF ALL RESPONSES TO PHQ QUESTIONS 1-9: 0
SUM OF ALL RESPONSES TO PHQ QUESTIONS 1-9: 0

## 2022-04-26 ASSESSMENT — SOCIAL DETERMINANTS OF HEALTH (SDOH): HOW HARD IS IT FOR YOU TO PAY FOR THE VERY BASICS LIKE FOOD, HOUSING, MEDICAL CARE, AND HEATING?: NOT HARD AT ALL

## 2022-04-26 NOTE — PROGRESS NOTES
Post-Discharge Transitional Care  Follow Up      Jaelyn Cheek   YOB: 1955    Date of Office Visit:  4/26/2022  Date of Hospital Admission: 4/14/22  Date of Hospital Discharge: 4/15/22  Risk of hospital readmission (high >=14%. Medium >=10%) :No data recorded    Care management risk score Rising risk (score 2-5) and Complex Care (Scores >=6): 2     Non face to face  following discharge, date last encounter closed (first attempt may have been earlier): 4/18/2022 10:40 AM    Call initiated 2 business days of discharge: Yes    ASSESSMENT/PLAN:   Hospital discharge follow-up  -     OR DISCHARGE MEDS RECONCILED W/ CURRENT OUTPATIENT MED LIST  Chest pain, unspecified type  Type 2 diabetes mellitus with hyperglycemia, without long-term current use of insulin (HCC)  Malignant neoplasm of other parts of pancreas (HCC)  Elevated blood sugar    He will follow up with cardiology tomorrow  Ongoing issues with his pancreatic cancer  Constipation    Will have stress test   Determine if cath needed    Will be seeing a new surgeon about possible partial pancreatectomy    Medical Decision Making: moderate complexity  No follow-ups on file. On this date 4/26/2022 I have spent 20 minutes reviewing previous notes, test results and face to face with the patient discussing the diagnosis and importance of compliance with the treatment plan as well as documenting on the day of the visit. Subjective:   HPI:  Follow up of Hospital problems/diagnosis(es): chest pain  Chief Complaint   Patient presents with    Follow-Up from Hospital     chest pain, [facility]        Inpatient course: Discharge summary reviewed- see chart. Admit date:  4/14/2022                      Discharge date:  4/15/22     Admitting Physician: Rodger Ormond, MD  Primary Care Physician:  WYMARTITA [facility], MD     Discharge Diagnoses:    Chest pain;  Angina Class III         Chief Complaint   Patient presents with    Chest Pain      Hospital Course: known as: PRINZIDE;ZESTORETIC  take 1 tablet by mouth twice a day     meloxicam 15 MG tablet  Commonly known as: MOBIC  take 1 tablet by mouth once daily     metoprolol tartrate 25 MG tablet  Commonly known as: LOPRESSOR  Take 1 tablet by mouth 2 times daily     MIRALAX PO     nitroGLYCERIN 0.4 MG SL tablet  Commonly known as: NITROSTAT  up to max of 3 total doses. If no relief after 1 dose, call 911. omeprazole 40 MG delayed release capsule  Commonly known as: PRILOSEC  Take 1 capsule by mouth daily              Medications marked \"taking\" at this time  Outpatient Medications Marked as Taking for the 4/26/22 encounter (Office Visit) with Jarod Everett MD   Medication Sig Dispense Refill    lisinopril-hydroCHLOROthiazide (PRINZIDE;ZESTORETIC) 20-12.5 MG per tablet take 1 tablet by mouth twice a day 60 tablet 5    aspirin 81 MG EC tablet Take 1 tablet by mouth daily 30 tablet 3    metoprolol tartrate (LOPRESSOR) 25 MG tablet Take 1 tablet by mouth 2 times daily 60 tablet 3    nitroGLYCERIN (NITROSTAT) 0.4 MG SL tablet up to max of 3 total doses. If no relief after 1 dose, call 911. 25 tablet 0    omeprazole (PRILOSEC) 40 MG delayed release capsule Take 1 capsule by mouth daily 30 capsule 3    insulin 70-30 (NOVOLIN 70/30) (70-30) 100 UNIT per ML injection vial Inject 40 Units into the skin 2 times daily 30 mL 5    lanreotide acetate (SOMATULINE) 120 MG/0.5ML SOLN depot injection Inject 120 mg into the skin once      meloxicam (MOBIC) 15 MG tablet take 1 tablet by mouth once daily 30 tablet 5    Polyethylene Glycol 3350 (MIRALAX PO) Take by mouth as needed       amLODIPine (NORVASC) 5 MG tablet take 1 tablet by mouth once daily 180 tablet 2    Continuous Blood Gluc Sensor (FREESTYLE NATALY 14 DAY SENSOR) MISC TEST DAILY AND AS NEEDED. CHANGE EVERY 2 WEEKS. 1 each 0    Continuous Blood Gluc  (FREESTYLE NATALY 14 DAY READER) KISHA Test daily and prn.  Dx: DM2 1 Device 0        Medications patient taking as of now reconciled against medications ordered at time of hospital discharge: Yes      Review of Systems:   General ROS: negative for - nausea, vomiting, chills, fatigue, fever, malaise, weight gain or weight loss  Respiratory ROS: HPI  Cardiovascular ROS: per HPI  Gastrointestinal ROS: no abdominal pain, change in bowel habits, or black or bloody stools  Genito-Urinary ROS: no dysuria, trouble voiding, or hematuria  Musculoskeletal ROS: negative for - gait disturbance, joint pain or joint stiffness  Neurological ROS: negative for - behavioral changes, memory loss, numbness/tingling, tremors or weakness    In general patient otherwise reports feeling well. Objective:    Physical Exam:  BP (!) 160/76 (Site: Left Upper Arm, Position: Sitting, Cuff Size: Medium Adult)   Pulse 69   Temp 97.7 °F (36.5 °C)   Ht 6' 1.5\" (1.867 m)   Wt 211 lb 6.4 oz (95.9 kg)   SpO2 97%   BMI 27.51 kg/m²     Gen: Well, NAD, Alert, Oriented x 3   HEENT: EOMI, eyes clear, MMM  Skin: without rash or jaundice  Neck: no significant lymphadenopathy or thyromegaly  Lungs: CTA B w/out Rales/Wheezes/Rhonchi, Good respiratory effort   Heart: RRR, S1S2, w/out M/R/G, non-displaced PMI   Ext: No C/C/E Bilaterally. Neuro: Neurovascularly intact w/ Sensory/Motor intact UE/LE Bilaterally. An electronic signature was used to authenticate this note.   --Travis Hoyos MD

## 2022-04-27 ENCOUNTER — OFFICE VISIT (OUTPATIENT)
Dept: CARDIOLOGY CLINIC | Age: 67
End: 2022-04-27
Payer: MEDICARE

## 2022-04-27 ENCOUNTER — HOSPITAL ENCOUNTER (OUTPATIENT)
Dept: GENERAL RADIOLOGY | Age: 67
Discharge: HOME | End: 2022-04-29
Payer: MEDICARE

## 2022-04-27 VITALS
HEART RATE: 77 BPM | OXYGEN SATURATION: 96 % | BODY MASS INDEX: 27.51 KG/M2 | WEIGHT: 211.4 LBS | DIASTOLIC BLOOD PRESSURE: 66 MMHG | SYSTOLIC BLOOD PRESSURE: 138 MMHG

## 2022-04-27 DIAGNOSIS — E11.9 TYPE 2 DIABETES MELLITUS WITHOUT COMPLICATION, WITH LONG-TERM CURRENT USE OF INSULIN (HCC): ICD-10-CM

## 2022-04-27 DIAGNOSIS — G45.9 TIA (TRANSIENT ISCHEMIC ATTACK): ICD-10-CM

## 2022-04-27 DIAGNOSIS — I10 HYPERTENSION, UNSPECIFIED TYPE: ICD-10-CM

## 2022-04-27 DIAGNOSIS — C25.7 MALIGNANT NEOPLASM OF OTHER PARTS OF PANCREAS (HCC): ICD-10-CM

## 2022-04-27 DIAGNOSIS — Z79.4 TYPE 2 DIABETES MELLITUS WITHOUT COMPLICATION, WITH LONG-TERM CURRENT USE OF INSULIN (HCC): ICD-10-CM

## 2022-04-27 DIAGNOSIS — I10 HYPERTENSION, UNSPECIFIED TYPE: Primary | ICD-10-CM

## 2022-04-27 LAB
ANION GAP SERPL CALCULATED.3IONS-SCNC: 12 MEQ/L (ref 9–15)
BUN BLDV-MCNC: 17 MG/DL (ref 8–23)
CALCIUM SERPL-MCNC: 8.9 MG/DL (ref 8.5–9.9)
CHLORIDE BLD-SCNC: 103 MEQ/L (ref 95–107)
CO2: 26 MEQ/L (ref 20–31)
CREAT SERPL-MCNC: 0.93 MG/DL (ref 0.7–1.2)
GFR AFRICAN AMERICAN: >60
GFR NON-AFRICAN AMERICAN: >60
GLUCOSE BLD-MCNC: 167 MG/DL (ref 70–99)
POTASSIUM SERPL-SCNC: 3.6 MEQ/L (ref 3.4–4.9)
PRO-BNP: 95 PG/ML
SODIUM BLD-SCNC: 141 MEQ/L (ref 135–144)

## 2022-04-27 PROCEDURE — 3052F HG A1C>EQUAL 8.0%<EQUAL 9.0%: CPT | Performed by: INTERNAL MEDICINE

## 2022-04-27 PROCEDURE — 74022 RADEX COMPL AQT ABD SERIES: CPT

## 2022-04-27 PROCEDURE — 99213 OFFICE O/P EST LOW 20 MIN: CPT | Performed by: INTERNAL MEDICINE

## 2022-04-27 RX ORDER — FUROSEMIDE 20 MG/1
20 TABLET ORAL DAILY
Qty: 60 TABLET | Refills: 3 | Status: SHIPPED | OUTPATIENT
Start: 2022-04-27 | End: 2022-09-19

## 2022-04-27 RX ORDER — LISINOPRIL 20 MG/1
20 TABLET ORAL DAILY
Qty: 30 TABLET | Refills: 0 | Status: SHIPPED | OUTPATIENT
Start: 2022-04-27 | End: 2022-05-27

## 2022-04-27 RX ORDER — CARVEDILOL 6.25 MG/1
6.25 TABLET ORAL 2 TIMES DAILY
Qty: 60 TABLET | Refills: 3 | Status: SHIPPED | OUTPATIENT
Start: 2022-04-27 | End: 2022-05-25 | Stop reason: SDUPTHER

## 2022-04-27 RX ORDER — CHLORTHALIDONE 25 MG/1
25 TABLET ORAL DAILY
Qty: 30 TABLET | Refills: 3 | Status: SHIPPED | OUTPATIENT
Start: 2022-04-27 | End: 2022-05-25 | Stop reason: SDUPTHER

## 2022-04-27 ASSESSMENT — ENCOUNTER SYMPTOMS
WHEEZING: 0
NAUSEA: 0
VOMITING: 0
ABDOMINAL PAIN: 0
RHINORRHEA: 0
CHEST TIGHTNESS: 0
COLOR CHANGE: 0
CONSTIPATION: 0
EYE REDNESS: 0
COUGH: 0
DIARRHEA: 0
APNEA: 0
SHORTNESS OF BREATH: 1

## 2022-04-27 NOTE — PROGRESS NOTES
Chief Complaint   Patient presents with    Follow-Up from Hospital       Patient presents for initial medical evaluation. Patient is followed on a regular basis by Dr. Gail Boykin MD.     Hypertension  Hyperlipidemia  Diabetes  Pancreatic cancer on chemotherapy  Liver cirrhosis  TTE 12/11/2019 EF 65%  TTE 4/15/2022 EF 65%  No prior nuclear stress test  No prior coronary angiograms    4/27/2022  First clinic visit follow-up on recent hospitalization  Patient was evaluated in the hospital on 4/14/2022 due to chest pain. At that time troponins were detectable but not significant peaked at 0.27. EKG did not show signs of ischemia. Patient underwent an echocardiogram which showed normal EF with no wall motion abnormality. Patient was discharged home with follow-up in clinic with me for a further ischemic evaluation  Patient comes with wife during this visit. States that he is not feeling well he is complaining of shortness of breath with physical activity. Reports that before going to the hospital was complaining of shortness of breath but after getting discharged this shortness of breath has been getting worse. Denies lower extremity edema  Denies paroxysmal nocturnal dyspnea no orthopnea.   Although at times patient has to switch sides when sleeping due to shortness of breath  Denies chest pains  Reports that his blood pressures at home are usually running In the 160s  Today during this visit systolics are in the 944V    Patient Active Problem List   Diagnosis    HTN (hypertension)    Type 2 diabetes mellitus without complication ([address]    Lumbar paraspinal muscle spasm    Allergic rhinosinusitis    TIA (transient ischemic attack)    Abdominal pain    Malignant neoplasm of other parts of pancreas ([address]    Other cirrhosis of liver ([address]    Gastroesophageal reflux disease without esophagitis    Chest pain       Past Surgical History:   Procedure Laterality Date    ABDOMEN SURGERY      COLONOSCOPY 02/20/2015    DR Fernandez Gear    COLONOSCOPY N/A 1/20/2020    COLONOSCOPY DIAGNOSTIC performed by Lm Wells MD at [address] [facility] ([facility]) N/A 12/17/2019    EUS performed by Lm Wells MD at [address] ENDOSCOPY N/A 4/4/2022    EGD ESOPHAGOGASTRODUODENOSCOPY performed by Lm Wells MD at [facility] Marital status:      Spouse name: Not on file    Number of children: Not on file    Years of education: Not on file    Highest education level: Not on file   Occupational History    Not on file   Tobacco Use    Smoking status: Never Smoker    Smokeless tobacco: Never Used   Vaping Use    Vaping Use: Never used   Substance and Sexual Activity    Alcohol use: No    Drug use: No    Sexual activity: Yes     Partners: Female   Other Topics Concern    Not on file   Social History Narrative    Not on file     Social Determinants of Health     Financial Resource Strain: Low Risk     Difficulty of Paying Living Expenses: Not hard at all   Food Insecurity: No Food Insecurity    Worried About [facility] in the Last Year: Never true    [facility] in the Last Year: Never true   Transportation Needs: No Transportation Needs    Lack of Transportation (Medical): No    Lack of Transportation (Non-Medical):  No   Physical Activity:     Days of Exercise per Week: Not on file    Minutes of Exercise per Session: Not on file   Stress:     Feeling of Stress : Not on file   Social Connections:     Frequency of Communication with Friends and Family: Not on file    Frequency of Social Gatherings with Friends and Family: Not on file    Attends Orthodoxy Services: Not on file    Active Member of Clubs or Organizations: Not on file    Attends Club or Organization Meetings: Not on file    Marital Status: Not on file   Intimate Partner Violence:     Fear of Current or Ex-Partner: Not on file    Emotionally Abused: Not on file    Physically Abused: Not on file    Sexually Abused: Not on file   Housing Stability:     Unable to Pay for Housing in the Last Year: Not on file    Number of Places Lived in the Last Year: Not on file    Unstable Housing in the Last Year: Not on file       Family History   Problem Relation Age of Onset    Cancer Mother         lung cancer    Colon Cancer Neg Hx     Celiac Disease Neg Hx     Crohn's Disease Neg Hx     Coronary Art Dis Neg Hx        Current Outpatient Medications   Medication Sig Dispense Refill    lisinopril-hydroCHLOROthiazide (PRINZIDE;ZESTORETIC) 20-12.5 MG per tablet take 1 tablet by mouth twice a day 60 tablet 5    aspirin 81 MG EC tablet Take 1 tablet by mouth daily 30 tablet 3    metoprolol tartrate (LOPRESSOR) 25 MG tablet Take 1 tablet by mouth 2 times daily 60 tablet 3    nitroGLYCERIN (NITROSTAT) 0.4 MG SL tablet up to max of 3 total doses. If no relief after 1 dose, call 911. 25 tablet 0    omeprazole (PRILOSEC) 40 MG delayed release capsule Take 1 capsule by mouth daily 30 capsule 3    insulin 70-30 (NOVOLIN 70/30) (70-30) 100 UNIT per ML injection vial Inject 40 Units into the skin 2 times daily 30 mL 5    lanreotide acetate (SOMATULINE) 120 MG/0.5ML SOLN depot injection Inject 120 mg into the skin once      meloxicam (MOBIC) 15 MG tablet take 1 tablet by mouth once daily 30 tablet 5    Polyethylene Glycol 3350 (MIRALAX PO) Take by mouth as needed       amLODIPine (NORVASC) 5 MG tablet take 1 tablet by mouth once daily 180 tablet 2    Continuous Blood Gluc Sensor (FREESTYLE NATALY 14 DAY SENSOR) MISC TEST DAILY AND AS NEEDED. CHANGE EVERY 2 WEEKS. 1 each 0    Continuous Blood Gluc  (FREESTYLE NATALY 14 DAY READER) KISHA Test daily and prn. Dx: DM2 1 Device 0     No current facility-administered medications for this visit. Patient has no known allergies.     Review of Systems:  Review of Systems   Constitutional: Negative for activity change, chills, diaphoresis and fever. HENT: Negative for congestion, ear pain, nosebleeds and rhinorrhea. Eyes: Negative for redness and visual disturbance. Respiratory: Positive for shortness of breath. Negative for apnea, cough, chest tightness and wheezing. Cardiovascular: Negative for chest pain, palpitations and leg swelling. Gastrointestinal: Negative for abdominal pain, constipation, diarrhea, nausea and vomiting. Genitourinary: Negative for difficulty urinating and dysuria. Musculoskeletal: Negative. Negative for joint swelling. Skin: Negative for color change, rash and wound. Neurological: Negative for dizziness, syncope, weakness, numbness and headaches. Psychiatric/Behavioral: Negative. VITALS:  Blood pressure 138/66, pulse 77, weight 211 lb 6.4 oz (95.9 kg), SpO2 96 %. Body mass index is 27.51 kg/m². Physical Examination:  Physical Exam  Vitals and nursing note reviewed. Constitutional:       Appearance: Normal appearance. HENT:      Head: Normocephalic and atraumatic. Mouth/Throat:      Mouth: Mucous membranes are moist.      Pharynx: Oropharynx is clear. Eyes:      Extraocular Movements: Extraocular movements intact. Conjunctiva/sclera: Conjunctivae normal.      Pupils: Pupils are equal, round, and reactive to light. Cardiovascular:      Rate and Rhythm: Normal rate and regular rhythm. Pulses: Normal pulses. Heart sounds: Normal heart sounds. Pulmonary:      Effort: Pulmonary effort is normal.      Breath sounds: Normal breath sounds. Abdominal:      General: Abdomen is flat. Bowel sounds are normal.      Palpations: Abdomen is soft. Musculoskeletal:         General: Normal range of motion. Cervical back: Normal range of motion and neck supple. Skin:     General: Skin is warm. Neurological:      General: No focal deficit present.       Mental Status: He is alert and oriented to person, place, and time. Mental status is at baseline. Psychiatric:         Mood and Affect: Mood normal.        EKG: Normal sinus rhythm without signs of ischemia    No orders of the defined types were placed in this encounter. The 10-year ASCVD risk score (Sherman Hoffman, et al., 2013) is: 29.2%    Values used to calculate the score:      Age: 77 years      Sex: Male      Is Non- : No      Diabetic: Yes      Tobacco smoker: No      Systolic Blood Pressure: 643 mmHg      Is BP treated: Yes      HDL Cholesterol: 47 mg/dL      Total Cholesterol: 164 mg/dL     ASSESSMENT:     Diagnosis Orders   1. Hypertension, unspecified type     2. TIA (transient ischemic attack)     3. Malignant neoplasm of other parts of pancreas ([facility]     4. Type 2 diabetes mellitus without complication, with long-term current use of insulin (HCC)       PLAN:   Chest pain. Patient was admitted to the hospital 2 weeks ago for chest pain at that time troponins were not significant but detected at 0.02. EKG without signs of ischemia  To further investigate this I would like to order a nuclear stress test  Continue aspirin and start atorvastatin  Continue beta-blocker    Hypertension  It seems like it is not well controlled reports blood pressures at home ranging between 160s to 180s  Will increase amlodipine to max dose from 5 mg daily to 10 mg daily  Stop lisinopril-hydrochlorothiazide  Start lisinopril 40 mg daily  Start chlorthalidone 25 mg daily  Switch metoprolol to Coreg    Return to clinic in 3 months    Shortness of breath. A physical exam there are bibasilar crackles. I would like to order a chest x-ray  I will start patient on low-dose of Lasix 20 mg daily for 1 week    Diabetes, pancreatic cancer and liver cirrhosis as per PCP and GI    Recommended patient to eat diets that emphasize high intakes of vegetables, fruits, nuts, whole grains, lean vegetable or animal protein, and fish.  As per 2019 ACC/AHA guideline on primary prevention of CVD     Recommended patient to engage in at least 150 minutes per week of accumulated moderate-intensity physical activity or 75 minutes per week of vigorous-intensity physical activity as per 2019 ACC/AHA guideline on primary prevention of CVD

## 2022-05-02 ENCOUNTER — CARE COORDINATION (OUTPATIENT)
Dept: CASE MANAGEMENT | Age: 67
End: 2022-05-02

## 2022-05-02 NOTE — CARE COORDINATION
[facility] Transitions Follow Up Call    2022    Patient: Sophie Franco  Patient : 1955   MRN: [id number]  Reason for Admission: Chest Pain  Discharge Date: 4/15/22 RARS: No data recorded       Care Transitions Follow Up Call    Needs to be reviewed by the provider   Additional needs identified to be addressed with provider: No  none             Method of communication with provider : none      Care Transition Nurse (CTN) contacted the patient by telephone to follow up after admission on 4/15/22. Verified name and  with patient as identifiers. Addressed changes since last contact: none  Discussed follow-up appointments. If no appointment was previously scheduled, appointment scheduling offered: Yes. Is follow up appointment scheduled within 7 days of discharge? Yes and CTN confirmed with patient he completed HFU appt with Dr. Anna Kennedy (PCP) on 22. Advance Care Planning:   Does patient have an Advance Directive: reviewed and current. CTN reviewed discharge instructions, medical action plan and red flags with patient and discussed any barriers to care and/or understanding of plan of care after discharge. Discussed appropriate site of care based on symptoms and resources available to patient including: PCP  Specialist  Urgent care clinics  When to call 911  Condition related references. The patient agrees to contact the PCP office for questions related to their healthcare. Patients top risk factors for readmission: lack of knowledge about disease  medical condition-DM, pancreatic cancer and liver cirrhosis  polypharmacy    Non-[facility] follow up appointment(s): CTN confirmed with patient he completed f/u appt with Dr. Mattie Mcghee (cardio) on 22     Plan for follow-up call in 7-10 days based on severity of symptoms and risk factors.     Care Transitions Subsequent and Final Call    Subsequent and Final Calls  Do you have any ongoing symptoms?: No  Have your medications changed?: Yes  Patient Reports: CTN confirmed with patient tht Dr. Riya Gan (cardio) started him on Lasix 20o mg daily, COreg 6.25 mg twice daily, Lisinopril 20 mg daily and Hygroton 25 mg daily. Do you have any questions related to your medications?: No  Do you currently have any active services?: No  Do you have any needs or concerns that I can assist you with?: No  Identified Barriers: None  Care Transitions Interventions  No Identified Needs  Other Interventions:         Spoke with patient today 5/2/22 for TCM/hospital discharge follow up sub call for chest pain. Patient states chest pain has subsided since last CTN call which he attributes to med changes made per Dr. Riya Gan (cardio). Confirmed with patient he completed f/u appt ith Dr. Riya Gan on 4/27/22 who started him on Lasix 20 mg daily, Lisinopril 20 mg daily, Coreg 6.25 mg twice daily and Hygroton 25 mg daily. Patient denies any recent use of NTG and understands NTG protocol for chest pain and knowing when to seek immediate medical attention. Patient states he is awaiting to scheduled for OP stress test that Dr. Riya Gan ordered. Denies any shortness of breath, chest pain, chest discomfort, abdominal pain/swelling, nausea, vomiting, chills or fever. Patient states BG while on phone with this CTN is 178. States having continuous monitoring device Quantum4D). Denies any s/s of hyperglycemia. Reviewed s/s of hyperglycemia/DM zone tool and knowing when to seek medical attention. Confirmed with patient he completed HFU appt with Dr. Miguel Farmer (PCP) on 4/26/22. Patient states his next injection for pancreatic cancer is 5/9/22 and request next call on 5/12/22. Denies any complaints or concerns at this time. CTN will continue to follow for Care Transition.        Follow Up  Future Appointments   Date Time Provider Fiorella Horan   6/13/2022  2:00 PM MANUEL Nance - YADIRA [facility]   7/27/2022 11:30 AM Miguelangel Davila MD [facility] CTN provided contact information for future needs.     Jamel Flower, MAUNEL

## 2022-05-11 ENCOUNTER — HOSPITAL ENCOUNTER (OUTPATIENT)
Dept: NUCLEAR MEDICINE | Age: 67
Discharge: HOME | End: 2022-05-13
Payer: MEDICARE

## 2022-05-11 ENCOUNTER — HOSPITAL ENCOUNTER (OUTPATIENT)
Dept: NON INVASIVE DIAGNOSTICS | Age: 67
Discharge: HOME | End: 2022-05-11
Payer: MEDICARE

## 2022-05-11 DIAGNOSIS — R07.9 CHEST PAIN, UNSPECIFIED TYPE: ICD-10-CM

## 2022-05-11 PROCEDURE — 6360000002 HC RX W HCPCS: Performed by: INTERNAL MEDICINE

## 2022-05-11 PROCEDURE — 2580000003 HC RX 258: Performed by: INTERNAL MEDICINE

## 2022-05-11 PROCEDURE — 78452 HT MUSCLE IMAGE SPECT MULT: CPT

## 2022-05-11 PROCEDURE — 93017 CV STRESS TEST TRACING ONLY: CPT

## 2022-05-11 PROCEDURE — 3430000000 HC RX DIAGNOSTIC RADIOPHARMACEUTICAL: Performed by: INTERNAL MEDICINE

## 2022-05-11 PROCEDURE — A9502 TC99M TETROFOSMIN: HCPCS | Performed by: INTERNAL MEDICINE

## 2022-05-11 RX ORDER — SODIUM CHLORIDE 0.9 % (FLUSH) 0.9 %
10 SYRINGE (ML) INJECTION PRN
Status: COMPLETED | OUTPATIENT
Start: 2022-05-11 | End: 2022-05-11

## 2022-05-11 RX ADMIN — TETROFOSMIN 35.8 MILLICURIE: 1.38 INJECTION, POWDER, LYOPHILIZED, FOR SOLUTION INTRAVENOUS at 08:57

## 2022-05-11 RX ADMIN — TETROFOSMIN 11.9 MILLICURIE: 1.38 INJECTION, POWDER, LYOPHILIZED, FOR SOLUTION INTRAVENOUS at 07:42

## 2022-05-11 RX ADMIN — Medication 10 ML: at 08:57

## 2022-05-11 RX ADMIN — REGADENOSON 0.4 MG: 0.08 INJECTION, SOLUTION INTRAVENOUS at 08:57

## 2022-05-11 RX ADMIN — Medication 10 ML: at 08:58

## 2022-05-11 RX ADMIN — Medication 10 ML: at 07:42

## 2022-05-11 NOTE — PROCEDURES
[address]                      [address], [address] [facility]                              CARDIAC STRESS TEST    PATIENT NAME: Justino Flores                        :        1955  MED REC NO:   [id number]                            ROOM:  ACCOUNT NO:   [de-identified]                           ADMIT DATE: 2022  PROVIDER:     Katherin Sams MD    CARDIOVASCULAR DIAGNOSTIC DEPARTMENT    DATE OF STUDY:  2022    CARDIAC STRESS TEST    ORDERING PROVIDER:  _____, PA    INDICATIONS:  Chest pain. PROCEDURE IN DETAIL:  After informed written consent, a baseline EKG was  obtained, which  showed normal sinus rhythm. For the rest portion of the  test, the patient received 11.9 mCi of Myoview IV. After appropriate  delay, resting SPECT images were obtained. For the stress portion of  the test, the patient received 0.4 mg of Lexiscan IV followed by 35.8  mCi of Myoview IV. After appropriate delay, stress SPECT images were  obtained. FINDINGS:  The patient was placed according to the Lexiscan protocol. The resting heart rate of 37 beats per minute, mara to 87 beats per  minute, representing 55% of the maximum age-predicted heart rate. The  resting blood pressure of 179/74 mmHg, mara to a 179/74 mmHg. EKG during the stress portion of the stress showed no ischemia, no major  arrhythmias. The patient remained asymptomatic. GATED SPECT IMAGES:  Images demonstrated normal wall thickening, normal  wall motion and normal wall EF. LEFT:  60%. SPECT IMAGES:  Overall study quality was excellent. There was uniform  normal tracer distribution throughout the myocardium arrest in a peak  stress. No fixed perfusion abnormality or reversible stress-inducted ischemia  noted. T.I.D. 1.24. CONCLUSION:  Normal stress test with no evidence of ischemia or infarct. Normal EF of 60%.         Roque Esparza MD    D: 2022 #17:00:11       T: 2022 05:46:91     [email or web address]  Job#: [id number]     Doc#: [id number]    CC:

## 2022-05-11 NOTE — PROGRESS NOTES
Reviewed history, allergies, and medications. Patient held 2 doses of carvedilol prior to testing. Consent confirmed. Lexiscan exam explained. Placed patient on monitor. @ [address] here to inject [employer]. SOB noted during recovery phase. Denied chest pain. No ectopy noted. Doctor to further review and interpret results. Patient off monitor and instructed to eat, will have last part of exam in 1 hour.

## 2022-05-12 ENCOUNTER — CARE COORDINATION (OUTPATIENT)
Dept: CASE MANAGEMENT | Age: 67
End: 2022-05-12

## 2022-05-12 NOTE — CARE COORDINATION
[address] Transitions Follow Up Call    2022    Patient: Van Ulrich  Patient : 1955   MRN: [id number]  Reason for Admission: Chest Pain  Discharge Date: 4/15/22 RARS: No data recorded       Spoke with: [address] Transitions Subsequent and Final Call    Subsequent and Final Calls  Do you have any ongoing symptoms?: No  Have your medications changed?: No  Do you have any questions related to your medications?: No  Do you currently have any active services?: No  Do you have any needs or concerns that I can assist you with?: No  Identified Barriers: None  Care Transitions Interventions  No Identified Needs  Other Interventions:         Spoke with patient today 22 for TCM/hospital discharge follow up sub call for chest pain. Patient states he continues feeling well and offers no complaints. Denies any shortness of breath, chest pain or chest discomfort. Denies any NTG use. Confirmed with patient he completed stress test yesterday. CTN noted stress test revealed: Normal stress test with no evidence of ischemia or infarct. Normal EF of 60% per Dr. Mj Patel (cardiology) note. Confirmed with patient he is scheduled to follow up with Rajeev Coleman at Dr. Mj Patel office on 22 to discuss results. Patient states he continues tolerating new meds (Coreg, Lisinopril, Lasix and Hygroton) that Dr. Mj Patel ordered at f/u visit. Patient denies any complaints or needs at this time. CTN will continue to follow for Care Transition.      Follow Up  Future Appointments   Date Time Provider Fiorella Horan   2022  2:00 PM MANUEL Durant - YADIRA Tam [facility]   2022 11:30 AM MD [id number] [facility], APRN

## 2022-05-13 PROCEDURE — 93018 CV STRESS TEST I&R ONLY: CPT | Performed by: INTERNAL MEDICINE

## 2022-05-19 ENCOUNTER — CARE COORDINATION (OUTPATIENT)
Dept: CASE MANAGEMENT | Age: 67
End: 2022-05-19

## 2022-05-19 NOTE — CARE COORDINATION
[address] Transitions Follow Up Call    2022    Patient: Kate Plunkett  Patient : 1955   MRN: [id number]  Reason for Admission: Chest Pain  Discharge Date: 4/15/22 RARS: No data recorded     Attempted to contact patient today 22 for final TCM/hospital discharge follow up for chest pain. Left message on home/mobile number requesting a return call back to CTN and provided a contact information. CTN signing off if no return call.      MANUEL Medrano

## 2022-05-23 NOTE — TELEPHONE ENCOUNTER
Please approve or deny this refill request. The order is pended. Thank you.     LOV 4/27/2022    Next Visit Date:  Future Appointments   Date Time Provider Fiorella Horan   6/13/2022  2:00 PM MANUEL Soto - YADIRA [facility]   7/27/2022 11:30 AM Karie Lea MD [address] [place] [address]

## 2022-05-25 DIAGNOSIS — I10 HYPERTENSION, UNSPECIFIED TYPE: Primary | ICD-10-CM

## 2022-05-25 NOTE — TELEPHONE ENCOUNTER
Please approve or deny this refill request. The order is pended. Thank you.     LOV 4/27/2022    Next Visit Date:  Future Appointments   Date Time Provider Fiorella Horan   6/13/2022  2:00 PM MANUEL German - CNP [facility]   7/27/2022 11:30 AM Eddy Melendez MD [address] [place] [address]

## 2022-05-27 DIAGNOSIS — I10 HYPERTENSION, UNSPECIFIED TYPE: Primary | ICD-10-CM

## 2022-05-27 RX ORDER — LISINOPRIL 20 MG/1
TABLET ORAL
Qty: 90 TABLET | Refills: 1 | Status: SHIPPED | OUTPATIENT
Start: 2022-05-27 | End: 2022-07-27 | Stop reason: SDUPTHER

## 2022-05-27 RX ORDER — CARVEDILOL 6.25 MG/1
6.25 TABLET ORAL 2 TIMES DAILY
Qty: 60 TABLET | Refills: 11 | Status: SHIPPED | OUTPATIENT
Start: 2022-05-27

## 2022-05-27 RX ORDER — CHLORTHALIDONE 25 MG/1
25 TABLET ORAL DAILY
Qty: 30 TABLET | Refills: 11 | Status: SHIPPED | OUTPATIENT
Start: 2022-05-27

## 2022-05-27 RX ORDER — LISINOPRIL 20 MG/1
20 TABLET ORAL DAILY
Qty: 30 TABLET | Refills: 11 | Status: SHIPPED | OUTPATIENT
Start: 2022-05-27 | End: 2022-10-19 | Stop reason: SDUPTHER

## 2022-05-27 RX ORDER — LISINOPRIL 20 MG/1
TABLET ORAL
Qty: 30 TABLET | Refills: 0 | Status: SHIPPED | OUTPATIENT
Start: 2022-05-27 | End: 2022-07-27 | Stop reason: SDUPTHER

## 2022-05-27 NOTE — TELEPHONE ENCOUNTER
Please approve or deny this refill request. The order is pended. Thank you.     LOV 4/27/2022    Next Visit Date:  Future Appointments   Date Time Provider Fiorella Horan   6/13/2022  2:00 PM MANUEL Niño - YADIRA [facility]   7/27/2022 11:30 AM Lena Villavicencio MD [facility]

## 2022-06-13 ENCOUNTER — OFFICE VISIT (OUTPATIENT)
Dept: GASTROENTEROLOGY | Age: 67
End: 2022-06-13
Payer: MEDICARE

## 2022-06-13 VITALS
OXYGEN SATURATION: 100 % | HEIGHT: 74 IN | WEIGHT: 203 LBS | SYSTOLIC BLOOD PRESSURE: 108 MMHG | BODY MASS INDEX: 26.05 KG/M2 | HEART RATE: 61 BPM | DIASTOLIC BLOOD PRESSURE: 62 MMHG

## 2022-06-13 DIAGNOSIS — R16.1 SPLENOMEGALY: ICD-10-CM

## 2022-06-13 DIAGNOSIS — K76.0 HEPATIC STEATOSIS: ICD-10-CM

## 2022-06-13 DIAGNOSIS — K74.00 HEPATIC FIBROSIS: Primary | ICD-10-CM

## 2022-06-13 DIAGNOSIS — D3A.8 NEUROENDOCRINE TUMOR OF PANCREAS: ICD-10-CM

## 2022-06-13 DIAGNOSIS — K59.00 CONSTIPATION, UNSPECIFIED CONSTIPATION TYPE: ICD-10-CM

## 2022-06-13 DIAGNOSIS — K76.6 PORTAL HYPERTENSION (HCC): ICD-10-CM

## 2022-06-13 PROCEDURE — 99213 OFFICE O/P EST LOW 20 MIN: CPT | Performed by: NURSE PRACTITIONER

## 2022-06-13 PROCEDURE — 1123F ACP DISCUSS/DSCN MKR DOCD: CPT | Performed by: NURSE PRACTITIONER

## 2022-06-13 NOTE — PROGRESS NOTES
Gastroenterology Clinic Follow up Visit    Cherylynn Moritz  [id number]  Chief Complaint   Patient presents with    Follow-up     Patient here for 3 month follow up      HPI: 79 y.o. male following up after last GI clinic on 3/3/2022. S/p EGD 4/4/2022. Interval change: Patient reports doing well other than some increased fatigue during his chemotherapy weeks. He denies further issues with constipation with taking MiraLAX daily during his chemotherapy treatments. Reports he continues to follow with [facility] oncology, recently saw a surgeon who might consider surgery, removal of part of the pancreas and liver. States he is currently undergoing testing including a dedicated MRI of the liver and blood work to assess if he is a surgical candidate. He denies GERD symptoms (takes PPI daily), nausea/vomiting, hematemesis, dysphagia, abdominal pain, hematochezia, melena or weight loss. He denies overt signs of hepatic encephalopathy, increased abdominal girth or lower extremity edema. HPI from last GI clinic visit on 3/3/2022  summarized below:  Patient presents to the GI clinic, recently back from [place], with significant improvement in constipation symptoms. Having a bowel movement daily to every other day while taking MiraLAX and Metamucil. He endorses abdominal bloating/gas after eating a meal, states he takes Gas-X with relief of symptoms. He does report feeling fatigued more than usual.  States this began happening since he started chemotherapy around 9 months ago. States he currently receives injections every month. He does report longstanding history of difficulty with sleep for many years. States some nights he is a restless he just gets up and does not sleep hardly at all. However, lately he states he has been taking more naps (short 10-minute naps) throughout the day. Denies overt signs of HE (confusion, slurred speech or lethargy).  He denies increasing abdominal girth other than feeling bloated after eating (relief w/gasx). He denies lower extremity edema. He does report following with endocrinology every month, he continues to have trouble with controlling his glucose levels (reports highs and lows). He denies GERD symptoms, nausea/vomiting, hematemesis, dysphagia, abdominal pain, hematochezia, melena or weight loss. Patient denies chest pain, shortness of breath or palpitations. Smoking status: denies  Alcohol use: denies  Illicit drug use: denies  NSAID use: takes meloxicam daily, occasional ibuprofen when he has pain, around 1 time per month.     HPI from last GI clinic visit on 1/20/2022  summarized below:  Patient reports following up in the GI clinic regarding primary pancreatic neuroendocrine tumor, diagnosed in 2019.  States he was previously referred to Dr. Dwaine Roberts possible resection, however he was also found to have neuroendocrine tumor of the liver during the operation along with portal hypertension and it was decided the surgery was too risky at that time so he was closed up without any tumor resections. States he was placed on chemotherapy.  States he has been on oral chemo and now gets 1 IV per month every 4 weeks. He was referred to Dr. Wen for further management of his nonalcoholic cirrhosis of the liver and portal hypertension.  Patient reports Dr. Zeina Pineda it is reasonable for him to come back to our GI clinic for further management of his liver disease so patient does not have to drive so far.   Patient denies upper GI symptoms.  He endorses fatigue and constipation.  States he will have a dark green bowel movement every 2 days (small in caliber).  He endorses every couple of weeks he will have a very large/hard bowel movement.  States he has tried MiraLAX in the past, however this causes him to have \"brown splatter\" in the toilet.  States he takes Gas-X for excessive bloating with relief.     HPI from last GI clinic visit on 1/15/2020 summarized below:  Patient presents to the clinic to discuss getting further work-up before proceed with with surgical intervention for a neuroendocrine tumor on the tail of the pancreas.   Patient seeing Dr. Xin Mcduffie, surgical oncologist.   Patient without any complaints at this time. No F/C. No Cp,SOB, or palpitation. Occasional RUQ pain with certain foods. Intermittent diarrhea over the past 6 months. No melena or hematochezia.      HPI and A/P at last visits summarized below:  Presented to the hospital owing to abdominal pain. Patient reported that over the last week or so she been having epigastric pain associated with nausea vomiting and diarrhea. Symptoms seems has improving since admission. Patient currently denies any nausea, vomiting or diarrhea. Abdominal pain seems had resolved. Patient CT scan that was suggestive of cirrhosis. CT scan also showed lesions around 2 cm between the gastric wall and the pancreas. Review of lab work showed that patient had abnormal transaminases at least since 2017 with evidence of steatosis. Subsequently GI consult was obtained for further evaluation and management.      1-Abdominal pain, nausea, vomiting and diarrhea  Acute symptoms seems resolved at this time  Continue supportive measures, IV fluid and advance diet as tolerated. 2- Abnormal CT imaging /?  Pancreatic mass  CAT scan with contrast none clear that that showed a 2 cm soft tissue density with calcification near the tail of pancreas and adjacent to this gastric wall. ?  GIST tumor  Proceed with EGD/EUS for further evaluation as outpatient   3- Radiological evidence of cirrhosis/ Early cirrhosis  Patient has no clinical findings to suggest advanced liver disease  However review of serial lab work since 2017 showed Abnnormal transaminases and subsequent reversal of AST/ALT ratio in addition to thrombocytopenia  The Apri score for the patient most suggestive of advanced disease/ cirrhosis (76% sensitive and 72% specific for cirrhosis)  Patient's lab and radiological findings suggestive of advanced liver disease  Likely etiology of advanced fibrosis will be Sirisha Garbiel given the associated metabolic syndrome with hypertension dyslipidemia  Will proceed with correlation with FibroScan as outpatient and further work-up that may include viral further viral studies as well as other metabolic work-up.     Previous GI work up/Endoscopic investigations:  EGD 4/4/2022: Essentially normal upper endoscopy  Colonoscopy 1/20/2020: Entire examined colon is normal.  No specimens collected. FibroScan 1/22/2020:   Appropriate reading, Based on LSM of 27.5 Kpa, Evidence of advanced    fibrosis / Cirrhosis    Fibrosis stage IV- Cirrhosis  / F 4    Based on CAP of 288 db/M,  Mod-Severe Steatosis ( >33%)   Endoscopic ultrasound 12/17/19- - 21 x 15 mm hypoechoic mass at the tail of the pancreas, normal common bile duct, parenchymal changes suggestive of chronic pancreatitis, dilated pancreatic duct head of the pancreas, normal pancreatic duct body. Review of Systems   All other systems reviewed and are negative. Past medical history, past surgical history, medication list, social and familyhistory reviewed    Blood pressure 108/62, pulse 61, height 6' 1.5\" (1.867 m), weight 203 lb (92.1 kg), SpO2 100 %. Physical Exam  Constitutional:       General: He is not in acute distress. Appearance: Normal appearance. He is normal weight. He is not ill-appearing. HENT:      Head: Normocephalic and atraumatic. Eyes:      General: No scleral icterus. Cardiovascular:      Rate and Rhythm: Normal rate and regular rhythm. Pulses: Normal pulses. Pulmonary:      Effort: Pulmonary effort is normal. No respiratory distress. Breath sounds: Normal breath sounds. Abdominal:      General: Bowel sounds are normal. There is no distension. Palpations: Abdomen is soft. There is no mass. Tenderness: There is no abdominal tenderness.  There is no guarding or rebound. Musculoskeletal:         General: Normal range of motion. Lymphadenopathy:      Cervical: No cervical adenopathy. Skin:     General: Skin is warm and dry. Coloration: Skin is not jaundiced. Neurological:      Mental Status: He is alert and oriented to person, place, and time. Psychiatric:         Mood and Affect: Mood normal.         Behavior: Behavior normal.         Thought Content: Thought content normal.         Judgment: Judgment normal.       Laboratory, Pathology, Radiology reviewed in detail with relevantimportant investigations summarized below:    Lab Results   Component Value Date    WBC 5.6 04/15/2022    HGB 12.2 (L) 04/15/2022    HCT 36.9 (L) 04/15/2022    MCV 85.1 04/15/2022     (L) 04/15/2022     Lab Results   Component Value Date    ALT 31 04/15/2022    AST 37 04/15/2022    ALKPHOS 98 04/15/2022    BILITOT 0.5 04/15/2022     MRI abdomen with and without contrast 12/16/2021: Overall stable exam. Redemonstration of an approximate 1.4 x 2.2 cm enhancing mass at the distal pancreatic tail with not significantly changed.  No new evidence to suggest metastatic disease throughout the abdomen/pelvis.  Cirrhosis. Approximate 4 mm arterial enhancing focus in the segment VII not significantly changed.  Previously described 4 mm enhancing nodular focus of segment II is not seen on the current exam.  No new arterial enhancing hepatic lesion identified.  Splenomegaly as well as small caliber torturous varices near the GE junction, likely related to portal HTN.  Small low signal intensity gallstones in the dependent portion of the gallbladder towards the fundus.  No intrahepatic or extrahepatic biliary dilation. MRI abdomen with/without contrast 4/8/2022: No new change compared to nearly 4 months ago. Stable pancreatic tail mass. No new sites of metastatic disease of the abdomen/pelvis. Cirrhosis and portal hypertension.   No change in tiny arterial enhancing focus in the right hepatic lobe without contrast.  Trace pelvic free fluid. Abdominal/chest x-ray 4/27/2022: No radiographic evidence of acute intrathoracic process. Nonobstructive bowel gas pattern. Assessment and Plan:  Ebony Hayward 79 y.o. 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 Previously referred to Dr. Jocelyne Bae for possible resection. However, during surgery it was discovered that it had spread to his liver and patient also with evidence of liver cirrhosis/portal hypertension, therefore resection was aborted at that time. Patient follows with oncology, on oral and IV chemotherapy with no further evidence of continued spread per recent MRIs on 12/16/2021 and 4/8/2022. He is currently undergoing testing to see if he is a candidate for possible surgical resection at [facility]. Patient following at [address] [facility] [place], [place] for history of liver cirrhosis with likely etiology being MCNAMARA given his prior metabolic syndrome with HTN/dyslipidemia and FibroScan on 1/22/2020 revealing stage IV fibrosis F4, moderate to severe steatosis.     1. Neuroendocrine tumor of pancreas  -Recommend continued follow-up with patient's oncologist at [facility].  -Discussed with patient at length risk versus benefit of surgery with known cirrhosis. Patient understands risk of surgical complication up to and including death is higher with cirrhosis. 2. Hepatic fibrosis  3. Hepatic steatosis  4. Portal hypertension (HCC)  5. Splenomegaly  MELD score (9/7/21): 7 with 1.9% estimated 3 month mortality, CMP and INR ordered for recalculation  Cause of liver disease: Likely MCNAMARA  - Hx of SBP: no   - EGD for Variceal screening:  Completed 4/2022  [facility] screening[facility]surveillance: Patient with history of neuroendocrine tumor with metastasis to the liver, no new lesions identified on recent MRI 12/2021 or 4/2022.  He is to have repeat MRI of liver soon at [facility] with possible upcoming surgical resection of tumor. Will not order any repeat imaging at this time.  - Hepatic encephalopathy: No history. However, patient with increased fatigue with starting chemotherapy, longstanding history of trouble sleeping for many years, no new overt symptoms of HE. Will continue to monitor.  - Advised to limit/discontinue NSAID use   - Continue complete alcohol abstinence. Last drink >13 years ago. - Hepatitis status:               PCO A immunity: reactive 6/30/2020              Hep B immunity: non-reactive 6/30/2020              Hep C Status: non-reactive 6/30/2020     6.  Constipation, unspecified constipation type  - Symptoms currently resolved  -Continue of MiraLAX 17 g by mouth daily    Return in about 6 months (around 12/13/2022), or if symptoms worsen or fail to improve. MANUEL Mcdowell - CNP   Staff Gastroenterology Nurse Practitioner  [facility]    Please note this report has been partially produced using speech recognition software and contain errors related to that system including grammar, punctuation and spelling as well as words and phrases that may seem inappropriate. If there are questions or concerns please feel free to contact me to clarify.

## 2022-06-14 ENCOUNTER — OFFICE VISIT (OUTPATIENT)
Dept: FAMILY MEDICINE CLINIC | Age: 67
End: 2022-06-14

## 2022-06-14 VITALS
DIASTOLIC BLOOD PRESSURE: 62 MMHG | TEMPERATURE: 98 F | HEART RATE: 93 BPM | OXYGEN SATURATION: 97 % | HEIGHT: 74 IN | SYSTOLIC BLOOD PRESSURE: 124 MMHG | WEIGHT: 202.6 LBS | BODY MASS INDEX: 26 KG/M2

## 2022-06-14 DIAGNOSIS — R16.1 SPLENOMEGALY: ICD-10-CM

## 2022-06-14 DIAGNOSIS — M25.562 ACUTE PAIN OF LEFT KNEE: ICD-10-CM

## 2022-06-14 DIAGNOSIS — D3A.8 NEUROENDOCRINE TUMOR OF PANCREAS: ICD-10-CM

## 2022-06-14 DIAGNOSIS — K76.0 HEPATIC STEATOSIS: ICD-10-CM

## 2022-06-14 DIAGNOSIS — L03.116 CELLULITIS OF LEFT KNEE: ICD-10-CM

## 2022-06-14 DIAGNOSIS — M25.562 ACUTE PAIN OF LEFT KNEE: Primary | ICD-10-CM

## 2022-06-14 DIAGNOSIS — K74.00 HEPATIC FIBROSIS: ICD-10-CM

## 2022-06-14 DIAGNOSIS — K76.6 PORTAL HYPERTENSION (HCC): ICD-10-CM

## 2022-06-14 LAB
ALBUMIN SERPL-MCNC: 4.4 G/DL (ref 3.5–4.6)
ALP BLD-CCNC: 117 U/L (ref 35–104)
ALT SERPL-CCNC: 29 U/L (ref 0–41)
ANION GAP SERPL CALCULATED.3IONS-SCNC: 13 MEQ/L (ref 9–15)
AST SERPL-CCNC: 38 U/L (ref 0–40)
BASOPHILS ABSOLUTE: 0 K/UL (ref 0–0.2)
BASOPHILS RELATIVE PERCENT: 0.3 %
BILIRUB SERPL-MCNC: 1 MG/DL (ref 0.2–0.7)
BUN BLDV-MCNC: 25 MG/DL (ref 8–23)
CALCIUM SERPL-MCNC: 9.5 MG/DL (ref 8.5–9.9)
CHLORIDE BLD-SCNC: 99 MEQ/L (ref 95–107)
CO2: 29 MEQ/L (ref 20–31)
CREAT SERPL-MCNC: 1.21 MG/DL (ref 0.7–1.2)
EOSINOPHILS ABSOLUTE: 0.1 K/UL (ref 0–0.7)
EOSINOPHILS RELATIVE PERCENT: 0.6 %
GFR AFRICAN AMERICAN: >60
GFR NON-AFRICAN AMERICAN: 59.8
GLOBULIN: 3.6 G/DL (ref 2.3–3.5)
GLUCOSE BLD-MCNC: 162 MG/DL (ref 70–99)
HCT VFR BLD CALC: 42.2 % (ref 42–52)
HEMOGLOBIN: 13.5 G/DL (ref 14–18)
INR BLD: 1.1
LYMPHOCYTES ABSOLUTE: 1.1 K/UL (ref 1–4.8)
LYMPHOCYTES RELATIVE PERCENT: 9 %
MCH RBC QN AUTO: 27 PG (ref 27–31.3)
MCHC RBC AUTO-ENTMCNC: 32 % (ref 33–37)
MCV RBC AUTO: 84.5 FL (ref 80–100)
MONOCYTES ABSOLUTE: 0.9 K/UL (ref 0.2–0.8)
MONOCYTES RELATIVE PERCENT: 6.8 %
NEUTROPHILS ABSOLUTE: 10.5 K/UL (ref 1.4–6.5)
NEUTROPHILS RELATIVE PERCENT: 83.3 %
PDW BLD-RTO: 15.1 % (ref 11.5–14.5)
PLATELET # BLD: 157 K/UL (ref 130–400)
POTASSIUM SERPL-SCNC: 3.9 MEQ/L (ref 3.4–4.9)
PROTHROMBIN TIME: 13.8 SEC (ref 12.3–14.9)
RBC # BLD: 4.99 M/UL (ref 4.7–6.1)
SODIUM BLD-SCNC: 141 MEQ/L (ref 135–144)
TOTAL PROTEIN: 8 G/DL (ref 6.3–8)
URIC ACID, SERUM: 6.4 MG/DL (ref 3.4–7)
WBC # BLD: 12.6 K/UL (ref 4.8–10.8)

## 2022-06-14 PROCEDURE — 99213 OFFICE O/P EST LOW 20 MIN: CPT | Performed by: PHYSICIAN ASSISTANT

## 2022-06-14 PROCEDURE — 1123F ACP DISCUSS/DSCN MKR DOCD: CPT | Performed by: PHYSICIAN ASSISTANT

## 2022-06-14 RX ORDER — GREEN TEA/HOODIA GORDONII 315-12.5MG
1 CAPSULE ORAL DAILY
Qty: 30 TABLET | Refills: 1 | Status: SHIPPED | OUTPATIENT
Start: 2022-06-14 | End: 2022-07-27 | Stop reason: ALTCHOICE

## 2022-06-14 RX ORDER — CLINDAMYCIN HYDROCHLORIDE 300 MG/1
300 CAPSULE ORAL 3 TIMES DAILY
Qty: 30 CAPSULE | Refills: 0 | Status: SHIPPED | OUTPATIENT
Start: 2022-06-14 | End: 2022-06-24

## 2022-06-14 ASSESSMENT — ENCOUNTER SYMPTOMS
GASTROINTESTINAL NEGATIVE: 1
EYES NEGATIVE: 1
RESPIRATORY NEGATIVE: 1

## 2022-06-14 NOTE — PROGRESS NOTES
[facility]  [address] [facility] [address]  Dept: [phone]  Loc: [phone]     Pt Name: Chi Jha  MRN: [id number]  Jackgflewis 1955      HISTORY OF PRESENT ILLNESS    Chi Jha is a 79 y.o. male who presents to the [facility] with chief complaint of left knee pain that started today. He complains of mild redness on the knee and tenderness to touch. He has chronic calluses on both knees and he says he typically trims them. He did this about 3-4 days ago. He is currently battling pancreatic cancer and has an MRI scheduled for Thursday. His oncologist is through [facility]. Patient had hip replacement with Dr. Kt Hunter a few years ago. He has no fever and no other concerns at this time. REVIEW OF SYSTEMS       Review of Systems   Constitutional: Negative. HENT: Negative. Eyes: Negative. Respiratory: Negative. Cardiovascular: Negative. Gastrointestinal: Negative. Endocrine: Negative. Genitourinary: Negative. Musculoskeletal: Positive for arthralgias. Left knee pain and redness    Skin: Negative. Neurological: Negative. Psychiatric/Behavioral: Negative.           PAST MEDICAL HISTORY     Past Medical History:   Diagnosis Date    Cancer [facility])     Pancreatic    History of skin cancer     HTN (hypertension)     Malignant neoplasm of other parts of pancreas ([facility] [address]     Type II or unspecified type diabetes mellitus without mention of complication, not stated as uncontrolled          SURGICAL HISTORY       Past Surgical History:   Procedure Laterality Date    ABDOMEN SURGERY      COLONOSCOPY  02/20/2015    DR Jim Santos    COLONOSCOPY N/A 1/20/2020    COLONOSCOPY DIAGNOSTIC performed by Lm Wells MD at [address] [place] [address] ([facility]) N/A 12/17/2019    EUS performed by Lm Wells MD at [facility] ENDOSCOPY N/A 4/4/2022    EGD ESOPHAGOGASTRODUODENOSCOPY performed by Ketty Spencer MD at [address] [place] [address]       Current Outpatient Medications   Medication Sig Dispense Refill    clindamycin (CLEOCIN) 300 MG capsule Take 1 capsule by mouth 3 times daily for 10 days 30 capsule 0    Probiotic Acidophilus (FLORANEX) TABS Take 1 tablet by mouth daily 30 tablet 1    lisinopril (PRINIVIL;ZESTRIL) 20 MG tablet take 1 tablet by mouth once daily (Patient not taking: Reported on 6/13/2022) 30 tablet 0    carvedilol (COREG) 6.25 MG tablet Take 1 tablet by mouth 2 times daily 60 tablet 11    lisinopril (PRINIVIL;ZESTRIL) 20 MG tablet Take 1 tablet by mouth daily 30 tablet 11    chlorthalidone (HYGROTON) 25 MG tablet Take 1 tablet by mouth daily 30 tablet 11    lisinopril (PRINIVIL;ZESTRIL) 20 MG tablet take 1 tablet by mouth once daily 90 tablet 1    furosemide (LASIX) 20 MG tablet Take 1 tablet by mouth daily 60 tablet 3    aspirin 81 MG EC tablet Take 1 tablet by mouth daily 30 tablet 3    nitroGLYCERIN (NITROSTAT) 0.4 MG SL tablet up to max of 3 total doses. If no relief after 1 dose, call 911. 25 tablet 0    omeprazole (PRILOSEC) 40 MG delayed release capsule Take 1 capsule by mouth daily 30 capsule 3    insulin 70-30 (NOVOLIN 70/30) (70-30) 100 UNIT per ML injection vial Inject 40 Units into the skin 2 times daily 30 mL 5    lanreotide acetate (SOMATULINE) 120 MG/0.5ML SOLN depot injection Inject 120 mg into the skin once      meloxicam (MOBIC) 15 MG tablet take 1 tablet by mouth once daily 30 tablet 5    Polyethylene Glycol 3350 (MIRALAX PO) Take by mouth as needed       Continuous Blood Gluc Sensor (FREESTYLE NATALY 14 DAY SENSOR) MISC TEST DAILY AND AS NEEDED. CHANGE EVERY 2 WEEKS. 1 each 0    Continuous Blood Gluc  (FREESTYLE NATALY 14 DAY READER) KISHA Test daily and prn. Dx: DM2 1 Device 0     No current facility-administered medications for this visit.       ALLERGIES     Patient has no known allergies. FAMILY HISTORY       Family History   Problem Relation Age of Onset    Cancer Mother         lung cancer    Colon Cancer Neg Hx     Celiac Disease Neg Hx     Crohn's Disease Neg Hx     Coronary Art Dis Neg Hx           SOCIAL HISTORY       Social History     Socioeconomic History    Marital status:      Spouse name: None    Number of children: None    Years of education: None    Highest education level: None   Occupational History    None   Tobacco Use    Smoking status: Never Smoker    Smokeless tobacco: Never Used   Vaping Use    Vaping Use: Never used   Substance and Sexual Activity    Alcohol use: No    Drug use: No    Sexual activity: Yes     Partners: Female   Other Topics Concern    None   Social History Narrative    None     Social Determinants of Health     Financial Resource Strain: Low Risk     Difficulty of Paying Living Expenses: Not hard at all   Food Insecurity: No Food Insecurity    Worried About Running Out of Food in the Last Year: Never true    Alejandra of Food in the Last Year: Never true   Transportation Needs: No Transportation Needs    Lack of Transportation (Medical): No    Lack of Transportation (Non-Medical):  No   Physical Activity:     Days of Exercise per Week: Not on file    Minutes of Exercise per Session: Not on file   Stress:     Feeling of Stress : Not on file   Social Connections:     Frequency of Communication with Friends and Family: Not on file    Frequency of Social Gatherings with Friends and Family: Not on file    Attends Islam Services: Not on file    Active Member of Clubs or Organizations: Not on file    Attends Club or Organization Meetings: Not on file    Marital Status: Not on file   Intimate Partner Violence:     Fear of Current or Ex-Partner: Not on file    Emotionally Abused: Not on file    Physically Abused: Not on file    Sexually Abused: Not on file   Housing Stability:     Unable to Pay for Housing in the Last Year: Not on file    Number of Places Lived in the Last Year: Not on file    Unstable Housing in the Last Year: Not on file         PHYSICAL EXAM    (up to 7 for level 4, 8 or more for level 5)       Physical Exam  Constitutional:       General: He is not in acute distress. Appearance: He is well-developed. HENT:      Head: Normocephalic and atraumatic. Eyes:      Conjunctiva/sclera: Conjunctivae normal.      Pupils: Pupils are equal, round, and reactive to light. Cardiovascular:      Rate and Rhythm: Normal rate and regular rhythm. Heart sounds: No murmur heard. Pulmonary:      Effort: No respiratory distress. Breath sounds: Normal breath sounds. No wheezing or rales. Abdominal:      General: There is no distension. Palpations: Abdomen is soft. Tenderness: There is no abdominal tenderness. Musculoskeletal:         General: Normal range of motion. Cervical back: Normal range of motion and neck supple. Left knee: Bony tenderness present. Tenderness present. Comments: Left knee erythema    Skin:     General: Skin is warm and dry. Findings: No erythema or rash. Neurological:      Mental Status: He is alert and oriented to person, place, and time. Cranial Nerves: No cranial nerve deficit. Psychiatric:         Judgment: Judgment normal.           All other labs were within normal range or not returned as of this dictation. Vitals:    Vitals:    06/14/22 1334   BP: 124/62   Site: Right Upper Arm   Position: Sitting   Cuff Size: Medium Adult   Pulse: 93   Temp: 98 °F (36.7 °C)   TempSrc: Temporal   SpO2: 97%   Weight: 202 lb 9.6 oz (91.9 kg)   Height: 6' 1.5\" (1.867 m)     Patient will be covered with clindamycin and probiotics for initial therapy until other testing is obtained. He is to follow up with his orthopedic if symptoms worsen.   Treatment was initiated dure to wanting to avoid anything worsening with patients current health status. He verbalizes understanding of plan at discharge and has no further questions. DISPOSITION/PLAN   1. Acute pain of left knee    - CBC with Auto Differential; Future  - XR KNEE LEFT (3 VIEWS); Future  - Uric Acid; Future    2.  Cellulitis of left knee

## 2022-06-16 NOTE — PATIENT INSTRUCTIONS
Patient Education        Cellulitis: Care Instructions  Your Care Instructions     Cellulitis is a skin infection caused by bacteria, most often strep or staph. It often occurs after a break in the skin from a scrape, cut, bite, orpuncture, or after a rash. Cellulitis may be treated without doing tests to find out what caused it. But your doctor may do tests, if needed, to look for a specific bacteria, like methicillin-resistant Staphylococcus aureus (MRSA). The doctor has checked you carefully, but problems can develop later. If you notice any problems or new symptoms, get medical treatment right away. Follow-up care is a key part of your treatment and safety. Be sure to make and go to all appointments, and call your doctor if you are having problems. It's also a good idea to know your test results and keep alist of the medicines you take. How can you care for yourself at home?  Take your antibiotics as directed. Do not stop taking them just because you feel better. You need to take the full course of antibiotics.  Prop up the infected area on pillows to reduce pain and swelling. Try to keep the area above the level of your heart as often as you can.  If your doctor told you how to care for your wound, follow your doctor's instructions. If you did not get instructions, follow this general advice:  ? Wash the wound with clean water 2 times a day. Don't use hydrogen peroxide or alcohol, which can slow healing. ? You may cover the wound with a thin layer of petroleum jelly, such as Vaseline, and a nonstick bandage. ? Apply more petroleum jelly and replace the bandage as needed.  Be safe with medicines. Take pain medicines exactly as directed. ? If the doctor gave you a prescription medicine for pain, take it as prescribed. ? If you are not taking a prescription pain medicine, ask your doctor if you can take an over-the-counter medicine.   To prevent cellulitis in the future   Try to prevent cuts, scrapes, or other injuries to your skin. Cellulitis most often occurs where there is a break in the skin.  If you get a scrape, cut, mild burn, or bite, wash the wound with clean water as soon as you can to help avoid infection. Don't use hydrogen peroxide or alcohol, which can slow healing.  If you have swelling in your legs (edema), support stockings and good skin care may help prevent leg sores and cellulitis.  Take care of your feet, especially if you have diabetes or other conditions that increase the risk of infection. Wear shoes and socks. Do not go barefoot. If you have athlete's foot or other skin problems on your feet, talk to your doctor about how to treat them. When should you call for help? Call your doctor now or seek immediate medical care if:     You have signs that your infection is getting worse, such as:  ? Increased pain, swelling, warmth, or redness. ? Red streaks leading from the area. ? Pus draining from the area. ? A fever.      You get a rash. Watch closely for changes in your health, and be sure to contact your doctor if:     You do not get better as expected. Where can you learn more? Go to [email or web address] org and sign in to your [email or web address] account. Enter P504 in the [facility] box to learn more about \"Cellulitis: Care Instructions. \"     If you do not have an account, please click on the \"Sign Up Now\" link. Current as of: November 15, 2021               Content Version: 13.2  © 2006-2022 [employer]wise[employer] Inc[employer]. Care instructions adapted under license by [facility] ([facility]). If you have questions about a medical condition or this instruction, always ask your healthcare professional. [place] [address] any warranty or liability for your use of this information.

## 2022-06-21 ENCOUNTER — TELEPHONE (OUTPATIENT)
Dept: GASTROENTEROLOGY | Age: 67
End: 2022-06-21

## 2022-06-21 DIAGNOSIS — R79.9 ELEVATED BUN: Primary | ICD-10-CM

## 2022-06-21 DIAGNOSIS — R79.89 ELEVATED SERUM CREATININE: ICD-10-CM

## 2022-06-21 NOTE — TELEPHONE ENCOUNTER
Called patient. Discussed mildly elevated BUN and creatinine, likely secondary to dehydration. Discussed repeat lab work in a week or 2 to ensure resolution. Discussed staying adequately hydrated in the summer weather. Patient is agreeable and reports understanding.

## 2022-07-10 DIAGNOSIS — R73.9 ELEVATED BLOOD SUGAR: ICD-10-CM

## 2022-07-10 DIAGNOSIS — E08.65 DIABETES MELLITUS DUE TO UNDERLYING CONDITION, UNCONTROLLED, WITH HYPERGLYCEMIA (HCC): ICD-10-CM

## 2022-07-11 DIAGNOSIS — E08.65 DIABETES MELLITUS DUE TO UNDERLYING CONDITION, UNCONTROLLED, WITH HYPERGLYCEMIA (HCC): ICD-10-CM

## 2022-07-11 DIAGNOSIS — R73.9 ELEVATED BLOOD SUGAR: ICD-10-CM

## 2022-07-11 RX ORDER — FLASH GLUCOSE SENSOR
KIT MISCELLANEOUS
Qty: 1 EACH | Refills: 3 | Status: SHIPPED | OUTPATIENT
Start: 2022-07-11 | End: 2022-09-23

## 2022-07-12 RX ORDER — FLASH GLUCOSE SENSOR
KIT MISCELLANEOUS
Qty: 1 EACH | Refills: 0 | OUTPATIENT
Start: 2022-07-12

## 2022-07-25 LAB
AVERAGE GLUCOSE: NORMAL
HBA1C MFR BLD: 8.5 %

## 2022-07-27 ENCOUNTER — OFFICE VISIT (OUTPATIENT)
Dept: CARDIOLOGY CLINIC | Age: 67
End: 2022-07-27
Payer: MEDICARE

## 2022-07-27 VITALS
HEART RATE: 70 BPM | SYSTOLIC BLOOD PRESSURE: 132 MMHG | OXYGEN SATURATION: 99 % | WEIGHT: 206.2 LBS | BODY MASS INDEX: 26.46 KG/M2 | DIASTOLIC BLOOD PRESSURE: 72 MMHG | HEIGHT: 74 IN

## 2022-07-27 DIAGNOSIS — I10 HYPERTENSION, UNSPECIFIED TYPE: Primary | ICD-10-CM

## 2022-07-27 PROCEDURE — 99213 OFFICE O/P EST LOW 20 MIN: CPT | Performed by: INTERNAL MEDICINE

## 2022-07-27 PROCEDURE — 1123F ACP DISCUSS/DSCN MKR DOCD: CPT | Performed by: INTERNAL MEDICINE

## 2022-07-27 RX ORDER — INSULIN LISPRO 100 [IU]/ML
50 INJECTION, SOLUTION INTRAVENOUS; SUBCUTANEOUS 3 TIMES DAILY
COMMUNITY

## 2022-07-27 RX ORDER — INSULIN GLARGINE 100 [IU]/ML
INJECTION, SOLUTION SUBCUTANEOUS
COMMUNITY
Start: 2022-07-14

## 2022-07-27 ASSESSMENT — ENCOUNTER SYMPTOMS
COLOR CHANGE: 0
NAUSEA: 0
WHEEZING: 0
DIARRHEA: 0
SHORTNESS OF BREATH: 1
COUGH: 0
EYE REDNESS: 0
RHINORRHEA: 0
ABDOMINAL PAIN: 0
CHEST TIGHTNESS: 0
APNEA: 0
CONSTIPATION: 0
VOMITING: 0

## 2022-07-27 NOTE — PROGRESS NOTES
Chief Complaint   Patient presents with    3 Month Follow-Up     For HTN, HPL, and TIA. Results     Of Stress Test.        Patient presents for initial medical evaluation. Patient is followed on a regular basis by Dr. Hellen Church MD.     Hypertension  Hyperlipidemia  Diabetes  Pancreatic cancer on chemotherapy  Liver cirrhosis  TTE 12/11/2019 EF 65%  TTE 4/15/2022 EF 65%  Nuclear stress test 5/11/2022 EF 60% no evidence of ischemia or infarct  No prior coronary angiograms    7/27/2022  Follow-up visit on chest pain  Patient was instructed to have a nuclear stress test which on 4 5/11/2022 was reported as no ischemia nor infarct EF 60%  Patient still endorsing random episodes of chest discomfort after eating  Denies shortness of breath  On August 3 patient is scheduled to undergo pancreatic surgery    4/27/2022  First clinic visit follow-up on recent hospitalization  Patient was evaluated in the hospital on 4/14/2022 due to chest pain. At that time troponins were detectable but not significant peaked at 0.27. EKG did not show signs of ischemia. Patient underwent an echocardiogram which showed normal EF with no wall motion abnormality. Patient was discharged home with follow-up in clinic with me for a further ischemic evaluation  Patient comes with wife during this visit. States that he is not feeling well he is complaining of shortness of breath with physical activity. Reports that before going to the hospital was complaining of shortness of breath but after getting discharged this shortness of breath has been getting worse. Denies lower extremity edema  Denies paroxysmal nocturnal dyspnea no orthopnea.   Although at times patient has to switch sides when sleeping due to shortness of breath  Denies chest pains  Reports that his blood pressures at home are usually running In the 160s  Today during this visit systolics are in the 088H    Patient Active Problem List   Diagnosis    HTN (hypertension)    Type 2 diabetes mellitus without complication (HCC)    Lumbar paraspinal muscle spasm    Allergic rhinosinusitis    TIA (transient ischemic attack)    Abdominal pain    Malignant neoplasm of other parts of pancreas (HCC)    Other cirrhosis of liver (HCC)    Gastroesophageal reflux disease without esophagitis    Chest pain       Past Surgical History:   Procedure Laterality Date    ABDOMEN SURGERY      COLONOSCOPY  02/20/2015    DR Adore Chaparro    COLONOSCOPY N/A 1/20/2020    COLONOSCOPY DIAGNOSTIC performed by Heladio Lynn MD at [facility]) N/A 12/17/2019    EUS performed by Heladio Lynn MD at [address] N/A 4/4/2022    EGD ESOPHAGOGASTRODUODENOSCOPY performed by Heladio Lynn MD at [address] History     Socioeconomic History    Marital status:    Tobacco Use    Smoking status: Never    Smokeless tobacco: Never   Vaping Use    Vaping Use: Never used   Substance and Sexual Activity    Alcohol use: No    Drug use: No    Sexual activity: Yes     Partners: Female     Social Determinants of Health     Financial Resource Strain: Low Risk     Difficulty of Paying Living Expenses: Not hard at all   Food Insecurity: No Food Insecurity    Worried About Running Out of Food in the Last Year: Never true    Ran Out of Food in the Last Year: Never true   Transportation Needs: No Transportation Needs    Lack of Transportation (Medical): No    Lack of Transportation (Non-Medical): No       Family History   Problem Relation Age of Onset    Cancer Mother         lung cancer    Colon Cancer Neg Hx     Celiac Disease Neg Hx     Crohn's Disease Neg Hx     Coronary Art Dis Neg Hx        Current Outpatient Medications   Medication Sig Dispense Refill    LANTUS SOLOSTAR 100 UNIT/ML injection pen inject 50 units subcutaneously daily      insulin lispro (HUMALOG) 100 UNIT/ML SOLN injection vial Inject 50 Units into the skin 3 times daily      Continuous Blood Gluc Sensor (FREESTYLE NATALY 14 DAY SENSOR) MISC TEST DAILY AND AS NEEDED. CHANGE EVERY 2 WEEKS. 1 each 3    carvedilol (COREG) 6.25 MG tablet Take 1 tablet by mouth 2 times daily 60 tablet 11    lisinopril (PRINIVIL;ZESTRIL) 20 MG tablet Take 1 tablet by mouth daily 30 tablet 11    chlorthalidone (HYGROTON) 25 MG tablet Take 1 tablet by mouth daily 30 tablet 11    furosemide (LASIX) 20 MG tablet Take 1 tablet by mouth daily 60 tablet 3    aspirin 81 MG EC tablet Take 1 tablet by mouth daily 30 tablet 3    omeprazole (PRILOSEC) 40 MG delayed release capsule Take 1 capsule by mouth daily 30 capsule 3    lanreotide acetate (SOMATULINE) 120 MG/0.5ML SOLN depot injection Inject 120 mg into the skin once      meloxicam (MOBIC) 15 MG tablet take 1 tablet by mouth once daily 30 tablet 5    Polyethylene Glycol 3350 (MIRALAX PO) Take by mouth as needed       Continuous Blood Gluc  (FREESTYLE NATALY 14 DAY READER) KISHA Test daily and prn. Dx: DM2 1 Device 0    nitroGLYCERIN (NITROSTAT) 0.4 MG SL tablet up to max of 3 total doses. If no relief after 1 dose, call 911. (Patient not taking: Reported on 7/27/2022) 25 tablet 0     No current facility-administered medications for this visit. Patient has no known allergies. Review of Systems:  Review of Systems   Constitutional:  Negative for activity change, chills, diaphoresis and fever. HENT:  Negative for congestion, ear pain, nosebleeds and rhinorrhea. Eyes:  Negative for redness and visual disturbance. Respiratory:  Positive for shortness of breath. Negative for apnea, cough, chest tightness and wheezing. Cardiovascular:  Negative for chest pain, palpitations and leg swelling. Gastrointestinal:  Negative for abdominal pain, constipation, diarrhea, nausea and vomiting. Genitourinary:  Negative for difficulty urinating and dysuria. Musculoskeletal: Negative. Negative for joint swelling.    Skin:  Negative for color change, rash and wound. Neurological:  Negative for dizziness, syncope, weakness, numbness and headaches. Psychiatric/Behavioral: Negative. VITALS:  Blood pressure 132/72, pulse 70, height 6' 1.5\" (1.867 m), weight 206 lb 3.2 oz (93.5 kg), SpO2 99 %. Body mass index is 26.84 kg/m². Physical Examination:  Physical Exam  Vitals and nursing note reviewed. Constitutional:       Appearance: Normal appearance. HENT:      Head: Normocephalic and atraumatic. Mouth/Throat:      Mouth: Mucous membranes are moist.      Pharynx: Oropharynx is clear. Eyes:      Extraocular Movements: Extraocular movements intact. Conjunctiva/sclera: Conjunctivae normal.      Pupils: Pupils are equal, round, and reactive to light. Cardiovascular:      Rate and Rhythm: Normal rate and regular rhythm. Pulses: Normal pulses. Heart sounds: Normal heart sounds. Pulmonary:      Effort: Pulmonary effort is normal.      Breath sounds: Normal breath sounds. Abdominal:      General: Abdomen is flat. Bowel sounds are normal.      Palpations: Abdomen is soft. Musculoskeletal:         General: Normal range of motion. Cervical back: Normal range of motion and neck supple. Skin:     General: Skin is warm. Neurological:      General: No focal deficit present. Mental Status: He is alert and oriented to person, place, and time. Mental status is at baseline. Psychiatric:         Mood and Affect: Mood normal.      EKG: Normal sinus rhythm without signs of ischemia    No orders of the defined types were placed in this encounter.     The 10-year ASCVD risk score (Sheeba Mccollum, et al., 2013) is: 29.3%    Values used to calculate the score:      Age: 79 years      Sex: Male      Is Non- : No      Diabetic: Yes      Tobacco smoker: No      Systolic Blood Pressure: 479 mmHg      Is BP treated: Yes      HDL Cholesterol: 47 mg/dL      Total Cholesterol: 164 mg/dL     ASSESSMENT: Diagnosis Orders   1. Hypertension, unspecified type          PLAN:   Chest pain. Nuclear stress test 5/2022 negative for ischemia EF 60%  Continue aspirin and start atorvastatin  Continue beta-blocker    Risk stratification prior to undergoing pancreas cancer resection on August 3, 2022  Patient is an intermediate risk for any periprocedural complication  Patient does not have any factors that would prohibit his surgery  No ischemia on recent nuclear stress test  Patient is not in heart failure  No known valvular disease  No major arrhythmias  Patient can proceed with surgery as planned with appropriate precautions    Hypertension  It seems like it is not well controlled reports blood pressures at home ranging between 160s to 180s  Start lisinopril 40 mg daily  Start chlorthalidone 25 mg daily  Continue Coreg    Return to clinic in 3 months    Diabetes, pancreatic cancer and liver cirrhosis as per PCP and GI    Recommended patient to eat diets that emphasize high intakes of vegetables, fruits, nuts, whole grains, lean vegetable or animal protein, and fish.  As per 2019 ACC/AHA guideline on primary prevention of CVD     Recommended patient to engage in at least 150 minutes per week of accumulated moderate-intensity physical activity or 75 minutes per week of vigorous-intensity physical activity as per 2019 ACC/AHA guideline on primary prevention of CVD

## 2022-08-05 NOTE — TELEPHONE ENCOUNTER
Requesting medication refill. Please approve or deny this request.    Rx requested:  Requested Prescriptions     Pending Prescriptions Disp Refills    RA ASPIRIN EC 81 MG EC tablet [Pharmacy Med Name: RA ASPIRIN EC 81 MG TABLET] 90 tablet 3     Sig: take 1 tablet by mouth once daily         Last Office Visit:   Visit date not found      Next Visit Date:  Future Appointments   Date Time Provider Fiorella Horan   12/19/2022  9:00 AM MANUEL Salazar - CNP [facility]   1/11/2023 12:00 PM Nora Narayanan MD [facility]               Last refill 4/16/22. Please approve or deny.

## 2022-08-08 RX ORDER — ACETAMINOPHEN/DIPHENHYDRAMINE 500MG-25MG
TABLET ORAL
Qty: 90 TABLET | Refills: 3 | Status: SHIPPED | OUTPATIENT
Start: 2022-08-08

## 2022-08-08 RX ORDER — OMEPRAZOLE 40 MG/1
CAPSULE, DELAYED RELEASE ORAL
Qty: 30 CAPSULE | Refills: 3 | Status: SHIPPED | OUTPATIENT
Start: 2022-08-08

## 2022-09-19 ENCOUNTER — OFFICE VISIT (OUTPATIENT)
Dept: FAMILY MEDICINE CLINIC | Age: 67
End: 2022-09-19
Payer: MEDICARE

## 2022-09-19 VITALS
HEIGHT: 74 IN | HEART RATE: 84 BPM | DIASTOLIC BLOOD PRESSURE: 62 MMHG | BODY MASS INDEX: 23.87 KG/M2 | TEMPERATURE: 98.2 F | WEIGHT: 186 LBS | OXYGEN SATURATION: 98 % | SYSTOLIC BLOOD PRESSURE: 120 MMHG

## 2022-09-19 DIAGNOSIS — Z90.410 HISTORY OF PANCREATECTOMY: ICD-10-CM

## 2022-09-19 DIAGNOSIS — C25.7 MALIGNANT NEOPLASM OF OTHER PARTS OF PANCREAS (HCC): Primary | ICD-10-CM

## 2022-09-19 PROCEDURE — G0009 ADMIN PNEUMOCOCCAL VACCINE: HCPCS | Performed by: STUDENT IN AN ORGANIZED HEALTH CARE EDUCATION/TRAINING PROGRAM

## 2022-09-19 PROCEDURE — 90732 PPSV23 VACC 2 YRS+ SUBQ/IM: CPT | Performed by: STUDENT IN AN ORGANIZED HEALTH CARE EDUCATION/TRAINING PROGRAM

## 2022-09-19 PROCEDURE — 1123F ACP DISCUSS/DSCN MKR DOCD: CPT | Performed by: STUDENT IN AN ORGANIZED HEALTH CARE EDUCATION/TRAINING PROGRAM

## 2022-09-19 PROCEDURE — 99213 OFFICE O/P EST LOW 20 MIN: CPT | Performed by: STUDENT IN AN ORGANIZED HEALTH CARE EDUCATION/TRAINING PROGRAM

## 2022-09-19 RX ORDER — ONDANSETRON 8 MG/1
TABLET, ORALLY DISINTEGRATING ORAL
COMMUNITY
Start: 2022-08-19

## 2022-09-19 ASSESSMENT — ENCOUNTER SYMPTOMS
VOMITING: 0
SHORTNESS OF BREATH: 0
SORE THROAT: 0
COUGH: 0
SINUS PRESSURE: 0
ABDOMINAL PAIN: 0

## 2022-09-19 NOTE — PROGRESS NOTES
After obtaining consent, and per orders of Dr. Shyam Hill, injection of Pneumo 23 given in Right deltoid by Cassandra Colbert MA. Patient instructed to remain in clinic for 20 minutes afterwards, and to report any adverse reaction to me immediately.

## 2022-09-22 DIAGNOSIS — E08.65 DIABETES MELLITUS DUE TO UNDERLYING CONDITION, UNCONTROLLED, WITH HYPERGLYCEMIA (HCC): ICD-10-CM

## 2022-09-22 DIAGNOSIS — R73.9 ELEVATED BLOOD SUGAR: ICD-10-CM

## 2022-09-23 RX ORDER — FLASH GLUCOSE SENSOR
KIT MISCELLANEOUS
Qty: 1 EACH | Refills: 5 | Status: SHIPPED | OUTPATIENT
Start: 2022-09-23

## 2022-09-23 NOTE — TELEPHONE ENCOUNTER
Future Appointments    Encounter Information    Provider Department Appt Notes   12/19/2022 Keon Gaspar, MANUEL - [facility] Gastroenterology 6m f/u   1/11/2023 Tray Cho, [address] [place] [address] Cardiology 6 months     Past Visits    Date Provider Specialty Visit Type Primary Dx   09/19/2022 Gera Rich DO Family Medicine Office Visit Malignant neoplasm of other parts of pancreas [facility])   07/27/2022 Tray Cho MD Cardiology Office Visit Hypertension, unspecified type   06/14/2022 Maximo Carver Family Medicine Office Visit Acute pain of left knee   06/13/2022 Keon Gaspar, MANUEL - CNP Gastroenterology Office Visit Hepatic fibrosis   04/27/2022 Tray Cho MD Cardiology Office Visit Hypertension, unspecified type

## 2022-10-19 DIAGNOSIS — I10 HYPERTENSION, UNSPECIFIED TYPE: ICD-10-CM

## 2022-10-19 RX ORDER — LISINOPRIL 20 MG/1
20 TABLET ORAL DAILY
Qty: 90 TABLET | Refills: 3 | Status: SHIPPED | OUTPATIENT
Start: 2022-10-19

## 2022-10-19 NOTE — TELEPHONE ENCOUNTER
Requesting medication refill. Please approve or deny this request.    Rx requested:  Requested Prescriptions     Pending Prescriptions Disp Refills    lisinopril (PRINIVIL;ZESTRIL) 20 MG tablet 90 tablet 3     Sig: Take 1 tablet by mouth daily         Last Office Visit:   7/27/2022      Next Visit Date:  Future Appointments   Date Time Provider Fiorella Horan   12/19/2022  9:00 AM MANUEL Kirby - CNP [address]   1/11/2023 12:00 PM Luis Angel Beltre MD [facility]               Last refill 5/27/22. Please approve or deny.

## 2022-10-24 ENCOUNTER — OFFICE VISIT (OUTPATIENT)
Dept: FAMILY MEDICINE CLINIC | Age: 67
End: 2022-10-24
Payer: MEDICARE

## 2022-10-24 VITALS
DIASTOLIC BLOOD PRESSURE: 68 MMHG | TEMPERATURE: 97.8 F | OXYGEN SATURATION: 98 % | WEIGHT: 186 LBS | BODY MASS INDEX: 23.87 KG/M2 | HEART RATE: 83 BPM | HEIGHT: 74 IN | SYSTOLIC BLOOD PRESSURE: 116 MMHG

## 2022-10-24 DIAGNOSIS — J06.9 VIRAL URI: Primary | ICD-10-CM

## 2022-10-24 DIAGNOSIS — R11.0 NAUSEA: ICD-10-CM

## 2022-10-24 LAB
INFLUENZA A ANTIBODY: NORMAL
INFLUENZA B ANTIBODY: NORMAL
Lab: NORMAL
PERFORMING INSTRUMENT: NORMAL
QC PASS/FAIL: NORMAL
SARS-COV-2, POC: NORMAL

## 2022-10-24 PROCEDURE — 99213 OFFICE O/P EST LOW 20 MIN: CPT

## 2022-10-24 PROCEDURE — 87426 SARSCOV CORONAVIRUS AG IA: CPT

## 2022-10-24 PROCEDURE — 87804 INFLUENZA ASSAY W/OPTIC: CPT

## 2022-10-24 PROCEDURE — 1123F ACP DISCUSS/DSCN MKR DOCD: CPT

## 2022-10-24 RX ORDER — ONDANSETRON 4 MG/1
4 TABLET, FILM COATED ORAL 3 TIMES DAILY PRN
Qty: 30 TABLET | Refills: 0 | Status: SHIPPED | OUTPATIENT
Start: 2022-10-24

## 2022-10-24 ASSESSMENT — ENCOUNTER SYMPTOMS
SORE THROAT: 0
ABDOMINAL DISTENTION: 0
VOMITING: 0
SHORTNESS OF BREATH: 0
ABDOMINAL PAIN: 0
HEARTBURN: 1
WHEEZING: 0
RHINORRHEA: 1
NAUSEA: 1
SINUS PAIN: 0
SINUS PRESSURE: 0
COUGH: 1
DIARRHEA: 0
CONSTIPATION: 0

## 2022-10-24 NOTE — PROGRESS NOTES
[address] Encounter    SUBJECTIVE    CHIEF COMPLAINT:   Chief Complaint   Patient presents with    Other     Congestion, upset stomach        HPI:  Sara El is a 79 y.o. male who presents to the walk-in clinic today for:     Cough  This is a new problem. Episode onset: x4 days. The problem has been unchanged. The problem occurs hourly. The cough is Non-productive. Associated symptoms include heartburn (intermittent since surgery in August), nasal congestion, postnasal drip, rhinorrhea and weight loss. Pertinent negatives include no chest pain, chills, ear congestion, ear pain, fever, headaches, myalgias, rash, sore throat, shortness of breath or wheezing. Associated symptoms comments: nausea. The symptoms are aggravated by lying down. Treatments tried: gas x. The treatment provided mild relief.      Past Medical History:   Diagnosis Date    Cancer [facility])     Pancreatic    History of skin cancer     HTN (hypertension)     Malignant neoplasm of other parts of pancreas (HCC)     Type II or unspecified type diabetes mellitus without mention of complication, not stated as uncontrolled        Current Outpatient Medications on File Prior to Visit   Medication Sig Dispense Refill    lisinopril (PRINIVIL;ZESTRIL) 20 MG tablet Take 1 tablet by mouth daily 90 tablet 3    Continuous Blood Gluc Sensor (FREESTYLE NATALY 14 DAY SENSOR) [facility] apply 1 SENSOR to back OF UPPER ARM REMOVE AND REPLACE every 14 days use with DEVICE to MONITOR BLOOD SUGAR 1 each 5    ondansetron (ZOFRAN-ODT) 8 MG TBDP disintegrating tablet dissolve 1 tablet ON TONGUE every 8 hours if needed nausea      omeprazole (PRILOSEC) 40 MG delayed release capsule take 1 capsule by mouth once daily 30 capsule 3    RA ASPIRIN EC 81 MG EC tablet take 1 tablet by mouth once daily 90 tablet 3    LANTUS SOLOSTAR 100 UNIT/ML injection pen inject 50 units subcutaneously daily      insulin lispro (HUMALOG) 100 UNIT/ML SOLN injection vial Inject 50 Units into the skin 3 times daily      carvedilol (COREG) 6.25 MG tablet Take 1 tablet by mouth 2 times daily 60 tablet 11    chlorthalidone (HYGROTON) 25 MG tablet Take 1 tablet by mouth daily 30 tablet 11    nitroGLYCERIN (NITROSTAT) 0.4 MG SL tablet up to max of 3 total doses. If no relief after 1 dose, call 911. 25 tablet 0    lanreotide acetate (SOMATULINE) 120 MG/0.5ML SOLN depot injection Inject 120 mg into the skin once      meloxicam (MOBIC) 15 MG tablet take 1 tablet by mouth once daily 30 tablet 5    Polyethylene Glycol 3350 (MIRALAX PO) Take by mouth as needed       Continuous Blood Gluc  ([employer] 14 DAY READER) KISHA Test daily and prn. Dx: DM2 1 Device 0     No current facility-administered medications on file prior to visit. Family History   Problem Relation Age of Onset    Cancer Mother         lung cancer    Colon Cancer Neg Hx     Celiac Disease Neg Hx     Crohn's Disease Neg Hx     Coronary Art Dis Neg Hx        Social History     Socioeconomic History    Marital status:      Spouse name: Not on file    Number of children: Not on file    Years of education: Not on file    Highest education level: Not on file   Occupational History    Not on file   Tobacco Use    Smoking status: Never    Smokeless tobacco: Never   Vaping Use    Vaping Use: Never used   Substance and Sexual Activity    Alcohol use: No    Drug use: No    Sexual activity: Yes     Partners: Female   Other Topics Concern    Not on file   Social History Narrative    Not on file     Social Determinants of Health     Financial Resource Strain: Low Risk     Difficulty of Paying Living Expenses: Not hard at all   Food Insecurity: No Food Insecurity    Worried About Running Out of Food in the Last Year: Never true    [address] in the Last Year: Never true   Transportation Needs: No Transportation Needs    Lack of Transportation (Medical): No    Lack of Transportation (Non-Medical):  No Physical Activity: Not on file   Stress: Not on file   Social Connections: Not on file   Intimate Partner Violence: Not on file   Housing Stability: Not on file       No Known Allergies    Review of Systems   Constitutional:  Positive for weight loss. Negative for chills and fever. HENT:  Positive for congestion, postnasal drip, rhinorrhea and sneezing. Negative for ear pain, sinus pressure, sinus pain and sore throat. Respiratory:  Positive for cough. Negative for shortness of breath and wheezing. Cardiovascular:  Negative for chest pain. Gastrointestinal:  Positive for heartburn (intermittent since surgery in August) and nausea. Negative for abdominal distention, abdominal pain, constipation, diarrhea and vomiting. Genitourinary:  Negative for difficulty urinating, frequency and urgency. Musculoskeletal:  Negative for myalgias. Skin:  Negative for rash. Allergic/Immunologic: Positive for immunocompromised state. Neurological:  Negative for headaches. OBJECTIVE:  VITALS:  /68   Pulse 83   Temp 97.8 °F (36.6 °C)   Ht 6' 1.5\" (1.867 m)   Wt 186 lb (84.4 kg)   SpO2 98%   BMI 24.21 kg/m²     Physical Exam  Vitals reviewed. Constitutional:       General: He is not in acute distress. Appearance: Normal appearance. He is normal weight. He is not ill-appearing. HENT:      Head: Normocephalic. Right Ear: Tympanic membrane, ear canal and external ear normal. There is no impacted cerumen. Left Ear: Tympanic membrane, ear canal and external ear normal. There is no impacted cerumen. Nose: Congestion and rhinorrhea present. Mouth/Throat:      Mouth: Mucous membranes are moist.      Pharynx: Oropharynx is clear. No oropharyngeal exudate or posterior oropharyngeal erythema. Cardiovascular:      Rate and Rhythm: Normal rate and regular rhythm. Pulses: Normal pulses. Heart sounds: Normal heart sounds. No murmur heard. No friction rub. No gallop. Pulmonary:      Effort: Pulmonary effort is normal.      Breath sounds: Normal breath sounds. No wheezing, rhonchi or rales. Abdominal:      General: A surgical scar is present. Bowel sounds are normal.      Palpations: Abdomen is soft. There is no mass. Tenderness: There is abdominal tenderness in the left lower quadrant. Musculoskeletal:      Cervical back: Normal range of motion and neck supple. Lymphadenopathy:      Cervical: No cervical adenopathy. Skin:     General: Skin is warm and dry. Findings: No erythema or rash. Neurological:      Mental Status: He is alert and oriented to person, place, and time. ASSESSMENT/PLAN:    1. Viral URI  - POCT Influenza A/B  - POCT COVID-19, Antigen  - OTC mucinex  - Afrin as needed for severe congestion - do not take longer than 3 days  - Increase fluids + rest  - Declined medication for cough    2. Nausea  - ondansetron (ZOFRAN) 4 MG tablet; Take 1 tablet by mouth 3 times daily as needed for Nausea or Vomiting  Dispense: 30 tablet; Refill: 0      LABS:  No results found for this visit on 10/24/22. Return in about 1 week (around 10/31/2022) for reassessment with PCP. Follow up with PCP to evaluate treatment results or return if symptoms worsen or fail to improve. Discussed signs and symptoms which require immediate follow-up in ED/call to 911. Understanding verbalized. All prescribed medication today including purpose, proper use, and side effects discussed. Treatment plan/rationale and result expectations have been discussed with the patient who expresses understanding and desires to proceed. An electronic signature was used to authenticate this note.   Rach Fuller, APRN - CNP

## 2022-10-24 NOTE — PATIENT INSTRUCTIONS
- OTC mucinex  - Afrin as needed for severe congestion - do not take longer than 3 days  - Increase fluids + rest    Viral Respiratory Infection: Care Instructions  Overview     A viral respiratory infection is an infection of the nose, sinuses, or throat caused by a virus. Colds and the flu are common types of viral respiratory infections. The symptoms of a viral respiratory infection often start quickly. They include a fever, sore throat, and runny nose. You may also just not feel well. Or youmay not want to eat much. Most viral infections can be treated with home care. This may include drinking lots of fluids and taking over-the-counter pain medicine. You will probablyfeel better in 4 to 10 days. Antibiotics are not used to treat a viral infection. Antibiotics don't killviruses, so they won't help cure a viral illness. In some cases, a doctor may prescribe antiviral medicine to help your bodyfight a serious viral infection. Follow-up care is a key part of your treatment and safety. Be sure to make and go to all appointments, and call your doctor if you are having problems. It's also a good idea to know your test results and keep alist of the medicines you take. How can you care for yourself at home? To prevent dehydration, drink plenty of fluids. Choose water and other clear liquids until you feel better. If you have kidney, heart, or liver disease and have to limit fluids, talk with your doctor before you increase the amount of fluids you drink. Ask your doctor if you can take an over-the-counter pain medicine, such as acetaminophen (Tylenol), ibuprofen (Advil, Motrin), or naproxen (Aleve). Be safe with medicines. Read and follow all instructions on the label. No one younger than 20 should take aspirin. It has been linked to Reye syndrome, a serious illness. Be careful when taking over-the-counter cold or flu medicines and Tylenol at the same time.  Many of these medicines have acetaminophen, which is Tylenol. Read the labels to make sure that you are not taking more than the recommended dose. Too much acetaminophen (Tylenol) can be harmful. Get plenty of rest.  Use saline (saltwater) nasal washes to help keep your nasal passages open and wash out mucus and allergens. You can buy saline nose sprays at a grocery store or drugstore. Follow the instructions on the package. Or you can make your own at home. Add 1 teaspoon of non-iodized salt and 1 teaspoon of baking soda to 2 cups of distilled or boiled and cooled water. Fill a squeeze bottle or neti pot with the nasal wash. Then put the tip into your nostril, and lean over the sink. With your mouth open, gently squirt the liquid. Repeat on the other side. Use a vaporizer or humidifier to add moisture to your bedroom. Follow the instructions for cleaning the machine. Do not smoke or allow others to smoke around you. If you need help quitting, talk to your doctor about stop-smoking programs and medicines. These can increase your chances of quitting for good. When should you call for help? Call 911 anytime you think you may need emergency care. For example, call if:    You have severe trouble breathing. Call your doctor now or seek immediate medical care if:    You have a new or higher fever. Your fever lasts more than 48 hours. You have trouble breathing. You have a fever with a stiff neck or a severe headache. You are sensitive to light. You feel very sleepy or confused. Watch closely for changes in your health, and be sure to contact your doctor if:    You do not get better as expected. Where can you learn more? Go to https://[employer].SalesPortal. org and sign in to your [employer] account. Enter E686 in the [employer] box to learn more about \"Viral Respiratory Infection: Care Instructions. \"     If you do not have an account, please click on the \"Sign Up Now\" link.   Current as of: February 9, 2022 Content Version: 13.3  © 2318-9118 Healthwise, Incorporated. Care instructions adapted under license by [facility] ([facility]). If you have questions about a medical condition or this instruction, always ask your healthcare professional. [address] any warranty or liability for your use of this information.

## 2022-11-13 DIAGNOSIS — M19.90 OSTEOARTHRITIS, UNSPECIFIED OSTEOARTHRITIS TYPE, UNSPECIFIED SITE: ICD-10-CM

## 2022-11-13 NOTE — TELEPHONE ENCOUNTER
Appointments    Encounter Information    Provider Department Appt Notes   12/19/2022 Gama Rodriguez, MANUEL - [address] [facility] Gastroenterology 6m f/u   1/11/2023 Shobha Swift, [address] [place] [address] Cardiology 6 months     Past Visits    Date Provider Specialty Visit Type Primary Dx   10/24/2022 MANUEL Gomez - CNP Family Medicine Office Visit Viral URI   09/19/2022 Joseluis Noland DO Family Medicine Office Visit Malignant neoplasm of other parts of pancreas [facility])   07/27/2022 Shobha Swift MD Cardiology Office Visit Hypertension, unspecified type   06/14/2022 Lucia Heard Family Medicine Office Visit Acute pain of left knee   06/13/2022 MANUEL Damon CNP Gastroenterology Office Visit Hepatic

## 2022-11-14 RX ORDER — MELOXICAM 15 MG/1
TABLET ORAL
Qty: 30 TABLET | Refills: 5 | Status: SHIPPED | OUTPATIENT
Start: 2022-11-14

## 2022-12-10 ENCOUNTER — OFFICE VISIT (OUTPATIENT)
Dept: FAMILY MEDICINE CLINIC | Age: 67
End: 2022-12-10
Payer: MEDICARE

## 2022-12-10 VITALS
OXYGEN SATURATION: 97 % | WEIGHT: 186 LBS | DIASTOLIC BLOOD PRESSURE: 72 MMHG | HEART RATE: 93 BPM | SYSTOLIC BLOOD PRESSURE: 122 MMHG | TEMPERATURE: 98.8 F | BODY MASS INDEX: 24.21 KG/M2

## 2022-12-10 DIAGNOSIS — B37.0 ORAL THRUSH: ICD-10-CM

## 2022-12-10 DIAGNOSIS — J06.9 URI WITH COUGH AND CONGESTION: Primary | ICD-10-CM

## 2022-12-10 DIAGNOSIS — R53.83 FATIGUE, UNSPECIFIED TYPE: ICD-10-CM

## 2022-12-10 PROCEDURE — 3074F SYST BP LT 130 MM HG: CPT | Performed by: NURSE PRACTITIONER

## 2022-12-10 PROCEDURE — 87804 INFLUENZA ASSAY W/OPTIC: CPT | Performed by: NURSE PRACTITIONER

## 2022-12-10 PROCEDURE — 87426 SARSCOV CORONAVIRUS AG IA: CPT | Performed by: NURSE PRACTITIONER

## 2022-12-10 PROCEDURE — 3078F DIAST BP <80 MM HG: CPT | Performed by: NURSE PRACTITIONER

## 2022-12-10 PROCEDURE — 99213 OFFICE O/P EST LOW 20 MIN: CPT | Performed by: NURSE PRACTITIONER

## 2022-12-10 PROCEDURE — 1123F ACP DISCUSS/DSCN MKR DOCD: CPT | Performed by: NURSE PRACTITIONER

## 2022-12-10 RX ORDER — TRAMADOL HYDROCHLORIDE 50 MG/1
TABLET ORAL
COMMUNITY
Start: 2022-08-05

## 2022-12-10 RX ORDER — LISINOPRIL 20 MG/1
TABLET ORAL
COMMUNITY
Start: 2022-07-20

## 2022-12-10 RX ORDER — OMEPRAZOLE 40 MG/1
CAPSULE, DELAYED RELEASE ORAL
COMMUNITY
Start: 2022-08-08

## 2022-12-10 RX ORDER — AMOXICILLIN 500 MG/1
500 CAPSULE ORAL 3 TIMES DAILY
Qty: 21 CAPSULE | Refills: 0 | Status: SHIPPED | OUTPATIENT
Start: 2022-12-10 | End: 2022-12-17

## 2022-12-10 RX ORDER — INSULIN GLARGINE 100 [IU]/ML
INJECTION, SOLUTION SUBCUTANEOUS
COMMUNITY
Start: 2022-07-14

## 2022-12-10 RX ORDER — GUAIFENESIN 600 MG/1
600 TABLET, EXTENDED RELEASE ORAL 2 TIMES DAILY
Qty: 30 TABLET | Refills: 0 | Status: SHIPPED | OUTPATIENT
Start: 2022-12-10 | End: 2022-12-25

## 2022-12-10 RX ORDER — DOCUSATE SODIUM, 50 MG SENNOSIDES, 8.6 MG 8.6; 5 1/1; 1/1
CAPSULE, GELATIN COATED ORAL
COMMUNITY
Start: 2022-08-10

## 2022-12-10 RX ORDER — CARVEDILOL 6.25 MG/1
TABLET ORAL
COMMUNITY
Start: 2022-08-04

## 2022-12-10 RX ORDER — DEXTROMETHORPHAN POLISTIREX 30 MG/5ML
60 SUSPENSION ORAL 2 TIMES DAILY PRN
Qty: 148 ML | Refills: 0 | Status: SHIPPED | OUTPATIENT
Start: 2022-12-10 | End: 2022-12-20

## 2022-12-10 RX ORDER — CLOTRIMAZOLE 10 MG/1
10 LOZENGE ORAL; TOPICAL
Qty: 50 TABLET | Refills: 0 | Status: SHIPPED | OUTPATIENT
Start: 2022-12-10 | End: 2022-12-20

## 2022-12-10 RX ORDER — CHLORTHALIDONE 25 MG/1
TABLET ORAL
COMMUNITY
Start: 2022-07-26

## 2022-12-10 ASSESSMENT — ENCOUNTER SYMPTOMS
COUGH: 1
RHINORRHEA: 0
NAUSEA: 0
DIARRHEA: 0
ABDOMINAL PAIN: 0
CHEST TIGHTNESS: 1

## 2022-12-10 NOTE — PROGRESS NOTES
Subjective  Goldie , 79 y.o. male presents today with:  Chief Complaint   Patient presents with    Other     Thursday evening sx started  Headache,chest congestion, burning in chest, sob  Wife tested + for covid        HPI  Presents to [facility] for viral testing   Wife positive COVID-19 currently   Started to feel unwell Thursday   Recent exposure flu   Cough/chest congestion, PND, SOB with exertion and headache   Thick white phlegm with cough   C/o chest tightness   Denies SOB  Denies N/V/D  Fatigued   Tmax 100F. Thursday evening   Sleep unchanged   Tylenol & Mucinex                       Past Medical History:   Diagnosis Date    Cancer [facility])     Pancreatic    History of skin cancer     HTN (hypertension)     Malignant neoplasm of other parts of pancreas ([facility] [address]     Type II or unspecified type diabetes mellitus without mention of complication, not stated as uncontrolled       Past Surgical History:   Procedure Laterality Date    ABDOMEN SURGERY      COLONOSCOPY  02/20/2015    DR Laura Agarwal    COLONOSCOPY N/A 1/20/2020    COLONOSCOPY DIAGNOSTIC performed by Maia Le MD at [address] [facility]) N/A 12/17/2019    EUS performed by Maia Le MD at [address] [facility] N/A 4/4/2022    EGD ESOPHAGOGASTRODUODENOSCOPY performed by Maia Le MD at [facility]     Family History   Problem Relation Age of Onset    Cancer Mother         lung cancer    Colon Cancer Neg Hx     Celiac Disease Neg Hx     Crohn's Disease Neg Hx     Coronary Art Dis Neg Hx            Review of Systems   Constitutional:  Positive for activity change, diaphoresis and fatigue. Negative for appetite change and chills. Fever: low grade. HENT:  Negative for congestion, ear pain and rhinorrhea. Sore throat: d/t cough. Respiratory:  Positive for cough and chest tightness.  Shortness of breath: exertional.   Cardiovascular:  Negative for chest pain and palpitations. Gastrointestinal:  Negative for abdominal pain, diarrhea and nausea. Musculoskeletal:  Negative for myalgias. Neurological:  Positive for headaches (dull). Negative for dizziness and light-headedness. Psychiatric/Behavioral:  Sleep disturbance: chronic issue. PMH, Surgical Hx, Family Hx, and Social Hx reviewed and updated. Objective  Vitals:    12/10/22 1116   BP: 122/72   Pulse: 93   Temp: 98.8 °F (37.1 °C)   SpO2: 97%   Weight: 186 lb (84.4 kg)     BP Readings from Last 3 Encounters:   12/10/22 122/72   10/24/22 116/68   09/19/22 120/62     Wt Readings from Last 3 Encounters:   12/10/22 186 lb (84.4 kg)   10/24/22 186 lb (84.4 kg)   09/19/22 186 lb (84.4 kg)           Physical Exam  Vitals reviewed. Constitutional:       General: He is not in acute distress. Appearance: He is ill-appearing. He is not toxic-appearing. HENT:      Right Ear: Tympanic membrane, ear canal and external ear normal.      Left Ear: Tympanic membrane, ear canal and external ear normal.      Nose: Congestion present. Right Sinus: No maxillary sinus tenderness or frontal sinus tenderness. Left Sinus: No maxillary sinus tenderness or frontal sinus tenderness. Mouth/Throat:      Lips: Pink. Mouth: Mucous membranes are moist. Oral lesions: thrush present. Pharynx: Uvula midline. Oropharyngeal exudate present. No pharyngeal swelling, posterior oropharyngeal erythema or uvula swelling. Eyes:      General: Lids are normal. Vision grossly intact. Conjunctiva/sclera: Conjunctivae normal.   Cardiovascular:      Rate and Rhythm: Normal rate. Pulmonary:      Effort: Pulmonary effort is normal.      Breath sounds: Normal breath sounds and air entry. Musculoskeletal:         General: Normal range of motion. Cervical back: Normal range of motion. No rigidity.    Lymphadenopathy:      Head:      Right side of head: No submental, submandibular, tonsillar, preauricular or posterior auricular adenopathy. Left side of head: No submental, submandibular, tonsillar, preauricular or posterior auricular adenopathy. Cervical: No cervical adenopathy. Skin:     General: Skin is warm and dry. Capillary Refill: Capillary refill takes less than 2 seconds. Coloration: Skin is not pale. Neurological:      General: No focal deficit present. Mental Status: He is alert and oriented to person, place, and time. Assessment & Plan    Diagnosis Orders   1. URI with cough and congestion  POCT COVID-19, Antigen    POCT Influenza A/B    amoxicillin (AMOXIL) 500 MG capsule    guaiFENesin (MUCINEX) 600 MG extended release tablet    dextromethorphan (DELSYM) 30 MG/5ML extended release liquid      2. Oral thrush  clotrimazole (MYCELEX) 10 MG juwan      3. Fatigue, unspecified type  POCT COVID-19, Antigen    POCT Influenza A/B        Orders Placed This Encounter   Procedures    POCT COVID-19, Antigen     Order Specific Question:   Previously tested for COVID-19? Answer:   Yes     Order Specific Question:   Pregnant: Answer:   No    POCT Influenza A/B     Orders Placed This Encounter   Medications    amoxicillin (AMOXIL) 500 MG capsule     Sig: Take 1 capsule by mouth 3 times daily for 7 days     Dispense:  21 capsule     Refill:  0    guaiFENesin (MUCINEX) 600 MG extended release tablet     Sig: Take 1 tablet by mouth 2 times daily for 15 days     Dispense:  30 tablet     Refill:  0    clotrimazole (MYCELEX) 10 MG juwan     Sig: Take 1 tablet by mouth 5 times daily for 10 days     Dispense:  50 tablet     Refill:  0    dextromethorphan (DELSYM) 30 MG/5ML extended release liquid     Sig: Take 10 mLs by mouth 2 times daily as needed for Cough     Dispense:  148 mL     Refill:  0         Return if symptoms worsen or fail to improve, for follow up with PCP. Reviewed with the patient: current clinical status & medications.  Side effects, adverse effects of the medications prescribed today, as well as treatment plan/rationale and result expectations have been discussed with the patient who expressed understanding. How can you care for yourself at home? Get some extra rest.  Take an over-the-counter cough medicine to help quiet a dry, hacking cough so that you can sleep. Drink lots of water and other fluids. This helps thin the mucus and soothes a dry or sore throat. Close follow up to evaluate treatment results and for coordination of care. I have reviewed the patient's medical history in detail and updated the computerized patient record.       MANUEL Johnson - NP

## 2022-12-11 ASSESSMENT — VISUAL ACUITY: OU: 1

## 2022-12-13 RX ORDER — OMEPRAZOLE 40 MG/1
CAPSULE, DELAYED RELEASE ORAL
Qty: 30 CAPSULE | Refills: 3 | Status: SHIPPED | OUTPATIENT
Start: 2022-12-13

## 2023-01-11 ENCOUNTER — OFFICE VISIT (OUTPATIENT)
Dept: CARDIOLOGY CLINIC | Age: 68
End: 2023-01-11
Payer: MEDICARE

## 2023-01-11 VITALS
WEIGHT: 194.4 LBS | RESPIRATION RATE: 16 BRPM | HEART RATE: 90 BPM | SYSTOLIC BLOOD PRESSURE: 120 MMHG | DIASTOLIC BLOOD PRESSURE: 66 MMHG | HEIGHT: 74 IN | BODY MASS INDEX: 24.95 KG/M2 | OXYGEN SATURATION: 98 %

## 2023-01-11 DIAGNOSIS — G45.9 TIA (TRANSIENT ISCHEMIC ATTACK): ICD-10-CM

## 2023-01-11 DIAGNOSIS — I15.9 SECONDARY HYPERTENSION: Primary | ICD-10-CM

## 2023-01-11 PROCEDURE — 3078F DIAST BP <80 MM HG: CPT | Performed by: INTERNAL MEDICINE

## 2023-01-11 PROCEDURE — 1123F ACP DISCUSS/DSCN MKR DOCD: CPT | Performed by: INTERNAL MEDICINE

## 2023-01-11 PROCEDURE — 3074F SYST BP LT 130 MM HG: CPT | Performed by: INTERNAL MEDICINE

## 2023-01-11 PROCEDURE — 99213 OFFICE O/P EST LOW 20 MIN: CPT | Performed by: INTERNAL MEDICINE

## 2023-01-11 RX ORDER — FUROSEMIDE 20 MG/1
20 TABLET ORAL DAILY
Qty: 180 TABLET | Refills: 5 | Status: SHIPPED | OUTPATIENT
Start: 2023-01-11

## 2023-01-11 ASSESSMENT — ENCOUNTER SYMPTOMS
COLOR CHANGE: 0
CONSTIPATION: 0
RHINORRHEA: 0
SHORTNESS OF BREATH: 1
ABDOMINAL PAIN: 0
COUGH: 0
DIARRHEA: 0
EYE REDNESS: 0
APNEA: 0
VOMITING: 0
WHEEZING: 0
CHEST TIGHTNESS: 0
NAUSEA: 0

## 2023-01-11 NOTE — PROGRESS NOTES
Chief Complaint   Patient presents with    6 Month Follow-Up     For HTN, HPL, and TIA. Patient presents for initial medical evaluation. Patient is followed on a regular basis by Dr. Adria Lindquist MD.     Hypertension  Hyperlipidemia  Diabetes  Pancreatic cancer on chemotherapy, neuroendocrine tumor status post resection August 4, 2022 at [facility]  Liver cirrhosis  Portal vein thrombosis on Eliquis  TTE EF 65% on 1/10/2023 at [facility]  TTE 12/11/2019 EF 65%  TTE 4/15/2022 EF 65%  Nuclear stress test 5/11/2022 EF 60% no evidence of ischemia or infarct  No prior coronary angiograms  =======================  1/11/2023  Follow-up on cardiac comorbidities  Patient reports weakness and shortness of breath especially with physical activity  Patient had an EKG at [facility] this week after following up on tumor resection  EKG showing sinus rhythm with mild ST depressions in the anterior lateral leads  Patient had a stress test May 2022 with no ischemia    7/27/2022  Follow-up visit on chest pain  Patient was instructed to have a nuclear stress test which on 4 5/11/2022 was reported as no ischemia nor infarct EF 60%  Patient still endorsing random episodes of chest discomfort after eating  Denies shortness of breath  On August 3 patient is scheduled to undergo pancreatic surgery    4/27/2022  First clinic visit follow-up on recent hospitalization  Patient was evaluated in the hospital on 4/14/2022 due to chest pain. At that time troponins were detectable but not significant peaked at 0.27. EKG did not show signs of ischemia. Patient underwent an echocardiogram which showed normal EF with no wall motion abnormality. Patient was discharged home with follow-up in clinic with me for a further ischemic evaluation  Patient comes with wife during this visit. States that he is not feeling well he is complaining of shortness of breath with physical activity.   Reports that before going to the hospital was complaining of shortness of breath but after getting discharged this shortness of breath has been getting worse. Denies lower extremity edema  Denies paroxysmal nocturnal dyspnea no orthopnea.   Although at times patient has to switch sides when sleeping due to shortness of breath  Denies chest pains  Reports that his blood pressures at home are usually running In the 160s  Today during this visit systolics are in the 597C    Patient Active Problem List   Diagnosis    HTN (hypertension)    Type 2 diabetes mellitus without complication (HCC)    Lumbar paraspinal muscle spasm    Allergic rhinosinusitis    TIA (transient ischemic attack)    Abdominal pain    Malignant neoplasm of other parts of pancreas (HCC)    Other cirrhosis of liver (HCC)    Gastroesophageal reflux disease without esophagitis    Chest pain    History of pancreatectomy       Past Surgical History:   Procedure Laterality Date    ABDOMEN SURGERY      COLONOSCOPY  02/20/2015    DR Renée Ortiz    COLONOSCOPY N/A 1/20/2020    COLONOSCOPY DIAGNOSTIC performed by Kojo Miranda MD at [facility] N/A 12/17/2019    EUS performed by Kojo Miranda MD at [address] ENDOSCOPY N/A 4/4/2022    EGD ESOPHAGOGASTRODUODENOSCOPY performed by Kojo Miranda MD at [facility] History     Socioeconomic History    Marital status:      Spouse name: None    Number of children: None    Years of education: None    Highest education level: None   Tobacco Use    Smoking status: Never    Smokeless tobacco: Never   Vaping Use    Vaping Use: Never used   Substance and Sexual Activity    Alcohol use: No    Drug use: No    Sexual activity: Yes     Partners: Female     Social Determinants of Health     Financial Resource Strain: Low Risk     Difficulty of Paying Living Expenses: Not hard at all   Food Insecurity: No Food Insecurity    Worried About Running Out of Food in the Last Year: Never true    [facility] in the Last Year: Never true   Transportation Needs: No Transportation Needs    Lack of Transportation (Medical): No    Lack of Transportation (Non-Medical): No       Family History   Problem Relation Age of Onset    Cancer Mother         lung cancer    Colon Cancer Neg Hx     Celiac Disease Neg Hx     Crohn's Disease Neg Hx     Coronary Art Dis Neg Hx        Current Outpatient Medications   Medication Sig Dispense Refill    omeprazole (PRILOSEC) 40 MG delayed release capsule take 1 capsule by mouth once daily 30 capsule 3    carvedilol (COREG) 6.25 MG tablet 1 tablet 2 TIMES DAILY (route: oral)      chlorthalidone (HYGROTON) 25 MG tablet 1 tablet DAILY (route: oral)      traMADol (ULTRAM) 50 MG tablet Per instructions EVERY 4 HOURS (route: oral)      omeprazole (PRILOSEC) 40 MG delayed release capsule 1 capsule DAILY (route: oral)      lisinopril (PRINIVIL;ZESTRIL) 20 MG tablet 1 tablet DAILY (route: oral)      Sennosides-Docusate Sodium (SENNA PLUS) 8.6-50 MG CAPS Per instructions 2 TIMES DAILY (route: oral)      insulin glargine (LANTUS SOLOSTAR) 100 UNIT/ML injection pen 50 unit BEDTIME (route: subcutaneous)      meloxicam (MOBIC) 15 MG tablet take 1 tablet by mouth once daily 30 tablet 5    ondansetron (ZOFRAN) 4 MG tablet Take 1 tablet by mouth 3 times daily as needed for Nausea or Vomiting 30 tablet 0    lisinopril (PRINIVIL;ZESTRIL) 20 MG tablet Take 1 tablet by mouth daily 90 tablet 3    Continuous Blood Gluc Sensor (FREESTYLE NATALY 14 DAY SENSOR) [facility] [place] apply 1 SENSOR to back OF UPPER ARM REMOVE AND REPLACE every 14 days use with DEVICE to MONITOR BLOOD SUGAR 1 each 5    ondansetron (ZOFRAN-ODT) 8 MG TBDP disintegrating tablet dissolve 1 tablet ON TONGUE every 8 hours if needed nausea      RA ASPIRIN EC 81 MG EC tablet take 1 tablet by mouth once daily 90 tablet 3    LANTUS SOLOSTAR 100 UNIT/ML injection pen inject 50 units subcutaneously daily      insulin lispro (HUMALOG) 100 UNIT/ML SOLN injection vial Inject 50 Units into the skin 3 times daily      carvedilol (COREG) 6.25 MG tablet Take 1 tablet by mouth 2 times daily 60 tablet 11    chlorthalidone (HYGROTON) 25 MG tablet Take 1 tablet by mouth daily 30 tablet 11    nitroGLYCERIN (NITROSTAT) 0.4 MG SL tablet up to max of 3 total doses. If no relief after 1 dose, call 911. 25 tablet 0    lanreotide acetate (SOMATULINE) 120 MG/0.5ML SOLN depot injection Inject 120 mg into the skin once      Polyethylene Glycol 3350 (MIRALAX PO) Take by mouth as needed       Continuous Blood Gluc  (FREESTYLE NATALY 14 DAY READER) KISHA Test daily and prn. Dx: DM2 1 Device 0     No current facility-administered medications for this visit. Patient has no known allergies. Review of Systems:  Review of Systems   Constitutional:  Negative for activity change, chills, diaphoresis and fever. HENT:  Negative for congestion, ear pain, nosebleeds and rhinorrhea. Eyes:  Negative for redness and visual disturbance. Respiratory:  Positive for shortness of breath. Negative for apnea, cough, chest tightness and wheezing. Cardiovascular:  Negative for chest pain, palpitations and leg swelling. Gastrointestinal:  Negative for abdominal pain, constipation, diarrhea, nausea and vomiting. Genitourinary:  Negative for difficulty urinating and dysuria. Musculoskeletal: Negative. Negative for joint swelling. Skin:  Negative for color change, rash and wound. Neurological:  Negative for dizziness, syncope, weakness, numbness and headaches. Psychiatric/Behavioral: Negative. VITALS:  Blood pressure 120/66, pulse 90, resp. rate 16, height 6' 1.5\" (1.867 m), weight 194 lb 6.4 oz (88.2 kg), SpO2 98 %. Body mass index is 25.3 kg/m². Physical Examination:  Physical Exam  Vitals and nursing note reviewed. Constitutional:       Appearance: Normal appearance. HENT:      Head: Normocephalic and atraumatic.       Mouth/Throat:      Mouth: Mucous membranes are moist. Pharynx: Oropharynx is clear. Eyes:      Extraocular Movements: Extraocular movements intact. Conjunctiva/sclera: Conjunctivae normal.      Pupils: Pupils are equal, round, and reactive to light. Cardiovascular:      Rate and Rhythm: Normal rate and regular rhythm. Pulses: Normal pulses. Heart sounds: Normal heart sounds. Pulmonary:      Effort: Pulmonary effort is normal.      Breath sounds: Normal breath sounds. Abdominal:      General: Abdomen is flat. Bowel sounds are normal.      Palpations: Abdomen is soft. Musculoskeletal:         General: Normal range of motion. Cervical back: Normal range of motion and neck supple. Skin:     General: Skin is warm. Neurological:      General: No focal deficit present. Mental Status: He is alert and oriented to person, place, and time. Mental status is at baseline. Psychiatric:         Mood and Affect: Mood normal.      EKG: Normal sinus rhythm without signs of ischemia    No orders of the defined types were placed in this encounter. The 10-year ASCVD risk score (Tabitha MCNEIL, et al., 2019) is: 25.3%    Values used to calculate the score:      Age: 79 years      Sex: Male      Is Non- : No      Diabetic: Yes      Tobacco smoker: No      Systolic Blood Pressure: 941 mmHg      Is BP treated: Yes      HDL Cholesterol: 47 mg/dL      Total Cholesterol: 164 mg/dL     ASSESSMENT:     Diagnosis Orders   1. Secondary hypertension        2. TIA (transient ischemic attack)          PLAN:   Chest pain.   Nuclear stress test 5/2022 negative for ischemia EF 60%  Continue aspirin and start atorvastatin  Continue beta-blocker    Hypertension  It seems like it is not well controlled reports blood pressures at home ranging between 160s to 180s  Start lisinopril 40 mg daily  Start chlorthalidone 25 mg daily  Continue Coreg    Shortness of breath  Possibly related to small degree of heart failure  To my exam there is no pitting edema  I would like to start patient on Lasix 20 mg p.o. daily  TTE 1/10/2023 at [facility] normal EF  Follow-up BMP  CT of the chest negative for PE on 1/10/2023 at [facility]. Return to clinic in 3 months    Diabetes, pancreatic cancer and liver cirrhosis as per PCP and GI    Recommended patient to eat diets that emphasize high intakes of vegetables, fruits, nuts, whole grains, lean vegetable or animal protein, and fish.  As per 2019 ACC/AHA guideline on primary prevention of CVD     Recommended patient to engage in at least 150 minutes per week of accumulated moderate-intensity physical activity or 75 minutes per week of vigorous-intensity physical activity as per 2019 ACC/AHA guideline on primary prevention of CVD

## 2023-01-17 ENCOUNTER — OFFICE VISIT (OUTPATIENT)
Dept: GASTROENTEROLOGY | Age: 68
End: 2023-01-17
Payer: MEDICARE

## 2023-01-17 ENCOUNTER — PREP FOR PROCEDURE (OUTPATIENT)
Dept: GASTROENTEROLOGY | Age: 68
End: 2023-01-17

## 2023-01-17 VITALS
SYSTOLIC BLOOD PRESSURE: 118 MMHG | OXYGEN SATURATION: 100 % | DIASTOLIC BLOOD PRESSURE: 62 MMHG | HEIGHT: 74 IN | WEIGHT: 194 LBS | HEART RATE: 61 BPM | BODY MASS INDEX: 24.9 KG/M2

## 2023-01-17 DIAGNOSIS — Z90.410 HISTORY OF PANCREATECTOMY: ICD-10-CM

## 2023-01-17 DIAGNOSIS — K74.69 OTHER CIRRHOSIS OF LIVER (HCC): ICD-10-CM

## 2023-01-17 DIAGNOSIS — K59.00 CONSTIPATION, UNSPECIFIED CONSTIPATION TYPE: ICD-10-CM

## 2023-01-17 DIAGNOSIS — K92.1 MELENA: ICD-10-CM

## 2023-01-17 DIAGNOSIS — D64.9 ANEMIA, UNSPECIFIED TYPE: ICD-10-CM

## 2023-01-17 DIAGNOSIS — R53.83 OTHER FATIGUE: ICD-10-CM

## 2023-01-17 DIAGNOSIS — D3A.8 NEUROENDOCRINE TUMOR OF PANCREAS: ICD-10-CM

## 2023-01-17 DIAGNOSIS — K76.6 PORTAL HYPERTENSION (HCC): ICD-10-CM

## 2023-01-17 DIAGNOSIS — K21.9 GASTROESOPHAGEAL REFLUX DISEASE, UNSPECIFIED WHETHER ESOPHAGITIS PRESENT: ICD-10-CM

## 2023-01-17 DIAGNOSIS — I81 PORTAL VEIN THROMBOSIS: ICD-10-CM

## 2023-01-17 DIAGNOSIS — Z90.81 H/O SPLENECTOMY: ICD-10-CM

## 2023-01-17 DIAGNOSIS — R10.13 EPIGASTRIC PAIN: Primary | ICD-10-CM

## 2023-01-17 PROCEDURE — 1123F ACP DISCUSS/DSCN MKR DOCD: CPT | Performed by: NURSE PRACTITIONER

## 2023-01-17 PROCEDURE — 3074F SYST BP LT 130 MM HG: CPT | Performed by: NURSE PRACTITIONER

## 2023-01-17 PROCEDURE — 99214 OFFICE O/P EST MOD 30 MIN: CPT | Performed by: NURSE PRACTITIONER

## 2023-01-17 PROCEDURE — 3078F DIAST BP <80 MM HG: CPT | Performed by: NURSE PRACTITIONER

## 2023-01-17 RX ORDER — ESOMEPRAZOLE MAGNESIUM 40 MG/1
40 CAPSULE, DELAYED RELEASE ORAL DAILY
Qty: 30 CAPSULE | Refills: 3 | Status: SHIPPED | OUTPATIENT
Start: 2023-01-17

## 2023-01-17 NOTE — PROGRESS NOTES
Gastroenterology Clinic Follow up Visit    Vi Aguila  [id number]  Chief Complaint   Patient presents with    Follow-up     HPI: 79 y.o. male following up after last GI clinic on 6/13/2022. 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 He is s/p robotic distal pancreatectomy with splenectomy converted to open procedure August 4, 2022. Recent MRI in December with no evidence of residual or metastatic disease however noted partially occlusive thrombus of the main portal vein (progressed compared to CT nearly 4 months ago) and he was subsequently started on Eliquis approximately 2 weeks ago by hematology. Interval change: Patient presents to the GI clinic with constipation of upper GI symptoms along with complaints of fatigue. He endorses increasing GERD symptoms, reports a \"hotness in my chest\" associated with mild nausea/\"queasy feeling\" and epigastric pain/pressure, progressing since his pancreatectomy in August, states omeprazole is not helping. He endorses having a soft/formed bowel movement approximately every other day with taking MiraLAX and Colace every other day. Does report since starting Eliquis he has noticed his stools have turned dark brown, almost black. He denies vomiting, hematemesis, dysphagia, hematochezia, or weight loss. He additionally reports difficulty controlling his blood sugars (recently with elevated readings) since his pancreatectomy, on both short and long term insulin. States he was recently started on Eliquis approximately 2 weeks ago by his hematologist/oncologist after MRI in December revealing progressing/partially occlusive portal vein thrombosis. Reports he has seen cardiology and pulmonology in regards to his fatigue with essentially negative work-up.     HPI from last GI clinic visit on 6/13/2022  summarized below:   Patient reports doing well other than some increased fatigue during his chemotherapy weeks. He denies further issues with constipation with taking MiraLAX daily during his chemotherapy treatments. Reports he continues to follow with [facility] oncology, recently saw a surgeon who might consider surgery, removal of part of the pancreas and liver. States he is currently undergoing testing including a dedicated MRI of the liver and blood work to assess if he is a surgical candidate. He denies GERD symptoms (takes PPI daily), nausea/vomiting, hematemesis, dysphagia, abdominal pain, hematochezia, melena or weight loss. He denies overt signs of hepatic encephalopathy, increased abdominal girth or lower extremity edema. HPI from last GI clinic visit on 3/3/2022  summarized below:  Patient presents to the GI clinic, recently back from [place], with significant improvement in constipation symptoms. Having a bowel movement daily to every other day while taking MiraLAX and Metamucil. He endorses abdominal bloating/gas after eating a meal, states he takes Gas-X with relief of symptoms. He does report feeling fatigued more than usual.  States this began happening since he started chemotherapy around 9 months ago. States he currently receives injections every month. He does report longstanding history of difficulty with sleep for many years. States some nights he is a restless he just gets up and does not sleep hardly at all. However, lately he states he has been taking more naps (short 10-minute naps) throughout the day. Denies overt signs of HE (confusion, slurred speech or lethargy). He denies increasing abdominal girth other than feeling bloated after eating (relief w/gasx). He denies lower extremity edema. He does report following with endocrinology every month, he continues to have trouble with controlling his glucose levels (reports highs and lows).   He denies GERD symptoms, nausea/vomiting, hematemesis, dysphagia, abdominal pain, hematochezia, melena or weight loss. Patient denies chest pain, shortness of breath or palpitations. Smoking status: denies  Alcohol use: denies  Illicit drug use: denies  NSAID use: takes meloxicam daily, occasional ibuprofen when he has pain, around 1 time per month. HPI from last GI clinic visit on 1/20/2022  summarized below:  Patient reports following up in the GI clinic regarding primary pancreatic neuroendocrine tumor, diagnosed in 2019. States he was previously referred to Dr. Andrae High for possible resection, however he was also found to have neuroendocrine tumor of the liver during the operation along with portal hypertension and it was decided the surgery was too risky at that time so he was closed up without any tumor resections. States he was placed on chemotherapy. States he has been on oral chemo and now gets 1 IV per month every 4 weeks. He was referred to Dr. Conchita Fischer for further management of his nonalcoholic cirrhosis of the liver and portal hypertension. Patient reports Dr. Conchita Fischer states it is reasonable for him to come back to our GI clinic for further management of his liver disease so patient does not have to drive so far. Patient denies upper GI symptoms. He endorses fatigue and constipation. States he will have a dark green bowel movement every 2 days (small in caliber). He endorses every couple of weeks he will have a very large/hard bowel movement. States he has tried MiraLAX in the past, however this causes him to have \"brown splatter\" in the toilet. States he takes Gas-X for excessive bloating with relief. HPI from last GI clinic visit on 1/15/2020 summarized below:  Patient presents to the clinic to discuss getting further work-up before proceed with with surgical intervention for a neuroendocrine tumor on the tail of the pancreas. Patient seeing Dr. Latricia Guerra, surgical oncologist.   Patient without any complaints at this time. No F/C.  No Cp,SOB, or palpitation. Occasional RUQ pain with certain foods. Intermittent diarrhea over the past 6 months. No melena or hematochezia. HPI and A/P at last visits summarized below:  Presented to the hospital owing to abdominal pain. Patient reported that over the last week or so she been having epigastric pain associated with nausea vomiting and diarrhea. Symptoms seems has improving since admission. Patient currently denies any nausea, vomiting or diarrhea. Abdominal pain seems had resolved. Patient CT scan that was suggestive of cirrhosis. CT scan also showed lesions around 2 cm between the gastric wall and the pancreas. Review of lab work showed that patient had abnormal transaminases at least since 2017 with evidence of steatosis. Subsequently GI consult was obtained for further evaluation and management. 1-Abdominal pain, nausea, vomiting and diarrhea  Acute symptoms seems resolved at this time  Continue supportive measures, IV fluid and advance diet as tolerated.   2- Abnormal CT imaging /?  Pancreatic mass  CAT scan with contrast none clear that that showed a 2 cm soft tissue density with calcification near the tail of pancreas and adjacent to this gastric wall.?  GIST tumor  Proceed with EGD/EUS for further evaluation as outpatient   3- Radiological evidence of cirrhosis/ Early cirrhosis  Patient has no clinical findings to suggest advanced liver disease  However review of serial lab work since 2017 showed Abnnormal transaminases and subsequent reversal of AST/ALT ratio in addition to thrombocytopenia  The Apri score for the patient most suggestive of advanced disease/ cirrhosis (76% sensitive and 72% specific for cirrhosis)  Patient's lab and radiological findings suggestive of advanced liver disease  Likely etiology of advanced fibrosis will be Ilda Lava given the associated metabolic syndrome with hypertension dyslipidemia  Will proceed with correlation with FibroScan as outpatient and further work-up that may include viral further viral studies as well as other metabolic work-up. Previous GI work up/Endoscopic investigations:  EGD 4/4/2022: Essentially normal upper endoscopy    Colonoscopy 1/20/2020: Entire examined colon is normal.  No specimens collected. FibroScan 1/22/2020:   Appropriate reading, Based on LSM of 27.5 Kpa, Evidence of advanced    fibrosis / Cirrhosis    Fibrosis stage IV- Cirrhosis  / F 4    Based on CAP of 288 db/M,  Mod-Severe Steatosis ( >33%)     Endoscopic ultrasound 12/17/19- - 21 x 15 mm hypoechoic mass at the tail of the pancreas, normal common bile duct, parenchymal changes suggestive of chronic pancreatitis, dilated pancreatic duct head of the pancreas, normal pancreatic duct body. Review of Systems   All other systems reviewed and are negative. Past medical history, past surgical history, medication list, social and familyhistory reviewed    Blood pressure 118/62, pulse 61, height 6' 1.5\" (1.867 m), weight 194 lb (88 kg), SpO2 100 %. Physical Exam  Constitutional:       General: He is not in acute distress. Appearance: Normal appearance. He is normal weight. He is not ill-appearing. HENT:      Head: Normocephalic and atraumatic. Eyes:      General: No scleral icterus. Cardiovascular:      Rate and Rhythm: Normal rate and regular rhythm. Pulses: Normal pulses. Pulmonary:      Effort: Pulmonary effort is normal. No respiratory distress. Breath sounds: Normal breath sounds. Abdominal:      General: A surgical scar is present. Bowel sounds are normal. There is no distension. Palpations: Abdomen is soft. There is no mass. Tenderness: There is abdominal tenderness (mild) in the epigastric area. There is no guarding or rebound. Musculoskeletal:         General: Normal range of motion. Lymphadenopathy:      Cervical: No cervical adenopathy. Skin:     General: Skin is warm and dry. Coloration: Skin is not jaundiced.    Neurological: Mental Status: He is alert and oriented to person, place, and time. Psychiatric:         Mood and Affect: Mood normal.         Behavior: Behavior normal.         Thought Content: Thought content normal.         Judgment: Judgment normal.     Laboratory, Pathology, Radiology reviewed in detail with relevantimportant investigations summarized below:    Recent Labs     01/09/23  1644 01/05/23  1406   WBC 8.5 11.5*   HGB 10.3* 9.9*   HCT 33.5* 31.9*   MCV 80 81   * 495*     Lab Results   Component Value Date    WBC 8.5 01/09/2023    HGB 10.3 (L) 01/09/2023    HCT 33.5 (L) 01/09/2023    MCV 80 01/09/2023     (H) 01/09/2023     Lab Results   Component Value Date    ALT 23 01/05/2023    AST 26 01/05/2023    ALKPHOS 97 01/05/2023    BILITOT 0.6 01/05/2023     MRI abdomen with and without contrast 12/16/2021: Overall stable exam. Redemonstration of an approximate 1.4 x 2.2 cm enhancing mass at the distal pancreatic tail with not significantly changed. No new evidence to suggest metastatic disease throughout the abdomen/pelvis. Cirrhosis. Approximate 4 mm arterial enhancing focus in the segment VII not significantly changed. Previously described 4 mm enhancing nodular focus of segment II is not seen on the current exam.  No new arterial enhancing hepatic lesion identified. Splenomegaly as well as small caliber torturous varices near the GE junction, likely related to portal HTN. Small low signal intensity gallstones in the dependent portion of the gallbladder towards the fundus. No intrahepatic or extrahepatic biliary dilation. MRI abdomen with/without contrast 4/8/2022: No change compared to nearly 4 months ago. Stable pancreatic tail mass. No new sites of metastatic disease of the abdomen or pelvis. Cirrhosis and portal hypertension. No change in tiny arterial enhancing focus in the right hepatic lobe without contrast washout. Trace pelvic free fluid.     MRI liver with and without contrast 6/29/2022: Stable pancreatic tail lesion. Cirrhotic hepatic morphology stable size and morphology of subcentimeter arterial hyperenhancing focus within the right hepatic lobe. Washout is difficult to assess for on delayed phase imaging due to motion and small size of lesion. This again likely represents LI RADS 3 lesion. Splenomegaly. MRI abdomen with/without contrast 4/8/2022: No new change compared to nearly 4 months ago. Stable pancreatic tail mass. No new sites of metastatic disease of the abdomen/pelvis. Cirrhosis and portal hypertension. No change in tiny arterial enhancing focus in the right hepatic lobe without contrast.  Trace pelvic free fluid. Abdominal/chest x-ray 4/27/2022: No radiographic evidence of acute intrathoracic process. Nonobstructive bowel gas pattern. CT abdomen pelvis with IV contrast 8/31/2022: Patient is post interval distal pancreatectomy. There is a fluid collection in the expected location of the tail of the pancreas extending into the expected location of the spleen, which measures up to 4 x 2.6 cm in transaxial diameters and up to 4.0 cm in CC diameter, possibly relating to postoperative hematoma/seroma, abscess, or contained pancreatic leak. There is surrounding enhancement and fat stranding. The lateral aspect of the collection is continuous with the posterior inferior wall of the proximal gastric body which is adhesed to the posterior lateral chest wall via adjacent irregular soft tissue density. The stomach demonstrates wall thickening along its lateral aspect particularly near where it is adhesed to the chest wall, suggesting gastritis, possibly secondary to recent surgery or adjacent inflammation from the pancreatectomy bed process. There is expected postsurgical ligation of the splenic artery and vein there is nonocclusive thrombus seen in the residual splenic vein and portal splenic confluence.   There is stranding of the omentum in the left upper quadrant that is probably postsurgical in etiology. There is a small volume of low-density ascites in the pelvis which is nonspecific. No pneumoperitoneum or loculated intraperitoneal collection. MRI abdomen/pelvis 12/22/2022: 1. Post distal pancreatectomy and splenectomy. No evidence of residual disease. Previous fluid collection at the pancreatic resection margin is no longer present. 2. No evidence metastatic disease of the abdomen or pelvis. 3. Partially occlusive thrombosis of main portal vein, progressed  compared to CT nearly 4 months ago. 4. Morphologic changes of cirrhosis. 4 mm LI-RADS LR 3 lesion is  stable. Assessment and Plan:  Ama Dawson 79 y.o. 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 Previously referred to Dr. Alejandro Ta for possible resection. However, during surgery it was discovered that it had spread to his liver and also w/evidence of liver cirrhosis/portal hypertension, therefore resection was aborted. Patient received oral and IV chemotherapy w/no further evidence of continued spread per recent MRIs on 12/16/2021 and 4/8/2022. S/P robotic distal pancreatectomy with splenectomy converted to open August 4th of 2022 with Dr. Popeye Lundberg. MRI in December w/no evidence of residual or metastatic disease, however w/partially occlusive thrombus of the main portal vein, progressed compared to CT nearly 4 months ago. He was subsequently started on Eliquis per hematology at [facility] [place] approximately 2 weeks ago. Patient following at [address] [facility] [place] for history of liver cirrhosis w/likely etiology being MCNAMARA given his prior metabolic syndrome w/HTN/dyslipidemia and FibroScan on 1/22/2020 revealing stage IV fibrosis F4, moderate to severe steatosis. 1. History of Neuroendocrine tumor of pancreas  2. History of pancreatectomy  3. H/O splenectomy  -Recommend continued follow-up w/[address]'s oncologist [address]     4. Other cirrhosis of liver ([facility] [address]  5. Portal hypertension ([facility] [address]  6. Portal vein thrombosis-likely chronic (seen on prior CT 4 months ago, although progressed)  MELD score (9/7/21): 7 with 1.9% estimated 3 month mortality, CMP recently drawn, INR ordered for recalculation  Cause of liver disease: Likely MCNAMARA  - Hx of SBP: no   - EGD for Variceal screening:  Completed 4/2022. Repeat EGD ordered given possible melena in the context of Eliquis. [facility] screening/surveillance: Recommend every 6 months at minimum. Recent MRI 12/2022 with no evidence of residual or metastatic disease  - Hepatic encephalopathy: No history. However, patient with increased fatigue, no new overt symptoms of HE. Will continue to monitor.  - Advised to limit/discontinue NSAID use   - Continue complete alcohol abstinence. Last drink >13 years ago. - Hepatitis status:               Hep A immunity: reactive 6/30/2020              Hep B immunity: non-reactive 6/30/2020              Hep C Status: non-reactive 6/30/2020  -Continue Eliquis per patient's hematologist/oncologist recommendations for portal vein thrombosis    7. ?Melena in the context of anticoagulation  8. Epigastric pain  9. Gastroesophageal reflux disease, unspecified whether esophagitis present  - Acid suppression medication changed to Nexium 40 mg by mouth daily  - Advised on the correct dose and timing of the PPI, Preferably 1/2 hour before breakfast. If symptoms are worse at night would recommend taking it half an hour before dinner.  - Pathophysiology and etiology of reflux discussed at length, with help of images. - Anti-reflux lifestyle modification discussed at length   --Avoid spicy acidic based foods   --Limit coffee, tea, alcohol use   --Limit the amount of food during meal time, avoid gorging   --Avoid bedtime snacks and eat meals 3 to 4 hrs before lying down   --Stop (or atleast cut down) Smoking.    --Elevate the head of the bed with blocks   --Weight reduction advised and discussed at length   - EGD requested to assess/exclude for esophagitis/gastritis/hiatal hernia. Also for variceal screening (Procedure risks and instruction discussed). 10. Other fatigue  11. Anemia, unspecified type  - Iron and TIBC; Future  - Ferritin; Future    12. Constipation, unspecified constipation type  - Symptoms currently resolved  -Continue of MiraLAX 17 g by mouth daily    Return for Follow-up after endoscopy. MANUEL Cleaning - CNP   Staff Gastroenterology Nurse Practitioner  [facility]    Please note this report has been partially produced using speech recognition software and contain errors related to that system including grammar, punctuation and spelling as well as words and phrases that may seem inappropriate. If there are questions or concerns please feel free to contact me to clarify.

## 2023-01-23 ENCOUNTER — TELEPHONE (OUTPATIENT)
Dept: FAMILY MEDICINE CLINIC | Age: 68
End: 2023-01-23

## 2023-01-23 DIAGNOSIS — D64.9 ANEMIA, UNSPECIFIED TYPE: ICD-10-CM

## 2023-01-23 DIAGNOSIS — R10.13 EPIGASTRIC PAIN: ICD-10-CM

## 2023-01-23 DIAGNOSIS — R53.83 OTHER FATIGUE: ICD-10-CM

## 2023-01-23 DIAGNOSIS — I15.9 SECONDARY HYPERTENSION: ICD-10-CM

## 2023-01-23 DIAGNOSIS — K21.9 GASTROESOPHAGEAL REFLUX DISEASE, UNSPECIFIED WHETHER ESOPHAGITIS PRESENT: ICD-10-CM

## 2023-01-23 DIAGNOSIS — K92.1 MELENA: ICD-10-CM

## 2023-01-23 DIAGNOSIS — K74.69 OTHER CIRRHOSIS OF LIVER (HCC): ICD-10-CM

## 2023-01-23 LAB
ANION GAP SERPL CALCULATED.3IONS-SCNC: 11 MEQ/L (ref 9–15)
BUN BLDV-MCNC: 20 MG/DL (ref 8–23)
CALCIUM SERPL-MCNC: 9.5 MG/DL (ref 8.5–9.9)
CHLORIDE BLD-SCNC: 97 MEQ/L (ref 95–107)
CO2: 29 MEQ/L (ref 20–31)
CREAT SERPL-MCNC: 0.96 MG/DL (ref 0.7–1.2)
GFR SERPL CREATININE-BSD FRML MDRD: >60 ML/MIN/{1.73_M2}
GLUCOSE BLD-MCNC: 218 MG/DL (ref 70–99)
HCT VFR BLD CALC: 34 % (ref 42–52)
HEMOGLOBIN: 10.7 G/DL (ref 14–18)
INR BLD: 1.1
MCH RBC QN AUTO: 24.6 PG (ref 27–31.3)
MCHC RBC AUTO-ENTMCNC: 31.5 % (ref 33–37)
MCV RBC AUTO: 78 FL (ref 79–92.2)
PDW BLD-RTO: 16.5 % (ref 11.5–14.5)
PLATELET # BLD: 498 K/UL (ref 130–400)
POTASSIUM SERPL-SCNC: 3 MEQ/L (ref 3.4–4.9)
PRO-BNP: 215 PG/ML
PROTHROMBIN TIME: 14.5 SEC (ref 12.3–14.9)
RBC # BLD: 4.35 M/UL (ref 4.7–6.1)
SODIUM BLD-SCNC: 137 MEQ/L (ref 135–144)
WBC # BLD: 8.9 K/UL (ref 4.8–10.8)

## 2023-01-24 ENCOUNTER — PATIENT MESSAGE (OUTPATIENT)
Dept: GASTROENTEROLOGY | Age: 68
End: 2023-01-24

## 2023-01-24 LAB
FERRITIN: 22 NG/ML (ref 30–400)
FOLATE: 9.4 NG/ML
IRON SATURATION: 11 % (ref 20–55)
IRON: 46 UG/DL (ref 59–158)
TOTAL IRON BINDING CAPACITY: 426 UG/DL (ref 250–450)
UNSATURATED IRON BINDING CAPACITY: 380 UG/DL (ref 112–347)
VITAMIN B-12: 500 PG/ML (ref 232–1245)

## 2023-01-24 RX ORDER — FERROUS SULFATE 325(65) MG
325 TABLET ORAL
Qty: 90 TABLET | Refills: 1 | Status: ON HOLD | OUTPATIENT
Start: 2023-01-24

## 2023-01-24 NOTE — TELEPHONE ENCOUNTER
Called patient regarding his [employer] message. Discussed his low iron levels and anemia with recommendation to start oral iron therapy once he has his EGD on Friday. Also discussed with him worsening fatigue, shortness of breath, and chest pain can be related to his heart or lungs. Patient reports he has had his heart and lungs checked out recently. Had a normal EKG recently, normal stress test May 2022. Discussed with patient if he continues to feel lightheaded or dizzy, chest pain returns, or shortness of breath worsens he should report to the ER. He reports understanding. Oral iron supplement ordered.

## 2023-01-27 ENCOUNTER — ANESTHESIA EVENT (OUTPATIENT)
Dept: ENDOSCOPY | Age: 68
End: 2023-01-27
Payer: MEDICARE

## 2023-01-27 ENCOUNTER — HOSPITAL ENCOUNTER (OUTPATIENT)
Age: 68
Setting detail: OUTPATIENT SURGERY
Discharge: HOME | DRG: 246 | End: 2023-01-27
Attending: INTERNAL MEDICINE | Admitting: INTERNAL MEDICINE
Payer: MEDICARE

## 2023-01-27 ENCOUNTER — ANESTHESIA (OUTPATIENT)
Dept: ENDOSCOPY | Age: 68
End: 2023-01-27
Payer: MEDICARE

## 2023-01-27 VITALS
BODY MASS INDEX: 24.77 KG/M2 | OXYGEN SATURATION: 96 % | WEIGHT: 193 LBS | TEMPERATURE: 98.3 F | RESPIRATION RATE: 16 BRPM | HEART RATE: 82 BPM | SYSTOLIC BLOOD PRESSURE: 143 MMHG | DIASTOLIC BLOOD PRESSURE: 70 MMHG | HEIGHT: 74 IN

## 2023-01-27 DIAGNOSIS — D50.0 IRON DEFICIENCY ANEMIA DUE TO CHRONIC BLOOD LOSS: ICD-10-CM

## 2023-01-27 DIAGNOSIS — K92.1 MELENA: ICD-10-CM

## 2023-01-27 DIAGNOSIS — K21.9 GASTROESOPHAGEAL REFLUX DISEASE: ICD-10-CM

## 2023-01-27 DIAGNOSIS — R10.13 EPIGASTRIC PAIN: ICD-10-CM

## 2023-01-27 LAB
GLUCOSE BLD-MCNC: 225 MG/DL (ref 70–99)
PERFORMED ON: ABNORMAL

## 2023-01-27 PROCEDURE — 3700000001 HC ADD 15 MINUTES (ANESTHESIA): Performed by: INTERNAL MEDICINE

## 2023-01-27 PROCEDURE — 6360000002 HC RX W HCPCS: Performed by: NURSE ANESTHETIST, CERTIFIED REGISTERED

## 2023-01-27 PROCEDURE — 3700000000 HC ANESTHESIA ATTENDED CARE: Performed by: INTERNAL MEDICINE

## 2023-01-27 PROCEDURE — 43239 EGD BIOPSY SINGLE/MULTIPLE: CPT | Performed by: INTERNAL MEDICINE

## 2023-01-27 PROCEDURE — 88305 TISSUE EXAM BY PATHOLOGIST: CPT

## 2023-01-27 PROCEDURE — 7100000010 HC PHASE II RECOVERY - FIRST 15 MIN: Performed by: INTERNAL MEDICINE

## 2023-01-27 PROCEDURE — 2580000003 HC RX 258: Performed by: INTERNAL MEDICINE

## 2023-01-27 PROCEDURE — 2580000003 HC RX 258

## 2023-01-27 PROCEDURE — 2500000003 HC RX 250 WO HCPCS: Performed by: NURSE ANESTHETIST, CERTIFIED REGISTERED

## 2023-01-27 PROCEDURE — 3609017100 HC EGD: Performed by: INTERNAL MEDICINE

## 2023-01-27 PROCEDURE — 88342 IMHCHEM/IMCYTCHM 1ST ANTB: CPT

## 2023-01-27 PROCEDURE — 7100000011 HC PHASE II RECOVERY - ADDTL 15 MIN: Performed by: INTERNAL MEDICINE

## 2023-01-27 PROCEDURE — 2709999900 HC NON-CHARGEABLE SUPPLY: Performed by: INTERNAL MEDICINE

## 2023-01-27 RX ORDER — ONDANSETRON 2 MG/ML
8 INJECTION INTRAMUSCULAR; INTRAVENOUS
OUTPATIENT
Start: 2023-01-27

## 2023-01-27 RX ORDER — SODIUM CHLORIDE 9 MG/ML
5-250 INJECTION, SOLUTION INTRAVENOUS PRN
OUTPATIENT
Start: 2023-01-27

## 2023-01-27 RX ORDER — SODIUM CHLORIDE 9 MG/ML
INJECTION, SOLUTION INTRAVENOUS CONTINUOUS
Status: DISCONTINUED | OUTPATIENT
Start: 2023-01-27 | End: 2023-01-27 | Stop reason: HOSPADM

## 2023-01-27 RX ORDER — MAGNESIUM HYDROXIDE 1200 MG/15ML
LIQUID ORAL PRN
Status: DISCONTINUED | OUTPATIENT
Start: 2023-01-27 | End: 2023-01-27 | Stop reason: ALTCHOICE

## 2023-01-27 RX ORDER — FAMOTIDINE 10 MG/ML
20 INJECTION, SOLUTION INTRAVENOUS
OUTPATIENT
Start: 2023-01-27

## 2023-01-27 RX ORDER — PROPOFOL 10 MG/ML
INJECTION, EMULSION INTRAVENOUS PRN
Status: DISCONTINUED | OUTPATIENT
Start: 2023-01-27 | End: 2023-01-27 | Stop reason: SDUPTHER

## 2023-01-27 RX ORDER — ALBUTEROL SULFATE 90 UG/1
4 AEROSOL, METERED RESPIRATORY (INHALATION) PRN
OUTPATIENT
Start: 2023-01-27

## 2023-01-27 RX ORDER — APIXABAN 5 MG/1
TABLET, FILM COATED ORAL
COMMUNITY
Start: 2023-01-21

## 2023-01-27 RX ORDER — DIPHENHYDRAMINE HYDROCHLORIDE 50 MG/ML
50 INJECTION INTRAMUSCULAR; INTRAVENOUS
OUTPATIENT
Start: 2023-01-27

## 2023-01-27 RX ORDER — SODIUM CHLORIDE 9 MG/ML
INJECTION, SOLUTION INTRAVENOUS CONTINUOUS
OUTPATIENT
Start: 2023-01-27

## 2023-01-27 RX ORDER — SODIUM CHLORIDE 0.9 % (FLUSH) 0.9 %
5-40 SYRINGE (ML) INJECTION PRN
OUTPATIENT
Start: 2023-01-27

## 2023-01-27 RX ORDER — EPINEPHRINE 1 MG/ML
0.3 INJECTION, SOLUTION, CONCENTRATE INTRAVENOUS PRN
OUTPATIENT
Start: 2023-01-27

## 2023-01-27 RX ORDER — SODIUM CHLORIDE 9 MG/ML
INJECTION, SOLUTION INTRAVENOUS
Status: COMPLETED
Start: 2023-01-27 | End: 2023-01-27

## 2023-01-27 RX ORDER — HEPARIN SODIUM (PORCINE) LOCK FLUSH IV SOLN 100 UNIT/ML 100 UNIT/ML
500 SOLUTION INTRAVENOUS PRN
OUTPATIENT
Start: 2023-01-27

## 2023-01-27 RX ORDER — LIDOCAINE HYDROCHLORIDE 20 MG/ML
INJECTION, SOLUTION INFILTRATION; PERINEURAL PRN
Status: DISCONTINUED | OUTPATIENT
Start: 2023-01-27 | End: 2023-01-27 | Stop reason: SDUPTHER

## 2023-01-27 RX ORDER — ACETAMINOPHEN 325 MG/1
650 TABLET ORAL
OUTPATIENT
Start: 2023-01-27

## 2023-01-27 RX ADMIN — PROPOFOL 50 MG: 10 INJECTION, EMULSION INTRAVENOUS at 08:25

## 2023-01-27 RX ADMIN — SODIUM CHLORIDE: 9 INJECTION, SOLUTION INTRAVENOUS at 07:26

## 2023-01-27 RX ADMIN — PROPOFOL 50 MG: 10 INJECTION, EMULSION INTRAVENOUS at 08:22

## 2023-01-27 RX ADMIN — PROPOFOL 100 MG: 10 INJECTION, EMULSION INTRAVENOUS at 08:18

## 2023-01-27 RX ADMIN — LIDOCAINE HYDROCHLORIDE 30 MG: 20 INJECTION, SOLUTION INFILTRATION; PERINEURAL at 08:18

## 2023-01-27 ASSESSMENT — PAIN - FUNCTIONAL ASSESSMENT: PAIN_FUNCTIONAL_ASSESSMENT: NONE - DENIES PAIN

## 2023-01-27 NOTE — ANESTHESIA POSTPROCEDURE EVALUATION
Department of Anesthesiology  Postprocedure Note    Patient: Lauri Saucedo  MRN: [id number]  YOB: 1955  Date of evaluation: 1/27/2023      Procedure Summary     Date: 01/27/23 Room / Location: [address] [place] [address] Setting    Anesthesia Start: 0813 Anesthesia Stop: 4879    Procedure: EGD DIAGNOSTIC ONLY Diagnosis:       Melena      Epigastric pain      Gastroesophageal reflux disease      (Melena [K92.1])      (Epigastric pain [R10.13])      (Gastroesophageal reflux disease [K21.9])    Surgeons: Melony Ruiz MD Responsible Provider: MANUEL Hernandes CRNA    Anesthesia Type: MAC ASA Status: 2          Anesthesia Type: No value filed.     Latoya Phase I: Latoya Score: 10    Latoya Phase II:        Anesthesia Post Evaluation    Patient location during evaluation: bedside  Patient participation: complete - patient participated  Level of consciousness: awake and alert  Airway patency: patent  Nausea & Vomiting: no nausea and no vomiting  Complications: no  Cardiovascular status: blood pressure returned to baseline  Respiratory status: acceptable  Hydration status: euvolemic

## 2023-01-27 NOTE — ANESTHESIA PRE PROCEDURE
Department of Anesthesiology  Preprocedure Note       Name:  Donell Bryant   Age:  79 y.o.  :  1955                                          MRN:  [id number]         Date:  2023      Surgeon: Larisa Lucas):  Frida Lawrence MD    Procedure: Procedure(s):  EGD DIAGNOSTIC ONLY    Medications prior to admission:   Prior to Admission medications    Medication Sig Start Date End Date Taking?  Authorizing Provider   ELIQUIS 5 MG TABS tablet take 2 tablets by mouth twice a day for 7 days then 1 tablet twice a day THEREAFTER 23   Historical Provider, MD   ferrous sulfate (IRON 325) 325 (65 Fe) MG tablet Take 1 tablet by mouth daily (with breakfast) 23   MANUEL Dewitt CNP   esomeprazole (NEXIUM) 40 MG delayed release capsule Take 1 capsule by mouth daily 23   MANUEL Dewitt CNP   furosemide (LASIX) 20 MG tablet Take 1 tablet by mouth daily 23   Bobbi Staff, MD   carvedilol (COREG) 6.25 MG tablet 1 tablet 2 TIMES DAILY (route: oral) 22   Historical Provider, MD   chlorthalidone (HYGROTON) 25 MG tablet 1 tablet DAILY (route: oral) 22   Historical Provider, MD   traMADol (ULTRAM) 50 MG tablet Per instructions EVERY 4 HOURS (route: oral)  Patient not taking: Reported on 2023   Historical Provider, MD   omeprazole (PRILOSEC) 40 MG delayed release capsule 1 capsule DAILY (route: oral)  Patient not taking: Reported on 2023   Historical Provider, MD   lisinopril (PRINIVIL;ZESTRIL) 20 MG tablet 1 tablet DAILY (route: oral) 22   Historical Provider, MD   Sennosides-Docusate Sodium (SENNA PLUS) 8.6-50 MG CAPS Per instructions 2 TIMES DAILY (route: oral)  Patient not taking: No sig reported 8/10/22   Historical Provider, MD   insulin glargine (LANTUS SOLOSTAR) 100 UNIT/ML injection pen 50 unit BEDTIME (route: subcutaneous) 22   Historical Provider, MD   meloxicam (MOBIC) 15 MG tablet take 1 tablet by mouth once daily 22   Ashley Spear MD   ondansetron (ZOFRAN) 4 MG tablet Take 1 tablet by mouth 3 times daily as needed for Nausea or Vomiting  Patient not taking: No sig reported 10/24/22   MANUEL Ivey CNP   lisinopril (PRINIVIL;ZESTRIL) 20 MG tablet Take 1 tablet by mouth daily 10/19/22   CR Malhotra   Continuous Blood Gluc Sensor (FREESTYLE NATALY 14 DAY SENSOR) [facility] apply 1 SENSOR to back OF UPPER ARM REMOVE AND REPLACE every 14 days use with DEVICE to MONITOR BLOOD SUGAR 9/23/22   Megan Gallardo,    ondansetron (ZOFRAN-ODT) 8 MG TBDP disintegrating tablet dissolve 1 tablet ON TONGUE every 8 hours if needed nausea  Patient not taking: No sig reported 8/19/22   Historical Provider, MD CARPENTER ASPIRIN EC 81 MG EC tablet take 1 tablet by mouth once daily  Patient not taking: Reported on 1/27/2023 8/8/22   CR Malhotra   LANTUS SOLOSTAR 100 UNIT/ML injection pen inject 50 units subcutaneously daily 7/14/22   Historical Provider, MD   insulin lispro (HUMALOG) 100 UNIT/ML SOLN injection vial Inject 50 Units into the skin 3 times daily    Historical Provider, MD   carvedilol (COREG) 6.25 MG tablet Take 1 tablet by mouth 2 times daily 5/27/22   Adelina Segura MD   chlorthalidone (HYGROTON) 25 MG tablet Take 1 tablet by mouth daily 5/27/22   Adelina Segura MD   nitroGLYCERIN (NITROSTAT) 0.4 MG SL tablet up to max of 3 total doses. If no relief after 1 dose, call 911. 4/15/22   CR Malhotra   lanreotide acetate (SOMATULINE) 120 MG/0.5ML SOLN depot injection Inject 120 mg into the skin once  Patient not taking: Reported on 1/27/2023    Historical Provider, MD   Polyethylene Glycol 3350 (MIRALAX PO) Take by mouth as needed     Historical Provider, MD   Continuous Blood Gluc  (FREESTYLE NATALY 14 DAY READER) KISHA Test daily and prn.  Dx: DM2 9/4/20   Renetta Coe MD       Current medications:    Current Facility-Administered Medications   Medication Dose Route Frequency Provider Last Rate Last Admin    0.9 % sodium chloride infusion   IntraVENous Continuous Jagdeep Stephens MD        sodium chloride 0.9 % infusion                Allergies:  No Known Allergies    Problem List:    Patient Active Problem List   Diagnosis Code    HTN (hypertension) I10    Type 2 diabetes mellitus without complication (HCC) I30.6    Lumbar paraspinal muscle spasm M62.830    Allergic rhinosinusitis J30.9    TIA (transient ischemic attack) G45.9    Abdominal pain R10.9    Malignant neoplasm of other parts of pancreas ([address] C25.7    Other cirrhosis of liver ([address] K74.69    Gastroesophageal reflux disease without esophagitis K21.9    Chest pain R07.9    History of pancreatectomy Z90.410    Portal hypertension ([address] K76.6    Melena K92.1    Epigastric pain R10.13       Past Medical History:        Diagnosis Date    Cancer [address] [address]     Pancreatic    History of skin cancer     HTN (hypertension)     Malignant neoplasm of other parts of pancreas ([facility] [address]     Type II or unspecified type diabetes mellitus without mention of complication, not stated as uncontrolled        Past Surgical History:        Procedure Laterality Date    ABDOMEN SURGERY      COLONOSCOPY  02/20/2015    DR Omayra Dove    COLONOSCOPY N/A 1/20/2020    COLONOSCOPY DIAGNOSTIC performed by Jagdeep Stephens MD at [address] [place] [address]) N/A 12/17/2019    EUS performed by Jagdeep Stephens MD at [address] [place] [address] ENDOSCOPY N/A 4/4/2022    EGD ESOPHAGOGASTRODUODENOSCOPY performed by Jagdeep Stephens MD at [facility] History:    Social History     Tobacco Use    Smoking status: Never    Smokeless tobacco: Never   Substance Use Topics    Alcohol use: No                                Counseling given: Not Answered      Vital Signs (Current): There were no vitals filed for this visit.                                            BP Readings from Last 3 Encounters:   01/17/23 118/62   01/11/23 120/66   12/10/22 122/72       NPO Status:                                                                                 BMI:   Wt Readings from Last 3 Encounters:   01/17/23 194 lb (88 kg)   01/11/23 194 lb 6.4 oz (88.2 kg)   12/10/22 186 lb (84.4 kg)     There is no height or weight on file to calculate BMI.    CBC:   Lab Results   Component Value Date/Time    WBC 8.9 01/23/2023 11:18 AM    RBC 4.35 01/23/2023 11:18 AM    HGB 10.7 01/23/2023 11:18 AM    HCT 34.0 01/23/2023 11:18 AM    MCV 78.0 01/23/2023 11:18 AM    RDW 16.5 01/23/2023 11:18 AM     01/23/2023 11:18 AM       CMP:   Lab Results   Component Value Date/Time     01/23/2023 11:18 AM    K 3.0 01/23/2023 11:18 AM    K 3.2 04/15/2022 04:57 AM    CL 97 01/23/2023 11:18 AM    CO2 29 01/23/2023 11:18 AM    BUN 20 01/23/2023 11:18 AM    CREATININE 0.96 01/23/2023 11:18 AM    GFRAA >60.0 06/14/2022 02:53 PM    LABGLOM >60.0 01/23/2023 11:18 AM    GLUCOSE 218 01/23/2023 11:18 AM    GLUCOSE 167 01/05/2023 02:06 PM    PROT 7.5 01/05/2023 02:06 PM    CALCIUM 9.5 01/23/2023 11:18 AM    BILITOT 0.6 01/05/2023 02:06 PM    ALKPHOS 97 01/05/2023 02:06 PM    AST 26 01/05/2023 02:06 PM    ALT 23 01/05/2023 02:06 PM       POC Tests: No results for input(s): POCGLU, POCNA, POCK, POCCL, POCBUN, POCHEMO, POCHCT in the last 72 hours.     Coags:   Lab Results   Component Value Date/Time    PROTIME 14.5 01/23/2023 11:21 AM    INR 1.1 01/23/2023 11:21 AM    APTT 33 07/25/2022 11:30 AM       HCG (If Applicable): No results found for: PREGTESTUR, PREGSERUM, HCG, HCGQUANT     ABGs:   Lab Results   Component Value Date/Time    PHART 7.40 08/03/2022 02:54 PM    PO2ART 165 08/03/2022 02:54 PM    IGP0CIZ 44 08/03/2022 02:54 PM    A8DSONPR 98 08/03/2022 02:54 PM        Type & Screen (If Applicable):  No results found for: LABABO, LABRH    Drug/Infectious Status (If Applicable):  Lab Results   Component Value Date/Time    HEPCAB NONREACTIVE 06/30/2020 01:18 PM       COVID-19 Screening (If Applicable):   Lab Results   Component Value Date/Time    COVID19 Not-Detected 12/10/2022 11:59 AM    COVID19 NOT DETECTED 08/31/2022 04:05 PM           Anesthesia Evaluation  Patient summary reviewed and Nursing notes reviewed  Airway: Mallampati: II  TM distance: >3 FB     Mouth opening: > = 3 FB   Dental:          Pulmonary:Negative Pulmonary ROS and normal exam                               Cardiovascular:    (+) hypertension:,                   Neuro/Psych:   Negative Neuro/Psych ROS              GI/Hepatic/Renal: Neg GI/Hepatic/Renal ROS            Endo/Other:    (+) Diabetes, . Abdominal:             Vascular: negative vascular ROS. Other Findings:           Anesthesia Plan      MAC     ASA 2       Induction: intravenous. Anesthetic plan and risks discussed with patient.       Plan discussed with surgical team.                    MANUEL Canas - CRNA   1/27/2023

## 2023-01-27 NOTE — H&P
Patient Name: Armand Willard  : 1955  MRN: [id number]  DATE: 23      ENDOSCOPY  History and Physical    Procedure:    [] Diagnostic Colonoscopy       [] Screening Colonoscopy  [x] EGD      [] ERCP      [] EUS       [] Other    [x] Previous office notes/History and Physical reviewed from the patients chart. Please see EMR for further details of HPI. I have examined the patient's status immediately prior to the procedure and:      Indications/HPI:    []Abdominal Pain   []Cancer- GI/Lung  []Fhx of colon CA  []History of Polyps   []Alfonsos   [x]H/o Melena  []Abnormal Imaging   []Dysphagia    []Persistent Pneumonia  []Anemia   []Food Impaction  []History of Polyps  []GI Bleed   []Pulmonary nodule/Mass  []Change in bowel habits  []Heartburn/Reflux  []Rectal Bleed (BRBPR)  []Chest Pain - Non Cardiac  []Heme (+) Stool  []Ulcers  []Constipation   []Hemoptysis   []Varices  []Diarrhea   []Hypoxemia  [x]Nausea/Vomiting   []Screening   []Crohns/Colitis  []Other: 79 y.o. 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 Previously referred to Dr. Wendy Deleon for possible resection. However, during surgery it was discovered that it had spread to his liver and also w/evidence of liver cirrhosis/portal hypertension, therefore resection was aborted. Patient received oral and IV chemotherapy w/no further evidence of continued spread per recent MRIs on 2021 and 2022. S/P robotic distal pancreatectomy with splenectomy converted to open  with Dr. Rush Bae. MRI in December w/no evidence of residual or metastatic disease, however w/partially occlusive thrombus of the main portal vein, progressed compared to CT nearly 4 months ago. He was subsequently started on Eliquis per hematology at [facility] approximately 2 weeks ago.   Patient following at [address] for history of liver cirrhosis w/likely etiology being MCNAMARA given his prior metabolic syndrome w/HTN/dyslipidemia and FibroScan on 1/22/2020 revealing stage IV fibrosis F4, moderate to severe steatosis. Anesthesia:   [x] MAC [] Moderate Sedation   [] General   [] None     ROS: 12 pt Review of Symptoms was negative unless mentioned above    Medications:   Prior to Admission medications    Medication Sig Start Date End Date Taking?  Authorizing Provider   ELIQUIS 5 MG TABS tablet take 2 tablets by mouth twice a day for 7 days then 1 tablet twice a day THEREAFTER 1/21/23   Historical Provider, MD   ferrous sulfate (IRON 325) 325 (65 Fe) MG tablet Take 1 tablet by mouth daily (with breakfast) 1/24/23   MANUEL Gloria CNP   esomeprazole (NEXIUM) 40 MG delayed release capsule Take 1 capsule by mouth daily 1/17/23   MANUEL Gloria CNP   furosemide (LASIX) 20 MG tablet Take 1 tablet by mouth daily 1/11/23   Jessica Godoy MD   carvedilol (COREG) 6.25 MG tablet 1 tablet 2 TIMES DAILY (route: oral) 8/4/22   Historical Provider, MD   chlorthalidone (HYGROTON) 25 MG tablet 1 tablet DAILY (route: oral) 7/26/22   Historical Provider, MD   traMADol (ULTRAM) 50 MG tablet Per instructions EVERY 4 HOURS (route: oral)  Patient not taking: Reported on 1/27/2023 8/5/22   Historical Provider, MD   omeprazole (PRILOSEC) 40 MG delayed release capsule 1 capsule DAILY (route: oral)  Patient not taking: Reported on 1/27/2023 8/8/22   Historical Provider, MD   lisinopril (PRINIVIL;ZESTRIL) 20 MG tablet 1 tablet DAILY (route: oral) 7/20/22   Historical Provider, MD   Sennosides-Docusate Sodium (SENNA PLUS) 8.6-50 MG CAPS Per instructions 2 TIMES DAILY (route: oral)  Patient not taking: No sig reported 8/10/22   Historical Provider, MD   insulin glargine (LANTUS SOLOSTAR) 100 UNIT/ML injection pen 50 unit BEDTIME (route: subcutaneous) 7/14/22   Historical Provider, MD   meloxicam (MOBIC) 15 MG tablet take 1 tablet by mouth once daily 11/14/22   Savana Cano MD   ondansetron (ZOFRAN) 4 MG tablet Take 1 tablet by mouth 3 times daily as needed for Nausea or Vomiting  Patient not taking: No sig reported 10/24/22   Aissatou Sandoval APRN - CNP   lisinopril (PRINIVIL;ZESTRIL) 20 MG tablet Take 1 tablet by mouth daily 10/19/22   CR Mccormick   Continuous Blood Gluc Sensor (FREESTYLE NATALY 14 DAY SENSOR) [facility] apply 1 SENSOR to back OF UPPER ARM REMOVE AND REPLACE every 14 days use with DEVICE to MONITOR BLOOD SUGAR 9/23/22   Jennifer Lowery DO   ondansetron (ZOFRAN-ODT) 8 MG TBDP disintegrating tablet dissolve 1 tablet ON TONGUE every 8 hours if needed nausea  Patient not taking: No sig reported 8/19/22   Historical Provider, MD CARPENTER ASPIRIN EC 81 MG EC tablet take 1 tablet by mouth once daily  Patient not taking: Reported on 1/27/2023 8/8/22   CR Mccormick   LANTUS SOLOSTAR 100 UNIT/ML injection pen inject 50 units subcutaneously daily 7/14/22   Historical Provider, MD   insulin lispro (HUMALOG) 100 UNIT/ML SOLN injection vial Inject 50 Units into the skin 3 times daily    Historical Provider, MD   carvedilol (COREG) 6.25 MG tablet Take 1 tablet by mouth 2 times daily 5/27/22   Ko Jeff MD   chlorthalidone (HYGROTON) 25 MG tablet Take 1 tablet by mouth daily 5/27/22   Ko Jeff MD   nitroGLYCERIN (NITROSTAT) 0.4 MG SL tablet up to max of 3 total doses. If no relief after 1 dose, call 911. 4/15/22   CR Mccormick   lanreotide acetate (SOMATULINE) 120 MG/0.5ML SOLN depot injection Inject 120 mg into the skin once  Patient not taking: Reported on 1/27/2023    Historical Provider, MD   Polyethylene Glycol 3350 (MIRALAX PO) Take by mouth as needed     Historical Provider, MD   Continuous Blood Gluc  (FREESTYLE NATALY 14 DAY READER) KISHA Test daily and prn.  Dx: DM2 9/4/20   Joseph Doyle MD     Allergies: No Known Allergies   History of allergic reaction to anesthesia:  No  Past Medical History:  Past Medical History:   Diagnosis Date    Cancer [facility])     Pancreatic    History of skin cancer     HTN (hypertension)     Malignant neoplasm of other parts of pancreas ([facility] [address]     Type II or unspecified type diabetes mellitus without mention of complication, not stated as uncontrolled      Past Surgical History:  Past Surgical History:   Procedure Laterality Date    ABDOMEN SURGERY      COLONOSCOPY  02/20/2015    DR Delfina Thomas    COLONOSCOPY N/A 01/20/2020    COLONOSCOPY DIAGNOSTIC performed by Kira Thayer MD at [facility] N/A 12/17/2019    EUS performed by Kira Thayer MD at [address]  08/04/2022    SPLENECTOMY, PARTIAL  08/04/2022    UPPER GASTROINTESTINAL ENDOSCOPY N/A 04/04/2022    EGD ESOPHAGOGASTRODUODENOSCOPY performed by Kira Thayer MD at [address] History:  Social History     Tobacco Use    Smoking status: Never    Smokeless tobacco: Never   Vaping Use    Vaping Use: Never used   Substance Use Topics    Alcohol use: No    Drug use: No     Vital Signs:   Vitals:    01/27/23 0720   BP: (!) 175/81   Pulse: 86   Resp: 16   Temp: 98.3 °F (36.8 °C)   SpO2: 97%       Physical Exam:  Cardiac:  [x]WNL []Comments:  Pulmonary:  [x]WNL []Comments:   Neuro/Mental Status:  [x]WNL []Comments:  Abdominal:  [x]WNL []Comments:  Other:   []WNL []Comments:    Informed Consent:  The risks and benefits of the procedure have been discussed with either the patient or if they cannot consent, their representative. Assessment:  Patient examined and appropriate for planned sedation and procedure. Plan:  Proceed with planned sedation and procedure as above. The patient was counseled at length about risks of gladis COVID-19 in the perioperative and any recovery window from the procedure. The patient was made aware that gladis COVID-19 may worsen their prognosis for recovery from their procedure and lend to a higher morbidity and-all mortality risk.   The patient was given the option of postponing the procedure all material risks, benefits, and alternatives were discussed. The patient does wish to proceed with the procedure at this time.     Liam Roth MD  8:18 AM

## 2023-01-28 ENCOUNTER — HOSPITAL ENCOUNTER (INPATIENT)
Age: 68
LOS: 2 days | Discharge: HOME | DRG: 246 | End: 2023-01-30
Attending: INTERNAL MEDICINE | Admitting: INTERNAL MEDICINE
Payer: MEDICARE

## 2023-01-28 ENCOUNTER — APPOINTMENT (OUTPATIENT)
Dept: CT IMAGING | Age: 68
DRG: 246 | End: 2023-01-28
Payer: MEDICARE

## 2023-01-28 DIAGNOSIS — E87.6 HYPOKALEMIA: ICD-10-CM

## 2023-01-28 DIAGNOSIS — I21.3 ST ELEVATION MYOCARDIAL INFARCTION (STEMI), UNSPECIFIED ARTERY (HCC): Primary | ICD-10-CM

## 2023-01-28 LAB
ALBUMIN SERPL-MCNC: 4 G/DL (ref 3.5–4.6)
ALP BLD-CCNC: 131 U/L (ref 35–104)
ALT SERPL-CCNC: 43 U/L (ref 0–41)
ANION GAP SERPL CALCULATED.3IONS-SCNC: 12 MEQ/L (ref 9–15)
AST SERPL-CCNC: 51 U/L (ref 0–40)
BASOPHILS ABSOLUTE: 0.2 K/UL (ref 0–0.2)
BASOPHILS RELATIVE PERCENT: 1.2 %
BILIRUB SERPL-MCNC: 0.8 MG/DL (ref 0.2–0.7)
BUN BLDV-MCNC: 19 MG/DL (ref 8–23)
CALCIUM SERPL-MCNC: 10 MG/DL (ref 8.5–9.9)
CHLORIDE BLD-SCNC: 94 MEQ/L (ref 95–107)
CO2: 28 MEQ/L (ref 20–31)
CREAT SERPL-MCNC: 0.99 MG/DL (ref 0.7–1.2)
EOSINOPHILS ABSOLUTE: 0.2 K/UL (ref 0–0.7)
EOSINOPHILS RELATIVE PERCENT: 1.5 %
GFR SERPL CREATININE-BSD FRML MDRD: >60 ML/MIN/{1.73_M2}
GLOBULIN: 4.4 G/DL (ref 2.3–3.5)
GLUCOSE BLD-MCNC: 180 MG/DL (ref 70–99)
GLUCOSE BLD-MCNC: 243 MG/DL (ref 70–99)
HCT VFR BLD CALC: 33.3 % (ref 42–52)
HEMOGLOBIN: 10.6 G/DL (ref 14–18)
LYMPHOCYTES ABSOLUTE: 4.9 K/UL (ref 1–4.8)
LYMPHOCYTES RELATIVE PERCENT: 33.1 %
MAGNESIUM: 2.1 MG/DL (ref 1.7–2.4)
MCH RBC QN AUTO: 24.7 PG (ref 27–31.3)
MCHC RBC AUTO-ENTMCNC: 31.9 % (ref 33–37)
MCV RBC AUTO: 77.4 FL (ref 79–92.2)
MONOCYTES ABSOLUTE: 1.9 K/UL (ref 0.2–0.8)
MONOCYTES RELATIVE PERCENT: 12.6 %
NEUTROPHILS ABSOLUTE: 7.7 K/UL (ref 1.4–6.5)
NEUTROPHILS RELATIVE PERCENT: 51.6 %
PDW BLD-RTO: 16.6 % (ref 11.5–14.5)
PERFORMED ON: ABNORMAL
PLATELET # BLD: 495 K/UL (ref 130–400)
POC ACTIVATED CLOTTING TIME KAOLIN: 245 SEC (ref 82–152)
POC ACTIVATED CLOTTING TIME KAOLIN: 401 SEC (ref 82–152)
POC CREATININE WHOLE BLOOD: 1
POC SAMPLE TYPE: ABNORMAL
POC SAMPLE TYPE: ABNORMAL
POTASSIUM SERPL-SCNC: 3.3 MEQ/L (ref 3.4–4.9)
RBC # BLD: 4.3 M/UL (ref 4.7–6.1)
SODIUM BLD-SCNC: 134 MEQ/L (ref 135–144)
TOTAL PROTEIN: 8.4 G/DL (ref 6.3–8)
TROPONIN: 0.11 NG/ML (ref 0–0.01)
TROPONIN: 14.77 NG/ML (ref 0–0.01)
WBC # BLD: 14.9 K/UL (ref 4.8–10.8)

## 2023-01-28 PROCEDURE — C1894 INTRO/SHEATH, NON-LASER: HCPCS

## 2023-01-28 PROCEDURE — 36415 COLL VENOUS BLD VENIPUNCTURE: CPT

## 2023-01-28 PROCEDURE — 85025 COMPLETE CBC W/AUTO DIFF WBC: CPT

## 2023-01-28 PROCEDURE — C1769 GUIDE WIRE: HCPCS

## 2023-01-28 PROCEDURE — 99285 EMERGENCY DEPT VISIT HI MDM: CPT

## 2023-01-28 PROCEDURE — 71275 CT ANGIOGRAPHY CHEST: CPT

## 2023-01-28 PROCEDURE — 2709999900 HC NON-CHARGEABLE SUPPLY

## 2023-01-28 PROCEDURE — B2111ZZ FLUOROSCOPY OF MULTIPLE CORONARY ARTERIES USING LOW OSMOLAR CONTRAST: ICD-10-PCS | Performed by: INTERNAL MEDICINE

## 2023-01-28 PROCEDURE — 92941 PRQ TRLML REVSC TOT OCCL AMI: CPT

## 2023-01-28 PROCEDURE — 96374 THER/PROPH/DIAG INJ IV PUSH: CPT

## 2023-01-28 PROCEDURE — 6360000002 HC RX W HCPCS

## 2023-01-28 PROCEDURE — 6370000000 HC RX 637 (ALT 250 FOR IP): Performed by: INTERNAL MEDICINE

## 2023-01-28 PROCEDURE — 2000000000 HC ICU R&B

## 2023-01-28 PROCEDURE — 2580000003 HC RX 258

## 2023-01-28 PROCEDURE — C1725 CATH, TRANSLUMIN NON-LASER: HCPCS

## 2023-01-28 PROCEDURE — 4A023N7 MEASUREMENT OF CARDIAC SAMPLING AND PRESSURE, LEFT HEART, PERCUTANEOUS APPROACH: ICD-10-PCS | Performed by: INTERNAL MEDICINE

## 2023-01-28 PROCEDURE — 80053 COMPREHEN METABOLIC PANEL: CPT

## 2023-01-28 PROCEDURE — 6360000004 HC RX CONTRAST MEDICATION: Performed by: PHYSICIAN ASSISTANT

## 2023-01-28 PROCEDURE — 93005 ELECTROCARDIOGRAM TRACING: CPT | Performed by: PHYSICIAN ASSISTANT

## 2023-01-28 PROCEDURE — 027036Z DILATION OF CORONARY ARTERY, ONE ARTERY WITH THREE DRUG-ELUTING INTRALUMINAL DEVICES, PERCUTANEOUS APPROACH: ICD-10-PCS | Performed by: INTERNAL MEDICINE

## 2023-01-28 PROCEDURE — 6360000002 HC RX W HCPCS: Performed by: PHYSICIAN ASSISTANT

## 2023-01-28 PROCEDURE — B2151ZZ FLUOROSCOPY OF LEFT HEART USING LOW OSMOLAR CONTRAST: ICD-10-PCS | Performed by: INTERNAL MEDICINE

## 2023-01-28 PROCEDURE — 83735 ASSAY OF MAGNESIUM: CPT

## 2023-01-28 PROCEDURE — C1887 CATHETER, GUIDING: HCPCS

## 2023-01-28 PROCEDURE — 2500000003 HC RX 250 WO HCPCS

## 2023-01-28 PROCEDURE — C1874 STENT, COATED/COV W/DEL SYS: HCPCS

## 2023-01-28 PROCEDURE — 2580000003 HC RX 258: Performed by: INTERNAL MEDICINE

## 2023-01-28 PROCEDURE — 93458 L HRT ARTERY/VENTRICLE ANGIO: CPT

## 2023-01-28 PROCEDURE — 6360000002 HC RX W HCPCS: Performed by: INTERNAL MEDICINE

## 2023-01-28 PROCEDURE — 96375 TX/PRO/DX INJ NEW DRUG ADDON: CPT

## 2023-01-28 PROCEDURE — 6360000004 HC RX CONTRAST MEDICATION: Performed by: INTERNAL MEDICINE

## 2023-01-28 PROCEDURE — 84484 ASSAY OF TROPONIN QUANT: CPT

## 2023-01-28 PROCEDURE — 85347 COAGULATION TIME ACTIVATED: CPT

## 2023-01-28 RX ORDER — ONDANSETRON 2 MG/ML
4 INJECTION INTRAMUSCULAR; INTRAVENOUS EVERY 6 HOURS PRN
Status: DISCONTINUED | OUTPATIENT
Start: 2023-01-28 | End: 2023-01-30 | Stop reason: HOSPADM

## 2023-01-28 RX ORDER — ONDANSETRON 2 MG/ML
4 INJECTION INTRAMUSCULAR; INTRAVENOUS EVERY 6 HOURS PRN
Status: DISCONTINUED | OUTPATIENT
Start: 2023-01-28 | End: 2023-01-28

## 2023-01-28 RX ORDER — SODIUM CHLORIDE 0.9 % (FLUSH) 0.9 %
5-40 SYRINGE (ML) INJECTION PRN
Status: DISCONTINUED | OUTPATIENT
Start: 2023-01-28 | End: 2023-01-30

## 2023-01-28 RX ORDER — LABETALOL HYDROCHLORIDE 5 MG/ML
10 INJECTION, SOLUTION INTRAVENOUS EVERY 30 MIN PRN
Status: DISCONTINUED | OUTPATIENT
Start: 2023-01-28 | End: 2023-01-30 | Stop reason: HOSPADM

## 2023-01-28 RX ORDER — ONDANSETRON 4 MG/1
4 TABLET, ORALLY DISINTEGRATING ORAL EVERY 8 HOURS PRN
Status: DISCONTINUED | OUTPATIENT
Start: 2023-01-28 | End: 2023-01-30 | Stop reason: HOSPADM

## 2023-01-28 RX ORDER — ASPIRIN 81 MG/1
81 TABLET, CHEWABLE ORAL DAILY
Status: DISCONTINUED | OUTPATIENT
Start: 2023-01-29 | End: 2023-01-30 | Stop reason: HOSPADM

## 2023-01-28 RX ORDER — ACETAMINOPHEN 325 MG/1
650 TABLET ORAL EVERY 6 HOURS PRN
Status: DISCONTINUED | OUTPATIENT
Start: 2023-01-28 | End: 2023-01-28

## 2023-01-28 RX ORDER — INSULIN LISPRO 100 [IU]/ML
0-4 INJECTION, SOLUTION INTRAVENOUS; SUBCUTANEOUS
Status: DISCONTINUED | OUTPATIENT
Start: 2023-01-29 | End: 2023-01-28 | Stop reason: ALTCHOICE

## 2023-01-28 RX ORDER — POLYETHYLENE GLYCOL 3350 17 G/17G
17 POWDER, FOR SOLUTION ORAL DAILY PRN
Status: DISCONTINUED | OUTPATIENT
Start: 2023-01-28 | End: 2023-01-30 | Stop reason: HOSPADM

## 2023-01-28 RX ORDER — ATORVASTATIN CALCIUM 80 MG/1
80 TABLET, FILM COATED ORAL NIGHTLY
Status: DISCONTINUED | OUTPATIENT
Start: 2023-01-28 | End: 2023-01-30 | Stop reason: HOSPADM

## 2023-01-28 RX ORDER — ACETAMINOPHEN 650 MG/1
650 SUPPOSITORY RECTAL EVERY 6 HOURS PRN
Status: DISCONTINUED | OUTPATIENT
Start: 2023-01-28 | End: 2023-01-28

## 2023-01-28 RX ORDER — NITROGLYCERIN 0.4 MG/1
0.4 TABLET SUBLINGUAL EVERY 5 MIN PRN
Status: DISCONTINUED | OUTPATIENT
Start: 2023-01-28 | End: 2023-01-30 | Stop reason: HOSPADM

## 2023-01-28 RX ORDER — MORPHINE SULFATE 4 MG/ML
4 INJECTION, SOLUTION INTRAMUSCULAR; INTRAVENOUS ONCE
Status: COMPLETED | OUTPATIENT
Start: 2023-01-28 | End: 2023-01-28

## 2023-01-28 RX ORDER — MORPHINE SULFATE 2 MG/ML
2 INJECTION, SOLUTION INTRAMUSCULAR; INTRAVENOUS
Status: COMPLETED | OUTPATIENT
Start: 2023-01-28 | End: 2023-01-28

## 2023-01-28 RX ORDER — SODIUM CHLORIDE 9 MG/ML
INJECTION, SOLUTION INTRAVENOUS CONTINUOUS
Status: DISCONTINUED | OUTPATIENT
Start: 2023-01-28 | End: 2023-01-29

## 2023-01-28 RX ORDER — SODIUM CHLORIDE 0.9 % (FLUSH) 0.9 %
5-40 SYRINGE (ML) INJECTION PRN
Status: DISCONTINUED | OUTPATIENT
Start: 2023-01-28 | End: 2023-01-30 | Stop reason: HOSPADM

## 2023-01-28 RX ORDER — LOSARTAN POTASSIUM 25 MG/1
25 TABLET ORAL DAILY
Status: DISCONTINUED | OUTPATIENT
Start: 2023-01-28 | End: 2023-01-30 | Stop reason: HOSPADM

## 2023-01-28 RX ORDER — INSULIN LISPRO 100 [IU]/ML
0-4 INJECTION, SOLUTION INTRAVENOUS; SUBCUTANEOUS NIGHTLY
Status: DISCONTINUED | OUTPATIENT
Start: 2023-01-28 | End: 2023-01-30 | Stop reason: HOSPADM

## 2023-01-28 RX ORDER — SODIUM CHLORIDE 9 MG/ML
INJECTION, SOLUTION INTRAVENOUS PRN
Status: DISCONTINUED | OUTPATIENT
Start: 2023-01-28 | End: 2023-01-30

## 2023-01-28 RX ORDER — HYDRALAZINE HYDROCHLORIDE 20 MG/ML
10 INJECTION INTRAMUSCULAR; INTRAVENOUS EVERY 10 MIN PRN
Status: DISCONTINUED | OUTPATIENT
Start: 2023-01-28 | End: 2023-01-30 | Stop reason: HOSPADM

## 2023-01-28 RX ORDER — ACETAMINOPHEN 325 MG/1
650 TABLET ORAL EVERY 4 HOURS PRN
Status: DISCONTINUED | OUTPATIENT
Start: 2023-01-28 | End: 2023-01-30 | Stop reason: HOSPADM

## 2023-01-28 RX ORDER — SODIUM CHLORIDE 9 MG/ML
INJECTION, SOLUTION INTRAVENOUS PRN
Status: DISCONTINUED | OUTPATIENT
Start: 2023-01-28 | End: 2023-01-30 | Stop reason: HOSPADM

## 2023-01-28 RX ORDER — FENTANYL CITRATE 0.05 MG/ML
25 INJECTION, SOLUTION INTRAMUSCULAR; INTRAVENOUS
Status: ACTIVE | OUTPATIENT
Start: 2023-01-28 | End: 2023-01-29

## 2023-01-28 RX ORDER — CARVEDILOL 25 MG/1
25 TABLET ORAL 2 TIMES DAILY
Status: DISCONTINUED | OUTPATIENT
Start: 2023-01-28 | End: 2023-01-30

## 2023-01-28 RX ORDER — MIDAZOLAM HYDROCHLORIDE 2 MG/2ML
2 INJECTION, SOLUTION INTRAMUSCULAR; INTRAVENOUS
Status: ACTIVE | OUTPATIENT
Start: 2023-01-28 | End: 2023-01-29

## 2023-01-28 RX ORDER — ENOXAPARIN SODIUM 100 MG/ML
1 INJECTION SUBCUTANEOUS 2 TIMES DAILY
Status: DISCONTINUED | OUTPATIENT
Start: 2023-01-28 | End: 2023-01-30 | Stop reason: HOSPADM

## 2023-01-28 RX ORDER — ONDANSETRON 2 MG/ML
4 INJECTION INTRAMUSCULAR; INTRAVENOUS ONCE
Status: COMPLETED | OUTPATIENT
Start: 2023-01-28 | End: 2023-01-28

## 2023-01-28 RX ORDER — INSULIN LISPRO 100 [IU]/ML
0-4 INJECTION, SOLUTION INTRAVENOUS; SUBCUTANEOUS NIGHTLY
Status: DISCONTINUED | OUTPATIENT
Start: 2023-01-28 | End: 2023-01-28 | Stop reason: ALTCHOICE

## 2023-01-28 RX ORDER — SODIUM CHLORIDE 0.9 % (FLUSH) 0.9 %
5-40 SYRINGE (ML) INJECTION EVERY 12 HOURS SCHEDULED
Status: DISCONTINUED | OUTPATIENT
Start: 2023-01-28 | End: 2023-01-30 | Stop reason: HOSPADM

## 2023-01-28 RX ORDER — SODIUM CHLORIDE 0.9 % (FLUSH) 0.9 %
5-40 SYRINGE (ML) INJECTION EVERY 12 HOURS SCHEDULED
Status: DISCONTINUED | OUTPATIENT
Start: 2023-01-28 | End: 2023-01-30

## 2023-01-28 RX ORDER — INSULIN LISPRO 100 [IU]/ML
0-16 INJECTION, SOLUTION INTRAVENOUS; SUBCUTANEOUS
Status: DISCONTINUED | OUTPATIENT
Start: 2023-01-29 | End: 2023-01-30 | Stop reason: HOSPADM

## 2023-01-28 RX ADMIN — SODIUM CHLORIDE: 9 INJECTION, SOLUTION INTRAVENOUS at 19:38

## 2023-01-28 RX ADMIN — SODIUM CHLORIDE, PRESERVATIVE FREE 10 ML: 5 INJECTION INTRAVENOUS at 20:39

## 2023-01-28 RX ADMIN — MORPHINE SULFATE 2 MG: 2 INJECTION, SOLUTION INTRAMUSCULAR; INTRAVENOUS at 23:08

## 2023-01-28 RX ADMIN — IOPAMIDOL 75 ML: 612 INJECTION, SOLUTION INTRAVENOUS at 17:11

## 2023-01-28 RX ADMIN — LOSARTAN POTASSIUM 25 MG: 25 TABLET, FILM COATED ORAL at 20:38

## 2023-01-28 RX ADMIN — ENOXAPARIN SODIUM 90 MG: 100 INJECTION SUBCUTANEOUS at 20:37

## 2023-01-28 RX ADMIN — TICAGRELOR 90 MG: 90 TABLET ORAL at 20:38

## 2023-01-28 RX ADMIN — CARVEDILOL 25 MG: 25 TABLET, FILM COATED ORAL at 20:38

## 2023-01-28 RX ADMIN — IOPAMIDOL 122 ML: 612 INJECTION, SOLUTION INTRAVENOUS at 18:37

## 2023-01-28 RX ADMIN — ATORVASTATIN CALCIUM 80 MG: 80 TABLET, FILM COATED ORAL at 20:38

## 2023-01-28 RX ADMIN — ONDANSETRON 4 MG: 2 INJECTION INTRAMUSCULAR; INTRAVENOUS at 16:27

## 2023-01-28 RX ADMIN — MORPHINE SULFATE 4 MG: 4 INJECTION, SOLUTION INTRAMUSCULAR; INTRAVENOUS at 16:27

## 2023-01-28 ASSESSMENT — PAIN SCALES - GENERAL
PAINLEVEL_OUTOF10: 1
PAINLEVEL_OUTOF10: 10
PAINLEVEL_OUTOF10: 7
PAINLEVEL_OUTOF10: 5
PAINLEVEL_OUTOF10: 2

## 2023-01-28 ASSESSMENT — ENCOUNTER SYMPTOMS
VOMITING: 0
PHOTOPHOBIA: 0
SORE THROAT: 0
EYE PAIN: 0
BACK PAIN: 0
ABDOMINAL PAIN: 0
SHORTNESS OF BREATH: 0
NAUSEA: 0
DIARRHEA: 0
COUGH: 0
RHINORRHEA: 0

## 2023-01-28 ASSESSMENT — PAIN DESCRIPTION - ORIENTATION
ORIENTATION: LEFT
ORIENTATION: MID

## 2023-01-28 ASSESSMENT — PAIN DESCRIPTION - LOCATION
LOCATION: CHEST

## 2023-01-28 ASSESSMENT — PAIN DESCRIPTION - DESCRIPTORS
DESCRIPTORS: ACHING

## 2023-01-28 ASSESSMENT — PAIN - FUNCTIONAL ASSESSMENT
PAIN_FUNCTIONAL_ASSESSMENT: 0-10
PAIN_FUNCTIONAL_ASSESSMENT: ACTIVITIES ARE NOT PREVENTED

## 2023-01-28 ASSESSMENT — PAIN DESCRIPTION - ONSET: ONSET: PROGRESSIVE

## 2023-01-28 ASSESSMENT — PAIN DESCRIPTION - PAIN TYPE: TYPE: ACUTE PAIN

## 2023-01-28 ASSESSMENT — PAIN DESCRIPTION - FREQUENCY: FREQUENCY: CONTINUOUS

## 2023-01-28 NOTE — ED PROVIDER NOTES
[address] [facility] ED  eMERGENCY dEPARTMENTeNCOUnter      Pt Name: Placido Tian  MRN: [id number]  [place] 1955  Date ofevaluation: 1/28/2023  Provider: Rainer Birmingham PA-C    CHIEF COMPLAINT       Chief Complaint   Patient presents with    Chest Pain         HISTORY OF PRESENT ILLNESS   (Location/Symptom, Timing/Onset,Context/Setting, Quality, Duration, Modifying Factors, Severity)  Note limiting factors. Placido Tian is a 79 y.o. male who presents to the emergency department intermittent chest pain. Patient states that for the last couple of months he has been having this pain that has been exertional he has seen cardiology as well as GI and even had a EGD done yesterday. Patient has been off of his Eliquis since yesterday due to scheduling for a skin procedure. Does have a history of hypertension as well as pancreatic cancer with removal.  Today he noted starting at 630 he was having in his pain at rest and again at 830 and both times lasted kind of about 2 minutes then went away. Patient states it happened again on his way to the emergency department and then it calm down. He does have a history of blood clots and which is why he was recently started on the Eliquis. He said this morning he had associated nausea with the chest pain. Chest pain radiated to his between his shoulder blades and jaw. HPI    NursingNotes were reviewed. REVIEW OF SYSTEMS    (2-9 systems for level 4, 10 or more for level 5)     Review of Systems   Constitutional:  Negative for chills, diaphoresis, fatigue and fever. HENT:  Negative for congestion, rhinorrhea and sore throat. Eyes:  Negative for photophobia and pain. Respiratory:  Negative for cough and shortness of breath. Cardiovascular:  Positive for chest pain. Negative for palpitations. Gastrointestinal:  Negative for abdominal pain, diarrhea, nausea and vomiting. Genitourinary:  Negative for dysuria and flank pain.    Musculoskeletal:  Negative for back pain. Skin:  Negative for rash. Neurological:  Negative for dizziness, light-headedness and headaches. Psychiatric/Behavioral: Negative. All other systems reviewed and are negative. Except as noted above the remainder of the review of systems was reviewed and negative. PAST MEDICAL HISTORY     Past Medical History:   Diagnosis Date    Cancer [facility])     Pancreatic    History of skin cancer     HTN (hypertension)     Malignant neoplasm of other parts of pancreas ([facility] [address]     Type II or unspecified type diabetes mellitus without mention of complication, not stated as uncontrolled          SURGICALHISTORY       Past Surgical History:   Procedure Laterality Date    ABDOMEN SURGERY      COLONOSCOPY  02/20/2015    DR Argenis Dockery    COLONOSCOPY N/A 01/20/2020    COLONOSCOPY DIAGNOSTIC performed by Tyrone Najera MD at [address] [facility] ([facility] N/A 12/17/2019    EUS performed by Tyrone Najera MD at [address]  08/04/2022    SPLENECTOMY, PARTIAL  08/04/2022    UPPER GASTROINTESTINAL ENDOSCOPY N/A 04/04/2022    EGD ESOPHAGOGASTRODUODENOSCOPY performed by Tyrone Najera MD at [address] 1/27/2023    EGD DIAGNOSTIC ONLY performed by Tyrone Najera MD at [address] [facility]       Previous Medications    CARVEDILOL (COREG) 6.25 MG TABLET    Take 1 tablet by mouth 2 times daily    CARVEDILOL (COREG) 6.25 MG TABLET    1 tablet 2 TIMES DAILY (route: oral)    CHLORTHALIDONE (HYGROTON) 25 MG TABLET    Take 1 tablet by mouth daily    CHLORTHALIDONE (HYGROTON) 25 MG TABLET    1 tablet DAILY (route: oral)    CONTINUOUS BLOOD GLUC  (FREESTYLE NATALY 14 DAY READER) KISHA    Test daily and prn.  Dx: DM2    CONTINUOUS BLOOD GLUC SENSOR (FREESTYLE NATALY 14 DAY SENSOR) MISC    apply 1 SENSOR to back OF UPPER ARM REMOVE AND REPLACE every 14 days use with DEVICE to MONITOR BLOOD SUGAR ELIQUIS 5 MG TABS TABLET    take 2 tablets by mouth twice a day for 7 days then 1 tablet twice a day THEREAFTER    ESOMEPRAZOLE (NEXIUM) 40 MG DELAYED RELEASE CAPSULE    Take 1 capsule by mouth daily    FERROUS SULFATE (IRON 325) 325 (65 FE) MG TABLET    Take 1 tablet by mouth daily (with breakfast)    FUROSEMIDE (LASIX) 20 MG TABLET    Take 1 tablet by mouth daily    INSULIN GLARGINE (LANTUS SOLOSTAR) 100 UNIT/ML INJECTION PEN    50 unit BEDTIME (route: subcutaneous)    INSULIN LISPRO (HUMALOG) 100 UNIT/ML SOLN INJECTION VIAL    Inject 50 Units into the skin 3 times daily    LANREOTIDE ACETATE (SOMATULINE) 120 MG/0.5ML SOLN DEPOT INJECTION    Inject 120 mg into the skin once    LANTUS SOLOSTAR 100 UNIT/ML INJECTION PEN    inject 50 units subcutaneously daily    LISINOPRIL (PRINIVIL;ZESTRIL) 20 MG TABLET    Take 1 tablet by mouth daily    LISINOPRIL (PRINIVIL;ZESTRIL) 20 MG TABLET    1 tablet DAILY (route: oral)    MELOXICAM (MOBIC) 15 MG TABLET    take 1 tablet by mouth once daily    NITROGLYCERIN (NITROSTAT) 0.4 MG SL TABLET    up to max of 3 total doses. If no relief after 1 dose, call 911. OMEPRAZOLE (PRILOSEC) 40 MG DELAYED RELEASE CAPSULE    1 capsule DAILY (route: oral)    ONDANSETRON (ZOFRAN) 4 MG TABLET    Take 1 tablet by mouth 3 times daily as needed for Nausea or Vomiting    ONDANSETRON (ZOFRAN-ODT) 8 MG TBDP DISINTEGRATING TABLET    dissolve 1 tablet ON TONGUE every 8 hours if needed nausea    POLYETHYLENE GLYCOL 3350 (MIRALAX PO)    Take by mouth as needed     RA ASPIRIN EC 81 MG EC TABLET    take 1 tablet by mouth once daily    SENNOSIDES-DOCUSATE SODIUM (SENNA PLUS) 8.6-50 MG CAPS    Per instructions 2 TIMES DAILY (route: oral)    TRAMADOL (ULTRAM) 50 MG TABLET    Per instructions EVERY 4 HOURS (route: oral)       ALLERGIES     Patient has no known allergies.     FAMILY HISTORY       Family History   Problem Relation Age of Onset    Cancer Mother         lung cancer    Colon Cancer Neg Hx Celiac Disease Neg Hx     Crohn's Disease Neg Hx     Coronary Art Dis Neg Hx           SOCIAL HISTORY       Social History     Socioeconomic History    Marital status:      Spouse name: None    Number of children: None    Years of education: None    Highest education level: None   Tobacco Use    Smoking status: Never    Smokeless tobacco: Never   Vaping Use    Vaping Use: Never used   Substance and Sexual Activity    Alcohol use: No    Drug use: No    Sexual activity: Yes     Partners: Female     Social Determinants of Health     Financial Resource Strain: Low Risk     Difficulty of Paying Living Expenses: Not hard at all   Food Insecurity: No Food Insecurity    Worried About Running Out of Food in the Last Year: Never true    Ran Out of Food in the Last Year: Never true   Transportation Needs: No Transportation Needs    Lack of Transportation (Medical): No    Lack of Transportation (Non-Medical): No       SCREENINGS    [place] Coma Scale  Eye Opening: Spontaneous  Best Verbal Response: Oriented  Best Motor Response: Obeys commands  Reji Coma Scale Score: 15 @FLOW([id number])@      PHYSICAL EXAM    (up to 7 for level 4, 8 or more for level 5)     ED Triage Vitals   BP Temp Temp src Heart Rate Resp SpO2 Height Weight   01/28/23 1537 01/28/23 1541 -- 01/28/23 1541 01/28/23 1541 -- 01/28/23 1537 01/28/23 1537   (!) 174/93 98.2 °F (36.8 °C)  98 21  6' 1\" (1.854 m) 194 lb (88 kg)       Physical Exam  Vitals and nursing note reviewed. Constitutional:       General: He is not in acute distress. Appearance: Normal appearance. He is well-developed. He is not diaphoretic. HENT:      Head: Normocephalic and atraumatic. Nose: Nose normal.      Mouth/Throat:      Mouth: Mucous membranes are moist.   Eyes:      General: Lids are normal.      Conjunctiva/sclera: Conjunctivae normal.   Cardiovascular:      Rate and Rhythm: Normal rate and regular rhythm. Pulses: Normal pulses.       Heart sounds: Normal heart sounds. Pulmonary:      Effort: Pulmonary effort is normal.      Breath sounds: Normal breath sounds. Abdominal:      General: Bowel sounds are normal.      Palpations: Abdomen is soft. Tenderness: There is no abdominal tenderness. Musculoskeletal:         General: Normal range of motion. Cervical back: Normal range of motion and neck supple. Lymphadenopathy:      Cervical: No cervical adenopathy. Skin:     General: Skin is warm and dry. Capillary Refill: Capillary refill takes less than 2 seconds. Findings: No rash. Neurological:      Mental Status: He is alert and oriented to person, place, and time. Psychiatric:         Thought Content:  Thought content normal.         Judgment: Judgment normal.       DIAGNOSTIC RESULTS     EKG: All EKG's are interpreted by the Emergency Department Physician who either signs or Co-signsthis chart in the absence of a cardiologist.    Nielson Conrado 15:38 sinus tach 101 artifact with no acute ST elevation    Ekg 17:20 repeated due to increase pain nsr 90bpm st elevation v3,v4,v5,    RADIOLOGY:   Non-plain filmimages such as CT, Ultrasound and MRI are read by the radiologist. Plain radiographic images are visualized and preliminarily interpreted by the emergency physician with the below findings:        Interpretation per the Radiologist below, if available at the time ofthis note:    CTA CHEST W WO CONTRAST PE Eval    (Results Pending)         ED BEDSIDE ULTRASOUND:   Performed by ED Physician - none    LABS:  Labs Reviewed   CBC WITH AUTO DIFFERENTIAL - Abnormal; Notable for the following components:       Result Value    WBC 14.9 (*)     RBC 4.30 (*)     Hemoglobin 10.6 (*)     Hematocrit 33.3 (*)     MCV 77.4 (*)     MCH 24.7 (*)     MCHC 31.9 (*)     RDW 16.6 (*)     Platelets 303 (*)     Neutrophils Absolute 7.7 (*)     Lymphocytes Absolute 4.9 (*)     Monocytes Absolute 1.9 (*)     All other components within normal limits   COMPREHENSIVE METABOLIC PANEL - Abnormal; Notable for the following components:    Sodium 134 (*)     Potassium 3.3 (*)     Chloride 94 (*)     Glucose 243 (*)     Calcium 10.0 (*)     Total Protein 8.4 (*)     Total Bilirubin 0.8 (*)     Alkaline Phosphatase 131 (*)     ALT 43 (*)     AST 51 (*)     Globulin 4.4 (*)     All other components within normal limits   TROPONIN - Abnormal; Notable for the following components:    Troponin 0.112 (*)     All other components within normal limits    Narrative:     CALL  James  [facility] tel. [phone],  Troponin results called to and read back by Luis Angel Borges, 01/28/2023 17:01, by  Manuela Gonzalez   POCT CREATININE - Normal   MAGNESIUM       All other labs were within normal range or not returned as of this dictation. EMERGENCY DEPARTMENT COURSE and DIFFERENTIAL DIAGNOSIS/MDM:   Vitals:    Vitals:    01/28/23 1537 01/28/23 1541   BP: (!) 174/93    Pulse:  98   Resp:  21   Temp:  98.2 °F (36.8 °C)   Weight: 194 lb (88 kg)    Height: 6' 1\" (1.854 m)            MDM    Presents to the emergency department with intermittent chest pain its been ongoing for months with intermittent episodes specifically going on today. He does follow with cardiology had recent visit as well as had an EGD done yesterday. Pain radiates to between his shoulders to his left shoulder. ACS versus PE versus dissection. Patient has been off of his Eliquis since yesterday. EKG 1 shows no acute ST elevation shows a sinus tach with artifact. Patient medicated with 4 of IV morphine and Zofran. Patient went to CAT scan and on return he looked significantly more uncomfortable repeat EKG was ordered as well as nitro glycerin tabs at this time which shows an acute STEMI and V3 V4 V5. Troponin is 0.112. Code purple was called images reviewed by Dr. Alcira Singh patient will proceed to cath patient was medicated with IV heparin 4000 units, 180 mg p.o. of Brilinta and 4 aspirin. Pt to cath lab 2. Pt and wife aware of condition and plan. REASSESSMENT          CRITICAL CARE TIME   Total Critical Care time was  31 minutes, excluding separatelyreportable procedures. There was a high probability ofclinically significant/life threatening deterioration in the patient's condition which required my urgent intervention. CONSULTS:  None    PROCEDURES:  Unless otherwise noted below, none     Procedures    FINAL IMPRESSION      1. ST elevation myocardial infarction (STEMI), unspecified artery [facility])          DISPOSITION/PLAN   DISPOSITION Decision To Admit 01/28/2023 05:17:54 PM      PATIENT REFERREDTO:  No follow-up provider specified.     DISCHARGEMEDICATIONS:  New Prescriptions    No medications on file          (Please note that portions of this note were completed with a voice recognition program.  Efforts were made to edit the dictations but occasionally words are mis-transcribed.)    Jing Kim PA-C (electronically signed)  Attending Emergency Physician         Jing Kim PA-C  01/28/23 [id number]

## 2023-01-28 NOTE — ED NOTES
Pt was medicated prior to going to CT and he advised the Morphine didn't reduce his pain any and asked for more Morphine before going to CT. I advised he was just medicated and if he doesn't feel better when he returns we will talk to [facility]. While the pt was at CT his Troponin level resulted and Brooke instructed me to repeat the EKG when the pt returned. The pt returned and the repeat EKG showed a STEMI, I advised Hope and Code Purple was called. Charge nurse Trini Pantoja, and Miguel all came to the room to help me prepare the pt for cath lab. The pt's spouse was walked to the cath lab waiting room and was told someone will keep her updated.        Angelina Hodges RN  01/28/23 0859

## 2023-01-28 NOTE — ED NOTES
1722 code purple called at this time  [facility] PA at bedside  1724 Pt given brilinta 180 mg, 324 mg ASA po, Heparin 4000 units IV  1734 pt states that he is having increased pain at this time and states that \"it start and then crawl up my chest to Left side of neck and down arm\".   Repeat EKG done at this time  Awaiting arrival of cath team at this time      Radha Potts RN  01/28/23 [id number] Maryjane Rahman RN  01/28/23 7989 Maryjane Rahman RN  01/28/23 1550

## 2023-01-28 NOTE — PROGRESS NOTES
Spiritual Care Services     Summary of Visit:  Code purple called for pt in ED. Pt is a person of [facility]. His wife praying on the phone with folks from the [facility] when I arrived. In touch with her children too. A close, supportive family. Coping well at this time. Encounter Summary  Encounter Overview/Reason : Crisis  Service Provided For[de-identified] Patient, Family  Referral/Consult From[de-identified] Multi-disciplinary team  Support System: Spouse, Children, Sikhism/ladarius community ([place])  Complexity of Encounter: Moderate  Begin Time: 1715  End Time : 1745  Total Time Calculated: 30 min     Crisis  Type: Code STEMI      Spiritual Assessment/Intervention/Outcomes:    Assessment: Calm, Coping    Intervention: Sustaining Presence/Ministry of presence    Outcome: Comfort      Care Plan:    Plan and Referrals  Plan/Referrals: Continue Support (comment)    Spiritual Care Services   Electronically signed by [facility] on 1/28/2023 at 5:34 PM.    To reach a  for emotional and spiritual support, place an [facility] consult request.   If a  is needed immediately, dial 0 and ask to page the on-call .

## 2023-01-28 NOTE — H&P
Cardiology history and physical note    Patient Name: Tez Perdomo Date: 2023  3:37 PM  MR #: [id number]  : 1955    Attending Physician: Froilan Perry MD  Reason for admission: STEMI    History of Presenting Illness:      Kalina Garcia is a 79 y.o. male who follows up with me in clinic on hospital day 0 with a history of hypertension, hyperlipidemia, diabetes, pancreatic cancer on chemotherapy, neuroendocrine tumor status post resection 2022 at [facility], liver cirrhosis, portal vein thrombosis on Eliquis, who came to the hospital for sudden onset of chest pain.  History Obtained From:  patient, electronic medical record    In the ER initial EKG showed sinus rhythm with no obvious signs of ischemia  Subsequent EKG an hour later showed ST elevations anterior lateral leads  Code purple was activated  Patient was brought immediately to the Cath Lab for coronary angiogram  Patient was found with 95% proximal LAD stenosis, mid LAD 90% stenosis status post successful PCI with ZAKI x3  History:      Past Medical History:   Diagnosis Date    Cancer ([facility] [address]     Pancreatic    History of skin cancer     HTN (hypertension)     Malignant neoplasm of other parts of pancreas ([facility] [address]     Type II or unspecified type diabetes mellitus without mention of complication, not stated as uncontrolled      Past Surgical History:   Procedure Laterality Date    ABDOMEN SURGERY      COLONOSCOPY  2015    DR Hoa Cooley    COLONOSCOPY N/A 2020    COLONOSCOPY DIAGNOSTIC performed by Vince Bateman MD at [address] [facility] N/A 2019    EUS performed by Vince Bateman MD at [address]  2022    SPLENECTOMY, PARTIAL  2022    UPPER GASTROINTESTINAL ENDOSCOPY N/A 2022    EGD ESOPHAGOGASTRODUODENOSCOPY performed by Vince Bateman MD at [address] 2023    EGD DIAGNOSTIC ONLY performed by Barry Clark Alin Dennis MD at [facility]     Family History  Family History   Problem Relation Age of Onset    Cancer Mother         lung cancer    Colon Cancer Neg Hx     Celiac Disease Neg Hx     Crohn's Disease Neg Hx     Coronary Art Dis Neg Hx      Social History     Socioeconomic History    Marital status:      Spouse name: Not on file    Number of children: Not on file    Years of education: Not on file    Highest education level: Not on file   Occupational History    Not on file   Tobacco Use    Smoking status: Never    Smokeless tobacco: Never   Vaping Use    Vaping Use: Never used   Substance and Sexual Activity    Alcohol use: No    Drug use: No    Sexual activity: Yes     Partners: Female   Other Topics Concern    Not on file   Social History Narrative    Not on file     Social Determinants of Health     Financial Resource Strain: Low Risk     Difficulty of Paying Living Expenses: Not hard at all   Food Insecurity: No Food Insecurity    Worried About Running Out of Food in the Last Year: Never true    [address] in the Last Year: Never true   Transportation Needs: No Transportation Needs    Lack of Transportation (Medical): No    Lack of Transportation (Non-Medical): No   Physical Activity: Not on file   Stress: Not on file   Social Connections: Not on file   Intimate Partner Violence: Not on file   Housing Stability: Not on file      Home Medications:      Not in a hospital admission.     Current Hospital Medications:   Scheduled Meds:   sodium chloride flush  5-40 mL IntraVENous 2 times per day    [START ON 1/29/2023] aspirin  81 mg Oral Daily    atorvastatin  80 mg Oral Nightly    metoprolol tartrate  25 mg Oral BID    losartan  25 mg Oral Daily    enoxaparin  1 mg/kg SubCUTAneous BID     Continuous Infusions:   sodium chloride       PRN Meds:.nitroGLYCERIN, sodium chloride flush, sodium chloride, ondansetron **OR** ondansetron, acetaminophen **OR** acetaminophen, polyethylene glycol   sodium chloride        Allergies:   No Known Allergies   Review of Systems:       [x] CV, Resp, Neuro, , and all other systems reviewed and negative other than listed in HPI. Objective Findings:     Vitals:   Vitals:    01/28/23 1627 01/28/23 1630 01/28/23 1645 01/28/23 1730   BP: (!) 157/81 (!) 157/81  (!) 149/83   Pulse: 94 92 89    Resp: 21 30 20    Temp:       Weight:       Height:            Physical Examination:  General: Alert oriented x4 not acute distress  HEENT: Normocephalic, no scleral icterus. Neck: No JVD. Heart: Regular, no murmur, no rub/gallop. No RV heave. Lungs: Clear to ascultation, no rales/wheezing/rhonchi. Good chest wall excursion. Abdomen: Soft nontender nondistended  Extremities: No clubbing/cyanosis, no edema. Skin: Warm, dry, normal turgor, no rash, no bruise, no petichiae. Neuro: No myoclonus or tremor. Psych: Normal affect    Results/ Medications reviewed 1/28/2023, 6:49 PM     Laboratory, Microbiology, Pathology, Radiology, Cardiology, Medications and Transcriptions reviewed  Scheduled Meds:   sodium chloride flush  5-40 mL IntraVENous 2 times per day    [START ON 1/29/2023] aspirin  81 mg Oral Daily    atorvastatin  80 mg Oral Nightly    metoprolol tartrate  25 mg Oral BID    losartan  25 mg Oral Daily    enoxaparin  1 mg/kg SubCUTAneous BID     Continuous Infusions:   sodium chloride         Recent Labs     01/28/23  1615   WBC 14.9*   HGB 10.6*   HCT 33.3*   MCV 77.4*   *     Recent Labs     01/28/23  1615   *   K 3.3*   CL 94*   CO2 28   BUN 19   CREATININE 0.99     Recent Labs     01/28/23  1615   PROT 8.4*   No results found for this or any previous visit. Impression:   STEMI.   Culprit lesion proximal LAD status post ZAKI x3.  Patient has RCA residual stenosis proximal 80% stenosis, distal 90% stenosis at the bifurcation (RCA left untreated will be staged on Monday)  Hypertension  Hyperlipidemia  Diabetes  Pancreatic cancer status post resection August 2022 at [facility]  Portal vein thrombosis on Eliquis  Liver cirrhosis  TTE 1/10/2023 at [facility] EF 65%  Plan:   Admit patient to ICU for post STEMI management  Continue aspirin and atorvastatin indefinitely for secondary prevention of CAD  Continue Coreg  Continue losartan  Remove TR band in 1 to 2 hours, after 1 hour restart full dose anticoagulation for history of portal vein thrombosis  Continue Brilinta 90 mg p.o. twice daily, patient was loaded with 180 mg x 1  Echocardiogram in the morning  Plan to bring patient back to the Cath Lab on Monday for PCI of the RCA  Comments:     Please call if questions or concerns arise. Electronically signed by Melissa Huynh MD on 1/28/2023 at 6:49 PM    Please note this report has been partially produced using speech recognition software and may cause contain errors related to that system including grammar, punctuation and spelling as well as words and phrases that may seem inappropriate. If there are questions or concerns please feel free to contact me to clarify.

## 2023-01-29 ENCOUNTER — APPOINTMENT (OUTPATIENT)
Dept: GENERAL RADIOLOGY | Age: 68
DRG: 246 | End: 2023-01-29
Payer: MEDICARE

## 2023-01-29 LAB
ANION GAP SERPL CALCULATED.3IONS-SCNC: 10 MEQ/L (ref 9–15)
BILIRUBIN URINE: NEGATIVE
BLOOD, URINE: NEGATIVE
BUN BLDV-MCNC: 23 MG/DL (ref 8–23)
CALCIUM SERPL-MCNC: 8.8 MG/DL (ref 8.5–9.9)
CHLORIDE BLD-SCNC: 98 MEQ/L (ref 95–107)
CLARITY: CLEAR
CO2: 28 MEQ/L (ref 20–31)
COLOR: YELLOW
CREAT SERPL-MCNC: 0.94 MG/DL (ref 0.7–1.2)
GFR SERPL CREATININE-BSD FRML MDRD: >60 ML/MIN/{1.73_M2}
GLUCOSE BLD-MCNC: 294 MG/DL (ref 70–99)
GLUCOSE BLD-MCNC: 301 MG/DL (ref 70–99)
GLUCOSE BLD-MCNC: 302 MG/DL (ref 70–99)
GLUCOSE BLD-MCNC: 317 MG/DL (ref 70–99)
GLUCOSE BLD-MCNC: 389 MG/DL (ref 70–99)
GLUCOSE URINE: >=1000 MG/DL
HCT VFR BLD CALC: 30.2 % (ref 42–52)
HEMOGLOBIN: 9.5 G/DL (ref 14–18)
KETONES, URINE: ABNORMAL MG/DL
LEUKOCYTE ESTERASE, URINE: NEGATIVE
MAGNESIUM: 2 MG/DL (ref 1.7–2.4)
MCH RBC QN AUTO: 23.8 PG (ref 27–31.3)
MCHC RBC AUTO-ENTMCNC: 31.3 % (ref 33–37)
MCV RBC AUTO: 76 FL (ref 79–92.2)
NITRITE, URINE: NEGATIVE
PDW BLD-RTO: 16.3 % (ref 11.5–14.5)
PERFORMED ON: ABNORMAL
PH UA: 6.5 (ref 5–9)
PLATELET # BLD: 485 K/UL (ref 130–400)
POTASSIUM REFLEX MAGNESIUM: 3.5 MEQ/L (ref 3.4–4.9)
PROTEIN UA: NEGATIVE MG/DL
RBC # BLD: 3.97 M/UL (ref 4.7–6.1)
SODIUM BLD-SCNC: 136 MEQ/L (ref 135–144)
SPECIFIC GRAVITY UA: 1.04 (ref 1–1.03)
TROPONIN: 5.66 NG/ML (ref 0–0.01)
UROBILINOGEN, URINE: 1 E.U./DL
WBC # BLD: 16.8 K/UL (ref 4.8–10.8)

## 2023-01-29 PROCEDURE — 84484 ASSAY OF TROPONIN QUANT: CPT

## 2023-01-29 PROCEDURE — 71045 X-RAY EXAM CHEST 1 VIEW: CPT

## 2023-01-29 PROCEDURE — 85027 COMPLETE CBC AUTOMATED: CPT

## 2023-01-29 PROCEDURE — 6360000002 HC RX W HCPCS: Performed by: INTERNAL MEDICINE

## 2023-01-29 PROCEDURE — 80048 BASIC METABOLIC PNL TOTAL CA: CPT

## 2023-01-29 PROCEDURE — 6370000000 HC RX 637 (ALT 250 FOR IP): Performed by: INTERNAL MEDICINE

## 2023-01-29 PROCEDURE — 2060000000 HC ICU INTERMEDIATE R&B

## 2023-01-29 PROCEDURE — 83735 ASSAY OF MAGNESIUM: CPT

## 2023-01-29 PROCEDURE — 81003 URINALYSIS AUTO W/O SCOPE: CPT

## 2023-01-29 PROCEDURE — 2580000003 HC RX 258: Performed by: INTERNAL MEDICINE

## 2023-01-29 PROCEDURE — 2700000000 HC OXYGEN THERAPY PER DAY

## 2023-01-29 PROCEDURE — 36415 COLL VENOUS BLD VENIPUNCTURE: CPT

## 2023-01-29 PROCEDURE — 99223 1ST HOSP IP/OBS HIGH 75: CPT | Performed by: INTERNAL MEDICINE

## 2023-01-29 RX ORDER — SODIUM CHLORIDE 9 MG/ML
INJECTION, SOLUTION INTRAVENOUS PRN
Status: DISCONTINUED | OUTPATIENT
Start: 2023-01-29 | End: 2023-01-30 | Stop reason: HOSPADM

## 2023-01-29 RX ADMIN — INSULIN LISPRO 8 UNITS: 100 INJECTION, SOLUTION INTRAVENOUS; SUBCUTANEOUS at 16:26

## 2023-01-29 RX ADMIN — SODIUM CHLORIDE, PRESERVATIVE FREE 10 ML: 5 INJECTION INTRAVENOUS at 21:25

## 2023-01-29 RX ADMIN — CARVEDILOL 25 MG: 25 TABLET, FILM COATED ORAL at 09:48

## 2023-01-29 RX ADMIN — ENOXAPARIN SODIUM 90 MG: 100 INJECTION SUBCUTANEOUS at 21:24

## 2023-01-29 RX ADMIN — INSULIN LISPRO 12 UNITS: 100 INJECTION, SOLUTION INTRAVENOUS; SUBCUTANEOUS at 08:22

## 2023-01-29 RX ADMIN — ATORVASTATIN CALCIUM 80 MG: 80 TABLET, FILM COATED ORAL at 21:24

## 2023-01-29 RX ADMIN — LOSARTAN POTASSIUM 25 MG: 25 TABLET, FILM COATED ORAL at 09:45

## 2023-01-29 RX ADMIN — INSULIN LISPRO 4 UNITS: 100 INJECTION, SOLUTION INTRAVENOUS; SUBCUTANEOUS at 21:31

## 2023-01-29 RX ADMIN — ENOXAPARIN SODIUM 90 MG: 100 INJECTION SUBCUTANEOUS at 09:49

## 2023-01-29 RX ADMIN — ONDANSETRON 4 MG: 2 INJECTION INTRAMUSCULAR; INTRAVENOUS at 08:11

## 2023-01-29 RX ADMIN — TICAGRELOR 90 MG: 90 TABLET ORAL at 21:24

## 2023-01-29 RX ADMIN — CARVEDILOL 25 MG: 25 TABLET, FILM COATED ORAL at 21:24

## 2023-01-29 RX ADMIN — INSULIN LISPRO 16 UNITS: 100 INJECTION, SOLUTION INTRAVENOUS; SUBCUTANEOUS at 12:57

## 2023-01-29 RX ADMIN — ASPIRIN 81 MG: 81 TABLET, CHEWABLE ORAL at 09:48

## 2023-01-29 RX ADMIN — TICAGRELOR 90 MG: 90 TABLET ORAL at 09:48

## 2023-01-29 ASSESSMENT — PAIN SCALES - GENERAL
PAINLEVEL_OUTOF10: 0
PAINLEVEL_OUTOF10: 0

## 2023-01-29 NOTE — PROGRESS NOTES
Patient alert and oriented, states he is feeling much better today. He did have a feeling of nausea this morning, thinks it may be from not eating. He was given one dose of zofran and reported feeling better after eating a late breakfast. Medications were given after symptom improvement. He denies chest pain or other, denies any shortness of breath, states he slept well. Right radial site dry and intact, no bleeding, no hematoma. Spoke with Dr. Nayely Piedra today. Orders placed for chest xray and Urine analysis. Patient okay to transfer to  telemetry. Patient and family updated as to plan of care. Report called to [address] [place] [address] at 1010.

## 2023-01-29 NOTE — PLAN OF CARE
Problem: Discharge Planning  Goal: Discharge to home or other facility with appropriate resources  Outcome: Progressing  Flowsheets  Taken 1/28/2023 2022  Discharge to home or other facility with appropriate resources:   Identify barriers to discharge with patient and caregiver   Arrange for needed discharge resources and transportation as appropriate  Taken 1/28/2023 2021  Discharge to home or other facility with appropriate resources:   Identify barriers to discharge with patient and caregiver   Identify discharge learning needs (meds, wound care, etc)     Problem: Pain  Goal: Verbalizes/displays adequate comfort level or baseline comfort level  Outcome: Progressing  Flowsheets (Taken 1/28/2023 2100)  Verbalizes/displays adequate comfort level or baseline comfort level:   Encourage patient to monitor pain and request assistance   Assess pain using appropriate pain scale   Administer analgesics based on type and severity of pain and evaluate response     Problem: ABCDS Injury Assessment  Goal: Absence of physical injury  Outcome: Progressing     Problem: Genitourinary - Adult  Goal: Absence of urinary retention  Outcome: Progressing

## 2023-01-29 NOTE — CARE COORDINATION
[facility] AT [facility] Case Management Initial Discharge Assessment    Met with Wife  to discuss discharge plan. PCP: Naga Chicas MD                                Date of Last Visit: 1 month or so ago    [facility] Patient: No        [facility] Notified: no    If no PCP, list provided? N/A    Discharge Planning    Living Arrangements: independently at home    Who do you live with? Wife   Who helps you with your care:  self or family    If lives at home:     Do you have any barriers navigating in your home? no    Patient can perform ADL? Yes    Current Services (outpatient and in home) :  None    Dialysis: No    Is transportation available to get to your appointments? Yes  - PT drives     DME Equipment:  yes - Cane walker crutches,raised toilet seat,grab bars    Respiratory equipment: None    Respiratory provider:  no     Pharmacy:  yes - [facility] with Medication Assistance Program?  No      Patient agreeable to [facility]? Declined    Patient agreeable to SNF/Rehab? Declined    Other discharge needs identified? Cardiac Rehab    Does Patient Have a High-Risk for Readmission Diagnosis (CHF, PN, MI, COPD)? Yes    If Yes,    Consult with pulmonologist? Yes  Consult with cardiologist? Yes  Cardiac Rehab referral if EF <35%? Yes  Consult with Pharmacy for medication assessment prior to discharge? Yes  Consult with Behavioral health to aid in depression, anxiety, or coping issues? N/A  Palliative Care Consult? Yes  Pulmonary Rehab order for COPD, PN, and CHF (if EF > 35%)? N/A   Does patient have a reliable scale and know how to read it (for CHF)? N/A  Nutrition consult for CHF? N/A  Respiratory therapy consult that includes bedside instruction on administration of nebulizers and/or inhalers, and assessment of oxygen and equipment needs in the home? N/A    Initial Discharge Plan? (Note: please see concurrent daily documentation for any updates after initial note).     Spoke to patients wife for CMI info since pt did not answer phone. Wife states he is independent and not using any equip at present. She does not feel he will need any services post dc. He will have cardiac rehab.      Readmission Risk              Risk of Unplanned Readmission:  16         Electronically signed by [employer] on 1/29/2023 at 12:02 PM

## 2023-01-29 NOTE — CONSULTS
Inpatient consult to Critical Care  Consult performed by: Barbara Pryor MD  Consult ordered by: Francine Castellon MD          Admit Date: 1/28/2023    PCP:  Luretha Cogan, MD       [address] [place] [address] / \[facility]\"":                The patient is a 79 y.o. male with significant past medical history of liver cirrhosis, portal vein thrombosis on anticoagulation, hypertension, pancreatic cancer, type 2 diabetes who presented with sudden onset of chest pain. EKG in the ER showed ST elevation in anterior lateral leads. Code purple was called and cardiology evaluated the patient. He was taken to cardiac cath and underwent an intervention. There was 95% proximal LAD stenosis with 90% stenosis in the mid LAD. He underwent PCI with placement of 3 drug-eluting stents. Chest pain has resolved. Is admitted to ICU for further monitoring. Had mild hypokalemia and mild hyponatremia on presentation.      Past Medical History:      Diagnosis Date    Cancer [facility])     Pancreatic    History of skin cancer     HTN (hypertension)     Malignant neoplasm of other parts of pancreas ([facility] [address]     Type II or unspecified type diabetes mellitus without mention of complication, not stated as uncontrolled         Past Surgical History:        Procedure Laterality Date    ABDOMEN SURGERY      COLONOSCOPY  02/20/2015    DR Harshil Banerjee    COLONOSCOPY N/A 01/20/2020    COLONOSCOPY DIAGNOSTIC performed by Rex Salas MD at [address] [place] [address] ([facility]) N/A 12/17/2019    EUS performed by Rex Salas MD at [address] [place] [address]  08/04/2022    SPLENECTOMY, PARTIAL  08/04/2022    UPPER GASTROINTESTINAL ENDOSCOPY N/A 04/04/2022    EGD ESOPHAGOGASTRODUODENOSCOPY performed by Rex Salas MD at [facility] 1/27/2023    EGD DIAGNOSTIC ONLY performed by Rex Salas MD at [facility]       Current Medications:     sodium chloride flush  5-40 mL IntraVENous 2 times per day    sodium chloride flush  5-40 mL IntraVENous 2 times per day    aspirin  81 mg Oral Daily    atorvastatin  80 mg Oral Nightly    losartan  25 mg Oral Daily    enoxaparin  1 mg/kg SubCUTAneous BID    carvedilol  25 mg Oral BID    ticagrelor  90 mg Oral BID    insulin lispro  0-16 Units SubCUTAneous TID     insulin lispro  0-4 Units SubCUTAneous Nightly     Home Meds:  Prior to Admission medications    Medication Sig Start Date End Date Taking? Authorizing Provider   ELIQUIS 5 MG TABS tablet take 2 tablets by mouth twice a day for 7 days then 1 tablet twice a day THEREAFTER 1/21/23   Historical Provider, MD   ferrous sulfate (IRON 325) 325 (65 Fe) MG tablet Take 1 tablet by mouth daily (with breakfast) 1/24/23   MANUEL Desai CNP   esomeprazole (NEXIUM) 40 MG delayed release capsule Take 1 capsule by mouth daily 1/17/23   MANUEL Desai CNP   furosemide (LASIX) 20 MG tablet Take 1 tablet by mouth daily 1/11/23   Guy Cross MD   Sennosides-Docusate Sodium (SENNA PLUS) 8.6-50 MG CAPS Per instructions 2 TIMES DAILY (route: oral)  Patient not taking: No sig reported 8/10/22   Historical Provider, MD   lisinopril (PRINIVIL;ZESTRIL) 20 MG tablet Take 1 tablet by mouth daily 10/19/22   CR Gaspar   LANTUS SOLOSTAR 100 UNIT/ML injection pen inject 50 units subcutaneously daily 7/14/22   Historical Provider, MD   insulin lispro (HUMALOG) 100 UNIT/ML SOLN injection vial Inject 50 Units into the skin 3 times daily    Historical Provider, MD   carvedilol (COREG) 6.25 MG tablet Take 1 tablet by mouth 2 times daily 5/27/22   Guy Cross MD   chlorthalidone (HYGROTON) 25 MG tablet Take 1 tablet by mouth daily 5/27/22   Guy Cross MD   nitroGLYCERIN (NITROSTAT) 0.4 MG SL tablet up to max of 3 total doses.  If no relief after 1 dose, call 911. 4/15/22   CR Gaspar   Polyethylene Glycol 3350 (MIRALAX PO) Take by mouth as needed     Historical Provider, MD   Continuous Blood Gluc  (FREESTYLE NATALY 14 DAY READER) KISHA Test daily and prn. Dx: DM2 20   Michaela Barbosa MD       Allergies:  Patient has no known allergies. Social History:      reports that he has never smoked. He has never used smokeless tobacco. He reports that he does not drink alcohol and does not use drugs. TOBACCO:   reports that he has never smoked. He has never used smokeless tobacco.     ETOH:   reports no history of alcohol use. Family History:   family history includes Cancer in his mother. Review of Systems  Complete review of systems done and negative unless otherwise noted positive.        Objective:     PHYSICAL EXAM:      VITALS:  BP (!) 93/49   Pulse 81   Temp 98.5 °F (36.9 °C) (Oral)   Resp (!) 9   Ht 6' 1\" (1.854 m)   Wt 191 lb 12.8 oz (87 kg)   SpO2 96%   BMI 25.30 kg/m²   24HR INTAKE/OUTPUT:    Intake/Output Summary (Last 24 hours) at 2023 0809  Last data filed at 2023 3376  Gross per 24 hour   Intake 706.76 ml   Output 1100 ml   Net -393.24 ml     CURRENT PULSE OXIMETRY:  SpO2: 96 %  24HR PULSE OXIMETRY RANGE:  SpO2  Av.5 %  Min: 95 %  Max: 100 %    General appearance - alert, ill appearing, and in no distress  Mental status - alert, oriented to person, place, and time  Eyes - pupils equal and reactive, extraocular eye movements intact  Nose - normal and patent, no erythema, discharge or polyps  Neck - supple, no significant adenopathy  Chest - clear to auscultation, no wheezes, rales or rhonchi, symmetric air entry  Heart - normal rate, regular rhythm, normal S1, S2, no murmurs, rubs, clicks or gallops  Abdomen - soft, nontender, nondistended, no masses or organomegaly  Rectal - deferred, not clinically indicated  Neurological - alert, oriented, normal speech, no focal findings or movement disorder noted, motor and sensory grossly normal bilaterally  Musculoskeletal - no joint tenderness, deformity or swelling  Extremities - peripheral pulses normal, no pedal edema, no clubbing or cyanosis  Skin - normal coloration and turgor, no rashes, no suspicious skin lesions noted         DATA:    CBC:   Recent Labs     01/28/23  1615   WBC 14.9*   HGB 10.6*   HCT 33.3*   *     BMP:    Recent Labs     01/28/23  1615   *   K 3.3*   CL 94*   CO2 28   BUN 19   CREATININE 0.99   GLUCOSE 243*   CALCIUM 10.0*   MG 2.1     HEPATIC:   Recent Labs     01/28/23  1615   AST 51*   ALT 43*   BILITOT 0.8*   ALKPHOS 131*     LACTATE: No results for input(s): LACTA in the last 72 hours. PROCALCITONIN: No results for input(s): PROCAL in the last 72 hours. TROPONIN:   Recent Labs     01/28/23  1615 01/28/23  2231   TROPONINI 0.112* 14.770*          Radiology Review:    CXR portable: Results for orders placed during the hospital encounter of 04/14/22    XR CHEST PORTABLE    Narrative  Exam: XR CHEST PORTABLE    History:  chest pain    Technique: AP portable view of the chest obtained. Comparison: none    Chest x-ray portable    Findings: The cardiomediastinal silhouette is within normal limits. There are no infiltrates, consolidations or effusions. .    Bones of the thorax appear intact. Impression  No radiographic evidence of acute intrathoracic process. Echo:    Assessment/Plan         Impression:    -ST elevation MI status post cardiac cath with PCI and drug-eluting stent x3.  -Chest pain due to above. This has resolved. -Accelerated hypertension. Systolic blood pressure was between 150 and 160 on presentation. This has improved. His blood pressure currently is marginal.  -Type 2 diabetes.  -History of pancreatic cancer.  -Peripheral vein thrombosis on anticoagulation. Recommendations:    -Admitted to the ICU for hemodynamic and airway monitoring.  -Continue cardiac telemetry.  -Continue antiplatelets.  -Continue anticoagulation for his history of portal vein thrombosis. -Repeat labs this morning. Replace electrolytes.   -Strict intake and output measurement.  -Blood pressure control.  -Tight glucose control.  -Down the road, she will benefit from testing for sleep disordered breathing.                Electronically signed by Yuan Washington MD on 1/29/2023 at 8:09 AM

## 2023-01-29 NOTE — PROGRESS NOTES
Shift summary:     1900 patient arrived in ICU bed 5 via cath team and is monitored. Skin inspection completed by this RN and Ellwood Aschoff, [facility] family at bedside to see patient. Plan of care discussed with patient and family. All verbalized understanding. Puncture site assessed. Stat band checked. Dr. Adria Camargo called for update on patient. New orders received and implemented. 2000 patient assessed. See EMR for details. Patient able to void per the urinal. 2 cc air taken out of stat band. 2015 2 cc air removed from stat band. 2035 2 cc air removed from stat band. 2100 final 2 cc air removed from stat band. No ecchymosis or hematoma present at the site. Pressure dressing applied to right radial puncture site. 2345 critical lab noted. Troponin 14.77. Dr. Adria Camargo notified via secure message.

## 2023-01-29 NOTE — BRIEF OP NOTE
Section of Cardiology  Adult Brief Cardiac Cath Procedure Note        Procedure(s):  LHC, b/l coronary angio, LV gram.  Successful PCI of the proximal and mid LAD ZAKI x3 (3.5 x 38, 3.0 x 38, 2.5 x 15 Elbe frontier)    Pre-operative Diagnosis: STEMI    H&P Status: Completed and reviewed.      Post-operative Diagnosis: Two-vessel disease    Findings:  See full report  Right dominant system  Left main: Big size vessel mild disease  LAD: Proximal 95% stenosis, mid 90% stenosis status post accessible PCI  Circumflex: Big size vessel ostial 60% stenosis  RCA: Proximal 80% stenosis, distal 80% stenosis with 80% stenosis of the proximal PL and proximal PDA branches (bifurcation lesion)    Complications:  none    Summary of successful PCI of the LAD  Right radial access  6 Fijian sheath insertion  6 Fijian 3.5 EBU was used to cannulate into the LCA  0.014 BMW was used to cannulate into the LAD  2.0 x 15 mm balloon was used to predilate the proximal and mid LAD segments inflated at nominal pressures  A 2.5 x 50 mm Joseph frontier was placed in the mid segment inflated at nominal pressures  Next a 3.0 x 38 mm Elbe frontier was placed in the mid to proximal segment inflated at nominal pressures  Next a 3.5 x 30 mm Elbe frontier was placed in the proximal segment inflated at nominal pressures  Final angiogram showed 0% residual stenosis restoration of HANH-3 flow stents look well expanded well opposed no dissections    Recommendations:  Admit patient to ICU for post STEMI management  Continue aspirin and atorvastatin indefinitely for secondary prevention of CAD  Continue Coreg  Continue losartan  Remove TR band in 1 to 2 hours, after 1 hour restart full dose anticoagulation for history of portal vein thrombosis  Continue Brilinta 90 mg p.o. twice daily, patient was loaded with 180 mg x 1  Echocardiogram in the morning  Plan to bring patient back to the Cath Lab on Monday for PCI of the RCA    Primary Proceduralist:   Colton Emanuel, MD    Full procedure note to follow

## 2023-01-29 NOTE — PROGRESS NOTES
Cardiology progress note    Patient Name: Tiffany Cervantes Date: 2023  3:37 PM  MR #: [id number]  : 1955    Attending Physician: Bartolo Acosta MD  Reason for admission: STEMI    History of Presenting Illness:      Marium Bello is a 79 y.o. male who follows up with me in clinic on hospital day 1 with a history of hypertension, hyperlipidemia, diabetes, pancreatic cancer on chemotherapy, neuroendocrine tumor status post resection 2022 at [facility], liver cirrhosis, portal vein thrombosis on Eliquis, who came to the hospital for sudden onset of chest pain.  History Obtained From:  patient, electronic medical record    In the ER initial EKG showed sinus rhythm with no obvious signs of ischemia  Subsequent EKG an hour later showed ST elevations anterior lateral leads  Code purple was activated  Patient was brought immediately to the Cath Lab for coronary angiogram  Patient was found with 95% proximal LAD stenosis, mid LAD 90% stenosis status post successful PCI with ZAKI x3  ================  Hospital course  2023  Patient in bed, denies chest pain but reports some soreness  Telemetry sinus rhythm  History:      Past Medical History:   Diagnosis Date    Cancer ([address]     Pancreatic    History of skin cancer     HTN (hypertension)     Malignant neoplasm of other parts of pancreas ([address]     Type II or unspecified type diabetes mellitus without mention of complication, not stated as uncontrolled      Past Surgical History:   Procedure Laterality Date    ABDOMEN SURGERY      COLONOSCOPY  2015    DR Ezio Nichole    COLONOSCOPY N/A 2020    COLONOSCOPY DIAGNOSTIC performed by Eddy Wheatley MD at [address] [place] [address]) N/A 2019    EUS performed by Eddy Wheatley MD at [address] [place] [address]  2022    SPLENECTOMY, PARTIAL  2022    UPPER GASTROINTESTINAL ENDOSCOPY N/A 2022    EGD ESOPHAGOGASTRODUODENOSCOPY performed by Vanessa Mancia Lizbet Del Rio MD at [address] [place] [address] N/A 1/27/2023    EGD DIAGNOSTIC ONLY performed by Laura Davies MD at [facility]     Family History  Family History   Problem Relation Age of Onset    Cancer Mother         lung cancer    Colon Cancer Neg Hx     Celiac Disease Neg Hx     Crohn's Disease Neg Hx     Coronary Art Dis Neg Hx      Social History     Socioeconomic History    Marital status:      Spouse name: Not on file    Number of children: Not on file    Years of education: Not on file    Highest education level: Not on file   Occupational History    Not on file   Tobacco Use    Smoking status: Never    Smokeless tobacco: Never   Vaping Use    Vaping Use: Never used   Substance and Sexual Activity    Alcohol use: No    Drug use: No    Sexual activity: Yes     Partners: Female   Other Topics Concern    Not on file   Social History Narrative    Not on file     Social Determinants of Health     Financial Resource Strain: Low Risk     Difficulty of Paying Living Expenses: Not hard at all   Food Insecurity: No Food Insecurity    Worried About Running Out of Food in the Last Year: Never true    [address] in the Last Year: Never true   Transportation Needs: No Transportation Needs    Lack of Transportation (Medical): No    Lack of Transportation (Non-Medical):  No   Physical Activity: Not on file   Stress: Not on file   Social Connections: Not on file   Intimate Partner Violence: Not on file   Housing Stability: Not on file      Home Medications:      Medications Prior to Admission: ELIQUIS 5 MG TABS tablet, take 2 tablets by mouth twice a day for 7 days then 1 tablet twice a day THEREAFTER  ferrous sulfate (IRON 325) 325 (65 Fe) MG tablet, Take 1 tablet by mouth daily (with breakfast)  esomeprazole (NEXIUM) 40 MG delayed release capsule, Take 1 capsule by mouth daily  furosemide (LASIX) 20 MG tablet, Take 1 tablet by mouth daily  Sennosides-Docusate Sodium (SENNA PLUS) 8.6-50 MG CAPS, Per instructions 2 TIMES DAILY (route: oral) (Patient not taking: No sig reported)  [DISCONTINUED] carvedilol (COREG) 6.25 MG tablet, 1 tablet 2 TIMES DAILY (route: oral)  [DISCONTINUED] chlorthalidone (HYGROTON) 25 MG tablet, 1 tablet DAILY (route: oral)  [DISCONTINUED] traMADol (ULTRAM) 50 MG tablet, Per instructions EVERY 4 HOURS (route: oral) (Patient not taking: Reported on 1/27/2023)  [DISCONTINUED] omeprazole (PRILOSEC) 40 MG delayed release capsule, 1 capsule DAILY (route: oral) (Patient not taking: Reported on 1/27/2023)  [DISCONTINUED] lisinopril (PRINIVIL;ZESTRIL) 20 MG tablet, 1 tablet DAILY (route: oral)  [DISCONTINUED] insulin glargine (LANTUS SOLOSTAR) 100 UNIT/ML injection pen, 50 unit BEDTIME (route: subcutaneous) (Patient not taking: Reported on 1/28/2023)  [DISCONTINUED] meloxicam (MOBIC) 15 MG tablet, take 1 tablet by mouth once daily  [DISCONTINUED] ondansetron (ZOFRAN) 4 MG tablet, Take 1 tablet by mouth 3 times daily as needed for Nausea or Vomiting (Patient not taking: No sig reported)  lisinopril (PRINIVIL;ZESTRIL) 20 MG tablet, Take 1 tablet by mouth daily  [DISCONTINUED] Continuous Blood Gluc Sensor (hubbuzz.comSTYLE NATALY 14 DAY SENSOR) MIS, apply 1 SENSOR to back OF UPPER ARM REMOVE AND REPLACE every 14 days use with DEVICE to MONITOR BLOOD SUGAR  [DISCONTINUED] ondansetron (ZOFRAN-ODT) 8 MG TBDP disintegrating tablet, dissolve 1 tablet ON TONGUE every 8 hours if needed nausea (Patient not taking: No sig reported)  [DISCONTINUED] RA ASPIRIN EC 81 MG EC tablet, take 1 tablet by mouth once daily (Patient not taking: Reported on 1/27/2023)  LANTUS SOLOSTAR 100 UNIT/ML injection pen, inject 50 units subcutaneously daily  insulin lispro (HUMALOG) 100 UNIT/ML SOLN injection vial, Inject 50 Units into the skin 3 times daily  carvedilol (COREG) 6.25 MG tablet, Take 1 tablet by mouth 2 times daily  chlorthalidone (HYGROTON) 25 MG tablet, Take 1 tablet by mouth daily  nitroGLYCERIN (NITROSTAT) 0.4 MG SL tablet, up to max of 3 total doses. If no relief after 1 dose, call 911. [DISCONTINUED] lanreotide acetate (SOMATULINE) 120 MG/0.5ML SOLN depot injection, Inject 120 mg into the skin once (Patient not taking: Reported on 1/27/2023)  Polyethylene Glycol 3350 (MIRALAX PO), Take by mouth as needed   Continuous Blood Gluc  (FREESTYLE NATALY 14 DAY READER) KISHA, Test daily and prn. Dx: DM2    Current Hospital Medications:   Scheduled Meds:   sodium chloride flush  5-40 mL IntraVENous 2 times per day    sodium chloride flush  5-40 mL IntraVENous 2 times per day    aspirin  81 mg Oral Daily    atorvastatin  80 mg Oral Nightly    losartan  25 mg Oral Daily    enoxaparin  1 mg/kg SubCUTAneous BID    carvedilol  25 mg Oral BID    ticagrelor  90 mg Oral BID    insulin lispro  0-16 Units SubCUTAneous TID WC    insulin lispro  0-4 Units SubCUTAneous Nightly     Continuous Infusions:   sodium chloride 75 mL/hr at 01/29/23 0504    sodium chloride      sodium chloride       PRN Meds:.nitroGLYCERIN, sodium chloride flush, sodium chloride, acetaminophen, fentanNYL, midazolam, ondansetron, hydrALAZINE, labetalol, sodium chloride flush, sodium chloride, ondansetron **OR** [DISCONTINUED] ondansetron, polyethylene glycol   sodium chloride 75 mL/hr at 01/29/23 0504    sodium chloride      sodium chloride        Allergies:   No Known Allergies   Review of Systems:       [x] CV, Resp, Neuro, , and all other systems reviewed and negative other than listed in HPI. Objective Findings:     Vitals:   Vitals:    01/29/23 0400 01/29/23 0500 01/29/23 0600 01/29/23 0700   BP: 117/62 (!) 90/53 (!) 96/55 (!) 93/49   Pulse: 81      Resp: (!) 9      Temp: 98.5 °F (36.9 °C)      TempSrc: Oral      SpO2: 97% 99% 99% 96%   Weight:       Height:            Physical Examination:  General: Alert oriented x4 not acute distress  HEENT: Normocephalic, no scleral icterus. Neck: No JVD. Heart: Regular, no murmur, no rub/gallop. No RV heave.  Lungs: Clear to ascultation, no rales/wheezing/rhonchi. Good chest wall excursion.  Abdomen: Soft nontender nondistended  Extremities: No clubbing/cyanosis, no edema.   Skin: Warm, dry, normal turgor, no rash, no bruise, no petichiae.  Neuro: No myoclonus or tremor.   Psych: Normal affect    Results/ Medications reviewed 1/29/2023, 7:55 AM     Laboratory, Microbiology, Pathology, Radiology, Cardiology, Medications and Transcriptions reviewed  Scheduled Meds:   sodium chloride flush  5-40 mL IntraVENous 2 times per day    sodium chloride flush  5-40 mL IntraVENous 2 times per day    aspirin  81 mg Oral Daily    atorvastatin  80 mg Oral Nightly    losartan  25 mg Oral Daily    enoxaparin  1 mg/kg SubCUTAneous BID    carvedilol  25 mg Oral BID    ticagrelor  90 mg Oral BID    insulin lispro  0-16 Units SubCUTAneous TID WC    insulin lispro  0-4 Units SubCUTAneous Nightly     Continuous Infusions:   sodium chloride 75 mL/hr at 01/29/23 0504    sodium chloride      sodium chloride         Recent Labs     01/28/23  1615   WBC 14.9*   HGB 10.6*   HCT 33.3*   MCV 77.4*   *     Recent Labs     01/28/23  1615   *   K 3.3*   CL 94*   CO2 28   BUN 19   CREATININE 0.99     Recent Labs     01/28/23  1615   PROT 8.4*   No results found for this or any previous visit.  Impression:   STEMI.  Culprit lesion proximal LAD status post ZAKI x3.  Patient has RCA residual stenosis proximal 80% stenosis, distal 90% stenosis at the bifurcation (RCA left untreated will be staged on Monday)  Hypertension  Hyperlipidemia  Diabetes  Pancreatic cancer status post resection August 2022 at   Portal vein thrombosis on Eliquis  Liver cirrhosis  TTE 1/10/2023 at  EF 65%  Plan:   Transfer patient to a telemetry floor bed  Continue aspirin and atorvastatin indefinitely for secondary prevention of CAD  Continue Coreg  Continue losartan  Continue anticoagulation for history of portal vein thrombosis  Continue Brilinta 90 mg  p.o. twice daily  Follow-up echocardiogram  Plan to bring patient back to the Cath Lab tomorrow for PCI of the RCA  Comments:     Please call if questions or concerns arise. Electronically signed by Jesus Vargas MD on 1/29/2023 at 7:55 AM    Please note this report has been partially produced using speech recognition software and may cause contain errors related to that system including grammar, punctuation and spelling as well as words and phrases that may seem inappropriate. If there are questions or concerns please feel free to contact me to clarify.

## 2023-01-30 VITALS
OXYGEN SATURATION: 98 % | HEIGHT: 73 IN | BODY MASS INDEX: 25.62 KG/M2 | SYSTOLIC BLOOD PRESSURE: 110 MMHG | DIASTOLIC BLOOD PRESSURE: 61 MMHG | TEMPERATURE: 98.2 F | WEIGHT: 193.3 LBS | HEART RATE: 87 BPM | RESPIRATION RATE: 16 BRPM

## 2023-01-30 PROBLEM — E87.6 HYPOKALEMIA: Status: ACTIVE | Noted: 2023-01-30

## 2023-01-30 PROBLEM — Z98.61 CAD S/P PERCUTANEOUS CORONARY ANGIOPLASTY: Status: ACTIVE | Noted: 2023-01-30

## 2023-01-30 PROBLEM — I25.5 ISCHEMIC CARDIOMYOPATHY: Status: ACTIVE | Noted: 2023-01-30

## 2023-01-30 PROBLEM — E11.65 INADEQUATELY CONTROLLED DIABETES MELLITUS (HCC): Status: ACTIVE | Noted: 2023-01-30

## 2023-01-30 PROBLEM — I25.10 CAD S/P PERCUTANEOUS CORONARY ANGIOPLASTY: Status: ACTIVE | Noted: 2023-01-30

## 2023-01-30 LAB
ANION GAP SERPL CALCULATED.3IONS-SCNC: 10 MEQ/L (ref 9–15)
BUN BLDV-MCNC: 29 MG/DL (ref 8–23)
CALCIUM SERPL-MCNC: 9 MG/DL (ref 8.5–9.9)
CHLORIDE BLD-SCNC: 100 MEQ/L (ref 95–107)
CO2: 26 MEQ/L (ref 20–31)
CREAT SERPL-MCNC: 1.07 MG/DL (ref 0.7–1.2)
GFR SERPL CREATININE-BSD FRML MDRD: >60 ML/MIN/{1.73_M2}
GFR SERPL CREATININE-BSD FRML MDRD: >60 ML/MIN/{1.73_M2}
GLUCOSE BLD-MCNC: 275 MG/DL (ref 70–99)
GLUCOSE BLD-MCNC: 299 MG/DL (ref 70–99)
GLUCOSE BLD-MCNC: 417 MG/DL (ref 70–99)
HBA1C MFR BLD: 9.3 % (ref 4.8–5.9)
HCT VFR BLD CALC: 27.6 % (ref 42–52)
HEMOGLOBIN: 8.8 G/DL (ref 14–18)
LV EF: 43 %
LVEF MODALITY: NORMAL
MAGNESIUM: 2.1 MG/DL (ref 1.7–2.4)
MCH RBC QN AUTO: 24.2 PG (ref 27–31.3)
MCHC RBC AUTO-ENTMCNC: 31.9 % (ref 33–37)
MCV RBC AUTO: 75.9 FL (ref 79–92.2)
PDW BLD-RTO: 16.2 % (ref 11.5–14.5)
PERFORMED ON: ABNORMAL
PERFORMED ON: ABNORMAL
PERFORMED ON: NORMAL
PLATELET # BLD: 442 K/UL (ref 130–400)
POC CREATININE: 1 MG/DL (ref 0.8–1.3)
POC SAMPLE TYPE: NORMAL
POTASSIUM REFLEX MAGNESIUM: 3.1 MEQ/L (ref 3.4–4.9)
RBC # BLD: 3.64 M/UL (ref 4.7–6.1)
SODIUM BLD-SCNC: 136 MEQ/L (ref 135–144)
WBC # BLD: 14.5 K/UL (ref 4.8–10.8)

## 2023-01-30 PROCEDURE — 99222 1ST HOSP IP/OBS MODERATE 55: CPT | Performed by: INTERNAL MEDICINE

## 2023-01-30 PROCEDURE — 99232 SBSQ HOSP IP/OBS MODERATE 35: CPT | Performed by: INTERNAL MEDICINE

## 2023-01-30 PROCEDURE — 6370000000 HC RX 637 (ALT 250 FOR IP): Performed by: INTERNAL MEDICINE

## 2023-01-30 PROCEDURE — 2500000003 HC RX 250 WO HCPCS

## 2023-01-30 PROCEDURE — APPSS45 APP SPLIT SHARED TIME 31-45 MINUTES: Performed by: PHYSICIAN ASSISTANT

## 2023-01-30 PROCEDURE — 84681 ASSAY OF C-PEPTIDE: CPT

## 2023-01-30 PROCEDURE — 36415 COLL VENOUS BLD VENIPUNCTURE: CPT

## 2023-01-30 PROCEDURE — 6370000000 HC RX 637 (ALT 250 FOR IP): Performed by: PHYSICIAN ASSISTANT

## 2023-01-30 PROCEDURE — 83036 HEMOGLOBIN GLYCOSYLATED A1C: CPT

## 2023-01-30 PROCEDURE — 80048 BASIC METABOLIC PNL TOTAL CA: CPT

## 2023-01-30 PROCEDURE — 83735 ASSAY OF MAGNESIUM: CPT

## 2023-01-30 PROCEDURE — 6360000004 HC RX CONTRAST MEDICATION: Performed by: INTERNAL MEDICINE

## 2023-01-30 PROCEDURE — 99238 HOSP IP/OBS DSCHRG MGMT 30/<: CPT | Performed by: INTERNAL MEDICINE

## 2023-01-30 PROCEDURE — 85027 COMPLETE CBC AUTOMATED: CPT

## 2023-01-30 PROCEDURE — 6360000002 HC RX W HCPCS

## 2023-01-30 PROCEDURE — C8929 TTE W OR WO FOL WCON,DOPPLER: HCPCS

## 2023-01-30 RX ORDER — NITROGLYCERIN 0.4 MG/1
0.4 TABLET SUBLINGUAL EVERY 5 MIN PRN
Qty: 25 TABLET | Refills: 3 | Status: SHIPPED | OUTPATIENT
Start: 2023-01-30

## 2023-01-30 RX ORDER — ATORVASTATIN CALCIUM 80 MG/1
80 TABLET, FILM COATED ORAL NIGHTLY
Qty: 30 TABLET | Refills: 3 | Status: SHIPPED | OUTPATIENT
Start: 2023-01-30

## 2023-01-30 RX ORDER — POTASSIUM CHLORIDE 20 MEQ/1
40 TABLET, EXTENDED RELEASE ORAL ONCE
Status: COMPLETED | OUTPATIENT
Start: 2023-01-30 | End: 2023-01-30

## 2023-01-30 RX ORDER — CARVEDILOL 12.5 MG/1
12.5 TABLET ORAL 2 TIMES DAILY
Qty: 60 TABLET | Refills: 3 | Status: SHIPPED | OUTPATIENT
Start: 2023-01-30 | End: 2023-01-30 | Stop reason: HOSPADM

## 2023-01-30 RX ORDER — SODIUM CHLORIDE 0.9 % (FLUSH) 0.9 %
5-40 SYRINGE (ML) INJECTION EVERY 12 HOURS SCHEDULED
Status: DISCONTINUED | OUTPATIENT
Start: 2023-01-30 | End: 2023-01-30 | Stop reason: HOSPADM

## 2023-01-30 RX ORDER — METOPROLOL SUCCINATE 50 MG/1
50 TABLET, EXTENDED RELEASE ORAL EVERY EVENING
Status: DISCONTINUED | OUTPATIENT
Start: 2023-01-30 | End: 2023-01-30 | Stop reason: HOSPADM

## 2023-01-30 RX ORDER — SODIUM CHLORIDE 0.9 % (FLUSH) 0.9 %
5-40 SYRINGE (ML) INJECTION PRN
Status: DISCONTINUED | OUTPATIENT
Start: 2023-01-30 | End: 2023-01-30 | Stop reason: HOSPADM

## 2023-01-30 RX ORDER — ASPIRIN 81 MG/1
81 TABLET, CHEWABLE ORAL DAILY
Qty: 30 TABLET | Refills: 3 | Status: SHIPPED | OUTPATIENT
Start: 2023-01-31

## 2023-01-30 RX ORDER — METOPROLOL SUCCINATE 50 MG/1
50 TABLET, EXTENDED RELEASE ORAL EVERY EVENING
Qty: 30 TABLET | Refills: 3 | Status: SHIPPED | OUTPATIENT
Start: 2023-01-30

## 2023-01-30 RX ORDER — INSULIN GLARGINE 100 [IU]/ML
35 INJECTION, SOLUTION SUBCUTANEOUS NIGHTLY
Status: DISCONTINUED | OUTPATIENT
Start: 2023-01-30 | End: 2023-01-30 | Stop reason: HOSPADM

## 2023-01-30 RX ORDER — INSULIN LISPRO 100 [IU]/ML
10 INJECTION, SOLUTION INTRAVENOUS; SUBCUTANEOUS
Status: DISCONTINUED | OUTPATIENT
Start: 2023-01-30 | End: 2023-01-30 | Stop reason: HOSPADM

## 2023-01-30 RX ADMIN — INSULIN LISPRO 8 UNITS: 100 INJECTION, SOLUTION INTRAVENOUS; SUBCUTANEOUS at 10:44

## 2023-01-30 RX ADMIN — POTASSIUM CHLORIDE 40 MEQ: 1500 TABLET, EXTENDED RELEASE ORAL at 14:22

## 2023-01-30 RX ADMIN — PERFLUTREN 1.5 ML: 6.52 INJECTION, SUSPENSION INTRAVENOUS at 09:00

## 2023-01-30 RX ADMIN — TICAGRELOR 90 MG: 90 TABLET ORAL at 10:44

## 2023-01-30 RX ADMIN — ASPIRIN 81 MG: 81 TABLET, CHEWABLE ORAL at 10:44

## 2023-01-30 RX ADMIN — LOSARTAN POTASSIUM 25 MG: 25 TABLET, FILM COATED ORAL at 10:44

## 2023-01-30 RX ADMIN — CARVEDILOL 25 MG: 25 TABLET, FILM COATED ORAL at 10:44

## 2023-01-30 NOTE — DISCHARGE SUMMARY
Cardiology Discharge Summary      Patient Identification:  Goldie Johnson  : 1955  MRN: [id number]   Account: [de-identified]     Admit date: 2023  Discharge date: 23  Attending provider: Berlin Echavarria MD        Primary care provider: Shiraz Gramajo MD     Admission Diagnoses:  STEMI (ST elevation myocardial infarction) [facility])         Discharge Diagnoses: Active Hospital Problems    Diagnosis Date Noted    CAD S/P percutaneous coronary angioplasty [I25.10, Z98.61] 2023     Priority: High    Hypokalemia [E87.6] 2023     Priority: High    Ischemic cardiomyopathy [I25.5] 2023     Priority: High    STEMI (ST elevation myocardial infarction) ([facility] [address] [I21.3] 2023     Priority: [facility] Hospital Course:   Goldie Johnson is a77 y.o. male admitted to [facility] on 2023 for STEMI after presenting to [address] [facility] ER with chest pain complaints. EKG performed showed ST elevation MI in anterolateral leads and code purple activated. Patient was taken emergently for cardiac catheterization with Dr. Nayely Piedra. Cardiac cath revealed 95% proximal LAD stenosis as well as 90% mid stenosis for which patient underwent PCI with drug-eluting stent. He also was also noted to have 80% proximal and distal RCA stenosis with 80% stenosis of the proximal PL branch as well as proximal PDA branch (bifurcation lesion) which will need staged PCI in the future, circumflex with 60% ostial stenosis. He was initiated on dual antiplatelet therapy with aspirin and Brilinta. Echocardiogram was completed on 2023 and revealed mildly reduced LV systolic function with EF of 40 to 45% with severe hypokinesis to akinesis of the distal anteroseptal wall and apex, mild concentric left ventricular hypertrophy, trace mitral valve regurgitation.   Patient was on lisinopril at home and was initiated on losartan while inpatient but given cardiomyopathy, he will be transition to [facility] instead. Patient doing well today with no complaints. All questions have been answered. Patient was evaluated by endocrinology on 1/30/2023 due to uncontrolled diabetes with hyperglycemia and patient will be following up with Dr. Tracy Arellano next week for insulin pump. Currently on telemetry, patient is sinus rhythm with heart rate in the 80s. Telemetry alarms reviewed showed multiple episodes of PSVT with heart rate into the 140s to 150s range early this morning. Will discharge home today and plan for staged PCI of the RCA in future as outpatient.       Procedures:     [facility] 1/28/23:   Findings:  See full report  Right dominant system  Left main: Big size vessel mild disease  LAD: Proximal 95% stenosis, mid 90% stenosis status post accessible PCI  Circumflex: Big size vessel ostial 60% stenosis  RCA: Proximal 80% stenosis, distal 80% stenosis with 80% stenosis of the proximal PL and proximal PDA branches (bifurcation lesion)     Complications:  none     Summary of successful PCI of the LAD  Right radial access  6 Andorran sheath insertion  6 Andorran 3.5 EBU was used to cannulate into the LCA  0.014 BMW was used to cannulate into the LAD  2.0 x 15 mm balloon was used to predilate the proximal and mid LAD segments inflated at nominal pressures  A 2.5 x 50 mm Joseph frontier was placed in the mid segment inflated at nominal pressures  Next a 3.0 x 38 mm Clio frontier was placed in the mid to proximal segment inflated at nominal pressures  Next a 3.5 x 30 mm Clio frontier was placed in the proximal segment inflated at nominal pressures  Final angiogram showed 0% residual stenosis restoration of HANH-3 flow stents look well expanded well opposed no dissections     Recommendations:  Admit patient to ICU for post STEMI management  Continue aspirin and atorvastatin indefinitely for secondary prevention of CAD  Continue Coreg  Continue losartan  Remove TR band in 1 to 2 hours, after 1 hour restart full dose anticoagulation for history of portal vein thrombosis  Continue Brilinta 90 mg p.o. twice daily, patient was loaded with 180 mg x 1  Echocardiogram in the morning  Plan to bring patient back to the Cath Lab on Monday for PCI of the RCA     Consults:   Pulmonary/critical care    Examination:  /61   Pulse 87   Temp 98.2 °F (36.8 °C) (Oral)   Resp 16   Ht 6' 1\" (1.854 m)   Wt 193 lb 4.8 oz (87.7 kg)   SpO2 98%   BMI 25.50 kg/m²    Physical Exam  Constitutional:       General: He is not in acute distress.  HENT:      Head: Normocephalic and atraumatic.   Cardiovascular:      Rate and Rhythm: Normal rate and regular rhythm.   Pulmonary:      Effort: Pulmonary effort is normal. No respiratory distress.      Breath sounds: No wheezing, rhonchi or rales.   Abdominal:      Palpations: Abdomen is soft.   Musculoskeletal:         General: Normal range of motion.      Cervical back: Normal range of motion and neck supple.      Right lower leg: No edema.      Left lower leg: No edema.      Comments: Right hand swelling dorsal aspect--recent infiltrated IV during EGD on Friday   Skin:     General: Skin is warm and dry.      Comments: Right radial access site soft, nontender, no hematoma; 2+ right radial pulse   Neurological:      General: No focal deficit present.      Mental Status: He is alert and oriented to person, place, and time.      Cranial Nerves: No cranial nerve deficit.   Psychiatric:         Mood and Affect: Mood normal.         Behavior: Behavior normal.       Medications:  Current Discharge Medication List        START taking these medications    Details   aspirin 81 MG chewable tablet Take 1 tablet by mouth daily  Qty: 30 tablet, Refills: 3      ticagrelor (BRILINTA) 90 MG TABS tablet Take 1 tablet by mouth 2 times daily  Qty: 60 tablet, Refills: 11      atorvastatin (LIPITOR) 80 MG tablet Take 1 tablet by mouth nightly  Qty: 30 tablet, Refills: 3           CONTINUE these medications which  have CHANGED    Details   carvedilol (COREG) 12.5 MG tablet Take 1 tablet by mouth 2 times daily  Qty: 60 tablet, Refills: 3      nitroGLYCERIN (NITROSTAT) 0.4 MG SL tablet Place 1 tablet under the tongue every 5 minutes as needed for Chest pain up to max of 3 total doses. If no relief after 1 dose, call 911. Qty: 25 tablet, Refills: 3           CONTINUE these medications which have NOT CHANGED    Details   ELIQUIS 5 MG TABS tablet take 2 tablets by mouth twice a day for 7 days then 1 tablet twice a day THEREAFTER      ferrous sulfate (IRON 325) 325 (65 Fe) MG tablet Take 1 tablet by mouth daily (with breakfast)  Qty: 90 tablet, Refills: 1      esomeprazole (NEXIUM) 40 MG delayed release capsule Take 1 capsule by mouth daily  Qty: 30 capsule, Refills: 3      furosemide (LASIX) 20 MG tablet Take 1 tablet by mouth daily  Qty: 180 tablet, Refills: 5      lisinopril (PRINIVIL;ZESTRIL) 20 MG tablet Take 1 tablet by mouth daily  Qty: 90 tablet, Refills: 3    Associated Diagnoses: Hypertension, unspecified type      LANTUS SOLOSTAR 100 UNIT/ML injection pen inject 50 units subcutaneously daily      insulin lispro (HUMALOG) 100 UNIT/ML SOLN injection vial Inject 50 Units into the skin 3 times daily      Continuous Blood Gluc  (FREESTYLE NATALY 14 DAY READER) KISHA Test daily and prn. Dx: DM2  Qty: 1 Device, Refills: 0    Associated Diagnoses: Diabetes mellitus due to underlying condition, uncontrolled, with hyperglycemia ([facility] [address]  Elevated blood sugar           STOP taking these medications       chlorthalidone (HYGROTON) 25 MG tablet Comments:   Reason for Stopping:         traMADol (ULTRAM) 50 MG tablet Comments:   Reason for Stopping:         omeprazole (PRILOSEC) 40 MG delayed release capsule Comments:   Reason for Stopping:         Sennosides-Docusate Sodium (SENNA PLUS) 8.6-50 MG CAPS Comments:   Reason for Stopping:         meloxicam (MOBIC) 15 MG tablet Comments:   Reason for Stopping:         ondansetron (ZOFRAN) 4 MG tablet Comments:   Reason for Stopping:         Continuous Blood Gluc Sensor (FREESTYLE NATALY 14 DAY SENSOR) MISC Comments:   Reason for Stopping:         ondansetron (ZOFRAN-ODT) 8 MG TBDP disintegrating tablet Comments:   Reason for Stopping:         RA ASPIRIN EC 81 MG EC tablet Comments:   Reason for Stopping:         chlorthalidone (HYGROTON) 25 MG tablet Comments:   Reason for Stopping:         lanreotide acetate (SOMATULINE) 120 MG/0.5ML SOLN depot injection Comments:   Reason for Stopping:         Polyethylene Glycol 3350 (MIRALAX PO) Comments:   Reason for Stopping:               Significant Diagnostics:   Radiology: CTA CHEST W WO CONTRAST PE Eval    Result Date: 1/28/2023  EXAMINATION: CTA OF THE CHEST WITH AND WITHOUT CONTRAST 1/28/2023 5:07 pm TECHNIQUE: CTA of the chest was performed before and after the administration of intravenous contrast.  Multiplanar reformatted images are provided for review. MIP images are provided for review. Automated exposure control, iterative reconstruction, and/or weight based adjustment of the mA/kV was utilized to reduce the radiation dose to as low as reasonably achievable. COMPARISON: 10/09/2012 HISTORY: ORDERING SYSTEM PROVIDED HISTORY: PE TECHNOLOGIST PROVIDED HISTORY: Reason for exam:->PE Decision Support Exception - unselect if not a suspected or confirmed emergency medical condition->Emergency Medical Condition (MA) What reading provider will be dictating this exam?->CRC FINDINGS: Aorta: No evidence of thoracic aortic aneurysm or dissection. No acute abnormality of the aorta. Mediastinum: No evidence of mediastinal lymphadenopathy. The heart and pericardium demonstrate no acute abnormality. Lungs/Pleura: The lungs are without acute process. No focal consolidation or pulmonary edema. No evidence of pleural effusion or pneumothorax. Upper Abdomen: Limited images of the upper abdomen are unremarkable.  Soft Tissues/Bones: No acute bone or soft tissue abnormality.  Degenerative changes thoracic spine.     No evidence of pulmonary embolus or pneumonia.     XR CHEST PORTABLE    Result Date: 1/29/2023  EXAMINATION: ONE XRAY VIEW OF THE CHEST 1/29/2023 10:49 am COMPARISON: 04/14/2022 HISTORY: ORDERING SYSTEM PROVIDED HISTORY: leukocytosis TECHNOLOGIST PROVIDED HISTORY: Reason for exam:->leukocytosis What reading provider will be dictating this exam?->CRC FINDINGS: The lungs are without acute focal process.  There is no effusion or pneumothorax. The cardiomediastinal silhouette is without acute process. The osseous structures are without acute process.     No acute process.       Labs:   Recent Results (from the past 72 hour(s))   EKG 12 Lead    Collection Time: 01/28/23  3:38 PM   Result Value Ref Range    Ventricular Rate 101 BPM    Atrial Rate 101 BPM    P-R Interval 152 ms    QRS Duration 94 ms    Q-T Interval 374 ms    QTc Calculation (Bazett) 484 ms    P Axis 60 degrees    R Axis 39 degrees    T Axis 58 degrees   CBC with Auto Differential    Collection Time: 01/28/23  4:15 PM   Result Value Ref Range    WBC 14.9 (H) 4.8 - 10.8 K/uL    RBC 4.30 (L) 4.70 - 6.10 M/uL    Hemoglobin 10.6 (L) 14.0 - 18.0 g/dL    Hematocrit 33.3 (L) 42.0 - 52.0 %    MCV 77.4 (L) 79.0 - 92.2 fL    MCH 24.7 (L) 27.0 - 31.3 pg    MCHC 31.9 (L) 33.0 - 37.0 %    RDW 16.6 (H) 11.5 - 14.5 %    Platelets 495 (H) 130 - 400 K/uL    Neutrophils % 51.6 %    Lymphocytes % 33.1 %    Monocytes % 12.6 %    Eosinophils % 1.5 %    Basophils % 1.2 %    Neutrophils Absolute 7.7 (H) 1.4 - 6.5 K/uL    Lymphocytes Absolute 4.9 (H) 1.0 - 4.8 K/uL    Monocytes Absolute 1.9 (H) 0.2 - 0.8 K/uL    Eosinophils Absolute 0.2 0.0 - 0.7 K/uL    Basophils Absolute 0.2 0.0 - 0.2 K/uL   CMP    Collection Time: 01/28/23  4:15 PM   Result Value Ref Range    Sodium 134 (L) 135 - 144 mEq/L    Potassium 3.3 (L) 3.4 - 4.9 mEq/L    Chloride 94 (L) 95 - 107 mEq/L    CO2 28 20 - 31 mEq/L    Anion Gap 12 9 - 15 mEq/L     Glucose 243 (H) 70 - 99 mg/dL    BUN 19 8 - 23 mg/dL    Creatinine 0.99 0.70 - 1.20 mg/dL    Est, Glom Filt Rate >60.0 >60    Calcium 10.0 (H) 8.5 - 9.9 mg/dL    Total Protein 8.4 (H) 6.3 - 8.0 g/dL    Albumin 4.0 3.5 - 4.6 g/dL    Total Bilirubin 0.8 (H) 0.2 - 0.7 mg/dL    Alkaline Phosphatase 131 (H) 35 - 104 U/L    ALT 43 (H) 0 - 41 U/L    AST 51 (H) 0 - 40 U/L    Globulin 4.4 (H) 2.3 - 3.5 g/dL   Magnesium    Collection Time: 01/28/23  4:15 PM   Result Value Ref Range    Magnesium 2.1 1.7 - 2.4 mg/dL   Troponin    Collection Time: 01/28/23  4:15 PM   Result Value Ref Range    Troponin 0.112 (HH) 0.000 - 0.010 ng/mL   POCT Creatinine    Collection Time: 01/28/23  4:19 PM   Result Value Ref Range    POC CREATININE WHOLE BLOOD 1.0    POCT Venous    Collection Time: 01/28/23  4:22 PM   Result Value Ref Range    POC Creatinine 1.0 0.8 - 1.3 mg/dL    Est, Glom Filt Rate >60 >60    Sample Type NGHIA     Performed on SEE BELOW    EKG 12 Lead    Collection Time: 01/28/23  5:21 PM   Result Value Ref Range    Ventricular Rate 95 BPM    Atrial Rate 95 BPM    P-R Interval 154 ms    QRS Duration 92 ms    Q-T Interval 400 ms    QTc Calculation (Bazett) 502 ms    P Axis 53 degrees    R Axis 45 degrees    T Axis 86 degrees   EKG 12 Lead    Collection Time: 01/28/23  5:34 PM   Result Value Ref Range    Ventricular Rate 90 BPM    Atrial Rate 90 BPM    P-R Interval 156 ms    QRS Duration 92 ms    Q-T Interval 396 ms    QTc Calculation (Bazett) 484 ms    P Axis 50 degrees    R Axis 47 degrees    T Axis 83 degrees   POCT Arterial    Collection Time: 01/28/23  6:08 PM   Result Value Ref Range    ACT-K 401 (H) 82 - 152 sec    Sample Type ART     Performed on SEE BELOW    POCT Arterial    Collection Time: 01/28/23  6:36 PM   Result Value Ref Range    ACT-K 245 (H) 82 - 152 sec    Sample Type ART     Performed on SEE BELOW    EKG 12 Lead    Collection Time: 01/28/23  7:34 PM   Result Value Ref Range    Ventricular Rate 80 BPM    Atrial Rate 80 BPM    P-R Interval 158 ms    QRS Duration 102 ms    Q-T Interval 404 ms    QTc Calculation (Bazett) 465 ms    P Axis 63 degrees    R Axis 44 degrees    T Axis 89 degrees   POCT Glucose    Collection Time: 01/28/23  8:41 PM   Result Value Ref Range    POC Glucose 180 (H) 70 - 99 mg/dl    Performed on ACCU-CHEK    Troponin    Collection Time: 01/28/23 10:31 PM   Result Value Ref Range    Troponin 14.770 (HH) 0.000 - 0.010 ng/mL   EKG 12 lead    Collection Time: 01/29/23  4:18 AM   Result Value Ref Range    Ventricular Rate 82 BPM    Atrial Rate 82 BPM    P-R Interval 162 ms    QRS Duration 104 ms    Q-T Interval 414 ms    QTc Calculation (Bazett) 483 ms    P Axis 62 degrees    R Axis 48 degrees    T Axis 108 degrees   Troponin    Collection Time: 01/29/23  7:54 AM   Result Value Ref Range    Troponin 5.660 (HH) 0.000 - 0.010 ng/mL   CBC    Collection Time: 01/29/23  7:54 AM   Result Value Ref Range    WBC 16.8 (H) 4.8 - 10.8 K/uL    RBC 3.97 (L) 4.70 - 6.10 M/uL    Hemoglobin 9.5 (L) 14.0 - 18.0 g/dL    Hematocrit 30.2 (L) 42.0 - 52.0 %    MCV 76.0 (L) 79.0 - 92.2 fL    MCH 23.8 (L) 27.0 - 31.3 pg    MCHC 31.3 (L) 33.0 - 37.0 %    RDW 16.3 (H) 11.5 - 14.5 %    Platelets 481 (H) 153 - 400 K/uL   Basic Metabolic Panel w/ Reflex to MG    Collection Time: 01/29/23  7:54 AM   Result Value Ref Range    Sodium 136 135 - 144 mEq/L    Potassium reflex Magnesium 3.5 3.4 - 4.9 mEq/L    Chloride 98 95 - 107 mEq/L    CO2 28 20 - 31 mEq/L    Anion Gap 10 9 - 15 mEq/L    Glucose 301 (H) 70 - 99 mg/dL    BUN 23 8 - 23 mg/dL    Creatinine 0.94 0.70 - 1.20 mg/dL    Est, Glom Filt Rate >60.0 >60    Calcium 8.8 8.5 - 9.9 mg/dL   Magnesium    Collection Time: 01/29/23  7:54 AM   Result Value Ref Range    Magnesium 2.0 1.7 - 2.4 mg/dL   POCT Glucose    Collection Time: 01/29/23  8:19 AM   Result Value Ref Range    POC Glucose 302 (H) 70 - 99 mg/dl    Performed on ACCU-CHEK    POCT Glucose    Collection Time: 01/29/23 10:51 AM Result Value Ref Range    POC Glucose 389 (H) 70 - 99 mg/dl    Performed on ACCU-CHEK    Urinalysis    Collection Time: 01/29/23 11:29 AM   Result Value Ref Range    Color, UA Yellow Straw/Yellow    Clarity, UA Clear Clear    Glucose, Ur >=1000 (A) Negative mg/dL    Bilirubin Urine Negative Negative    Ketones, Urine TRACE (A) Negative mg/dL    Specific Gravity, UA 1.041 1.005 - 1.030    Blood, Urine Negative Negative    pH, UA 6.5 5.0 - 9.0    Protein, UA Negative Negative mg/dL    Urobilinogen, Urine 1.0 <2.0 E.U./dL    Nitrite, Urine Negative Negative    Leukocyte Esterase, Urine Negative Negative   POCT Glucose    Collection Time: 01/29/23  4:08 PM   Result Value Ref Range    POC Glucose 294 (H) 70 - 99 mg/dl    Performed on ACCU-CHEK    POCT Glucose    Collection Time: 01/29/23  7:53 PM   Result Value Ref Range    POC Glucose 317 (H) 70 - 99 mg/dl    Performed on ACCU-CHEK    CBC    Collection Time: 01/30/23  4:51 AM   Result Value Ref Range    WBC 14.5 (H) 4.8 - 10.8 K/uL    RBC 3.64 (L) 4.70 - 6.10 M/uL    Hemoglobin 8.8 (L) 14.0 - 18.0 g/dL    Hematocrit 27.6 (L) 42.0 - 52.0 %    MCV 75.9 (L) 79.0 - 92.2 fL    MCH 24.2 (L) 27.0 - 31.3 pg    MCHC 31.9 (L) 33.0 - 37.0 %    RDW 16.2 (H) 11.5 - 14.5 %    Platelets 266 (H) 973 - 400 K/uL   Basic Metabolic Panel w/ Reflex to MG    Collection Time: 01/30/23  4:51 AM   Result Value Ref Range    Sodium 136 135 - 144 mEq/L    Potassium reflex Magnesium 3.1 (L) 3.4 - 4.9 mEq/L    Chloride 100 95 - 107 mEq/L    CO2 26 20 - 31 mEq/L    Anion Gap 10 9 - 15 mEq/L    Glucose 299 (H) 70 - 99 mg/dL    BUN 29 (H) 8 - 23 mg/dL    Creatinine 1.07 0.70 - 1.20 mg/dL    Est, Glom Filt Rate >60.0 >60    Calcium 9.0 8.5 - 9.9 mg/dL   Magnesium    Collection Time: 01/30/23  4:51 AM   Result Value Ref Range    Magnesium 2.1 1.7 - 2.4 mg/dL   Hemoglobin A1C    Collection Time: 01/30/23  4:51 AM   Result Value Ref Range    Hemoglobin A1C 9.3 (H) 4.8 - 5.9 %   POCT Glucose    Collection Time: 01/30/23  7:13 AM   Result Value Ref Range    POC Glucose 275 (H) 70 - 99 mg/dl    Performed on ACCU-CHEK    POCT Glucose    Collection Time: 01/30/23 10:47 AM   Result Value Ref Range    POC Glucose 417 (HH) 70 - 99 mg/dl    Performed on ACCU-CHEK      Echocardiogram 1/30/23:  Conclusions   Summary   Left ventricular ejection fraction is visually estimated at 40-45%. Mild left ventricular systolic dysfunction. Severe hypokinesis to akinesis of the distal anteroseptal wall and apex. Remaining wall segments are gladis normally. E/A flow reversal noted. Suggestive of diastolic dysfunction. Mild concentric left ventricular hypertrophy. No evidence of significant pericardial effusion is noted. Trace mitral regurgitation is present. Signature   ----------------------------------------------------------------   Electronically signed by Mike Tsang(Interpreting   physician) on 01/30/2023 11:35 AM   ----------------------------------------------------------------     EKG 1/29/23: SR 83, biphasic T wave changes in V2-V6, Q waves anteriorly, QTc 483ms    Follow-up visits:   Francine Castellon MD  [address] [facility] [address] [place] [address]  [phone]    Go on 2/8/2023  Cardiology follow-up on Wed 2/8/23 at 10am in [facility]    Diagnosis Date Noted    CAD S/P percutaneous coronary angioplasty [I25.10, Z98.61] 01/30/2023     Priority: High    Hypokalemia [E87.6] 01/30/2023     Priority: High    Ischemic cardiomyopathy [I25.5] 01/30/2023     Priority: High    STEMI (ST elevation myocardial infarction) ([address] [I21.3] 01/28/2023     Priority: High     STEMI s/p PCI ZAKI of prox to mid LAD on 1/28/23  RCA with 80% prox and distal stenosis--needs staged PCI as outpatient  Ischemic cardiomyopathy EF 40-45% per echo 1/30/23  HTN  Dyslipidemia  Uncontrolled DM with hyperglycemia  Hypokalemia--replace  Hx portal vein thrombosis--on Eliquis as outpatient  Pancreatic CA s/p resection in August 2022 at [facility]  PSVT  Anemia--Hgb 8.8 today from 10.6 on 1/28/23    Plan:   1. Maximize medical therapy-dual antiplatelet therapy with aspirin 81 mg p.o. daily and Brilinta 90 mg p.o. twice daily, discontinue Coreg and changed to Toprol-XL 50 mg daily every evening given episodes of PSVT and borderline hypotension at times, will discontinue losartan (was on lisinopril at home) and will start Entresto 24/26 mg p.o. twice daily on 1/31/23, Lipitor 80 mg p.o. nightly, resume oral anticoagulation with Eliquis at discharge given history of portal vein thrombosis, okay to resume Lasix 20 mg daily at discharge  2. Chlorthalidone was on home med list and will be discontinued upon discharge  3. Cardiac/diabetic/less than 2 g sodium diet recommended  4. Maintain potassium greater than 4, magnesium greater than 2--potassium replaced with 40 mEq potassium chloride p.o. x1 today  5. Check BMP in 1 week upon discharge to reevaluate renal function electrolytes  6. Endocrinology recommendations regarding uncontrolled DM  7. Consider event monitor (Zio) in future to evaluate for recurrent tachyarrhythmias/PSVT  8. Will plan for discharge home later today and follow-up with Dr. Sunshine Danielle in 1 week. Will plan for staged PCI of RCA as outpatient in near future            Electronically signed by Edie Pleitez 75 1/30/2023 at 4:44 PM      Attending Supervising Physician's CORY Salvador [id number]  I performed a history and physical examination on the patient and discussed the management with the physician assistant. I reviewed and agree with the findings and plan as documented in her note .     Electronically signed by Alissa Kraus MD on 2/2/23 at 11:14 AM EST

## 2023-01-30 NOTE — CARE COORDINATION
This Care Transition Nurse provided AMI booklet and zones sheet and reviewed. Stressed importance of phoning cardiologist for symptoms in the yellow zone and ems for symptoms in the red zone. Discussed symptoms of MI and risk factors. Discussed importance of controlling cholesterol levels, managing hypertension and diabetes, achieving a healthy weight, regular physical activity approved by cardiologist, managing stress and emotions. Reviewed signs and symptoms of depression and to seek treatment promptly if experiencing these symptoms. Discussed the importance of following a heart health diet low in fat, cholesterol, and sodium. Instructed how to read food labels for saturated fat, trans fat, and sodium content. Instructed to avoid eating any foods with trans fats and to choose lean meats, beans, fruits and vegetables, whole grains, low fat or non-fat dairy products. Discussed importance of taking medications as ordered. Take medications consistently and do not skip doses. Advised using weekly pill organizer. Discussed how and when to take nitroglycerin if ordered. Discussed signs and symptoms of bleeding associated with taking blood thinners if ordered and to avoid injury. Discussed benefits of Cardiac Rehab. Discussed the importance of physician (PCP) follow up within one week of discharge and follow up with cardiologist as ordered. Patient states his wife cooks low fat, low salt diet. He counts carbohydrates to manage his diabetes. He is very active at home and gets regular physical activity. He has good family support. He takes his medications as directed.

## 2023-01-30 NOTE — PROGRESS NOTES
CLINICAL PHARMACY NOTE: MEDS TO BEDS    Total # of Prescriptions Filled: 6   The following medications were delivered to the patient:  Entresto 24-26 mg tab  Carvedilol 12.5 mg tab   Brilinta 90 mg tab  Nitroglycerin 0.4 subl  Atorvastatin 80 mg tab  Aspirin 81 mg chew

## 2023-01-30 NOTE — FLOWSHEET NOTE
Dc instructions reviewed with the pt and wife. All questions answered. Nurse reviewed bleeding risks. Pt left with meds from [facility]. Pt dcd via .

## 2023-01-30 NOTE — PROGRESS NOTES
Pulmonary Progress Note    2023 12:40 PM    Subjective:   Admit Date: 2023  PCP: Naga Chicas MD    Chief Complaint   Patient presents with    Chest Pain     Interval History: Has been doing well. No major issues overnight. No more chest pain. 14 points review of systems has been obtained and negative except to was mentioned in HPI.      Medications:   Scheduled Meds:   sodium chloride flush  5-40 mL IntraVENous 2 times per day    potassium chloride  40 mEq Oral Once    sodium chloride flush  5-40 mL IntraVENous 2 times per day    aspirin  81 mg Oral Daily    atorvastatin  80 mg Oral Nightly    losartan  25 mg Oral Daily    enoxaparin  1 mg/kg SubCUTAneous BID    carvedilol  25 mg Oral BID    ticagrelor  90 mg Oral BID    insulin lispro  0-16 Units SubCUTAneous TID WC    insulin lispro  0-4 Units SubCUTAneous Nightly     Continuous Infusions:   sodium chloride      sodium chloride           Objective:   Vitals:   Temp (24hrs), Av.4 °F (36.9 °C), Min:98.1 °F (36.7 °C), Max:98.6 °F (37 °C)    /68   Pulse 87   Temp 98.1 °F (36.7 °C)   Resp 16   Ht 6' 1\" (1.854 m)   Wt 193 lb 4.8 oz (87.7 kg)   SpO2 98%   BMI 25.50 kg/m²   I/O:24HR INTAKE/OUTPUT:  No intake or output data in the 24 hours ending 23 1240   0701 -  0700  In: 400 [P.O.:400]  Out: -   CVP:           Physical Exam:  General appearance - alert, ill appearing, and in no distress  Mental status - alert, oriented to person, place, and time  Eyes - pupils equal and reactive, extraocular eye movements intact  Nose - normal and patent, no erythema, discharge or polyps  Neck - supple, no significant adenopathy  Chest - clear to auscultation, no wheezes, rales or rhonchi, symmetric air entry  Heart - normal rate, regular rhythm, normal S1, S2, no murmurs, rubs, clicks or gallops  Abdomen - soft, nontender, nondistended, no masses or organomegaly  Rectal - deferred, not clinically indicated  Neurological - alert, oriented, normal speech, no focal findings or movement disorder noted, motor and sensory grossly normal bilaterally  Musculoskeletal - no joint tenderness, deformity or swelling  Extremities - peripheral pulses normal, no pedal edema, no clubbing or cyanosis  Skin - normal coloration and turgor, no rashes, no suspicious skin lesions noted            BMP:    Recent Labs     01/28/23  1615 01/28/23  1622 01/29/23  0754 01/30/23  0451   *  --  136 136   K 3.3*  --  3.5 3.1*   CL 94*  --  98 100   CO2 28 --  28 26   BUN 19 -- 23 29*   CREATININE 0.99   < > 0.94 1.07   GLUCOSE 243*  --  301* 299*    < > = values in this interval not displayed. .  MG:3,PHOS:3)@  Ionized Calcium:   Lab Results   Component Value Date/Time    IONCA 1.18 03/17/2020 11:24 AM     CBC:   Recent Labs     01/29/23  0754 01/30/23  0451   WBC 16.8* 14.5*   HGB 9.5* 8.8*   * 442*      ABG: No results for input(s): PH, PCO2, PO2 in the last 72 hours. Assessment and Plan:       Impression:     -ST elevation MI status post cardiac cath with PCI and drug-eluting stent x3.  -Chest pain due to above. This has resolved. -Accelerated hypertension. Blood pressure is better controlled now. Improved. H   -Type 2 diabetes.  -History of pancreatic cancer.  -Portal vein thrombosis on anticoagulation.   -Likely sleep disordered breathing. Recommendations:     - Continue cardiac telemetry.  -Continue antiplatelets.  -Continue anticoagulation for his history of portal vein thrombosis. - Strict intake and output measurement.  -Blood pressure control.  -Tight glucose control.  -Down the road, she will benefit from testing for sleep disordered breathing. I discussed this with him.           Electronically signed by Mick Arana MD on 1/30/2023 at 12:40 PM

## 2023-01-30 NOTE — PLAN OF CARE
Patient remains free of injury. Reports adequate level of comfort. No chest pain. Call light in reach.

## 2023-01-30 NOTE — CONSULTS
Cardiac Rehab Consult Note  Name: Frandy Ferris  Age: 79 y.o. Gender: male    Chief Complaint:Chest Pain    Primary Care Provider: Ally Hollingsworth MD  InpatientTreatment Team: Treatment Team: Attending Provider: Harshad Frederick MD; Consulting Physician: Harshad Frederick MD; Consulting Physician: Chiara Deal MD; Consulting Physician: Silvia Porras MD; : Kenn Webb, RN; Registered Nurse: Margot Arellano RN; Respiratory Therapist (Day): Agnes Vasquez RCP; Utilization Reviewer: Tammi Zhong RN  Admission Date: 1/28/2023    Consult Received from  Dr. Gerald Arthur . Reason for consult STEMI. Patient visited at bedside. Program and benefits introduced. Brochures given. Patient's response interested. Principal Problem:    STEMI (ST elevation myocardial infarction) ([facility] [address]  Resolved Problems:    * No resolved hospital problems. *       Plan: scheduled 2/16/2023 @1:00pm    All of pt questions were answered. Case will be discussed with physician when appropriate.      Electronically signed by Kayla Osorio on 1/30/2023 at 11:11 AM

## 2023-01-31 ENCOUNTER — CARE COORDINATION (OUTPATIENT)
Dept: CASE MANAGEMENT | Age: 68
End: 2023-01-31

## 2023-01-31 DIAGNOSIS — I25.5 ISCHEMIC CARDIOMYOPATHY: Primary | ICD-10-CM

## 2023-01-31 LAB — C-PEPTIDE: 2.1 NG/ML (ref 1.1–4.4)

## 2023-01-31 PROCEDURE — 1111F DSCHRG MED/CURRENT MED MERGE: CPT | Performed by: FAMILY MEDICINE

## 2023-01-31 NOTE — ADT AUTH CERT
Patient Demographics    Name Patient ID SSN Gender Identity Birth Date   Remedios Pop [id number]  Male [birth year] (79 yrs)     Address Phone Email Employer    [employer] [facility] [phone] [phone] (H)   [phone] (M) [email or web address]      [place] Race Occupation Emp Status    -- White (non-) -- Self Employed      Reg Status PCP Date Last Verified Next Review Date    Verified Michaela Barbosa MD  [phone] 01/11/23 02/10/23      Admission Date Discharge Date Admitting Provider     23 Luis Handy MD       Marital Status Buddhism       None        Emergency Contact [address] [place] [address] (2)   [address]   [address] [facility]   [phone] (H)   [phone] Shanique Frank)     Subscriber Details  Hospital Account [de-identified]  [facility] Name/Sex/Relation Subscriber  Subscriber Address/Phone Subscriber Emp/Emp Phone   1.  [facility] MEDICARE   [id number] [facility] - Male   (Self) 1955 [address] [place] [address]   [phone](N) SELF-EMPLOYED      Utilization Reviews       Myocardial Infarction - Care Day 3 (2023) by Leonor Lee RN       Review Status Review Entered   Completed 2023 1643       Created By   Leonor Lee RN      Criteria Review      Care Day: 3 Care Date: 2023 Level of Care: Intermediate Care    Guideline Day 2    Level Of Care    (X) Intermediate care or telemetry    2023 4:43 PM EST by Kacie Hollingsworth      intermediate care unit   tele ordered    Clinical Status    ( ) * Hemodynamic stability    2023 4:43 PM EST by Francisca Hollingsworth      98.2, HR 87, tele sr, resp 16, bp 110/61, pox 98 % on RA    ( ) * Chest pain, dyspnea, or anginal equivalent absent    Activity    (X) Activity as tolerated    Routes    (X) * Oral hydration    2023 4:43 PM EST by Kacie Hollingsworth      oral hydration   iv saline locked   prn none    (X) * Oral medications    (X) Parenteral medications    2023 4:43 PM EST by Mariama Francis 40 meq po x1    (X) Oral diet as tolerated    Interventions    (X) * Oxygen absent    (X) Possible cardiac angiography, with percutaneous coronary intervention if indicated    1/30/2023 4:43 PM EST by Juli Hollingsworth      PCI 1/28    Medications    (X) * IV nitroglycerin absent    (X) Anticoagulants    1/30/2023 4:43 PM EST by Juli Hollingsworth      lovenox 90 mg sc bid    (X) Antiplatelet agents (eg, aspirin, clopidogrel, ticagrelor)    1/30/2023 4:43 PM EST by Juli Hollingsworth      asa 81 mg po daily  brilinta 90 mg po bid    (X) Beta-blocker    1/30/2023 4:43 PM EST by Juli Hollingsworth      coreg 25 mg po bid  toprol 50 mg po daily    (X) Possible ACE inhibitor or ARB    1/30/2023 4:43 PM EST by Juli Hollingsworth      cozaar 25 mg po daily    (X) Statin    1/30/2023 4:43 PM EST by Juli Hollingsworth      lipitor 80 mg po daily    * Milestone   Additional Notes   DATE: 1/30/2023  CD 3          RELEVANT BASELINES: (lab values, vitals, o2 amount/delivery, etc.)   None noted      PERTINENT UPDATES:   Remains on meds as charted in [facility]     Will plan for staged PCI of RCA as outpatient in near future      VITALS:   Charted in [facility]       ABNL/PERTINENT LABS/RADIOLOGY/DIAGNOSTIC STUDIES:   K 3.1   Bun 29   Gluc 299, 417   Wbc 14.5   Rbc 3.64   H/h 8.8/ 27.6   Mcv 75.9   Mch 24.2   Mchc 31.9   Rdw 16.2   Plt 442       Echo    Left ventricular ejection fraction is visually estimated at 40-45%. Mild left ventricular systolic dysfunction. Severe hypokinesis to akinesis of the distal anteroseptal wall and apex. Remaining wall segments are gladis normally. E/A flow reversal noted. Suggestive of diastolic dysfunction. Mild concentric left ventricular hypertrophy. No evidence of significant pericardial effusion is noted. Trace mitral regurgitation is present.        PHYSICAL EXAM:   General appearance - alert, ill appearing   Musculoskeletal      Comments: Right hand swelling dorsal aspect--recent infiltrated IV during EGD on Friday    Skin: General: Skin is warm and dry. Comments: Right radial access site soft, nontender, no hematoma; 2+ right radial pulse                MD CONSULTS/ASSESSMENT AND PLAN:   Pulmonology impression   -ST elevation MI status post cardiac cath with PCI and drug-eluting stent x3.   -Chest pain due to above. This has resolved. -Accelerated hypertension. Blood pressure is better controlled now. Improved. H    -Type 2 diabetes.   -History of pancreatic cancer.   -Portal vein thrombosis on anticoagulation.    -Likely sleep disordered breathing. Recommendations:       - Continue cardiac telemetry.   -Continue antiplatelets.   -Continue anticoagulation for his history of portal vein thrombosis. - Strict intake and output measurement.   -Blood pressure control.   -Tight glucose control.   -Down the road, she will benefit from testing for sleep disordered breathing. I discussed this with him. Cardiology impression     Diagnosis Date Noted   · CAD S/P percutaneous coronary angioplasty [I25.10, Z98.61] 01/30/2023       Priority: High   · Hypokalemia [E87.6] 01/30/2023       Priority: High   · Ischemic cardiomyopathy [I25.5] 01/30/2023       Priority: High   · STEMI (ST elevation myocardial infarction) ([facility] [address] [I21.3] 01/28/2023       Priority: High       1. STEMI s/p PCI ZAKI of prox to mid LAD on 1/28/23   2. RCA with 80% prox and distal stenosis--needs staged PCI as outpatient   3. Ischemic cardiomyopathy EF 40-45% per echo 1/30/23   4. HTN   5. Dyslipidemia   6. Uncontrolled DM with hyperglycemia   7. Hypokalemia--replace   8. Hx portal vein thrombosis--on Eliquis as outpatient   9. Pancreatic CA s/p resection in August 2022 at [facility]   10. PSVT   11. Anemia--Hgb 8.8 today from 10.6 on 1/28/23       Plan:    1.   Maximize medical therapy-dual antiplatelet therapy with aspirin 81 mg p.o. daily and Brilinta 90 mg p.o. twice daily, discontinue Coreg and changed to Toprol-XL 50 mg daily every evening given episodes of PSVT and borderline hypotension at times, will discontinue losartan (was on lisinopril at home) and will start Entresto 24/26 mg p.o. twice daily on 1/31/23, Lipitor 80 mg p.o. nightly, resume oral anticoagulation with Eliquis at discharge given history of portal vein thrombosis, okay to resume Lasix 20 mg daily at discharge   2. Chlorthalidone was on home med list and will be discontinued upon discharge   3. Cardiac/diabetic/less than 2 g sodium diet recommended   4. Maintain potassium greater than 4, magnesium greater than 2--potassium replaced with 40 mEq potassium chloride p.o. x1 today   5. Check BMP in 1 week upon discharge to reevaluate renal function electrolytes   6. Endocrinology recommendations regarding uncontrolled DM   7. Consider event monitor (Zio) in future to evaluate for recurrent tachyarrhythmias/PSVT   8. Will plan for discharge home later today and follow-up with Dr. Octavio Epstein in 1 week. Will plan for staged PCI of RCA as outpatient in near future         MEDICATIONS:   Charted in [facility]       ORDERS:   ADULT DIET; Regular; Low Fat/Low Chol/High Fiber/ALEXANDER    Inpatient consult to Critical Care    Telemetry monitoring    Bedrest          PT/OT/SLP/CM ASSESSMENT OR NOTES:   CM 1/30   DC PLAN REMAINS HOME WITH CARDIAC REHAB ONCE MEDICALLY CLEARED.              Myocardial Infarction - Care Day 2 (1/29/2023) by Jaswinder Rizvi RN       Review Status Review Entered   Completed 1/30/2023 1135       Created By   Jaswinder Rizvi RN      Criteria Review      Care Day: 2 Care Date: 1/29/2023 Level of Care: ICU    Guideline Day 2    Level Of Care    (X) Intermediate care or telemetry    1/30/2023 11:35 AM EST by Damari Stout      remains in ICU    Clinical Status    ( ) * Hemodynamic stability    1/30/2023 11:35 AM EST by Francisca Hollingsworth      98.3 (36.8)  20  90  93/49Abnormal , MAP 63, 96 % on RA    ( ) * Chest pain, dyspnea, or anginal equivalent absent    Routes    (X) * Oral hydration    1/30/2023 11:35 AM EST by Francisca Hollingsworth      IV NS at 75 cc/hr   prn iv zofran 4 mg x1    ( ) * Oral medications    1/30/2023 11:35 AM EST by Libby Hollingsworth      IV Morphine 4 mg x1  iv zofran 4 mg iv x1    (X) Parenteral medications    (X) Oral diet as tolerated    Interventions    (X) * Oxygen absent    (X) ECG, cardiac biomarkers    1/30/2023 11:35 AM EST by Libby Hollingsworth      trop 5.660    (X) Possible cardiac angiography, with percutaneous coronary intervention if indicated    1/30/2023 11:35 AM EST by Libby Hollingsworth      PCI 1/28    Medications    (X) * IV nitroglycerin absent    (X) Anticoagulants    1/30/2023 11:35 AM EST by Libby Hollingsworth      lovenox 90 mg sc bid    (X) Antiplatelet agents (eg, aspirin, clopidogrel, ticagrelor)    1/30/2023 11:35 AM EST by Francisca Hollingsworth      asa 81 mg po daily  brilinta 90 mg po bid    (X) Beta-blocker    1/30/2023 11:35 AM EST by Libby Hollingsworth      coreg 25 mg po bid    (X) Possible ACE inhibitor or ARB    1/30/2023 11:35 AM EST by Libby Hollingsworth      cozaar 25 mg po daily    (X) Statin    1/30/2023 11:35 AM EST by Libby Hollingsworth      lipitor 80 mg po daily       Definitions for Care Day 2    Hemodynamic stability    ( ) Hemodynamic stability, as indicated by  1 or more  of the following :       ( ) Patient hemodynamically stable, as indicated by  ALL  of the following  (1) (2) (3) (4) (5):          (X) Tachycardia absent       * Milestone   Additional Notes   DATE: 1/29/2023  CD 2          RELEVANT BASELINES: (lab values, vitals, o2 amount/delivery, etc.)   None noted       PERTINENT UPDATES:   Remains in ICU     93/49Abnormal , MAP 63   Remains on IVF's/ meds as charted in [facility] to bring patient back to the Cath Lab tomorrow for PCI of the RCA      VITALS:   charted in [facility]       ABNL/PERTINENT LABS/RADIOLOGY/DIAGNOSTIC STUDIES:   Gluc 301, 389   Wbc 16.8   Rbc 3.97   H/h 9.5/ 30.2   Mcv 76   Mch 23.8   Mchc 31.3   Rdw 16.3   Plt 485    UA Gluc > 1000 , trace ketones    CXR Neg       PHYSICAL EXAM:   Heart: Regular, no murmur, no rub/gallop. No RV cameron MÉNDEZ CONSULTS/ASSESSMENT AND PLAN:   Cardiology impression   Impression:   STEMI. Culprit lesion proximal LAD status post ZAKI x3.  Patient has RCA residual stenosis proximal 80% stenosis, distal 90% stenosis at the bifurcation (RCA left untreated will be staged on Monday)   Hypertension   Hyperlipidemia   Diabetes   Pancreatic cancer status post resection August 2022 at [facility]   Portal vein thrombosis on Eliquis   Liver cirrhosis   TTE 1/10/2023 at [facility] EF 65%   Plan:   Transfer patient to a telemetry floor bed   Continue aspirin and atorvastatin indefinitely for secondary prevention of CAD   Continue Coreg   Continue losartan   Remove TR band in 1 to 2 hours, after 1 hour restart full dose anticoagulation for history of portal vein thrombosis   Continue Brilinta 90 mg p.o. twice daily, patient was loaded with 180 mg x 1   Follow-up echocardiogram   Plan to bring patient back to the Cath Lab tomorrow for PCI of the RCA      Pulmonology impression   ST elevation MI status post cardiac cath with PCI and drug-eluting stent x3.   -Chest pain due to above. This has resolved. -Accelerated hypertension. Systolic blood pressure was between 150 and 160 on presentation. This has improved. His blood pressure currently is marginal.   -Type 2 diabetes.   -History of pancreatic cancer.   -Peripheral vein thrombosis on anticoagulation. Recommendations:       -Admitted to the ICU for hemodynamic and airway monitoring.   -Continue cardiac telemetry.   -Continue antiplatelets.   -Continue anticoagulation for his history of portal vein thrombosis. -Repeat labs this morning. Replace electrolytes. -Strict intake and output measurement.   -Blood pressure control.   -Tight glucose control.   -Down the road, she will benefit from testing for sleep disordered breathing. MEDICATIONS:   Charted in [facility]       ORDERS:   ADULT DIET;  Regular; Low Fat/Low Chol/High Fiber/ALEXANDER    Inpatient consult to Critical Care    Telemetry monitoring    Bedrest          PT/OT/SLP/CM ASSESSMENT OR NOTES:   CM 1/29       Spoke to patients wife for CMI info since pt did not answer phone. Wife states he is independent and not using any equip at present. She does not feel he will need any services post dc.  He will have cardiac rehab

## 2023-01-31 NOTE — CONSULTS
[address]                      [address], [address] [facility]                                  CONSULTATION    PATIENT NAME: Adi Gonzalez                        :        1955  MED REC NO:   [id number]                            ROOM:       [id number]  ACCOUNT NO:   [de-identified]                           ADMIT DATE: 2023  PROVIDER:     Raven Marie MD    CONSULT DATE:  2023    ENDOCRINE CONSULTATION    REFERRING PROVIDER:  Elena Desai MD    REASON FOR CONSULTATION:  Management of uncontrolled diabetes. CHIEF COMPLAINT AND HISTORY OF PRESENT ILLNESS:  The patient is a  44-year-old male with known history of diabetes for many years, recently  started on insulin, admitted with STEMI, status post PCI stent placed to  mid LAD on 2023. He had other stenosis for which he would be  brought in later for re-stenting. The patient's blood sugars have been  high here in the 200-400 range. Chemistries were reviewed. Sodium 136, potassium 3.1, chloride was 100,  CO2 was 26, BUN 29, creatinine 1.07. The patient's hemoglobin A1c was  9.3 and prior A1c was 8.5. The patient has been taking Lantus 50-70  units in the morning, Humalog three times to four to six times a day  based on his blood sugars, which have been very labile. He has been  using [employer], which was reviewed on his phone. Average blood  sugars in the 250-280 range. Rare hypoglycemia. The patient has had  history also of malignancy neoplasm of the pancreas, status post surgery  done last year. Blood sugars have been high since his surgery. PAST MEDICAL HISTORY:  Significant for diabetes worsened from  pancreatectomy, hypertension, coronary artery disease. PAST SURGICAL HISTORY:  Abdominal surgery, colonoscopy, hemorrhoid  surgery, partial splenectomy, pancreas surgery in 2022. FAMILY HISTORY:  Significant for cancer.     PERSONAL AND SOCIAL HISTORY:  Denies any smoking, alcohol, drug abuse. ALLERGIES:  None. MEDICATIONS:  Here include Brilinta, Entresto, Toprol, Lipitor, aspirin,  Humalog coverage, iron, Lasix, Lantus 50-70 units, Humalog up to 50  units total a day. REVIEW OF SYSTEMS:  Other than chest pain, 14-point review of systems  essentially unremarkable. PHYSICAL EXAMINATION:  GENERAL:  The patient is alert, awake, oriented x3 in no obvious  distress. VITAL SIGNS:  Blood pressure was 111/52, pulse rate was 89, respiratory  rate 16, temperature 98. 6. HEENT:  Normocephalic, atraumatic. Pupils equally reactive to light. Oral mucosa was moist.  NECK:  Supple. Trachea in midline. CHEST:  Lungs were clear to auscultation bilaterally. No wheezing or  crackles were heard. CARDIOVASCULAR:  Heart sounds were normal.  No murmurs or thrills were  present. ABDOMEN:  Soft. Nonobese. Bowel sounds are present. No organomegaly. EXTREMITIES:  Lower extremities reveal no edema. NEUROLOGIC:  Showed cranial nerves I through XII were intact. PSYCHIATRIC:  Normal affect and cognition. SKIN:  Intact. MUSCULOSKELETAL:  No joint swelling. LABORATORY DATA:  As above. ASSESSMENT:  Uncontrolled type 2 diabetes worsened after pancreatectomy,  status post STEMI and stent placement, coronary artery disease. PLAN:  Continue with current dose of Lantus Humalog. Continue seeing  [facility]. Educated that hyperglycemia due to current stressful  STEMI. Discussed about insulin pump therapy, Medtronic pump, to  brochures after discharge today. Keep his appointment for 02/1/2023. The patient and family educated extensively. Total time spent was 60 minutes. Thank you for the consult.         Padma Godoy MD    D: 01/30/2023 22:24:13       T: 01/30/2023 22:26:35     [email or web address]  Job#: [id number]     Doc#: [id number]    CC:

## 2023-01-31 NOTE — CARE COORDINATION
[facility] Care Transitions Initial Follow Up Call    Call within 2 business days of discharge: Yes    Care Transition Nurse contacted the patient by telephone to perform post hospital discharge assessment. Verified name and  with patient as identifiers. Provided introduction to self, and explanation of the Care Transition Nurse role. Patient: Barbara Pulliam Patient : 1955   MRN: <[id number]>  Reason for Admission: STEMI  Discharge Date: 23 RARS: Readmission Risk Score: 14.5      Last Discharge  [facility]       Date Complaint Diagnosis Description Type Department Provider    23 Chest Pain ST elevation myocardial infarction (STEMI), unspecified artery ([facility] [address] . .. ED to Hosp-Admission (Discharged) (ADMITTED) Terrance Adrian MD         Was this an external facility discharge? No Discharge Facility: [facility]    Challenges to be reviewed by the provider   Additional needs identified to be addressed with provider: No             Method of communication with provider: none. Patient is pleasant in conversation. -reports positive for weakness and fatigue   -reports right wrist site intact; JOSELUIS   -reports decreased swelling in right hand   -denies chest pain, palpitations, shortness of breath, lightheaded, or dizziness   -monitors daily weight; denies weight gain   -monitors BP and reports /65, HR 84-85  -reports    -reports good appetite; counts carbohydrates  -reports normal bladder/bowel elimination   -able to afford cost of medications at this time   -no transportation issues    -patient reports his insurance company provides a nurse visit every 2-3 weeks   -patient is aware that labs need completed by 2023     Emotional support provided; discussed will continue to follow. Care Transition Nurse reviewed discharge instructions, medical action plan, and red flags with patient who verbalized understanding.  The patient was given an opportunity to ask questions and does not have any further questions or concerns at this time. Were discharge instructions available to patient? Yes. Reviewed appropriate site of care based on symptoms and resources available to patient including: PCP  Specialist  When to call 911. The patient agrees to contact the PCP office for questions related to their healthcare. Advance Care Planning:   Does patient have an Advance Directive: decision maker updated. Medication reconciliation was performed with patient, who verbalizes understanding of administration of home medications. Medications reviewed, 1111F entered: yes        Non-face-to-face services provided:  CTN spoke with Olimpia Arthur, Pharmacist at [facility], and confirmed RX for Metoprolol is ready for pick-up. -Patient is sharing on the call and states he will  RX today. Offered patient enrollment in the Remote Patient Monitoring (RPM) program for in-home monitoring: Patient declined. Care Transitions 24 Hour Call    Do you have a copy of your discharge instructions?: Yes  Do you have all of your prescriptions and are they filled?:  No, patient needs RX for Metoprolol  Have you scheduled your follow up appointment?: Yes  How are you going to get to your appointment?: Car - drive self  Do you feel like you have everything you need to keep you well at home?: Yes  Care Transitions Interventions  No Identified Needs      Follow Up  Future Appointments   Date Time Provider Fiorella Rojasi   2/6/2023 11:30 AM Dylon Khan MD [address]   2/8/2023 10:00 AM Ervin Retana MD [address] [place] [address]   2/14/2023  1:30 PM Garnett Boxer, MD [facility]   2/22/2023  3:00 PM MANUEL Guy [facility]   4/12/2023 11:45 AM Ervin Retana MD [facility] Transition Nurse provided contact information. Plan for follow-up call in 7-10 days based on severity of symptoms and risk factors.   Plan for next call:  any new or worsening symptoms    Viva Severe, RN

## 2023-02-01 ENCOUNTER — TELEPHONE (OUTPATIENT)
Dept: CARDIOLOGY CLINIC | Age: 68
End: 2023-02-01

## 2023-02-01 DIAGNOSIS — I25.10 CAD S/P PERCUTANEOUS CORONARY ANGIOPLASTY: ICD-10-CM

## 2023-02-01 DIAGNOSIS — Z98.61 CAD S/P PERCUTANEOUS CORONARY ANGIOPLASTY: ICD-10-CM

## 2023-02-01 DIAGNOSIS — I21.3 ST ELEVATION MYOCARDIAL INFARCTION (STEMI), UNSPECIFIED ARTERY (HCC): ICD-10-CM

## 2023-02-01 DIAGNOSIS — Z95.5 STATUS POST CORONARY ARTERY STENT PLACEMENT: Primary | ICD-10-CM

## 2023-02-01 NOTE — TELEPHONE ENCOUNTER
----- Message from Dreama Ormond sent at 2/1/2023 12:21 PM EST -----  Regarding: Cardiac Rehab Order  Hi I need cardiac rehab orders on this patient.    Reason for referral MI & PCI  Ordering provider: Dr. Alcira Singh    Thanks!  -[facility]

## 2023-02-02 DIAGNOSIS — E87.6 HYPOKALEMIA: ICD-10-CM

## 2023-02-02 LAB
ANION GAP SERPL CALCULATED.3IONS-SCNC: 12 MEQ/L (ref 9–15)
BUN BLDV-MCNC: 17 MG/DL (ref 8–23)
CALCIUM SERPL-MCNC: 8.9 MG/DL (ref 8.5–9.9)
CHLORIDE BLD-SCNC: 98 MEQ/L (ref 95–107)
CO2: 26 MEQ/L (ref 20–31)
CREAT SERPL-MCNC: 0.88 MG/DL (ref 0.7–1.2)
EKG ATRIAL RATE: 101 BPM
EKG ATRIAL RATE: 80 BPM
EKG ATRIAL RATE: 82 BPM
EKG ATRIAL RATE: 90 BPM
EKG ATRIAL RATE: 95 BPM
EKG P AXIS: 50 DEGREES
EKG P AXIS: 53 DEGREES
EKG P AXIS: 60 DEGREES
EKG P AXIS: 62 DEGREES
EKG P AXIS: 63 DEGREES
EKG P-R INTERVAL: 152 MS
EKG P-R INTERVAL: 154 MS
EKG P-R INTERVAL: 156 MS
EKG P-R INTERVAL: 158 MS
EKG P-R INTERVAL: 162 MS
EKG Q-T INTERVAL: 374 MS
EKG Q-T INTERVAL: 396 MS
EKG Q-T INTERVAL: 400 MS
EKG Q-T INTERVAL: 404 MS
EKG Q-T INTERVAL: 414 MS
EKG QRS DURATION: 102 MS
EKG QRS DURATION: 104 MS
EKG QRS DURATION: 92 MS
EKG QRS DURATION: 92 MS
EKG QRS DURATION: 94 MS
EKG QTC CALCULATION (BAZETT): 465 MS
EKG QTC CALCULATION (BAZETT): 483 MS
EKG QTC CALCULATION (BAZETT): 484 MS
EKG QTC CALCULATION (BAZETT): 484 MS
EKG QTC CALCULATION (BAZETT): 502 MS
EKG R AXIS: 39 DEGREES
EKG R AXIS: 44 DEGREES
EKG R AXIS: 45 DEGREES
EKG R AXIS: 47 DEGREES
EKG R AXIS: 48 DEGREES
EKG T AXIS: 108 DEGREES
EKG T AXIS: 58 DEGREES
EKG T AXIS: 83 DEGREES
EKG T AXIS: 86 DEGREES
EKG T AXIS: 89 DEGREES
EKG VENTRICULAR RATE: 101 BPM
EKG VENTRICULAR RATE: 80 BPM
EKG VENTRICULAR RATE: 82 BPM
EKG VENTRICULAR RATE: 90 BPM
EKG VENTRICULAR RATE: 95 BPM
GFR SERPL CREATININE-BSD FRML MDRD: >60 ML/MIN/{1.73_M2}
GLUCOSE BLD-MCNC: 296 MG/DL (ref 70–99)
POTASSIUM SERPL-SCNC: 4 MEQ/L (ref 3.4–4.9)
SODIUM BLD-SCNC: 136 MEQ/L (ref 135–144)

## 2023-02-06 ENCOUNTER — OFFICE VISIT (OUTPATIENT)
Dept: FAMILY MEDICINE CLINIC | Age: 68
End: 2023-02-06

## 2023-02-06 VITALS
TEMPERATURE: 97.7 F | DIASTOLIC BLOOD PRESSURE: 82 MMHG | WEIGHT: 200.6 LBS | BODY MASS INDEX: 25.74 KG/M2 | OXYGEN SATURATION: 98 % | SYSTOLIC BLOOD PRESSURE: 130 MMHG | HEART RATE: 68 BPM | HEIGHT: 74 IN

## 2023-02-06 DIAGNOSIS — I25.10 CAD S/P PERCUTANEOUS CORONARY ANGIOPLASTY: ICD-10-CM

## 2023-02-06 DIAGNOSIS — I21.3 ST ELEVATION MYOCARDIAL INFARCTION (STEMI), UNSPECIFIED ARTERY (HCC): ICD-10-CM

## 2023-02-06 DIAGNOSIS — E11.65 INADEQUATELY CONTROLLED DIABETES MELLITUS (HCC): ICD-10-CM

## 2023-02-06 DIAGNOSIS — K74.69 OTHER CIRRHOSIS OF LIVER (HCC): ICD-10-CM

## 2023-02-06 DIAGNOSIS — Z98.61 CAD S/P PERCUTANEOUS CORONARY ANGIOPLASTY: ICD-10-CM

## 2023-02-06 DIAGNOSIS — K76.6 PORTAL HYPERTENSION (HCC): ICD-10-CM

## 2023-02-06 DIAGNOSIS — G45.9 TIA (TRANSIENT ISCHEMIC ATTACK): ICD-10-CM

## 2023-02-06 DIAGNOSIS — C25.7 MALIGNANT NEOPLASM OF OTHER PARTS OF PANCREAS (HCC): ICD-10-CM

## 2023-02-06 DIAGNOSIS — I50.22 CHRONIC SYSTOLIC (CONGESTIVE) HEART FAILURE (HCC): ICD-10-CM

## 2023-02-06 DIAGNOSIS — I25.5 ISCHEMIC CARDIOMYOPATHY: ICD-10-CM

## 2023-02-06 DIAGNOSIS — Z09 HOSPITAL DISCHARGE FOLLOW-UP: Primary | ICD-10-CM

## 2023-02-06 RX ORDER — APIXABAN 5 MG/1
TABLET, FILM COATED ORAL
Qty: 56 TABLET | Refills: 0 | COMMUNITY
Start: 2023-02-06

## 2023-02-06 SDOH — ECONOMIC STABILITY: HOUSING INSECURITY
IN THE LAST 12 MONTHS, WAS THERE A TIME WHEN YOU DID NOT HAVE A STEADY PLACE TO SLEEP OR SLEPT IN A SHELTER (INCLUDING NOW)?: NO

## 2023-02-06 SDOH — ECONOMIC STABILITY: FOOD INSECURITY: WITHIN THE PAST 12 MONTHS, YOU WORRIED THAT YOUR FOOD WOULD RUN OUT BEFORE YOU GOT MONEY TO BUY MORE.: NEVER TRUE

## 2023-02-06 SDOH — ECONOMIC STABILITY: INCOME INSECURITY: HOW HARD IS IT FOR YOU TO PAY FOR THE VERY BASICS LIKE FOOD, HOUSING, MEDICAL CARE, AND HEATING?: NOT HARD AT ALL

## 2023-02-06 SDOH — ECONOMIC STABILITY: FOOD INSECURITY: WITHIN THE PAST 12 MONTHS, THE FOOD YOU BOUGHT JUST DIDN'T LAST AND YOU DIDN'T HAVE MONEY TO GET MORE.: NEVER TRUE

## 2023-02-06 ASSESSMENT — PATIENT HEALTH QUESTIONNAIRE - PHQ9
SUM OF ALL RESPONSES TO PHQ QUESTIONS 1-9: 0
SUM OF ALL RESPONSES TO PHQ9 QUESTIONS 1 & 2: 0
2. FEELING DOWN, DEPRESSED OR HOPELESS: 0
SUM OF ALL RESPONSES TO PHQ QUESTIONS 1-9: 0
1. LITTLE INTEREST OR PLEASURE IN DOING THINGS: 0
SUM OF ALL RESPONSES TO PHQ QUESTIONS 1-9: 0
SUM OF ALL RESPONSES TO PHQ QUESTIONS 1-9: 0

## 2023-02-06 NOTE — PROGRESS NOTES
Post-Discharge Transitional Care  Follow Up      Yuan Rose   YOB: 1955    Date of Office Visit:  2/6/2023  Date of Hospital Admission: 1/28/23  Date of Hospital Discharge: 1/30/23  Risk of hospital readmission (high >=14%. Medium >=10%) :Readmission Risk Score: 14.5      Care management risk score Rising risk (score 2-5) and Complex Care (Scores >=6): No Risk Score On File     Non face to face  following discharge, date last encounter closed (first attempt may have been earlier): 01/31/2023    Call initiated 2 business days of discharge: Yes    ASSESSMENT/PLAN:   Hospital discharge follow-up  -     NE DISCHARGE MEDS RECONCILED W/ CURRENT OUTPATIENT MED LIST  Malignant neoplasm of other parts of pancreas ([facility] [address]  ST elevation myocardial infarction (STEMI), unspecified artery (HCC)  Chronic systolic (congestive) heart failure  Inadequately controlled diabetes mellitus ([facility] [address]  Portal hypertension (HCC)  Other cirrhosis of liver (HCC)      Needs PCI of RCA and will end up with insulin pump and CGM    Planning for Cardiac rehab    He may end up exercising on his own, he states     Keep f/u with endocrine and cardiology     Has a dry cough from entresto  Will discuss with cardiology Wednesday     Medical Decision Making: high complexity  No follow-ups on file. On this date 2/6/2023 I have spent >30 minutes reviewing previous notes, test results and face to face with the patient discussing the diagnosis and importance of compliance with the treatment plan as well as documenting on the day of the visit. Subjective:   HPI:  Follow up of Hospital problems/diagnosis(es): NSTEMI, s/p PTCA stenting  Inpatient course: Discharge summary reviewed- see chart. Discharge Diagnoses:          Active Hospital Problems     Diagnosis Date Noted    CAD S/P percutaneous coronary angioplasty [I25.10, Z98.61] 01/30/2023       Priority: High    Hypokalemia [E87.6] 01/30/2023       Priority: High    Ischemic cardiomyopathy [I25.5] 01/30/2023       Priority: High    STEMI (ST elevation myocardial infarction) ([facility] [address] [I21.3] 01/28/2023       Priority: [place] Hospital Course:   Mracelle Quevedo is a77 y.o. male admitted to [facility] on 1/28/2023 for STEMI after presenting to [facility] ER with chest pain complaints. EKG performed showed ST elevation MI in anterolateral leads and code purple activated. Patient was taken emergently for cardiac catheterization with Dr. Lisa Arellano. Cardiac cath revealed 95% proximal LAD stenosis as well as 90% mid stenosis for which patient underwent PCI with drug-eluting stent. He also was also noted to have 80% proximal and distal RCA stenosis with 80% stenosis of the proximal PL branch as well as proximal PDA branch (bifurcation lesion) which will need staged PCI in the future, circumflex with 60% ostial stenosis. He was initiated on dual antiplatelet therapy with aspirin and Brilinta. Echocardiogram was completed on 1/30/2023 and revealed mildly reduced LV systolic function with EF of 40 to 45% with severe hypokinesis to akinesis of the distal anteroseptal wall and apex, mild concentric left ventricular hypertrophy, trace mitral valve regurgitation. Patient was on lisinopril at home and was initiated on losartan while inpatient but given cardiomyopathy, he will be transition to [facility] instead. Patient doing well today with no complaints. All questions have been answered. Patient was evaluated by endocrinology on 1/30/2023 due to uncontrolled diabetes with hyperglycemia and patient will be following up with Dr. Hannah Villavicencio next week for insulin pump. Currently on telemetry, patient is sinus rhythm with heart rate in the 80s. Telemetry alarms reviewed showed multiple episodes of PSVT with heart rate into the 140s to 150s range early this morning. Will discharge home today and plan for staged PCI of the RCA in future as outpatient.         Procedures:      [facility] 1/28/23:   Findings:  See full report  Right dominant system  Left main: Big size vessel mild disease  LAD: Proximal 95% stenosis, mid 90% stenosis status post accessible PCI  Circumflex: Big size vessel ostial 60% stenosis  RCA: Proximal 80% stenosis, distal 80% stenosis with 80% stenosis of the proximal PL and proximal PDA branches (bifurcation lesion)     Complications:  none     Summary of successful PCI of the LAD  Right radial access  6 Tongan sheath insertion  6 Tongan 3.5 EBU was used to cannulate into the LCA  0.014 BMW was used to cannulate into the LAD  2.0 x 15 mm balloon was used to predilate the proximal and mid LAD segments inflated at nominal pressures  A 2.5 x 50 mm Joseph frontier was placed in the mid segment inflated at nominal pressures  Next a 3.0 x 38 mm [place] frontier was placed in the mid to proximal segment inflated at nominal pressures  Next a 3.5 x 30 mm [place] frontier was placed in the proximal segment inflated at nominal pressures  Final angiogram showed 0% residual stenosis restoration of HANH-3 flow stents look well expanded well opposed no dissections     Recommendations:  Admit patient to ICU for post STEMI management  Continue aspirin and atorvastatin indefinitely for secondary prevention of CAD  Continue Coreg  Continue losartan  Remove TR band in 1 to 2 hours, after 1 hour restart full dose anticoagulation for history of portal vein thrombosis  Continue Brilinta 90 mg p.o. twice daily, patient was loaded with 180 mg x 1  Echocardiogram in the morning  Plan to bring patient back to the Cath Lab on Monday for PCI of the RCA     Interval history/Current status: has been feeling well    He will be following with endocrine and likely be placed on insulin pump     Patient Active Problem List   Diagnosis    HTN (hypertension)    Type 2 diabetes mellitus without complication (HCC)    Lumbar paraspinal muscle spasm    Allergic rhinosinusitis    TIA (transient ischemic attack)    Abdominal pain    Malignant neoplasm of other parts of pancreas ([facility]    Other cirrhosis of liver (HCC)    Gastroesophageal reflux disease without esophagitis    Chest pain    History of pancreatectomy    Portal hypertension (HCC)    Melena    Epigastric pain    Iron deficiency anemia due to chronic blood loss    [facility] (ST elevation myocardial infarction) [facility]    CAD S/P percutaneous coronary angioplasty    Hypokalemia    Ischemic cardiomyopathy    Inadequately controlled diabetes mellitus ([facility]    Chronic systolic (congestive) heart failure       Medications listed as ordered at the time of discharge from hospital     Medication List            Accurate as of February 6, 2023 12:02 PM. If you have any questions, ask your nurse or doctor. CONTINUE taking these medications      aspirin 81 MG chewable tablet  Take 1 tablet by mouth daily     atorvastatin 80 MG tablet  Commonly known as: LIPITOR  Take 1 tablet by mouth nightly     Eliquis 5 MG Tabs tablet  Generic drug: apixaban     esomeprazole 40 MG delayed release capsule  Commonly known as: NexIUM  Take 1 capsule by mouth daily     ferrous sulfate 325 (65 Fe) MG tablet  Commonly known as: IRON 325  Take 1 tablet by mouth daily (with breakfast)     FreeStyle Velia 14 Day Marsteller Lemond Avis  Test daily and prn. Dx: DM2     furosemide 20 MG tablet  Commonly known as: Lasix  Take 1 tablet by mouth daily     insulin lispro 100 UNIT/ML Soln injection vial  Commonly known as: HUMALOG     Lantus SoloStar 100 UNIT/ML injection pen  Generic drug: insulin glargine     metoprolol succinate 50 MG extended release tablet  Commonly known as: TOPROL XL  Take 1 tablet by mouth every evening     nitroGLYCERIN 0.4 MG SL tablet  Commonly known as: NITROSTAT  Place 1 tablet under the tongue every 5 minutes as needed for Chest pain up to max of 3 total doses. If no relief after 1 dose, call 911.      sacubitril-valsartan 24-26 MG per tablet  Commonly known as: ENTRESTO  Take 1 tablet by mouth 2 times daily     ticagrelor 90 MG Tabs tablet  Commonly known as: BRILINTA  Take 1 tablet by mouth 2 times daily                Medications marked \"taking\" at this time  Outpatient Medications Marked as Taking for the 2/6/23 encounter (Office Visit) with Fiorella Owens MD   Medication Sig Dispense Refill    aspirin 81 MG chewable tablet Take 1 tablet by mouth daily 30 tablet 3    ticagrelor (BRILINTA) 90 MG TABS tablet Take 1 tablet by mouth 2 times daily 60 tablet 11    atorvastatin (LIPITOR) 80 MG tablet Take 1 tablet by mouth nightly 30 tablet 3    nitroGLYCERIN (NITROSTAT) 0.4 MG SL tablet Place 1 tablet under the tongue every 5 minutes as needed for Chest pain up to max of 3 total doses. If no relief after 1 dose, call 911. 25 tablet 3    sacubitril-valsartan (ENTRESTO) 24-26 MG per tablet Take 1 tablet by mouth 2 times daily 60 tablet 3    metoprolol succinate (TOPROL XL) 50 MG extended release tablet Take 1 tablet by mouth every evening 30 tablet 3    ELIQUIS 5 MG TABS tablet take 2 tablets by mouth twice a day for 7 days then 1 tablet twice a day THEREAFTER      ferrous sulfate (IRON 325) 325 (65 Fe) MG tablet Take 1 tablet by mouth daily (with breakfast) 90 tablet 1    esomeprazole (NEXIUM) 40 MG delayed release capsule Take 1 capsule by mouth daily 30 capsule 3    furosemide (LASIX) 20 MG tablet Take 1 tablet by mouth daily 180 tablet 5    LANTUS SOLOSTAR 100 UNIT/ML injection pen inject 50 units subcutaneously daily      insulin lispro (HUMALOG) 100 UNIT/ML SOLN injection vial Inject 50 Units into the skin 3 times daily      Continuous Blood Gluc  (FREESTYLE NATALY 14 DAY READER) KISHA Test daily and prn.  Dx: DM2 1 Device 0        Medications patient taking as of now reconciled against medications ordered at time of hospital discharge: Yes      Review of Systems:   General ROS: negative for - nausea, vomiting, chills, fatigue, fever, malaise, weight gain or weight loss  Respiratory ROS: no cough, shortness of breath, or wheezing  Cardiovascular ROS: no chest pain or dyspnea on exertion  Gastrointestinal ROS: no abdominal pain, change in bowel habits, or black or bloody stools  Genito-Urinary ROS: no dysuria, trouble voiding, or hematuria  Musculoskeletal ROS: negative for - gait disturbance, joint pain or joint stiffness  Neurological ROS: negative for - behavioral changes, memory loss, numbness/tingling, tremors or weakness    In general patient otherwise reports feeling well. Objective:    Physical Exam:  /82 (Site: Right Upper Arm)   Pulse 68   Temp 97.7 °F (36.5 °C)   Ht 6' 1.5\" (1.867 m)   Wt 200 lb 9.6 oz (91 kg)   SpO2 98%   BMI 26.11 kg/m²     Gen: Well, NAD, Alert, Oriented x 3   HEENT: EOMI, eyes clear, MMM  Skin: without rash or jaundice  Neck: no significant lymphadenopathy or thyromegaly  Lungs: CTA B w/out Rales/Wheezes/Rhonchi, Good respiratory effort   Heart: RRR, S1S2, w/out M/R/G, non-displaced PMI   Ext: No C/C/E Bilaterally. Neuro: Neurovascularly intact w/ Sensory/Motor intact UE/LE Bilaterally. An electronic signature was used to authenticate this note.   --Lucius Choi MD

## 2023-02-08 ENCOUNTER — OFFICE VISIT (OUTPATIENT)
Dept: CARDIOLOGY CLINIC | Age: 68
End: 2023-02-08
Payer: MEDICARE

## 2023-02-08 ENCOUNTER — CARE COORDINATION (OUTPATIENT)
Dept: CASE MANAGEMENT | Age: 68
End: 2023-02-08

## 2023-02-08 VITALS
BODY MASS INDEX: 25.8 KG/M2 | HEART RATE: 69 BPM | OXYGEN SATURATION: 99 % | DIASTOLIC BLOOD PRESSURE: 68 MMHG | WEIGHT: 201 LBS | HEIGHT: 74 IN | SYSTOLIC BLOOD PRESSURE: 122 MMHG | RESPIRATION RATE: 15 BRPM

## 2023-02-08 DIAGNOSIS — Z98.61 CAD S/P PERCUTANEOUS CORONARY ANGIOPLASTY: ICD-10-CM

## 2023-02-08 DIAGNOSIS — I25.83 CORONARY ARTERY DISEASE DUE TO LIPID RICH PLAQUE: Primary | ICD-10-CM

## 2023-02-08 DIAGNOSIS — I15.9 SECONDARY HYPERTENSION: ICD-10-CM

## 2023-02-08 DIAGNOSIS — I25.10 CORONARY ARTERY DISEASE DUE TO LIPID RICH PLAQUE: Primary | ICD-10-CM

## 2023-02-08 DIAGNOSIS — I25.10 CAD S/P PERCUTANEOUS CORONARY ANGIOPLASTY: ICD-10-CM

## 2023-02-08 PROCEDURE — 99213 OFFICE O/P EST LOW 20 MIN: CPT | Performed by: INTERNAL MEDICINE

## 2023-02-08 PROCEDURE — 1123F ACP DISCUSS/DSCN MKR DOCD: CPT | Performed by: INTERNAL MEDICINE

## 2023-02-08 PROCEDURE — 3078F DIAST BP <80 MM HG: CPT | Performed by: INTERNAL MEDICINE

## 2023-02-08 PROCEDURE — 3074F SYST BP LT 130 MM HG: CPT | Performed by: INTERNAL MEDICINE

## 2023-02-08 RX ORDER — SODIUM CHLORIDE 0.9 % (FLUSH) 0.9 %
5-40 SYRINGE (ML) INJECTION EVERY 12 HOURS SCHEDULED
OUTPATIENT
Start: 2023-02-08

## 2023-02-08 RX ORDER — SODIUM CHLORIDE 0.9 % (FLUSH) 0.9 %
5-40 SYRINGE (ML) INJECTION PRN
OUTPATIENT
Start: 2023-02-08

## 2023-02-08 RX ORDER — SODIUM CHLORIDE 9 MG/ML
INJECTION, SOLUTION INTRAVENOUS PRN
OUTPATIENT
Start: 2023-02-08

## 2023-02-08 ASSESSMENT — ENCOUNTER SYMPTOMS
COUGH: 0
WHEEZING: 0
CHEST TIGHTNESS: 0
SHORTNESS OF BREATH: 1
DIARRHEA: 0
RHINORRHEA: 0
CONSTIPATION: 0
COLOR CHANGE: 0
EYE REDNESS: 0
APNEA: 0
VOMITING: 0
ABDOMINAL PAIN: 0
NAUSEA: 0

## 2023-02-08 NOTE — CARE COORDINATION
[facility] Care Transitions Follow Up Call    Patient Current Location:  Home: [address] [place] [address]    Care Transition Nurse contacted the patient by telephone to follow up after admission on 2023. Verified name and  with patient as identifiers. Patient: Nemo Coy  Patient : 1955   MRN: [id number]  Reason for Admission: STEMI  Discharge Date: 23 RARS: Readmission Risk Score: 14.5      Needs to be reviewed by the provider   Additional needs identified to be addressed with provider: No           Method of communication with provider: none. CTN spoke briefly with the patient, Nemo Coy. Patient is pleasant in conversation and reports he is doing well. -reports less fatigue and weakness   -reports decreased swelling in right hand   -reports shortness of breath with exertion   -denies chest pain, palpitations, lightheaded, or dizziness   -monitors daily weight; denies weight gain   -reports   -completed HFU appointment with PCP on 2023   -completed HFU appointment with Cardiologist today     Emotional support provided; discussed will continue to follow. Addressed changes since last contact:  none  Discussed follow-up appointments. If no appointment was previously scheduled, appointment scheduling offered: N/A. Is follow up appointment scheduled within 7 days of discharge? Yes. Follow Up  Future Appointments   Date Time Provider Fiorella Horan   2023  1:30 PM CORY Longoria  [address]   2023  3:00 PM MANUEL Colon - CNP [address]   2023  1:15 PM Tammie Richardson MD [facility]         Discussed appropriate site of care based on symptoms and resources available to patient including: PCP  Specialist  When to call 911. The patient agrees to contact the PCP office for questions related to their healthcare.        Patients top risk factors for readmission: medical condition-STEMI, CVD, HTN, multiple healthcare providers, and polypharmacy  Interventions to address risk factors: Assessment and support for treatment adherence and medication management-patient reports taking medications as prescribed      Care Transition Nurse provided contact information for future needs. Plan for follow-up call in 7-10 days based on severity of symptoms and risk factors.   Plan for next call: any new or worsening symptoms  -discuss future procedure    Chica Olivas RN

## 2023-02-08 NOTE — PROGRESS NOTES
Chief Complaint   Patient presents with    Follow-Up from [facility]     Per [place], needs PCI    Discuss Medications     Entresto causes a cough, Eliquis is not covered by Insurance-Alternative        Patient presents for initial medical evaluation. Patient is followed on a regular basis by Dr. Adrien Oh MD.     CAD 1/20/2023 STEMI successful PCI of the proximal and mid LAD with ZAKI x3. Patient has residual 80% RCA stenosis as well as ostial circumflex 60% stenosis. With plans for these 2 lesions to be staged  Ischemic cardiomyopathy EF 40 to 45% by TTE 1/30/2023  Hypertension  Hyperlipidemia  Diabetes  Pancreatic cancer on chemotherapy, neuroendocrine tumor status post resection August 4, 2022 at [facility]  Liver cirrhosis  Portal vein thrombosis on Eliquis  TTE EF 65% on 1/10/2023 at [facility]  TTE 12/11/2019 EF 65%  TTE 4/15/2022 EF 65%  TTE 1/30/2023 EF 40 to 45%  Nuclear stress test 5/11/2022 EF 60% no evidence of ischemia or infarct  No prior coronary angiograms  =======================  2/8/2023  Follow-up on CAD and recent hospitalization  January 28, 2023 patient came to the hospital for sudden onset of chest pain. Patient was found with a STEMI code purple was activated. Patient underwent successful PCI of the proximal LAD with ZAKI x3  Patient has residual 80% RCA stenosis as well as ostial circumflex 60% stenosis.   With plans for these 2 lesions to be staged  Patient comes with wife for this appointment  Patient reports feeling well denies chest pain or shortness of breath  He feels with more energy    1/11/2023  Follow-up on cardiac comorbidities  Patient reports weakness and shortness of breath especially with physical activity  Patient had an EKG at [facility] this week after following up on tumor resection  EKG showing sinus rhythm with mild ST depressions in the anterior lateral leads  Patient had a stress test May 2022 with no ischemia    7/27/2022  Follow-up visit on chest pain  Patient was instructed to have a nuclear stress test which on 4 5/11/2022 was reported as no ischemia nor infarct EF 60%  Patient still endorsing random episodes of chest discomfort after eating  Denies shortness of breath  On August 3 patient is scheduled to undergo pancreatic surgery    4/27/2022  First clinic visit follow-up on recent hospitalization  Patient was evaluated in the hospital on 4/14/2022 due to chest pain. At that time troponins were detectable but not significant peaked at 0.27. EKG did not show signs of ischemia. Patient underwent an echocardiogram which showed normal EF with no wall motion abnormality. Patient was discharged home with follow-up in clinic with me for a further ischemic evaluation  Patient comes with wife during this visit. States that he is not feeling well he is complaining of shortness of breath with physical activity. Reports that before going to the hospital was complaining of shortness of breath but after getting discharged this shortness of breath has been getting worse. Denies lower extremity edema  Denies paroxysmal nocturnal dyspnea no orthopnea.   Although at times patient has to switch sides when sleeping due to shortness of breath  Denies chest pains  Reports that his blood pressures at home are usually running In the 160s  Today during this visit systolics are in the 906F    Patient Active Problem List   Diagnosis    HTN (hypertension)    Type 2 diabetes mellitus without complication (HCC)    Lumbar paraspinal muscle spasm    Allergic rhinosinusitis    TIA (transient ischemic attack)    Abdominal pain    Malignant neoplasm of other parts of pancreas (HCC)    Other cirrhosis of liver (HCC)    Gastroesophageal reflux disease without esophagitis    Chest pain    History of pancreatectomy    Portal hypertension (HCC)    Melena    Epigastric pain    Iron deficiency anemia due to chronic blood loss    STEMI (ST elevation myocardial infarction) ([address]    CAD S/P percutaneous coronary angioplasty Hypokalemia    Ischemic cardiomyopathy    Inadequately controlled diabetes mellitus ([facility]    Chronic systolic (congestive) heart failure       Past Surgical History:   Procedure Laterality Date    ABDOMEN SURGERY      COLONOSCOPY  02/20/2015    DR Kiah Mclain    COLONOSCOPY N/A 01/20/2020    COLONOSCOPY DIAGNOSTIC performed by Stormy Alexis MD at [facility] N/A 12/17/2019    EUS performed by Stormy Alexis MD at [address]  08/04/2022    SPLENECTOMY, PARTIAL  08/04/2022    UPPER GASTROINTESTINAL ENDOSCOPY N/A 04/04/2022    EGD ESOPHAGOGASTRODUODENOSCOPY performed by Stormy Alexis MD at [address] [facility] N/A 1/27/2023    EGD DIAGNOSTIC ONLY performed by Stormy Alexis MD at [facility] History     Socioeconomic History    Marital status:    Tobacco Use    Smoking status: Never    Smokeless tobacco: Never   Vaping Use    Vaping Use: Never used   Substance and Sexual Activity    Alcohol use: No    Drug use: No    Sexual activity: Yes     Partners: Female     Social Determinants of Health     Financial Resource Strain: Low Risk     Difficulty of Paying Living Expenses: Not hard at all   Food Insecurity: No Food Insecurity    Worried About Running Out of Food in the Last Year: Never true    Ran Out of Food in the Last Year: Never true   Transportation Needs: No Transportation Needs    Lack of Transportation (Medical): No    Lack of Transportation (Non-Medical): No   Housing Stability: Unknown    Unstable Housing in the Last Year: No       Family History   Problem Relation Age of Onset    Cancer Mother         lung cancer    Colon Cancer Neg Hx     Celiac Disease Neg Hx     Crohn's Disease Neg Hx     Coronary Art Dis Neg Hx        Current Outpatient Medications   Medication Sig Dispense Refill    ELIQUIS 5 MG TABS tablet take 2 tablets by mouth twice a day for 7 days then 1 tablet twice a day THEREAFTER  [id number], [id number]  3/25, 11/24 56 tablet 0    aspirin 81 MG chewable tablet Take 1 tablet by mouth daily 30 tablet 3    ticagrelor (BRILINTA) 90 MG TABS tablet Take 1 tablet by mouth 2 times daily 60 tablet 11    atorvastatin (LIPITOR) 80 MG tablet Take 1 tablet by mouth nightly 30 tablet 3    nitroGLYCERIN (NITROSTAT) 0.4 MG SL tablet Place 1 tablet under the tongue every 5 minutes as needed for Chest pain up to max of 3 total doses. If no relief after 1 dose, call 911. 25 tablet 3    sacubitril-valsartan (ENTRESTO) 24-26 MG per tablet Take 1 tablet by mouth 2 times daily 60 tablet 3    metoprolol succinate (TOPROL XL) 50 MG extended release tablet Take 1 tablet by mouth every evening 30 tablet 3    ferrous sulfate (IRON 325) 325 (65 Fe) MG tablet Take 1 tablet by mouth daily (with breakfast) 90 tablet 1    esomeprazole (NEXIUM) 40 MG delayed release capsule Take 1 capsule by mouth daily 30 capsule 3    furosemide (LASIX) 20 MG tablet Take 1 tablet by mouth daily 180 tablet 5    LANTUS SOLOSTAR 100 UNIT/ML injection pen inject 50 units subcutaneously daily      insulin lispro (HUMALOG) 100 UNIT/ML SOLN injection vial Inject 50 Units into the skin 3 times daily      Continuous Blood Gluc  (FREESTYLE NATAYL 14 DAY READER) KISHA Test daily and prn. Dx: DM2 1 Device 0     No current facility-administered medications for this visit. Patient has no known allergies. Review of Systems:  Review of Systems   Constitutional:  Negative for activity change, chills, diaphoresis and fever. HENT:  Negative for congestion, ear pain, nosebleeds and rhinorrhea. Eyes:  Negative for redness and visual disturbance. Respiratory:  Positive for shortness of breath. Negative for apnea, cough, chest tightness and wheezing. Cardiovascular:  Negative for chest pain, palpitations and leg swelling. Gastrointestinal:  Negative for abdominal pain, constipation, diarrhea, nausea and vomiting. Genitourinary:  Negative for difficulty urinating and dysuria. Musculoskeletal: Negative. Negative for joint swelling. Skin:  Negative for color change, rash and wound. Neurological:  Negative for dizziness, syncope, weakness, numbness and headaches. Psychiatric/Behavioral: Negative. VITALS:  Blood pressure 122/68, pulse 69, resp. rate 15, height 6' 1.5\" (1.867 m), weight 201 lb (91.2 kg), SpO2 99 %. Body mass index is 26.16 kg/m². Physical Examination:  Physical Exam  Vitals and nursing note reviewed. Constitutional:       Appearance: Normal appearance. HENT:      Head: Normocephalic and atraumatic. Mouth/Throat:      Mouth: Mucous membranes are moist.      Pharynx: Oropharynx is clear. Eyes:      Extraocular Movements: Extraocular movements intact. Conjunctiva/sclera: Conjunctivae normal.      Pupils: Pupils are equal, round, and reactive to light. Cardiovascular:      Rate and Rhythm: Normal rate and regular rhythm. Pulses: Normal pulses. Heart sounds: Normal heart sounds. Pulmonary:      Effort: Pulmonary effort is normal.      Breath sounds: Normal breath sounds. Abdominal:      General: Abdomen is flat. Bowel sounds are normal.      Palpations: Abdomen is soft. Musculoskeletal:         General: Normal range of motion. Cervical back: Normal range of motion and neck supple. Skin:     General: Skin is warm. Neurological:      General: No focal deficit present. Mental Status: He is alert and oriented to person, place, and time. Mental status is at baseline. Psychiatric:         Mood and Affect: Mood normal.      EKG: Normal sinus rhythm without signs of ischemia    No orders of the defined types were placed in this encounter. The ASCVD Risk score (Tabitha DK, et al., 2019) failed to calculate for the following reasons: The patient has a prior MI or stroke diagnosis     ASSESSMENT:     Diagnosis Orders   1.  Coronary artery disease due to lipid rich plaque        2. CAD S/P percutaneous coronary angioplasty        3. Secondary hypertension          PLAN:   CAD 1/20/2023 STEMI successful PCI of the proximal and mid LAD with ZAKI x3. Patient has residual 80% RCA stenosis as well as ostial circumflex 60% stenosis. With plans for these 2 lesions to be staged  Continue aspirin and high intensity statin for secondary prevention of CAD  Continue metoprolol 50 mg daily  Continue Entresto  Continue Brilinta ideally for 1 year  I will refer patient to cardiac rehab  Plan to bring patient back to the Cath Lab next week for staged PCI of the RCA and possibly circumflex    Ischemic cardiomyopathy EF 40 to 45% by TTE 1/2023  Patient denies chest pain or shortness of breath  Continue metoprolol succinate 50 mg daily  Continue Entresto  continue Lasix 20 mg daily  Repeat echocardiogram in 6 months    Hypertension  Continue metoprolol  Continue Entresto      Return to clinic in 3 months    Diabetes, pancreatic cancer and liver cirrhosis as per PCP and GI    Recommended patient to eat diets that emphasize high intakes of vegetables, fruits, nuts, whole grains, lean vegetable or animal protein, and fish.  As per 2019 ACC/AHA guideline on primary prevention of CVD     Recommended patient to engage in at least 150 minutes per week of accumulated moderate-intensity physical activity or 75 minutes per week of vigorous-intensity physical activity as per 2019 ACC/AHA guideline on primary prevention of CVD

## 2023-02-13 ENCOUNTER — OFFICE VISIT (OUTPATIENT)
Dept: ENDOCRINOLOGY | Age: 68
End: 2023-02-13

## 2023-02-13 VITALS
HEART RATE: 83 BPM | WEIGHT: 201 LBS | SYSTOLIC BLOOD PRESSURE: 128 MMHG | DIASTOLIC BLOOD PRESSURE: 68 MMHG | OXYGEN SATURATION: 94 % | HEIGHT: 73 IN | BODY MASS INDEX: 26.64 KG/M2

## 2023-02-13 DIAGNOSIS — E11.9 TYPE 2 DIABETES MELLITUS WITHOUT COMPLICATION, WITHOUT LONG-TERM CURRENT USE OF INSULIN (HCC): Primary | ICD-10-CM

## 2023-02-13 LAB
CHP ED QC CHECK: NORMAL
GLUCOSE BLD-MCNC: 174 MG/DL

## 2023-02-13 RX ORDER — INSULIN LISPRO 100 [IU]/ML
INJECTION, SOLUTION INTRAVENOUS; SUBCUTANEOUS
Qty: 10 EACH | Refills: 4 | Status: SHIPPED | OUTPATIENT
Start: 2023-02-13

## 2023-02-13 RX ORDER — OMEPRAZOLE 40 MG/1
CAPSULE, DELAYED RELEASE ORAL
COMMUNITY
Start: 2023-02-11 | End: 2023-02-22

## 2023-02-13 RX ORDER — MELOXICAM 15 MG/1
TABLET ORAL
COMMUNITY
Start: 2023-02-11 | End: 2023-02-22

## 2023-02-13 RX ORDER — INSULIN GLARGINE 100 [IU]/ML
INJECTION, SOLUTION SUBCUTANEOUS
Qty: 30 ADJUSTABLE DOSE PRE-FILLED PEN SYRINGE | Refills: 3 | Status: SHIPPED | OUTPATIENT
Start: 2023-02-13

## 2023-02-13 NOTE — PROGRESS NOTES
2023    Assessment:       Diagnosis Orders   1. Type 2 diabetes mellitus without complication, without long-term current use of insulin ([facility])  POCT Glucose            PLAN:     Orders Placed This Encounter   Procedures    Basic Metabolic Panel     Standing Status:   Future     Standing Expiration Date:   2024    Hemoglobin A1C     Standing Status:   Future     Standing Expiration Date:   2024    POCT Glucose     Lower dose of Humalog to 30 to 35 units with each meals and Lantus increased to 55 units at night repeat labs follow-up in 2 to 3 months time A1c goal of 7 or lower  Orders Placed This Encounter   Medications    LANTUS SOLOSTAR 100 UNIT/ML injection pen     Sig: inject 55 units subcutaneously daily     Dispense:  30 Adjustable Dose Pre-filled Pen Syringe     Refill:  3    insulin lispro (HUMALOG) 100 UNIT/ML SOLN injection vial     Si-35 units at each meals     Dispense:  10 each     Refill:  4         Orders Placed This Encounter   Procedures    POCT Glucose     No orders of the defined types were placed in this encounter. No follow-ups on file.   Subjective:     Chief Complaint   Patient presents with    New Patient    Diabetes     Vitals:    23 1605   BP: 128/68   Site: Right Upper Arm   Position: Sitting   Cuff Size: Medium Adult   Pulse: 83   SpO2: 94%   Weight: 201 lb (91.2 kg)   Height: 6' 1\" (1.854 m)     Wt Readings from Last 3 Encounters:   23 201 lb (91.2 kg)   23 201 lb (91.2 kg)   23 200 lb 9.6 oz (91 kg)     BP Readings from Last 3 Encounters:   23 128/68   23 122/68   23 130/82     Follow-up on type 2 diabetes recently seen in the hospital after he had STEMI blood sugars have been improving currently on Lantus 50 at night Humalog up to 50 units with each meals has been lowering the dose to 30-35 blood sugars mostly below 200 denies any hypoglycemia  History of pancreatic surgery  Reviewed C-peptide test that was at 2.1  Reviewed hospital records    Diabetes  He presents for his follow-up diabetic visit. He has type 2 diabetes mellitus. Pertinent negatives for diabetes include no polydipsia and no polyuria. Symptoms are improving. Diabetic complications include heart disease. Current diabetic treatment includes insulin injections. He is currently taking insulin pre-breakfast, pre-lunch, pre-dinner and at bedtime. His overall blood glucose range is 180-200 mg/dl.  (Hemoglobin A1C       Date                     Value               Ref Range           Status                01/30/2023               9.3 (H)             4.8 - 5.9 %         Final            ----------  )        Latest Reference Range & Units Most Recent   C-Peptide 1.1 - 4.4 ng/mL 2.1  1/30/23 04:51       Past Medical History:   Diagnosis Date    Cancer [facility])     Pancreatic    History of skin cancer     HTN (hypertension)     Malignant neoplasm of other parts of pancreas ([facility] [address]     Type II or unspecified type diabetes mellitus without mention of complication, not stated as uncontrolled      Past Surgical History:   Procedure Laterality Date    ABDOMEN SURGERY      COLONOSCOPY  02/20/2015    DR Hoa Cooley    COLONOSCOPY N/A 01/20/2020    COLONOSCOPY DIAGNOSTIC performed by Vince Bateman MD at [address] [place] [address]) N/A 12/17/2019    EUS performed by Vince Bateman MD at [address] [place] [address]  08/04/2022    SPLENECTOMY, PARTIAL  08/04/2022    UPPER GASTROINTESTINAL ENDOSCOPY N/A 04/04/2022    EGD ESOPHAGOGASTRODUODENOSCOPY performed by Vince Bateman MD at [address] [facility] [address] [address] 1/27/2023    EGD DIAGNOSTIC ONLY performed by Vince Bateman MD at [address] [place] [address] History    Marital status:      Spouse name: Not on file    Number of children: Not on file    Years of education: Not on file    Highest education level: Not on file   Occupational History    Not on file   Tobacco Use    Smoking status: Never    Smokeless tobacco: Never   Vaping Use    Vaping Use: Never used   Substance and Sexual Activity    Alcohol use: No    Drug use: No    Sexual activity: Yes     Partners: Female   Other Topics Concern    Not on file   Social History Narrative    Not on file     Social Determinants of Health     Financial Resource Strain: Low Risk     Difficulty of Paying Living Expenses: Not hard at all   Food Insecurity: No Food Insecurity    Worried About [address] in the Last Year: Never true    [address] [place] in the Last Year: Never true   Transportation Needs: No Transportation Needs    Lack of Transportation (Medical): No    Lack of Transportation (Non-Medical):  No   Physical Activity: Not on file   Stress: Not on file   Social Connections: Not on file   Intimate Partner Violence: Not on file   Housing Stability: Unknown    Unable to Pay for Housing in the Last Year: Not on file    Number of Places Lived in the Last Year: Not on file    Unstable Housing in the Last Year: No     Family History   Problem Relation Age of Onset    Cancer Mother         lung cancer    Colon Cancer Neg Hx     Celiac Disease Neg Hx     Crohn's Disease Neg Hx     Coronary Art Dis Neg Hx      No Known Allergies    Current Outpatient Medications:     meloxicam (MOBIC) 15 MG tablet, , Disp: , Rfl:     omeprazole (PRILOSEC) 40 MG delayed release capsule, , Disp: , Rfl:     ELIQUIS 5 MG TABS tablet, take 2 tablets by mouth twice a day for 7 days then 1 tablet twice a day THEREAFTER [id number] 3/25, 11/24, Disp: 56 tablet, Rfl: 0    aspirin 81 MG chewable tablet, Take 1 tablet by mouth daily, Disp: 30 tablet, Rfl: 3    ticagrelor (BRILINTA) 90 MG TABS tablet, Take 1 tablet by mouth 2 times daily, Disp: 60 tablet, Rfl: 11    atorvastatin (LIPITOR) 80 MG tablet, Take 1 tablet by mouth nightly, Disp: 30 tablet, Rfl: 3    nitroGLYCERIN (NITROSTAT) 0.4 MG SL tablet, Place 1 tablet under the tongue every 5 minutes as needed for Chest pain up to max of 3 total doses. If no relief after 1 dose, call 911., Disp: 25 tablet, Rfl: 3    sacubitril-valsartan (ENTRESTO) 24-26 MG per tablet, Take 1 tablet by mouth 2 times daily, Disp: 60 tablet, Rfl: 3    metoprolol succinate (TOPROL XL) 50 MG extended release tablet, Take 1 tablet by mouth every evening, Disp: 30 tablet, Rfl: 3    ferrous sulfate (IRON 325) 325 (65 Fe) MG tablet, Take 1 tablet by mouth daily (with breakfast), Disp: 90 tablet, Rfl: 1    esomeprazole (NEXIUM) 40 MG delayed release capsule, Take 1 capsule by mouth daily, Disp: 30 capsule, Rfl: 3    furosemide (LASIX) 20 MG tablet, Take 1 tablet by mouth daily, Disp: 180 tablet, Rfl: 5    LANTUS SOLOSTAR 100 UNIT/ML injection pen, inject 50 units subcutaneously daily, Disp: , Rfl:     insulin lispro (HUMALOG) 100 UNIT/ML SOLN injection vial, Inject 50 Units into the skin 3 times daily, Disp: , Rfl:     Continuous Blood Gluc  (FREESTYLE NATALY 14 DAY READER) KISHA, Test daily and prn.  Dx: DM2, Disp: 1 Device, Rfl: 0  Lab Results   Component Value Date     02/02/2023    K 4.0 02/02/2023    CL 98 02/02/2023    CO2 26 02/02/2023    BUN 17 02/02/2023    CREATININE 0.88 02/02/2023    GLUCOSE 174 02/13/2023    CALCIUM 8.9 02/02/2023    PROT 8.4 (H) 01/28/2023    LABALBU 4.0 01/28/2023    BILITOT 0.8 (H) 01/28/2023    ALKPHOS 131 (H) 01/28/2023    AST 51 (H) 01/28/2023    ALT 43 (H) 01/28/2023    LABGLOM >60.0 02/02/2023    GFRAA >60.0 06/14/2022    GLOB 4.4 (H) 01/28/2023     Lab Results   Component Value Date    WBC 14.5 (H) 01/30/2023    HGB 8.8 (L) 01/30/2023    HCT 27.6 (L) 01/30/2023    MCV 75.9 (L) 01/30/2023     (H) 01/30/2023     Lab Results   Component Value Date    LABA1C 9.3 (H) 01/30/2023    LABA1C 8.5 (A) 07/25/2022    LABA1C 9.1 (H) 07/01/2022     Lab Results   Component Value Date    HDL 47.0 05/17/2021    HDL 45 09/10/2020    HDL 50 08/09/2019    LDLCALC 91 09/10/2020 LDLCALC 101 08/09/2019    LDLCALC 120 10/03/2016    CHOL 164 05/17/2021    CHOL 154 09/10/2020    CHOL 171 08/09/2019    TRIG 106 05/17/2021    TRIG 90 09/10/2020    TRIG 98 08/09/2019     Lab Results   Component Value Date    TESTM 577 08/09/2019     Lab Results   Component Value Date    TSH 1.60 05/17/2021    TSH 1.40 03/08/2021    TSH 1.540 08/09/2019    T4FREE 0.80 03/08/2021    T4FREE 1.24 09/30/2014     No results found for: TPOABS    Review of Systems   Eyes: Negative. Cardiovascular: Negative. Endocrine: Negative for polydipsia and polyuria. All other systems reviewed and are negative. Objective:   Physical Exam  Vitals reviewed. Constitutional:       General: He is not in acute distress. Appearance: Normal appearance. He is normal weight. HENT:      Head: Normocephalic and atraumatic. Right Ear: External ear normal.      Left Ear: External ear normal.      Nose: Nose normal.   Eyes:      General: No scleral icterus. Right eye: No discharge. Left eye: No discharge. Extraocular Movements: Extraocular movements intact. Conjunctiva/sclera: Conjunctivae normal.   Cardiovascular:      Rate and Rhythm: Normal rate and regular rhythm. Heart sounds: Normal heart sounds. Pulmonary:      Effort: Pulmonary effort is normal.   Abdominal:      Palpations: Abdomen is soft. Musculoskeletal:         General: Normal range of motion. Cervical back: Normal range of motion and neck supple. Right lower leg: No edema. Left lower leg: No edema. Skin:     General: Skin is warm. Neurological:      General: No focal deficit present. Mental Status: He is alert and oriented to person, place, and time.    Psychiatric:         Mood and Affect: Mood normal.         Behavior: Behavior normal.

## 2023-02-14 ENCOUNTER — CARE COORDINATION (OUTPATIENT)
Dept: CASE MANAGEMENT | Age: 68
End: 2023-02-14

## 2023-02-14 NOTE — CARE COORDINATION
[facility] Care Transitions Follow Up Call    Patient Current Location:  Home: [address] [place] [address]    Care Transition Nurse contacted the patient by telephone to follow up after admission on 2023. Verified name and  with patient as identifiers. Patient: Efrain Jean  Patient : 1955   MRN: [id number]  Reason for Admission: STEMI  Discharge Date: 23 RARS: Readmission Risk Score: 14.5      Needs to be reviewed by the provider   Additional needs identified to be addressed with provider: No           Method of communication with provider: chart routing. Patient is pleasant in conversation and reports he is feeling pretty good. -reports having a lot more energy   -reports shortness of breath has improved   -denies chest pain, palpitations, lightheaded, or dizziness   -denies swelling or weight gain   -reports  this am   -Patient reports he is scheduled on 2023 for additional Cardiac Stents; states there is a possibility he may be transferred to [facility]. Emotional support provided; discussed will continue to follow. Addressed changes since last contact: 2023 active medication adjustments per Dr. Geneva Cross   Date Time Provider Fiorella Horan   2023 12:30 PM MLOZ CATH LAB RM [address] [facility]   2023  3:00 PM MANULE Holloway - CNP [address]   3/13/2023  3:00 PM Tigre Morin  [address]   2023  1:15 PM John Lee MD [facility]       Discussed appropriate site of care based on symptoms and resources available to patient including: PCP  Specialist  When to call 911. The patient agrees to contact the PCP office for questions related to their healthcare.          Patients top risk factors for readmission: medical condition-STEMI, CVD, HTN, multiple healthcare providers, and polypharmacy  Interventions to address risk factors: Assessment and support for treatment adherence and medication management-patient reports taking medications as prescribed    Offered patient enrollment in the Remote Patient Monitoring (RPM) program for in-home monitoring: Patient declined. Care Transition Nurse provided contact information for future needs. Plan for follow-up call in 7-10 days based on severity of symptoms and risk factors.   Plan for next call: any new or worsening symptoms   -review recent procedure; cardiac stents, follow up    Checo Lynn RN

## 2023-02-16 ENCOUNTER — HOSPITAL ENCOUNTER (OUTPATIENT)
Dept: CARDIAC CATH/INVASIVE PROCEDURES | Age: 68
Discharge: HOME | End: 2023-02-16
Attending: INTERNAL MEDICINE | Admitting: INTERNAL MEDICINE
Payer: MEDICARE

## 2023-02-16 VITALS
OXYGEN SATURATION: 99 % | WEIGHT: 189 LBS | BODY MASS INDEX: 24.94 KG/M2 | RESPIRATION RATE: 15 BRPM | SYSTOLIC BLOOD PRESSURE: 135 MMHG | DIASTOLIC BLOOD PRESSURE: 80 MMHG | HEART RATE: 83 BPM

## 2023-02-16 PROBLEM — I25.83 CORONARY ARTERY DISEASE DUE TO LIPID RICH PLAQUE: Status: ACTIVE | Noted: 2023-01-30

## 2023-02-16 LAB
ANION GAP SERPL CALCULATED.3IONS-SCNC: 12 MEQ/L (ref 9–15)
BUN BLDV-MCNC: 10 MG/DL (ref 8–23)
CALCIUM SERPL-MCNC: 9 MG/DL (ref 8.5–9.9)
CHLORIDE BLD-SCNC: 106 MEQ/L (ref 95–107)
CO2: 23 MEQ/L (ref 20–31)
CREAT SERPL-MCNC: 0.84 MG/DL (ref 0.7–1.2)
GFR SERPL CREATININE-BSD FRML MDRD: >60 ML/MIN/{1.73_M2}
GLUCOSE BLD-MCNC: 191 MG/DL (ref 70–99)
HCT VFR BLD CALC: 33.3 % (ref 42–52)
HEMOGLOBIN: 10.4 G/DL (ref 14–18)
MCH RBC QN AUTO: 24.4 PG (ref 27–31.3)
MCHC RBC AUTO-ENTMCNC: 31.3 % (ref 33–37)
MCV RBC AUTO: 78 FL (ref 79–92.2)
PDW BLD-RTO: 18.6 % (ref 11.5–14.5)
PERFORMED ON: ABNORMAL
PERFORMED ON: ABNORMAL
PLATELET # BLD: 665 K/UL (ref 130–400)
POC ACTIVATED CLOTTING TIME KAOLIN: 221 SEC (ref 82–152)
POC ACTIVATED CLOTTING TIME KAOLIN: 323 SEC (ref 82–152)
POC SAMPLE TYPE: ABNORMAL
POC SAMPLE TYPE: ABNORMAL
POTASSIUM SERPL-SCNC: 4.1 MEQ/L (ref 3.4–4.9)
RBC # BLD: 4.27 M/UL (ref 4.7–6.1)
SODIUM BLD-SCNC: 141 MEQ/L (ref 135–144)
WBC # BLD: 10.8 K/UL (ref 4.8–10.8)

## 2023-02-16 PROCEDURE — 6360000002 HC RX W HCPCS

## 2023-02-16 PROCEDURE — 2709999900 HC NON-CHARGEABLE SUPPLY

## 2023-02-16 PROCEDURE — 85347 COAGULATION TIME ACTIVATED: CPT

## 2023-02-16 PROCEDURE — 6360000004 HC RX CONTRAST MEDICATION: Performed by: INTERNAL MEDICINE

## 2023-02-16 PROCEDURE — 93571 IV DOP VEL&/PRESS C FLO 1ST: CPT

## 2023-02-16 PROCEDURE — C1874 STENT, COATED/COV W/DEL SYS: HCPCS

## 2023-02-16 PROCEDURE — C1725 CATH, TRANSLUMIN NON-LASER: HCPCS

## 2023-02-16 PROCEDURE — 80048 BASIC METABOLIC PNL TOTAL CA: CPT

## 2023-02-16 PROCEDURE — C9600 PERC DRUG-EL COR STENT SING: HCPCS

## 2023-02-16 PROCEDURE — C1769 GUIDE WIRE: HCPCS

## 2023-02-16 PROCEDURE — C1894 INTRO/SHEATH, NON-LASER: HCPCS

## 2023-02-16 PROCEDURE — 85027 COMPLETE CBC AUTOMATED: CPT

## 2023-02-16 PROCEDURE — C1887 CATHETER, GUIDING: HCPCS

## 2023-02-16 PROCEDURE — 2500000003 HC RX 250 WO HCPCS

## 2023-02-16 RX ORDER — FENTANYL CITRATE 0.05 MG/ML
25 INJECTION, SOLUTION INTRAMUSCULAR; INTRAVENOUS
Status: DISCONTINUED | OUTPATIENT
Start: 2023-02-16 | End: 2023-02-16 | Stop reason: HOSPADM

## 2023-02-16 RX ORDER — ACETAMINOPHEN 325 MG/1
650 TABLET ORAL EVERY 4 HOURS PRN
Status: DISCONTINUED | OUTPATIENT
Start: 2023-02-16 | End: 2023-02-16 | Stop reason: HOSPADM

## 2023-02-16 RX ORDER — ONDANSETRON 2 MG/ML
4 INJECTION INTRAMUSCULAR; INTRAVENOUS EVERY 6 HOURS PRN
Status: DISCONTINUED | OUTPATIENT
Start: 2023-02-16 | End: 2023-02-16 | Stop reason: HOSPADM

## 2023-02-16 RX ORDER — SODIUM CHLORIDE 0.9 % (FLUSH) 0.9 %
5-40 SYRINGE (ML) INJECTION EVERY 12 HOURS SCHEDULED
Status: DISCONTINUED | OUTPATIENT
Start: 2023-02-16 | End: 2023-02-16 | Stop reason: HOSPADM

## 2023-02-16 RX ORDER — MIDAZOLAM HYDROCHLORIDE 2 MG/2ML
2 INJECTION, SOLUTION INTRAMUSCULAR; INTRAVENOUS
Status: DISCONTINUED | OUTPATIENT
Start: 2023-02-16 | End: 2023-02-16 | Stop reason: HOSPADM

## 2023-02-16 RX ORDER — SODIUM CHLORIDE 9 MG/ML
INJECTION, SOLUTION INTRAVENOUS PRN
Status: DISCONTINUED | OUTPATIENT
Start: 2023-02-16 | End: 2023-02-16 | Stop reason: HOSPADM

## 2023-02-16 RX ORDER — LABETALOL HYDROCHLORIDE 5 MG/ML
10 INJECTION, SOLUTION INTRAVENOUS EVERY 30 MIN PRN
Status: DISCONTINUED | OUTPATIENT
Start: 2023-02-16 | End: 2023-02-16 | Stop reason: HOSPADM

## 2023-02-16 RX ORDER — SODIUM CHLORIDE 0.9 % (FLUSH) 0.9 %
5-40 SYRINGE (ML) INJECTION PRN
Status: DISCONTINUED | OUTPATIENT
Start: 2023-02-16 | End: 2023-02-16 | Stop reason: HOSPADM

## 2023-02-16 RX ORDER — HYDRALAZINE HYDROCHLORIDE 20 MG/ML
10 INJECTION INTRAMUSCULAR; INTRAVENOUS EVERY 10 MIN PRN
Status: DISCONTINUED | OUTPATIENT
Start: 2023-02-16 | End: 2023-02-16 | Stop reason: HOSPADM

## 2023-02-16 RX ORDER — SODIUM CHLORIDE 9 MG/ML
INJECTION, SOLUTION INTRAVENOUS CONTINUOUS
Status: DISCONTINUED | OUTPATIENT
Start: 2023-02-16 | End: 2023-02-16 | Stop reason: HOSPADM

## 2023-02-16 RX ORDER — MORPHINE SULFATE 2 MG/ML
2 INJECTION, SOLUTION INTRAMUSCULAR; INTRAVENOUS
Status: DISCONTINUED | OUTPATIENT
Start: 2023-02-16 | End: 2023-02-16 | Stop reason: HOSPADM

## 2023-02-16 RX ADMIN — IOPAMIDOL 110 ML: 612 INJECTION, SOLUTION INTRAVENOUS at 13:09

## 2023-02-16 NOTE — PROGRESS NOTES
Pt arrives to pre/post cath from home. Alert and oriented. Consent signed. Labs drawn. IV access obtained. Prepped for procedure. 1315 Pt. Returns from procedure. Awake and alert. R radial vasc band in place. No signs of bleeding or hematoma. Vital signs are stable. Pt denies having any pain or distress at this time. 1330 Eating and drinking without difficulty. Leo Queenie removal initiated from vasc band as ordered. Wrist remains stable. 1540 Vasc band discontinued. Quik clot and tegaderm placed. Discharge instructions given with demonstration of understanding. 1600 Pt. Discharged home with family.

## 2023-02-16 NOTE — BRIEF OP NOTE
Section of Cardiology  Adult Brief Cardiac Cath Procedure Note        Procedure(s): IFR of circumflex, successful PCI of proximal and distal RCA ZAKI x2 (3.0 x 30 mm and 3.5 x 18 mm Joseph frontier)    Pre-operative Diagnosis: CAD    H&P Status: Completed and reviewed.      Post-operative Diagnosis: Successful PCI of proximal and distal RCA ZAKI x2 (3.0 x 30 mm and 3.5 x 18 mm Vilas frontier)    Findings:  See full report  Left main: Big size vessel mild disease  LAD: Big size vessel previously placed stent widely patent  Circumflex: Ostial 60% stenosis IFR negative (0.94)  RCA: Big size dominant vessel proximal 90% stenosis distal 80% stenosis    Summary of successful PCI of the RCA  Right radial access  6 [place] sheath insertion  6 [place]acie JR4 guide  0.014 BMW was used to cannulate into the RCA and advanced to the PL branch a second 0.014 BMW was used to cannulate into the RCA and advanced to the level of the PDA  Through the PDA wire a 2.5 x 15 mm balloon was used to predilate the proximal and distal lesion  A 3.0 x 30 mm Vil[place] frontier stent was placed in the distal segment across from the PL branch  Next a 3.5 x 18 mm Joseph frontier stent was placed in the proximal segment overlapping the prior stent inflated at nominal pressures  Proximal stent was postdilated to 4 mm with a 4.0 x 12 mm noncompliant balloon inflated at 20 annalee  Final angiogram showed 0% residual stenosis, preservation of HANH-3 flow stents look well opposed well-expanded no dissections      Complications:  none    Recommendations:  Transfer patient back to holding area for post diagnostic cath management  Maximize medical therapy   Continue aspirin and high intensity statin indefinitely for secondary prevention of CAD  Continue beta-blocker and Entresto  Continue Brilinta ideally for 1 year  Remove sheath TR band after 1 to 2 hours  Bedrest for 2 hours  IV hydration at 75 cc/h to complete 1 L   Follow-up with me in clinic in 2 weeks    Primary Proceduralist:   Jeanie Faye MD    Full procedure note to follow

## 2023-02-16 NOTE — DISCHARGE INSTRUCTIONS
No driving for 24 hours. No excessive bending, lifting over 5 lb, or strenuous activity for 48 hours. If bleeding occurs hold pressure. If unable to control or stop bleeding, Call 911 or go to the nearest emergency room. May shower and remove dressing in 24 hours.

## 2023-02-17 RX ORDER — INSULIN LISPRO 100 [IU]/ML
INJECTION, SOLUTION INTRAVENOUS; SUBCUTANEOUS
Qty: 30 ADJUSTABLE DOSE PRE-FILLED PEN SYRINGE | Refills: 3 | Status: SHIPPED | OUTPATIENT
Start: 2023-02-17

## 2023-02-21 ENCOUNTER — CARE COORDINATION (OUTPATIENT)
Dept: CASE MANAGEMENT | Age: 68
End: 2023-02-21

## 2023-02-21 NOTE — CARE COORDINATION
[facility] Care Transitions Follow Up Call    Patient Current Location:  Home: [address] [id number]    Care Transition Nurse contacted the patient by telephone to follow up after admission on 2023. Verified name and  with patient as identifiers. Patient: Alina Kenney  Patient : 1955   MRN: [id number]  Reason for Admission: STEMI  Discharge Date: 23 RARS: Readmission Risk Score: 14.5      S/P Successful PCI of proximal and distal RCA ZAKI x2 (3.0 x 30 mm and 3.5 x 18 mm Joseph frontier)    Needs to be reviewed by the provider   Additional needs identified to be addressed with provider: No           Method of communication with provider: none. Patient reports he \"feels good\". -reports increased energy   -reports shortness of breath has improved a lot   -reports right wrist site is healed   -denies chest pain, palpitations, lightheaded, or dizziness   -denies any swelling   -reports weight loss   -awaiting call back from [facility] for appointment scheduling   -voices no needs or concerns at this time    Emotional support provided; discussed will continue to follow. Addressed changes since last contact:  none      Follow Up  Future Appointments   Date Time Provider Fiorella Horan   2023  3:00 PM MANUEL Stahl - CNP [address]   3/3/2023  1:30 PM Elena Desai  [facility]   3/13/2023  3:00 PM Raven Marie MD [facility]     Discussed appropriate site of care based on symptoms and resources available to patient including: PCP  Specialist  When to call 911. The patient agrees to contact the PCP office for questions related to their healthcare.          Patients top risk factors for readmission:  medical condition-STEMI, CVD, HTN, multiple healthcare providers, and polypharmacy  Interventions to address risk factors: Assessment and support for treatment adherence and medication management-patient completed scheduled procedure      Care Transition Nurse provided contact information for future needs. Plan for follow-up call in 7-10 days based on severity of symptoms and risk factors. Plan for next call: symptom check  -scheduled with Cardiac Rehab?      Jose Deleon, CHIDI

## 2023-02-22 ENCOUNTER — OFFICE VISIT (OUTPATIENT)
Dept: GASTROENTEROLOGY | Age: 68
End: 2023-02-22
Payer: MEDICARE

## 2023-02-22 VITALS
BODY MASS INDEX: 25.05 KG/M2 | HEART RATE: 62 BPM | OXYGEN SATURATION: 99 % | HEIGHT: 73 IN | DIASTOLIC BLOOD PRESSURE: 64 MMHG | SYSTOLIC BLOOD PRESSURE: 122 MMHG | WEIGHT: 189 LBS

## 2023-02-22 DIAGNOSIS — K21.9 GASTROESOPHAGEAL REFLUX DISEASE WITHOUT ESOPHAGITIS: ICD-10-CM

## 2023-02-22 DIAGNOSIS — K74.69 OTHER CIRRHOSIS OF LIVER (HCC): ICD-10-CM

## 2023-02-22 DIAGNOSIS — R79.89 ELEVATED LFTS: ICD-10-CM

## 2023-02-22 DIAGNOSIS — D50.9 IRON DEFICIENCY ANEMIA, UNSPECIFIED IRON DEFICIENCY ANEMIA TYPE: ICD-10-CM

## 2023-02-22 DIAGNOSIS — Z90.81 H/O SPLENECTOMY: ICD-10-CM

## 2023-02-22 DIAGNOSIS — R10.12 LUQ PAIN: ICD-10-CM

## 2023-02-22 DIAGNOSIS — I81 PORTAL VEIN THROMBOSIS: ICD-10-CM

## 2023-02-22 DIAGNOSIS — K76.6 PORTAL HYPERTENSION (HCC): ICD-10-CM

## 2023-02-22 DIAGNOSIS — Z90.410 HISTORY OF PANCREATECTOMY: ICD-10-CM

## 2023-02-22 DIAGNOSIS — D3A.8 NEUROENDOCRINE TUMOR OF PANCREAS: Primary | ICD-10-CM

## 2023-02-22 PROCEDURE — 99214 OFFICE O/P EST MOD 30 MIN: CPT | Performed by: NURSE PRACTITIONER

## 2023-02-22 PROCEDURE — 3078F DIAST BP <80 MM HG: CPT | Performed by: NURSE PRACTITIONER

## 2023-02-22 PROCEDURE — 3074F SYST BP LT 130 MM HG: CPT | Performed by: NURSE PRACTITIONER

## 2023-02-22 PROCEDURE — 1123F ACP DISCUSS/DSCN MKR DOCD: CPT | Performed by: NURSE PRACTITIONER

## 2023-02-22 RX ORDER — FERROUS SULFATE 325(65) MG
325 TABLET ORAL
Qty: 90 TABLET | Refills: 1 | Status: CANCELLED | OUTPATIENT
Start: 2023-02-22

## 2023-02-22 RX ORDER — FERROUS SULFATE 325(65) MG
325 TABLET ORAL 2 TIMES DAILY WITH MEALS
Qty: 180 TABLET | Refills: 1 | Status: SHIPPED | OUTPATIENT
Start: 2023-02-22

## 2023-02-22 NOTE — PROGRESS NOTES
Gastroenterology Clinic Follow up Visit    Barbara Pulliam  [id number]  Chief Complaint   Patient presents with    Follow-up     HPI: 79 y.o. male following up after last GI clinic on 1/17/2023. S/p recent hospitalization with STEMI, s/p successful PCI of proximal and mid LAD with ZAKI x3.  Patient has residual 80% RCA stenosis as well as ostial circumflex 60% stenosis. He had an additional 2 ZAKI placed (staged PCI). Now on Brilinta. Noted ischemic cardiomyopathy EF 40 to 45% by  1/30/2023. Interval change: Patient presents to the GI clinic with significant improvement in his upper abdominal/chest symptoms since his recent hospitalization with STEMI. States he is taking Nexium daily without breakthrough GERD symptoms. Does report dry cough since starting Entresto after his recent hospitalization with STEMI. States he has mentioned this to his cardiologist who is monitoring. He does report continued left upper quadrant pain, worse after eating or laying down. States this is a full feeling after he eats. States he mentioned it to his oncologist who is requesting repeat imaging (MRI or CT) of his abdomen to be completed on March 1 at [facility]. He reports having a bowel movement daily to every other day, soft to loose/watery, dark brown. States sometimes he feels as though his bowel movements are incomplete. States he is taking his oral iron supplement daily as prescribed (started 1 month ago). He denies increased abdominal girth, lower extremity edema, or overt S/S of hepatic encephalopathy. He denied nausea/vomiting, hematemesis, dysphagia, hematochezia, melena or weight loss. He denies chest pain, shortness of breath or palpitations. HPI from last GI clinic visit on 1/17/2023  summarized below:  79 y.o. male following up after last GI clinic on 6/13/2022.  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 He is s/p robotic distal pancreatectomy with splenectomy converted to open procedure August 4, 2022. Recent MRI in December with no evidence of residual or metastatic disease however noted partially occlusive thrombus of the main portal vein (progressed compared to CT nearly 4 months ago) and he was subsequently started on Eliquis approximately 2 weeks ago by hematology. Patient presents to the GI clinic with constipation of upper GI symptoms along with complaints of fatigue. He endorses increasing GERD symptoms, reports a \"hotness in my chest\" associated with mild nausea/\"queasy feeling\" and epigastric pain/pressure, progressing since his pancreatectomy in August, states omeprazole is not helping. He endorses having a soft/formed bowel movement approximately every other day with taking MiraLAX and Colace every other day. Does report since starting Eliquis he has noticed his stools have turned dark brown, almost black. He denies vomiting, hematemesis, dysphagia, hematochezia, or weight loss. He additionally reports difficulty controlling his blood sugars (recently with elevated readings) since his pancreatectomy, on both short and long term insulin. States he was recently started on Eliquis approximately 2 weeks ago by his hematologist/oncologist after MRI in December revealing progressing/partially occlusive portal vein thrombosis. Reports he has seen cardiology and pulmonology in regards to his fatigue with essentially negative work-up. HPI from last GI clinic visit on 6/13/2022  summarized below:   Patient reports doing well other than some increased fatigue during his chemotherapy weeks. He denies further issues with constipation with taking MiraLAX daily during his chemotherapy treatments. Reports he continues to follow with [facility] oncology, recently saw a surgeon who might consider surgery, removal of part of the pancreas and liver.  States he is currently undergoing testing including a dedicated MRI of the liver and blood work to assess if he is a surgical candidate. He denies GERD symptoms (takes PPI daily), nausea/vomiting, hematemesis, dysphagia, abdominal pain, hematochezia, melena or weight loss. He denies overt signs of hepatic encephalopathy, increased abdominal girth or lower extremity edema. HPI from last GI clinic visit on 3/3/2022  summarized below:  Patient presents to the GI clinic, recently back from [place], with significant improvement in constipation symptoms. Having a bowel movement daily to every other day while taking MiraLAX and Metamucil. He endorses abdominal bloating/gas after eating a meal, states he takes Gas-X with relief of symptoms. He does report feeling fatigued more than usual.  States this began happening since he started chemotherapy around 9 months ago. States he currently receives injections every month. He does report longstanding history of difficulty with sleep for many years. States some nights he is a restless he just gets up and does not sleep hardly at all. However, lately he states he has been taking more naps (short 10-minute naps) throughout the day. Denies overt signs of HE (confusion, slurred speech or lethargy). He denies increasing abdominal girth other than feeling bloated after eating (relief w/gasx). He denies lower extremity edema. He does report following with endocrinology every month, he continues to have trouble with controlling his glucose levels (reports highs and lows). He denies GERD symptoms, nausea/vomiting, hematemesis, dysphagia, abdominal pain, hematochezia, melena or weight loss. Patient denies chest pain, shortness of breath or palpitations. Smoking status: denies  Alcohol use: denies  Illicit drug use: denies  NSAID use: takes meloxicam daily, occasional ibuprofen when he has pain, around 1 time per month.      HPI from last GI clinic visit on 1/20/2022  summarized below:  Patient reports following up in the GI clinic regarding primary pancreatic neuroendocrine tumor, diagnosed in 2019. States he was previously referred to Dr. Emely Sanchez for possible resection, however he was also found to have neuroendocrine tumor of the liver during the operation along with portal hypertension and it was decided the surgery was too risky at that time so he was closed up without any tumor resections. States he was placed on chemotherapy. States he has been on oral chemo and now gets 1 IV per month every 4 weeks. He was referred to Dr. Kannan Stokes for further management of his nonalcoholic cirrhosis of the liver and portal hypertension. Patient reports Dr. Kannan Stokes states it is reasonable for him to come back to our GI clinic for further management of his liver disease so patient does not have to drive so far. Patient denies upper GI symptoms. He endorses fatigue and constipation. States he will have a dark green bowel movement every 2 days (small in caliber). He endorses every couple of weeks he will have a very large/hard bowel movement. States he has tried MiraLAX in the past, however this causes him to have \"brown splatter\" in the toilet. States he takes Gas-X for excessive bloating with relief. HPI from last GI clinic visit on 1/15/2020 summarized below:  Patient presents to the clinic to discuss getting further work-up before proceed with with surgical intervention for a neuroendocrine tumor on the tail of the pancreas. Patient seeing Dr. Naya Christiansen, surgical oncologist.   Patient without any complaints at this time. No F/C. No Cp,SOB, or palpitation. Occasional RUQ pain with certain foods. Intermittent diarrhea over the past 6 months. No melena or hematochezia. HPI and A/P at last visits summarized below:  Presented to the hospital owing to abdominal pain.  Patient reported that over the last week or so she been having epigastric pain associated with nausea vomiting and diarrhea. Symptoms seems has improving since admission. Patient currently denies any nausea, vomiting or diarrhea. Abdominal pain seems had resolved. Patient CT scan that was suggestive of cirrhosis. CT scan also showed lesions around 2 cm between the gastric wall and the pancreas. Review of lab work showed that patient had abnormal transaminases at least since 2017 with evidence of steatosis. Subsequently GI consult was obtained for further evaluation and management. 1-Abdominal pain, nausea, vomiting and diarrhea  Acute symptoms seems resolved at this time  Continue supportive measures, IV fluid and advance diet as tolerated. 2- Abnormal CT imaging /?  Pancreatic mass  CAT scan with contrast none clear that that showed a 2 cm soft tissue density with calcification near the tail of pancreas and adjacent to this gastric wall.?  GIST tumor  Proceed with EGD/EUS for further evaluation as outpatient   3- Radiological evidence of cirrhosis/ Early cirrhosis  Patient has no clinical findings to suggest advanced liver disease  However review of serial lab work since 2017 showed Abnnormal transaminases and subsequent reversal of AST/ALT ratio in addition to thrombocytopenia  The Apri score for the patient most suggestive of advanced disease/ cirrhosis (76% sensitive and 72% specific for cirrhosis)  Patient's lab and radiological findings suggestive of advanced liver disease  Likely etiology of advanced fibrosis will be Jayro Boo given the associated metabolic syndrome with hypertension dyslipidemia  Will proceed with correlation with FibroScan as outpatient and further work-up that may include viral further viral studies as well as other metabolic work-up. Previous GI work up/Endoscopic investigations:  EGD 1/27/23: Grade 1 (small) esophageal varices. Erosive gastropathy. Normal duodenum.   GASTRIC ANTRAL BIOPSIES: CHRONIC ATROPHIC GASTRITIS   FOCAL INTESTINAL METAPLASIA, REACTIVE EPITHELIAL ATYPIA   NEGATIVE FOR HELICOBACTER PYLORI    EGD 4/4/2022: Essentially normal upper endoscopy     Colonoscopy 1/20/2020: Entire examined colon is normal.  No specimens collected. FibroScan 1/22/2020:   Appropriate reading, Based on LSM of 27.5 Kpa, Evidence of advanced    fibrosis / Cirrhosis    Fibrosis stage IV- Cirrhosis  / F 4    Based on CAP of 288 db/M,  Mod-Severe Steatosis ( >33%)      Endoscopic ultrasound 12/17/19- - 21 x 15 mm hypoechoic mass at the tail of the pancreas, normal common bile duct, parenchymal changes suggestive of chronic pancreatitis, dilated pancreatic duct head of the pancreas, normal pancreatic duct body. Review of Systems   All other systems reviewed and are negative. Past medical history, past surgical history, medication list, social and familyhistory reviewed    Blood pressure 122/64, pulse 62, height 6' 1\" (1.854 m), weight 189 lb (85.7 kg), SpO2 99 %. Physical Exam  Constitutional:       General: He is not in acute distress. Appearance: Normal appearance. He is normal weight. He is not ill-appearing. HENT:      Head: Normocephalic and atraumatic. Eyes:      General: No scleral icterus. Cardiovascular:      Rate and Rhythm: Normal rate and regular rhythm. Pulses: Normal pulses. Pulmonary:      Effort: Pulmonary effort is normal. No respiratory distress. Breath sounds: Normal breath sounds. Abdominal:      General: Bowel sounds are normal. There is no distension. Palpations: Abdomen is soft. There is no mass. Tenderness: There is no abdominal tenderness. There is no guarding or rebound. Musculoskeletal:         General: Normal range of motion. Lymphadenopathy:      Cervical: No cervical adenopathy. Skin:     General: Skin is warm and dry. Coloration: Skin is not jaundiced. Neurological:      Mental Status: He is alert and oriented to person, place, and time.    Psychiatric:         Mood and Affect: Mood normal.         Behavior: Behavior normal. Thought Content: Thought content normal.         Judgment: Judgment normal.     Laboratory, Pathology, Radiology reviewed in detail with relevantimportant investigations summarized below:    Recent Labs     02/16/23  1059 01/30/23  0451 01/29/23  0754   WBC 10.8 14.5* 16.8*   HGB 10.4* 8.8* 9.5*   HCT 33.3* 27.6* 30.2*   MCV 78.0* 75.9* 76.0*   * 442* 485*     Lab Results   Component Value Date    WBC 10.8 02/16/2023    HGB 10.4 (L) 02/16/2023    HCT 33.3 (L) 02/16/2023    MCV 78.0 (L) 02/16/2023     (H) 02/16/2023     Lab Results   Component Value Date    ALT 43 (H) 01/28/2023    AST 51 (H) 01/28/2023    ALKPHOS 131 (H) 01/28/2023    BILITOT 0.8 (H) 01/28/2023     Component      Latest Ref Rng & Units 1/23/2023 1/23/2023          11:21 AM 11:21 AM   Iron      59 - 158 ug/dL  46 (L)   TIBC      250 - 450 ug/dL  426   Iron Saturation      20 - 55 %  11 (L)   UIBC      112 - 347 ug/dL  380 (H)   Ferritin      30 - 400 ng/mL 22 (L)      MRI abdomen with and without contrast 12/16/2021: Overall stable exam. Redemonstration of an approximate 1.4 x 2.2 cm enhancing mass at the distal pancreatic tail with not significantly changed. No new evidence to suggest metastatic disease throughout the abdomen/pelvis. Cirrhosis. Approximate 4 mm arterial enhancing focus in the segment VII not significantly changed. Previously described 4 mm enhancing nodular focus of segment II is not seen on the current exam.  No new arterial enhancing hepatic lesion identified. Splenomegaly as well as small caliber torturous varices near the GE junction, likely related to portal HTN. Small low signal intensity gallstones in the dependent portion of the gallbladder towards the fundus. No intrahepatic or extrahepatic biliary dilation. MRI abdomen with/without contrast 4/8/2022: No change compared to nearly 4 months ago. Stable pancreatic tail mass. No new sites of metastatic disease of the abdomen or pelvis. Cirrhosis and portal hypertension. No change in tiny arterial enhancing focus in the right hepatic lobe without contrast washout. Trace pelvic free fluid. MRI liver with and without contrast 6/29/2022: Stable pancreatic tail lesion. Cirrhotic hepatic morphology stable size and morphology of subcentimeter arterial hyperenhancing focus within the right hepatic lobe. Washout is difficult to assess for on delayed phase imaging due to motion and small size of lesion. This again likely represents LI RADS 3 lesion. Splenomegaly. MRI abdomen with/without contrast 4/8/2022: No new change compared to nearly 4 months ago. Stable pancreatic tail mass. No new sites of metastatic disease of the abdomen/pelvis. Cirrhosis and portal hypertension. No change in tiny arterial enhancing focus in the right hepatic lobe without contrast.  Trace pelvic free fluid. Abdominal/chest x-ray 4/27/2022: No radiographic evidence of acute intrathoracic process. Nonobstructive bowel gas pattern. CT abdomen pelvis with IV contrast 8/31/2022: Patient is post interval distal pancreatectomy. There is a fluid collection in the expected location of the tail of the pancreas extending into the expected location of the spleen, which measures up to 4 x 2.6 cm in transaxial diameters and up to 4.0 cm in CC diameter, possibly relating to postoperative hematoma/seroma, abscess, or contained pancreatic leak. There is surrounding enhancement and fat stranding. The lateral aspect of the collection is continuous with the posterior inferior wall of the proximal gastric body which is adhesed to the posterior lateral chest wall via adjacent irregular soft tissue density. The stomach demonstrates wall thickening along its lateral aspect particularly near where it is adhesed to the chest wall, suggesting gastritis, possibly secondary to recent surgery or adjacent inflammation from the pancreatectomy bed process.   There is expected postsurgical ligation of the splenic artery and vein there is nonocclusive thrombus seen in the residual splenic vein and portal splenic confluence. There is stranding of the omentum in the left upper quadrant that is probably postsurgical in etiology. There is a small volume of low-density ascites in the pelvis which is nonspecific. No pneumoperitoneum or loculated intraperitoneal collection. MRI abdomen/pelvis 12/22/2022: 1. Post distal pancreatectomy and splenectomy. No evidence of residual disease. Previous fluid collection at the pancreatic resection margin is no longer present. 2. No evidence metastatic disease of the abdomen or pelvis. 3. Partially occlusive thrombosis of main portal vein, progressed  compared to CT nearly 4 months ago. 4. Morphologic changes of cirrhosis. 4 mm LI-RADS LR 3 lesion is  stable. Assessment and Plan:  Samanta Ellington 79 y.o. 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 Previously referred to Dr. Marine Betancur for possible resection. However, during surgery it was discovered that it had spread to his liver and also w/evidence of liver cirrhosis/portal hypertension, therefore resection was aborted. Patient received oral and IV chemotherapy w/no further evidence of continued spread per recent MRIs on 12/16/2021 and 4/8/2022. S/P robotic distal pancreatectomy with splenectomy converted to open August 4th of 2022 with Dr. Cherelle Ivory. MRI in December 2022 w/no evidence of residual or metastatic disease, however w/partially occlusive thrombus of the main portal vein, progressed compared to CT nearly 4 months ago. He was subsequently started on Eliquis per hematology at [facility]. He recently was admitted to [facility] for STEMI, S/p successful PCI of proximal and mid LAD with ZAKI x3 and additional staged PCI/ZAKI to RCA  and circumflex. Now on Brilinta and ASA 81mg in addition to Eliquis.    [place] [facility] [place] history of liver cirrhosis w/likely etiology being MCNAMARA given his prior metabolic syndrome w/HTN/dyslipidemia and FibroScan on 1/22/2020 revealing stage IV fibrosis F4, moderate to severe steatosis. Recent EGD with small esophageal varices, erosive gastropathy. 1. History of Neuroendocrine tumor of pancreas  2. History of pancreatectomy  3. H/O splenectomy  4. LUQ pain/fullness  5. Elevated LFTs  -Recommend continued follow-up w/patient's oncologist at [address]  -Await further imaging at [facility] ordered by oncology for LUQ pain/fullness. 6. Other cirrhosis of liver ([facility] [address]  7. Portal hypertension ([facility] [address]  8. Portal vein thrombosis-likely chronic (seen on prior CT 4 months ago, although progressed)  MELD score (1/28/23): 7 with 1.9% estimated 3 month mortality  Cause of liver disease: Likely MCNAMARA  - Hx of SBP: no   - EGD for Variceal screening:  Completed 1/2023, small Evs noted  - [facility] [address] screening/surveillance: Recommend every 6 months at minimum. Recent MRI 12/2022 with no evidence of residual or metastatic disease. Await follow up abdominal imaging ordered per patient's oncologist.  - Hepatic encephalopathy: No history. - Advised to limit/discontinue NSAID use   - Continue complete alcohol abstinence. Last drink >13 years ago. - Hepatitis status:               Hep A immunity: reactive 6/30/2020              Hep B immunity: non-reactive 6/30/2020              Hep C Status: non-reactive 6/30/2020  -Continue Eliquis per patient's hematologist/oncologist recommendations for portal vein thrombosis     9. Gastroesophageal reflux disease without esophagitis  - Continue Nexium 40 mg by mouth daily  - Advised on the correct dose and timing of the PPI, Preferably 1/2 hour before breakfast. If symptoms are worse at night would recommend taking it half an hour before dinner.  - Pathophysiology and etiology of reflux discussed at length, with help of images.   - Anti-reflux lifestyle modification discussed at length --Avoid spicy acidic based foods              --Limit coffee, tea, alcohol use              --Limit the amount of food during meal time, avoid gorging              --Avoid bedtime snacks and eat meals 3 to 4 hrs before lying down              --Elevate the head of the bed with blocks              --Weight reduction advised and discussed at length     10. Iron deficiency anemia  - Increase ferrous sulfate to 325mg by mouth 2 times per day. Monitor response in follow up. May need IV infusions if no improvement at follow up. 11.  Constipation, unspecified constipation type  - Symptoms currently resolved  -Continue of MiraLAX 17 g by mouth daily  -Add fiber if needed to facilitate formed/complete bowel movements    Return in about 3 months (around 5/22/2023), or if symptoms worsen or fail to improve. MANUEL Mireles - CNP   Staff Gastroenterology Nurse Practitioner  [facility]    Please note this report has been partially produced using speech recognition software and contain errors related to that system including grammar, punctuation and spelling as well as words and phrases that may seem inappropriate. If there are questions or concerns please feel free to contact me to clarify.

## 2023-02-24 ASSESSMENT — ENCOUNTER SYMPTOMS: EYES NEGATIVE: 1

## 2023-03-01 ENCOUNTER — CARE COORDINATION (OUTPATIENT)
Dept: CASE MANAGEMENT | Age: 68
End: 2023-03-01

## 2023-03-01 NOTE — CARE COORDINATION
[facility] Care Transitions Follow Up Call    Patient Current Location:  Home: [address] [address]    Care Transition Nurse contacted the patient by telephone to follow up after admission on 2023. Verified name and  with patient as identifiers. Patient: Bernadine Morris  Patient : 1955   MRN: [id number]  Reason for Admission: STEMI  Discharge Date: 23 RARS: Readmission Risk Score: 14.5      Needs to be reviewed by the provider   Additional needs identified to be addressed with provider: No             Method of communication with provider: none. Patient reports he is doing well. -reports weight is consistent   -denies chest pain, palpitations, shortness of breath, lightheaded, or dizziness   -denies any swelling   -reports occasional constipation; reports eating prunes and keeping hydrated    -voices no needs or concerns    Emotional support provided; discussed Final Call. CTN contact information provided for future needs. Addressed changes since last contact:  active medication adjustments per Dr. María Torres   Date Time Provider hospitals   3/3/2023  1:30 PM Magaly Ballard, [address] [place] [address]   3/13/2023  3:00 PM Esther Webber  [address]   2023  1:00 PM MANUEL Self - [facility]         Discussed appropriate site of care based on symptoms and resources available to patient including: PCP  Specialist  When to call 911. The patient agrees to contact the PCP office for questions related to their healthcare.            Amanda Puga RN

## 2023-03-03 ENCOUNTER — OFFICE VISIT (OUTPATIENT)
Dept: CARDIOLOGY CLINIC | Age: 68
End: 2023-03-03
Payer: MEDICARE

## 2023-03-03 VITALS
WEIGHT: 197 LBS | HEART RATE: 82 BPM | BODY MASS INDEX: 26.11 KG/M2 | SYSTOLIC BLOOD PRESSURE: 126 MMHG | DIASTOLIC BLOOD PRESSURE: 84 MMHG | RESPIRATION RATE: 15 BRPM | OXYGEN SATURATION: 98 % | HEIGHT: 73 IN

## 2023-03-03 DIAGNOSIS — I25.10 CORONARY ARTERY DISEASE DUE TO LIPID RICH PLAQUE: ICD-10-CM

## 2023-03-03 DIAGNOSIS — I25.5 ISCHEMIC CARDIOMYOPATHY: ICD-10-CM

## 2023-03-03 DIAGNOSIS — I21.3 ST ELEVATION MYOCARDIAL INFARCTION (STEMI), UNSPECIFIED ARTERY (HCC): Primary | ICD-10-CM

## 2023-03-03 DIAGNOSIS — I25.83 CORONARY ARTERY DISEASE DUE TO LIPID RICH PLAQUE: ICD-10-CM

## 2023-03-03 PROCEDURE — 3074F SYST BP LT 130 MM HG: CPT | Performed by: INTERNAL MEDICINE

## 2023-03-03 PROCEDURE — 1123F ACP DISCUSS/DSCN MKR DOCD: CPT | Performed by: INTERNAL MEDICINE

## 2023-03-03 PROCEDURE — 99213 OFFICE O/P EST LOW 20 MIN: CPT | Performed by: INTERNAL MEDICINE

## 2023-03-03 PROCEDURE — 3079F DIAST BP 80-89 MM HG: CPT | Performed by: INTERNAL MEDICINE

## 2023-03-03 RX ORDER — LOSARTAN POTASSIUM 25 MG/1
25 TABLET ORAL DAILY
Qty: 180 TABLET | Refills: 5 | Status: SHIPPED | OUTPATIENT
Start: 2023-03-03

## 2023-03-03 ASSESSMENT — ENCOUNTER SYMPTOMS
ABDOMINAL PAIN: 0
APNEA: 0
CONSTIPATION: 0
VOMITING: 0
NAUSEA: 0
RHINORRHEA: 0
WHEEZING: 0
COUGH: 0
COLOR CHANGE: 0
SHORTNESS OF BREATH: 1
CHEST TIGHTNESS: 0
DIARRHEA: 0
EYE REDNESS: 0

## 2023-03-03 NOTE — PROGRESS NOTES
Chief Complaint   Patient presents with    Post-Op Check     02/16/2023       Patient presents for initial medical evaluation. Patient is followed on a regular basis by Dr. Luis Manuel Landaverde MD.     CAD 1/20/2023 STEMI successful PCI of the proximal and mid LAD with ZAKI x3.  2/16/2023 PCI to the distal RCA ZAKI x2, IFR of the circumflex negative (0.94)  Ischemic cardiomyopathy EF 40 to 45% by TTE 1/30/2023  Hypertension  Hyperlipidemia  Diabetes  Pancreatic cancer on chemotherapy, neuroendocrine tumor status post resection August 4, 2022 at [facility]  Liver cirrhosis  Portal vein thrombosis on Eliquis  TTE EF 65% on 1/10/2023 at [facility]  TTE 12/11/2019 EF 65%  TTE 4/15/2022 EF 65%  TTE 1/30/2023 EF 40 to 45%  Nuclear stress test 5/11/2022 EF 60% no evidence of ischemia or infarct  No prior coronary angiograms  =======================  3/3/2023  Follow-up on CAD  Last month patient returned to the Cath Lab for planned PCI of the RCA and IFR of the circumflex  Patient underwent PCI to the distal RCA with ZAKI x2  IFR of the circumflex was negative at 0.94  Today patient reports feeling well, states that now he is able to do activities that he was not able to do before the stents  However he is endorsing itching in his head possibly related to the Entresto  I would like to switch Entresto to losartan    2/8/2023  Follow-up on CAD and recent hospitalization  January 28, 2023 patient came to the hospital for sudden onset of chest pain. Patient was found with a STEMI code purple was activated. Patient underwent successful PCI of the proximal LAD with ZAKI x3  Patient has residual 80% RCA stenosis as well as ostial circumflex 60% stenosis.   With plans for these 2 lesions to be staged  Patient comes with wife for this appointment  Patient reports feeling well denies chest pain or shortness of breath  He feels with more energy    1/11/2023  Follow-up on cardiac comorbidities  Patient reports weakness and shortness of breath especially with physical activity  Patient had an EKG at [facility] this week after following up on tumor resection  EKG showing sinus rhythm with mild ST depressions in the anterior lateral leads  Patient had a stress test May 2022 with no ischemia    7/27/2022  Follow-up visit on chest pain  Patient was instructed to have a nuclear stress test which on 4 5/11/2022 was reported as no ischemia nor infarct EF 60%  Patient still endorsing random episodes of chest discomfort after eating  Denies shortness of breath  On August 3 patient is scheduled to undergo pancreatic surgery    4/27/2022  First clinic visit follow-up on recent hospitalization  Patient was evaluated in the hospital on 4/14/2022 due to chest pain. At that time troponins were detectable but not significant peaked at 0.27. EKG did not show signs of ischemia. Patient underwent an echocardiogram which showed normal EF with no wall motion abnormality. Patient was discharged home with follow-up in clinic with me for a further ischemic evaluation  Patient comes with wife during this visit. States that he is not feeling well he is complaining of shortness of breath with physical activity. Reports that before going to the hospital was complaining of shortness of breath but after getting discharged this shortness of breath has been getting worse. Denies lower extremity edema  Denies paroxysmal nocturnal dyspnea no orthopnea.   Although at times patient has to switch sides when sleeping due to shortness of breath  Denies chest pains  Reports that his blood pressures at home are usually running In the 160s  Today during this visit systolics are in the 924L    Patient Active Problem List   Diagnosis    HTN (hypertension)    Type 2 diabetes mellitus without complication (HCC)    Lumbar paraspinal muscle spasm    Allergic rhinosinusitis    TIA (transient ischemic attack)    Abdominal pain    Malignant neoplasm of other parts of pancreas ([address]    Other cirrhosis of liver ([address] Gastroesophageal reflux disease without esophagitis    Chest pain    History of pancreatectomy    Portal hypertension (HCC)    Melena    Epigastric pain    Iron deficiency anemia due to chronic blood loss    STEMI (ST elevation myocardial infarction) ([address]    Coronary artery disease due to lipid rich plaque    Hypokalemia    Ischemic cardiomyopathy    Inadequately controlled diabetes mellitus (HCC)    Chronic systolic (congestive) heart failure       Past Surgical History:   Procedure Laterality Date    ABDOMEN SURGERY      COLONOSCOPY  02/20/2015    DR Suresh Campbell    COLONOSCOPY N/A 01/20/2020    COLONOSCOPY DIAGNOSTIC performed by Carlotta Voss MD at [facility] ([facility] N/A 12/17/2019    EUS performed by Carlotta Voss MD at [address]  08/04/2022    SPLENECTOMY, PARTIAL  08/04/2022    UPPER GASTROINTESTINAL ENDOSCOPY N/A 04/04/2022    EGD ESOPHAGOGASTRODUODENOSCOPY performed by Carlotta Voss MD at [address] [facility] N/A 1/27/2023    EGD DIAGNOSTIC ONLY performed by Carlotta Voss MD at [address] History     Socioeconomic History    Marital status:    Tobacco Use    Smoking status: Never    Smokeless tobacco: Never   Vaping Use    Vaping Use: Never used   Substance and Sexual Activity    Alcohol use: No    Drug use: No    Sexual activity: Yes     Partners: Female     Social Determinants of Health     Financial Resource Strain: Low Risk     Difficulty of Paying Living Expenses: Not hard at all   Food Insecurity: No Food Insecurity    Worried About Running Out of Food in the Last Year: Never true    Ran Out of Food in the Last Year: Never true   Transportation Needs: No Transportation Needs    Lack of Transportation (Medical): No    Lack of Transportation (Non-Medical): No   Housing Stability: Unknown    Unstable Housing in the Last Year: No       Family History   Problem Relation Age of Onset    Cancer Mother         lung cancer    Colon Cancer Neg Hx     Celiac Disease Neg Hx     Crohn's Disease Neg Hx     Coronary Art Dis Neg Hx        Current Outpatient Medications   Medication Sig Dispense Refill    ferrous sulfate (IRON 325) 325 (65 Fe) MG tablet Take 1 tablet by mouth 2 times daily (with meals) 180 tablet 1    insulin lispro, 1 Unit Dial, (HUMALOG KWIKPEN) 100 UNIT/ML SOPN 30-35 units at each meal 3x daily 30 Adjustable Dose Pre-filled Pen Syringe 3    LANTUS SOLOSTAR 100 UNIT/ML injection pen inject 55 units subcutaneously daily 30 Adjustable Dose Pre-filled Pen Syringe 3    insulin lispro (HUMALOG) 100 UNIT/ML SOLN injection vial 30-35 units at each meals 10 each 4    ELIQUIS 5 MG TABS tablet take 2 tablets by mouth twice a day for 7 days then 1 tablet twice a day THEREAFTER  [id number], [id number]  3/25, 11/24 56 tablet 0    aspirin 81 MG chewable tablet Take 1 tablet by mouth daily 30 tablet 3    ticagrelor (BRILINTA) 90 MG TABS tablet Take 1 tablet by mouth 2 times daily 60 tablet 11    atorvastatin (LIPITOR) 80 MG tablet Take 1 tablet by mouth nightly 30 tablet 3    nitroGLYCERIN (NITROSTAT) 0.4 MG SL tablet Place 1 tablet under the tongue every 5 minutes as needed for Chest pain up to max of 3 total doses. If no relief after 1 dose, call 911. 25 tablet 3    sacubitril-valsartan (ENTRESTO) 24-26 MG per tablet Take 1 tablet by mouth 2 times daily 60 tablet 3    metoprolol succinate (TOPROL XL) 50 MG extended release tablet Take 1 tablet by mouth every evening 30 tablet 3    esomeprazole (NEXIUM) 40 MG delayed release capsule Take 1 capsule by mouth daily 30 capsule 3    furosemide (LASIX) 20 MG tablet Take 1 tablet by mouth daily 180 tablet 5    Continuous Blood Gluc  ([employer] NATALY 14 DAY READER) KISHA Test daily and prn. Dx: DM2 1 Device 0     No current facility-administered medications for this visit. Patient has no known allergies.     Review of Systems:  Review of Systems Constitutional:  Negative for activity change, chills, diaphoresis and fever. HENT:  Negative for congestion, ear pain, nosebleeds and rhinorrhea. Eyes:  Negative for redness and visual disturbance. Respiratory:  Positive for shortness of breath. Negative for apnea, cough, chest tightness and wheezing. Cardiovascular:  Negative for chest pain, palpitations and leg swelling. Gastrointestinal:  Negative for abdominal pain, constipation, diarrhea, nausea and vomiting. Genitourinary:  Negative for difficulty urinating and dysuria. Musculoskeletal: Negative. Negative for joint swelling. Skin:  Negative for color change, rash and wound. Neurological:  Negative for dizziness, syncope, weakness, numbness and headaches. Psychiatric/Behavioral: Negative. VITALS:  Blood pressure 126/84, pulse 82, resp. rate 15, height 6' 1\" (1.854 m), weight 197 lb (89.4 kg), SpO2 98 %. Body mass index is 25.99 kg/m². Physical Examination:  Physical Exam  Vitals and nursing note reviewed. Constitutional:       Appearance: Normal appearance. HENT:      Head: Normocephalic and atraumatic. Mouth/Throat:      Mouth: Mucous membranes are moist.      Pharynx: Oropharynx is clear. Eyes:      Extraocular Movements: Extraocular movements intact. Conjunctiva/sclera: Conjunctivae normal.      Pupils: Pupils are equal, round, and reactive to light. Cardiovascular:      Rate and Rhythm: Normal rate and regular rhythm. Pulses: Normal pulses. Heart sounds: Normal heart sounds. Pulmonary:      Effort: Pulmonary effort is normal.      Breath sounds: Normal breath sounds. Abdominal:      General: Abdomen is flat. Bowel sounds are normal.      Palpations: Abdomen is soft. Musculoskeletal:         General: Normal range of motion. Cervical back: Normal range of motion and neck supple. Skin:     General: Skin is warm. Neurological:      General: No focal deficit present.       Mental Status: He is alert and oriented to person, place, and time. Mental status is at baseline. Psychiatric:         Mood and Affect: Mood normal.      EKG: Normal sinus rhythm without signs of ischemia    No orders of the defined types were placed in this encounter. The ASCVD Risk score (Tabitha DK, et al., 2019) failed to calculate for the following reasons: The patient has a prior MI or stroke diagnosis     ASSESSMENT:     Diagnosis Orders   1. ST elevation myocardial infarction (STEMI), unspecified artery ([address]        2. Coronary artery disease due to lipid rich plaque        3. Ischemic cardiomyopathy          PLAN:   CAD 1/20/2023 STEMI successful PCI of the proximal and mid LAD with ZAKI x3. 2/16/2023 PCI to the distal RCA ZAKI x2, IFR of the circumflex negative (0.94)  Patient is asymptomatic endorsing no chest pain or shortness of breath with physical activity. Patient reports that he is able to do very much anything he wants without cardiac issues  Continue aspirin and high intensity statin for secondary prevention of CAD  Continue metoprolol 50 mg daily   Stop Entresto given itching  Start losartan 25 mg daily  Continue Brilinta ideally for 1 year. Patient reports that he was recently diagnosed with skin cancer and he is planned to have a removal of this skin lesion. I would like to wait at least until patient completes 6 months of Brilinta.   That would be July 20th 2023 when he can potentially stop Brilinta to undergo this skin lesion removal    Ischemic cardiomyopathy EF 40 to 45% by TTE 1/2023  Patient denies chest pain or shortness of breath  Continue metoprolol succinate 50 mg daily   Stop Entresto given itching  Start losartan 25 mg daily  continue Lasix 20 mg daily  Repeat echocardiogram in 6 months    Hypertension  Continue metoprolol  Start losartan patient did not tolerate Entresto    Portal vein thrombosis  Patient on Eliquis  I will defer Eliquis to Dr. Leopoldo Mallory at [facility]    Return to clinic in 6 months    Diabetes, pancreatic cancer and liver cirrhosis as per PCP and GI    Recommended patient to eat diets that emphasize high intakes of vegetables, fruits, nuts, whole grains, lean vegetable or animal protein, and fish.  As per 2019 ACC/AHA guideline on primary prevention of CVD     Recommended patient to engage in at least 150 minutes per week of accumulated moderate-intensity physical activity or 75 minutes per week of vigorous-intensity physical activity as per 2019 ACC/AHA guideline on primary prevention of CVD

## 2023-03-13 ENCOUNTER — OFFICE VISIT (OUTPATIENT)
Dept: ENDOCRINOLOGY | Age: 68
End: 2023-03-13
Payer: MEDICARE

## 2023-03-13 VITALS
OXYGEN SATURATION: 98 % | HEIGHT: 73 IN | DIASTOLIC BLOOD PRESSURE: 64 MMHG | WEIGHT: 196 LBS | HEART RATE: 82 BPM | SYSTOLIC BLOOD PRESSURE: 127 MMHG | BODY MASS INDEX: 25.98 KG/M2

## 2023-03-13 DIAGNOSIS — E11.65 INADEQUATELY CONTROLLED DIABETES MELLITUS (HCC): Primary | ICD-10-CM

## 2023-03-13 LAB
CHP ED QC CHECK: NORMAL
GLUCOSE BLD-MCNC: 246 MG/DL

## 2023-03-13 PROCEDURE — 3078F DIAST BP <80 MM HG: CPT | Performed by: INTERNAL MEDICINE

## 2023-03-13 PROCEDURE — 1123F ACP DISCUSS/DSCN MKR DOCD: CPT | Performed by: INTERNAL MEDICINE

## 2023-03-13 PROCEDURE — 99214 OFFICE O/P EST MOD 30 MIN: CPT | Performed by: INTERNAL MEDICINE

## 2023-03-13 PROCEDURE — 3074F SYST BP LT 130 MM HG: CPT | Performed by: INTERNAL MEDICINE

## 2023-03-13 PROCEDURE — 82962 GLUCOSE BLOOD TEST: CPT | Performed by: INTERNAL MEDICINE

## 2023-03-13 PROCEDURE — 3046F HEMOGLOBIN A1C LEVEL >9.0%: CPT | Performed by: INTERNAL MEDICINE

## 2023-03-13 RX ORDER — INSULIN LISPRO 100 [IU]/ML
INJECTION, SOLUTION INTRAVENOUS; SUBCUTANEOUS
Qty: 30 ADJUSTABLE DOSE PRE-FILLED PEN SYRINGE | Refills: 3 | Status: SHIPPED | OUTPATIENT
Start: 2023-03-13

## 2023-03-13 RX ORDER — INSULIN GLARGINE 100 [IU]/ML
INJECTION, SOLUTION SUBCUTANEOUS
Qty: 30 ADJUSTABLE DOSE PRE-FILLED PEN SYRINGE | Refills: 3 | Status: SHIPPED | OUTPATIENT
Start: 2023-03-13

## 2023-03-13 RX ORDER — FLASH GLUCOSE SENSOR
1 KIT MISCELLANEOUS
Qty: 6 EACH | Refills: 3 | Status: SHIPPED | OUTPATIENT
Start: 2023-03-13

## 2023-03-13 RX ORDER — FLASH GLUCOSE SENSOR
1 KIT MISCELLANEOUS
Qty: 2 EACH | Refills: 3 | Status: SHIPPED | OUTPATIENT
Start: 2023-03-13 | End: 2023-03-13 | Stop reason: SDUPTHER

## 2023-03-13 RX ORDER — FLASH GLUCOSE SENSOR
1 KIT MISCELLANEOUS
COMMUNITY
End: 2023-03-13 | Stop reason: SDUPTHER

## 2023-03-13 ASSESSMENT — ENCOUNTER SYMPTOMS: EYES NEGATIVE: 1

## 2023-03-13 NOTE — PROGRESS NOTES
3/13/2023    Assessment:       Diagnosis Orders   1. Inadequately controlled diabetes mellitus ([facility]              PLAN:     Orders Placed This Encounter   Procedures    Lipid Panel     Standing Status:   Future     Standing Expiration Date:   3/13/2024    Hemoglobin A1C     Standing Status:   Future     Standing Expiration Date:       Basic Metabolic Panel     Standing Status:   Future     Standing Expiration Date:   3/13/2024    POCT Glucose     Continue current dose of Lantus 55 units in the morning increase dose of Humalog to 35 to 40 units with each meals discussed insulin pump therapy and long-term A1c goal of 7 or lower  More than 50% of 30-minute spent patient education counseling  Diabetes education provided today:    Nutrition as a mainstream of diabetes therapy. [place] about label reading. Insulin pumps, how they work and how they affect blood sugar levels. Continuous Glucose monitor. How it works and checks blood sugars every 5 min. for 4 days during our tests. Managing high and low sugar readings. Orders Placed This Encounter   Medications    DISCONTD: Continuous Blood Gluc Sensor (FREESTYLE NATALY 14 DAY SENSOR) MISC     Si each by Does not apply route every 14 days     Dispense:  2 each     Refill:  3    insulin lispro, 1 Unit Dial, (HUMALOG KWIKPEN) 100 UNIT/ML SOPN     Si-40 units at each meal 3x daily     Dispense:  30 Adjustable Dose Pre-filled Pen Syringe     Refill:  3    LANTUS SOLOSTAR 100 UNIT/ML injection pen     Sig: inject 55 units subcutaneously daily 90 day supply     Dispense:  30 Adjustable Dose Pre-filled Pen Syringe     Refill:  3    Continuous Blood Gluc Sensor (FREESTYLE NATALY 14 DAY SENSOR) MISC     Si each by Does not apply route every 14 days     Dispense:  6 each     Refill:  3       Orders Placed This Encounter   Procedures    POCT Glucose     No orders of the defined types were placed in this encounter. No follow-ups on file.   Subjective: Chief Complaint   Patient presents with    Diabetes     Vitals:    03/13/23 1447   BP: 127/64   Pulse: 82   SpO2: 98%   Weight: 196 lb (88.9 kg)   Height: 6' 1\" (1.854 m)     Wt Readings from Last 3 Encounters:   03/13/23 196 lb (88.9 kg)   03/03/23 197 lb (89.4 kg)   02/22/23 189 lb (85.7 kg)     BP Readings from Last 3 Encounters:   03/13/23 127/64   03/03/23 126/84   02/22/23 122/64     Follow-up with IV diabetes history of coronary artery disease status post stent placement currently on Lantus 55 units in the morning Humalog 35 with each meals using freestyle kellen which was reviewed on his phone average blood sugar was 207 higher blood sugars in between 6 PM to 10 PM 1 hypoglycemia over the last 90 days  Patient starting to get more active has looked into insulin pump therapy but would rather hold off for now C-peptide has been normal history of pancreatic cancer      Diabetes  He presents for his follow-up diabetic visit. He has type 2 diabetes mellitus. Pertinent negatives for diabetes include no polydipsia, no polyuria and no weight loss. Symptoms are improving. Diabetic complications include heart disease. Current diabetic treatment includes insulin injections. He is currently taking insulin pre-breakfast, pre-lunch, pre-dinner and at bedtime. His overall blood glucose range is >200 mg/dl. An ACE inhibitor/angiotensin II receptor blocker is being taken.    Past Medical History:   Diagnosis Date    Cancer [facility])     Pancreatic    History of skin cancer     HTN (hypertension)     Malignant neoplasm of other parts of pancreas ([facility]     Type II or unspecified type diabetes mellitus without mention of complication, not stated as uncontrolled      Past Surgical History:   Procedure Laterality Date    ABDOMEN SURGERY      COLONOSCOPY  02/20/2015    DR Carmella Campos    COLONOSCOPY N/A 01/20/2020    COLONOSCOPY DIAGNOSTIC performed by Everett Allen MD at [address] [place] [address] ([facility]) N/A 12/17/2019    EUS performed by Lorenzo Hathaway MD at [address]  08/04/2022    SPLENECTOMY, PARTIAL  08/04/2022    UPPER GASTROINTESTINAL ENDOSCOPY N/A 04/04/2022    EGD ESOPHAGOGASTRODUODENOSCOPY performed by Lorenzo Hathaway MD at [address] [facility] N/A 1/27/2023    EGD DIAGNOSTIC ONLY performed by Lorenzo Hathaway MD at [address] [facility] History    Marital status:      Spouse name: Not on file    Number of children: Not on file    Years of education: Not on file    Highest education level: Not on file   Occupational History    Not on file   Tobacco Use    Smoking status: Never    Smokeless tobacco: Never   Vaping Use    Vaping Use: Never used   Substance and Sexual Activity    Alcohol use: No    Drug use: No    Sexual activity: Yes     Partners: Female   Other Topics Concern    Not on file   Social History Narrative    Not on file     Social Determinants of Health     Financial Resource Strain: Low Risk     Difficulty of Paying Living Expenses: Not hard at all   Food Insecurity: No Food Insecurity    Worried About Running Out of Food in the Last Year: Never true    [address] in the Last Year: Never true   Transportation Needs: No Transportation Needs    Lack of Transportation (Medical): No    Lack of Transportation (Non-Medical):  No   Physical Activity: Not on file   Stress: Not on file   Social Connections: Not on file   Intimate Partner Violence: Not on file   Housing Stability: Unknown    Unable to Pay for Housing in the Last Year: Not on file    Number of Places Lived in the Last Year: Not on file    Unstable Housing in the Last Year: No     Family History   Problem Relation Age of Onset    Cancer Mother         lung cancer    Colon Cancer Neg Hx     Celiac Disease Neg Hx     Crohn's Disease Neg Hx     Coronary Art Dis Neg Hx      No Known Allergies    Current Outpatient Medications: Continuous Blood Gluc Sensor (FREESTYLE NATALY 14 DAY SENSOR) MISC, 1 each by Does not apply route every 14 days, Disp: , Rfl:     losartan (COZAAR) 25 MG tablet, Take 1 tablet by mouth daily, Disp: 180 tablet, Rfl: 5    ferrous sulfate (IRON 325) 325 (65 Fe) MG tablet, Take 1 tablet by mouth 2 times daily (with meals), Disp: 180 tablet, Rfl: 1    insulin lispro, 1 Unit Dial, (HUMALOG KWIKPEN) 100 UNIT/ML SOPN, 30-35 units at each meal 3x daily, Disp: 30 Adjustable Dose Pre-filled Pen Syringe, Rfl: 3    LANTUS SOLOSTAR 100 UNIT/ML injection pen, inject 55 units subcutaneously daily, Disp: 30 Adjustable Dose Pre-filled Pen Syringe, Rfl: 3    ELIQUIS 5 MG TABS tablet, take 2 tablets by mouth twice a day for 7 days then 1 tablet twice a day THEREAFTER [id number], [id number] 3/25, 11/24, Disp: 56 tablet, Rfl: 0    aspirin 81 MG chewable tablet, Take 1 tablet by mouth daily, Disp: 30 tablet, Rfl: 3    ticagrelor (BRILINTA) 90 MG TABS tablet, Take 1 tablet by mouth 2 times daily, Disp: 60 tablet, Rfl: 11    atorvastatin (LIPITOR) 80 MG tablet, Take 1 tablet by mouth nightly, Disp: 30 tablet, Rfl: 3    nitroGLYCERIN (NITROSTAT) 0.4 MG SL tablet, Place 1 tablet under the tongue every 5 minutes as needed for Chest pain up to max of 3 total doses. If no relief after 1 dose, call 911., Disp: 25 tablet, Rfl: 3    metoprolol succinate (TOPROL XL) 50 MG extended release tablet, Take 1 tablet by mouth every evening, Disp: 30 tablet, Rfl: 3    esomeprazole (NEXIUM) 40 MG delayed release capsule, Take 1 capsule by mouth daily, Disp: 30 capsule, Rfl: 3    furosemide (LASIX) 20 MG tablet, Take 1 tablet by mouth daily, Disp: 180 tablet, Rfl: 5    Continuous Blood Gluc  (FREESTYLE NATALY 14 DAY READER) KISHA, Test daily and prn.  Dx: DM2, Disp: 1 Device, Rfl: 0  Lab Results   Component Value Date     02/16/2023    K 4.1 02/16/2023     02/16/2023    CO2 23 02/16/2023    BUN 10 02/16/2023    CREATININE 0.84 02/16/2023 GLUCOSE 246 03/13/2023    CALCIUM 9.0 02/16/2023    PROT 8.4 (H) 01/28/2023    LABALBU 4.0 01/28/2023    BILITOT 0.8 (H) 01/28/2023    ALKPHOS 131 (H) 01/28/2023    AST 51 (H) 01/28/2023    ALT 43 (H) 01/28/2023    LABGLOM >60.0 02/16/2023    GFRAA >60.0 06/14/2022    GLOB 4.4 (H) 01/28/2023     Lab Results   Component Value Date    WBC 10.8 02/16/2023    HGB 10.4 (L) 02/16/2023    HCT 33.3 (L) 02/16/2023    MCV 78.0 (L) 02/16/2023     (H) 02/16/2023     Lab Results   Component Value Date    LABA1C 9.3 (H) 01/30/2023    LABA1C 8.5 (A) 07/25/2022    LABA1C 9.1 (H) 07/01/2022     Lab Results   Component Value Date    HDL 47.0 05/17/2021    HDL 45 09/10/2020    HDL 50 08/09/2019    LDLCALC 91 09/10/2020    LDLCALC 101 08/09/2019    LDLCALC 120 10/03/2016    CHOL 164 05/17/2021    CHOL 154 09/10/2020    CHOL 171 08/09/2019    TRIG 106 05/17/2021    TRIG 90 09/10/2020    TRIG 98 08/09/2019     Lab Results   Component Value Date    TESTM 577 08/09/2019     Lab Results   Component Value Date    TSH 1.60 05/17/2021    TSH 1.40 03/08/2021    TSH 1.540 08/09/2019    T4FREE 0.80 03/08/2021    T4FREE 1.24 09/30/2014     No results found for: TPOABS    Review of Systems   Constitutional:  Negative for weight loss. Eyes: Negative. Cardiovascular: Negative. Endocrine: Negative. Negative for polydipsia and polyuria. Neurological: Negative. All other systems reviewed and are negative. Objective:   Physical Exam  Vitals reviewed. Constitutional:       General: He is not in acute distress. Appearance: Normal appearance. HENT:      Head: Normocephalic and atraumatic. Right Ear: External ear normal.      Left Ear: External ear normal.      Nose: Nose normal.   Eyes:      General: No scleral icterus. Right eye: No discharge. Left eye: No discharge. Extraocular Movements: Extraocular movements intact.       Conjunctiva/sclera: Conjunctivae normal.   Cardiovascular:      Rate and Rhythm: Normal rate. Pulmonary:      Effort: Pulmonary effort is normal.   Musculoskeletal:         General: Normal range of motion. Cervical back: Normal range of motion and neck supple. Neurological:      General: No focal deficit present. Mental Status: He is alert and oriented to person, place, and time.    Psychiatric:         Mood and Affect: Mood normal.         Behavior: Behavior normal.

## 2023-03-14 ENCOUNTER — TELEPHONE (OUTPATIENT)
Dept: FAMILY MEDICINE CLINIC | Age: 68
End: 2023-03-14

## 2023-03-15 ENCOUNTER — TELEPHONE (OUTPATIENT)
Dept: FAMILY MEDICINE CLINIC | Age: 68
End: 2023-03-15

## 2023-03-28 NOTE — TELEPHONE ENCOUNTER
Requesting medication refill.  Please approve or deny this request.    Rx requested:  Requested Prescriptions     Pending Prescriptions Disp Refills    metoprolol succinate (TOPROL XL) 50 MG extended release tablet 90 tablet 3     Sig: Take 1 tablet by mouth every evening         Last Office Visit:   3/3/2023      Next Visit Date:  Future Appointments   Date Time Provider Fiorella Horan   5/22/2023  1:00 PM MANUEL San - YADIRA Joseph   6/12/2023  2:30 PM Eh Alexander MD [address]   7/5/2023 10:00 AM Sulema Murphy MD [facility]

## 2023-03-29 RX ORDER — METOPROLOL SUCCINATE 50 MG/1
50 TABLET, EXTENDED RELEASE ORAL EVERY EVENING
Qty: 90 TABLET | Refills: 3 | Status: SHIPPED | OUTPATIENT
Start: 2023-03-29

## 2023-04-02 ENCOUNTER — OFFICE VISIT (OUTPATIENT)
Dept: FAMILY MEDICINE CLINIC | Age: 68
End: 2023-04-02
Payer: MEDICARE

## 2023-04-02 VITALS
SYSTOLIC BLOOD PRESSURE: 138 MMHG | HEIGHT: 73 IN | BODY MASS INDEX: 25.98 KG/M2 | RESPIRATION RATE: 14 BRPM | HEART RATE: 72 BPM | DIASTOLIC BLOOD PRESSURE: 72 MMHG | WEIGHT: 196 LBS | OXYGEN SATURATION: 99 % | TEMPERATURE: 98.5 F

## 2023-04-02 DIAGNOSIS — R39.9 UTI SYMPTOMS: ICD-10-CM

## 2023-04-02 DIAGNOSIS — N30.01 ACUTE CYSTITIS WITH HEMATURIA: Primary | ICD-10-CM

## 2023-04-02 LAB
BILIRUBIN, POC: ABNORMAL
BLOOD URINE, POC: ABNORMAL
CLARITY, POC: ABNORMAL
COLOR, POC: ABNORMAL
GLUCOSE URINE, POC: ABNORMAL
KETONES, POC: ABNORMAL
LEUKOCYTE EST, POC: ABNORMAL
NITRITE, POC: ABNORMAL
PH, POC: 6.5
PROTEIN, POC: ABNORMAL
SPECIFIC GRAVITY, POC: 1.02
UROBILINOGEN, POC: ABNORMAL

## 2023-04-02 PROCEDURE — 1123F ACP DISCUSS/DSCN MKR DOCD: CPT | Performed by: NURSE PRACTITIONER

## 2023-04-02 PROCEDURE — 99213 OFFICE O/P EST LOW 20 MIN: CPT | Performed by: NURSE PRACTITIONER

## 2023-04-02 PROCEDURE — 3075F SYST BP GE 130 - 139MM HG: CPT | Performed by: NURSE PRACTITIONER

## 2023-04-02 PROCEDURE — 81003 URINALYSIS AUTO W/O SCOPE: CPT | Performed by: NURSE PRACTITIONER

## 2023-04-02 PROCEDURE — 3078F DIAST BP <80 MM HG: CPT | Performed by: NURSE PRACTITIONER

## 2023-04-02 RX ORDER — CIPROFLOXACIN 500 MG/1
500 TABLET, FILM COATED ORAL 2 TIMES DAILY
Qty: 14 TABLET | Refills: 0 | Status: SHIPPED | OUTPATIENT
Start: 2023-04-02 | End: 2023-04-09

## 2023-04-02 ASSESSMENT — ENCOUNTER SYMPTOMS
TROUBLE SWALLOWING: 0
VOMITING: 0
SINUS PRESSURE: 0
EYE ITCHING: 0
WHEEZING: 0
ABDOMINAL PAIN: 0
SHORTNESS OF BREATH: 0
NAUSEA: 0
SINUS PAIN: 0
CONSTIPATION: 0
EYE REDNESS: 0
DIARRHEA: 0
SORE THROAT: 0
APNEA: 0
ABDOMINAL DISTENTION: 0

## 2023-04-02 NOTE — PROGRESS NOTES
patient: current clinical status, medications, activities and diet. Side effects, adverse effects of the medication prescribed today, as well as treatment plan and result expectations have been discussed with the patient who expresses understanding and desires to proceed. Close follow up to evaluate treatment results and for coordination of care. I have reviewed the patient's medical history in detail and updated the computerized patient record.       Bhargavi Adhikari, MANUEL - CNP

## 2023-04-04 LAB — BACTERIA UR CULT: NORMAL

## 2023-04-05 ENCOUNTER — TELEPHONE (OUTPATIENT)
Dept: FAMILY MEDICINE CLINIC | Age: 68
End: 2023-04-05

## 2023-04-05 NOTE — TELEPHONE ENCOUNTER
Pt called and advised no growth or UTI noted with urine culture and pt advised could have kidney stone and may have passed it. Pt aware to follow up with his PCP. Pt aware. None known

## 2023-04-25 ENCOUNTER — OFFICE VISIT (OUTPATIENT)
Dept: GASTROENTEROLOGY | Age: 68
End: 2023-04-25
Payer: MEDICARE

## 2023-04-25 VITALS — BODY MASS INDEX: 26.24 KG/M2 | HEIGHT: 73 IN | WEIGHT: 198 LBS | HEART RATE: 74 BPM | OXYGEN SATURATION: 98 %

## 2023-04-25 DIAGNOSIS — K83.8 DILATED CBD, ACQUIRED: ICD-10-CM

## 2023-04-25 DIAGNOSIS — K59.00 CONSTIPATION, UNSPECIFIED CONSTIPATION TYPE: ICD-10-CM

## 2023-04-25 DIAGNOSIS — K74.69 OTHER CIRRHOSIS OF LIVER (HCC): ICD-10-CM

## 2023-04-25 DIAGNOSIS — D3A.8 NEUROENDOCRINE TUMOR OF PANCREAS: ICD-10-CM

## 2023-04-25 DIAGNOSIS — R10.12 LUQ PAIN: ICD-10-CM

## 2023-04-25 DIAGNOSIS — K82.8 THICKENING OF WALL OF GALLBLADDER: ICD-10-CM

## 2023-04-25 DIAGNOSIS — D50.9 IRON DEFICIENCY ANEMIA, UNSPECIFIED IRON DEFICIENCY ANEMIA TYPE: Primary | ICD-10-CM

## 2023-04-25 DIAGNOSIS — K21.9 GASTROESOPHAGEAL REFLUX DISEASE WITHOUT ESOPHAGITIS: ICD-10-CM

## 2023-04-25 DIAGNOSIS — R53.83 OTHER FATIGUE: ICD-10-CM

## 2023-04-25 DIAGNOSIS — I81 PORTAL VEIN THROMBOSIS: ICD-10-CM

## 2023-04-25 DIAGNOSIS — K76.6 PORTAL HYPERTENSION (HCC): ICD-10-CM

## 2023-04-25 DIAGNOSIS — Z90.410 HISTORY OF PANCREATECTOMY: ICD-10-CM

## 2023-04-25 DIAGNOSIS — R79.89 ELEVATED LFTS: ICD-10-CM

## 2023-04-25 DIAGNOSIS — Z90.81 H/O SPLENECTOMY: ICD-10-CM

## 2023-04-25 PROCEDURE — 1123F ACP DISCUSS/DSCN MKR DOCD: CPT | Performed by: NURSE PRACTITIONER

## 2023-04-25 PROCEDURE — 99214 OFFICE O/P EST MOD 30 MIN: CPT | Performed by: NURSE PRACTITIONER

## 2023-04-25 RX ORDER — CARVEDILOL 6.25 MG/1
TABLET ORAL
COMMUNITY
Start: 2022-04-27

## 2023-04-25 RX ORDER — LISINOPRIL 20 MG/1
TABLET ORAL
COMMUNITY
Start: 2022-04-27

## 2023-04-25 ASSESSMENT — ENCOUNTER SYMPTOMS
VOICE CHANGE: 0
TROUBLE SWALLOWING: 0
NAUSEA: 0
CONSTIPATION: 0
ABDOMINAL DISTENTION: 0
CHOKING: 0
VOMITING: 0
DIARRHEA: 0
ANAL BLEEDING: 0
BLOOD IN STOOL: 0
ABDOMINAL PAIN: 0
RECTAL PAIN: 0
EYES NEGATIVE: 1

## 2023-04-25 NOTE — PROGRESS NOTES
speech recognition software and contain errors related to that system including grammar, punctuation and spelling as well as words and phrases that may seem inappropriate. If there are questions or concerns please feel free to contact me to clarify.

## 2023-04-26 DIAGNOSIS — D50.9 IRON DEFICIENCY ANEMIA, UNSPECIFIED IRON DEFICIENCY ANEMIA TYPE: ICD-10-CM

## 2023-04-26 LAB
ERYTHROCYTE [DISTWIDTH] IN BLOOD BY AUTOMATED COUNT: 18.3 % (ref 11.5–14.5)
HCT VFR BLD AUTO: 23.7 % (ref 42–52)
HGB BLD-MCNC: 7.5 G/DL (ref 14–18)
MCH RBC QN AUTO: 28.2 PG (ref 27–31.3)
MCHC RBC AUTO-ENTMCNC: 31.8 % (ref 33–37)
MCV RBC AUTO: 88.9 FL (ref 79–92.2)
PLATELET # BLD AUTO: 435 K/UL (ref 130–400)
RBC # BLD AUTO: 2.67 M/UL (ref 4.7–6.1)
WBC # BLD AUTO: 11.7 K/UL (ref 4.8–10.8)

## 2023-04-27 ENCOUNTER — TELEPHONE (OUTPATIENT)
Dept: CARDIOLOGY CLINIC | Age: 68
End: 2023-04-27

## 2023-04-27 LAB
FERRITIN: 27 NG/ML (ref 30–400)
IRON SATURATION: 6 % (ref 20–55)
IRON: 20 UG/DL (ref 59–158)
TOTAL IRON BINDING CAPACITY: 309 UG/DL (ref 250–450)
UNSATURATED IRON BINDING CAPACITY: 289 UG/DL (ref 112–347)

## 2023-04-28 ENCOUNTER — APPOINTMENT (OUTPATIENT)
Dept: GENERAL RADIOLOGY | Age: 68
End: 2023-04-28
Payer: MEDICARE

## 2023-04-28 ENCOUNTER — HOSPITAL ENCOUNTER (EMERGENCY)
Age: 68
Discharge: HOME | End: 2023-04-28
Attending: EMERGENCY MEDICINE
Payer: MEDICARE

## 2023-04-28 ENCOUNTER — TELEPHONE (OUTPATIENT)
Dept: GASTROENTEROLOGY | Age: 68
End: 2023-04-28

## 2023-04-28 VITALS
WEIGHT: 195 LBS | BODY MASS INDEX: 25.84 KG/M2 | DIASTOLIC BLOOD PRESSURE: 77 MMHG | RESPIRATION RATE: 21 BRPM | SYSTOLIC BLOOD PRESSURE: 149 MMHG | HEART RATE: 94 BPM | TEMPERATURE: 98 F | HEIGHT: 73 IN | OXYGEN SATURATION: 98 %

## 2023-04-28 DIAGNOSIS — R53.1 GENERAL WEAKNESS: Primary | ICD-10-CM

## 2023-04-28 DIAGNOSIS — D50.8 OTHER IRON DEFICIENCY ANEMIA: ICD-10-CM

## 2023-04-28 LAB
ALBUMIN SERPL-MCNC: 3.2 G/DL (ref 3.5–4.6)
ALP SERPL-CCNC: 207 U/L (ref 35–104)
ALT SERPL-CCNC: 52 U/L (ref 0–41)
ANION GAP SERPL CALCULATED.3IONS-SCNC: 8 MEQ/L (ref 9–15)
APTT PPP: 31.1 SEC (ref 24.4–36.8)
AST SERPL-CCNC: 70 U/L (ref 0–40)
BASOPHILS # BLD: 0.2 K/UL (ref 0–0.2)
BASOPHILS NFR BLD: 1.6 %
BILIRUB SERPL-MCNC: 0.4 MG/DL (ref 0.2–0.7)
BNP BLD-MCNC: 371 PG/ML
BUN SERPL-MCNC: 16 MG/DL (ref 8–23)
CALCIUM SERPL-MCNC: 8.6 MG/DL (ref 8.5–9.9)
CHLORIDE SERPL-SCNC: 105 MEQ/L (ref 95–107)
CO2 SERPL-SCNC: 23 MEQ/L (ref 20–31)
CREAT SERPL-MCNC: 0.72 MG/DL (ref 0.7–1.2)
EOSINOPHIL # BLD: 0.3 K/UL (ref 0–0.7)
EOSINOPHIL NFR BLD: 2.7 %
ERYTHROCYTE [DISTWIDTH] IN BLOOD BY AUTOMATED COUNT: 18.2 % (ref 11.5–14.5)
GLOBULIN SER CALC-MCNC: 4 G/DL (ref 2.3–3.5)
GLUCOSE SERPL-MCNC: 189 MG/DL (ref 70–99)
HCT VFR BLD AUTO: 23.4 % (ref 42–52)
HGB BLD-MCNC: 7.5 G/DL (ref 14–18)
INR PPP: 1.2
LYMPHOCYTES # BLD: 2.2 K/UL (ref 1–4.8)
LYMPHOCYTES NFR BLD: 20.7 %
MAGNESIUM SERPL-MCNC: 2.1 MG/DL (ref 1.7–2.4)
MCH RBC QN AUTO: 28.4 PG (ref 27–31.3)
MCHC RBC AUTO-ENTMCNC: 32 % (ref 33–37)
MCV RBC AUTO: 88.9 FL (ref 79–92.2)
MONOCYTES # BLD: 1.5 K/UL (ref 0.2–0.8)
MONOCYTES NFR BLD: 14.4 %
NEUTROPHILS # BLD: 6.4 K/UL (ref 1.4–6.5)
NEUTS SEG NFR BLD: 60.6 %
PLATELET # BLD AUTO: 465 K/UL (ref 130–400)
POTASSIUM SERPL-SCNC: 4 MEQ/L (ref 3.4–4.9)
PROT SERPL-MCNC: 7.2 G/DL (ref 6.3–8)
PROTHROMBIN TIME: 15.7 SEC (ref 12.3–14.9)
RBC # BLD AUTO: 2.63 M/UL (ref 4.7–6.1)
SODIUM SERPL-SCNC: 136 MEQ/L (ref 135–144)
TROPONIN T SERPL-MCNC: <0.01 NG/ML (ref 0–0.01)
WBC # BLD AUTO: 10.6 K/UL (ref 4.8–10.8)

## 2023-04-28 PROCEDURE — 83880 ASSAY OF NATRIURETIC PEPTIDE: CPT

## 2023-04-28 PROCEDURE — 71045 X-RAY EXAM CHEST 1 VIEW: CPT

## 2023-04-28 PROCEDURE — 96365 THER/PROPH/DIAG IV INF INIT: CPT

## 2023-04-28 PROCEDURE — 85610 PROTHROMBIN TIME: CPT

## 2023-04-28 PROCEDURE — 2580000003 HC RX 258: Performed by: EMERGENCY MEDICINE

## 2023-04-28 PROCEDURE — 36415 COLL VENOUS BLD VENIPUNCTURE: CPT

## 2023-04-28 PROCEDURE — 83735 ASSAY OF MAGNESIUM: CPT

## 2023-04-28 PROCEDURE — 6360000002 HC RX W HCPCS: Performed by: EMERGENCY MEDICINE

## 2023-04-28 PROCEDURE — 80053 COMPREHEN METABOLIC PANEL: CPT

## 2023-04-28 PROCEDURE — 85730 THROMBOPLASTIN TIME PARTIAL: CPT

## 2023-04-28 PROCEDURE — 85025 COMPLETE CBC W/AUTO DIFF WBC: CPT

## 2023-04-28 PROCEDURE — 99285 EMERGENCY DEPT VISIT HI MDM: CPT

## 2023-04-28 PROCEDURE — 84484 ASSAY OF TROPONIN QUANT: CPT

## 2023-04-28 PROCEDURE — 96361 HYDRATE IV INFUSION ADD-ON: CPT

## 2023-04-28 PROCEDURE — 93005 ELECTROCARDIOGRAM TRACING: CPT | Performed by: EMERGENCY MEDICINE

## 2023-04-28 RX ORDER — 0.9 % SODIUM CHLORIDE 0.9 %
1000 INTRAVENOUS SOLUTION INTRAVENOUS ONCE
Status: COMPLETED | OUTPATIENT
Start: 2023-04-28 | End: 2023-04-28

## 2023-04-28 RX ADMIN — SODIUM CHLORIDE 1000 ML: 9 INJECTION, SOLUTION INTRAVENOUS at 17:36

## 2023-04-28 RX ADMIN — SODIUM CHLORIDE 125 MG: 9 INJECTION, SOLUTION INTRAVENOUS at 18:25

## 2023-04-28 ASSESSMENT — PAIN - FUNCTIONAL ASSESSMENT: PAIN_FUNCTIONAL_ASSESSMENT: 0-10

## 2023-04-28 ASSESSMENT — ENCOUNTER SYMPTOMS
DIARRHEA: 0
BACK PAIN: 0
VOMITING: 0
SORE THROAT: 0
SHORTNESS OF BREATH: 0
COUGH: 0
ABDOMINAL PAIN: 0
NAUSEA: 0

## 2023-04-28 ASSESSMENT — PAIN DESCRIPTION - PAIN TYPE: TYPE: ACUTE PAIN

## 2023-04-28 ASSESSMENT — PAIN DESCRIPTION - LOCATION: LOCATION: CHEST

## 2023-04-28 ASSESSMENT — LIFESTYLE VARIABLES
HOW OFTEN DO YOU HAVE A DRINK CONTAINING ALCOHOL: NEVER
HOW MANY STANDARD DRINKS CONTAINING ALCOHOL DO YOU HAVE ON A TYPICAL DAY: PATIENT DOES NOT DRINK

## 2023-04-28 ASSESSMENT — PAIN DESCRIPTION - FREQUENCY: FREQUENCY: CONTINUOUS

## 2023-04-28 ASSESSMENT — PAIN SCALES - GENERAL: PAINLEVEL_OUTOF10: 3

## 2023-04-28 ASSESSMENT — PAIN DESCRIPTION - DESCRIPTORS: DESCRIPTORS: ACHING

## 2023-05-01 ENCOUNTER — CARE COORDINATION (OUTPATIENT)
Dept: CARE COORDINATION | Age: 68
End: 2023-05-01

## 2023-05-01 ENCOUNTER — TELEPHONE (OUTPATIENT)
Dept: CARDIOLOGY CLINIC | Age: 68
End: 2023-05-01

## 2023-05-01 LAB
EKG ATRIAL RATE: 104 BPM
EKG P AXIS: 72 DEGREES
EKG P-R INTERVAL: 156 MS
EKG Q-T INTERVAL: 388 MS
EKG QRS DURATION: 86 MS
EKG QTC CALCULATION (BAZETT): 510 MS
EKG R AXIS: 52 DEGREES
EKG T AXIS: 66 DEGREES
EKG VENTRICULAR RATE: 104 BPM

## 2023-05-01 PROCEDURE — 93010 ELECTROCARDIOGRAM REPORT: CPT | Performed by: INTERNAL MEDICINE

## 2023-05-01 RX ORDER — HEPARIN SODIUM (PORCINE) LOCK FLUSH IV SOLN 100 UNIT/ML 100 UNIT/ML
500 SOLUTION INTRAVENOUS PRN
OUTPATIENT
Start: 2023-05-01

## 2023-05-01 RX ORDER — SODIUM CHLORIDE 0.9 % (FLUSH) 0.9 %
5-40 SYRINGE (ML) INJECTION PRN
OUTPATIENT
Start: 2023-05-01

## 2023-05-01 RX ORDER — EPINEPHRINE 1 MG/ML
0.3 INJECTION, SOLUTION, CONCENTRATE INTRAVENOUS PRN
OUTPATIENT
Start: 2023-05-01

## 2023-05-01 RX ORDER — ONDANSETRON 2 MG/ML
8 INJECTION INTRAMUSCULAR; INTRAVENOUS
OUTPATIENT
Start: 2023-05-01

## 2023-05-01 RX ORDER — SODIUM CHLORIDE 9 MG/ML
INJECTION, SOLUTION INTRAVENOUS CONTINUOUS
OUTPATIENT
Start: 2023-05-01

## 2023-05-01 RX ORDER — FAMOTIDINE 10 MG/ML
20 INJECTION, SOLUTION INTRAVENOUS
OUTPATIENT
Start: 2023-05-01

## 2023-05-01 RX ORDER — DIPHENHYDRAMINE HYDROCHLORIDE 50 MG/ML
50 INJECTION INTRAMUSCULAR; INTRAVENOUS
OUTPATIENT
Start: 2023-05-01

## 2023-05-01 RX ORDER — ACETAMINOPHEN 325 MG/1
650 TABLET ORAL
OUTPATIENT
Start: 2023-05-01

## 2023-05-01 RX ORDER — SODIUM CHLORIDE 9 MG/ML
5-250 INJECTION, SOLUTION INTRAVENOUS PRN
OUTPATIENT
Start: 2023-05-01

## 2023-05-01 RX ORDER — ALBUTEROL SULFATE 90 UG/1
4 AEROSOL, METERED RESPIRATORY (INHALATION) PRN
OUTPATIENT
Start: 2023-05-01

## 2023-05-01 NOTE — CARE COORDINATION
ACM spoke to patient. Introduced [facility] program role of ACM goals and benefits. Patient was recently in the ED. ACM reviewed ER visit and after visit summary. Patient complains of ongoing chronic weakness chronic shortness of breath and extreme tiredness. ACM notes hemoglobin low and positive for anemia. Patient reports that Jamie Kiara Chacon is working on getting him Venofer infusions. ACM advised patient that someone will be calling when this has been arranged from the [facility] to set up. ACM discussed with patient that he needs to make sure that his oncologist/hematologist is aware of all these changes. The hematologist manages such conditions as anemia. Patient states he will call that provider tomorrow. Patient feels that he can manage himself very well and understands when and how to contact his providers. Patient has declined [facility] services at this time patient agreed to keep contact information if his needs change. ACM did assist patient in getting set up with a PCP appointment to update his primary care provider on all events.

## 2023-05-02 NOTE — TELEPHONE ENCOUNTER
Thanks Ching Hall  Patient was found anemic in the ER , patient needs to follow-up with heme-onc  Thanks

## 2023-05-08 ENCOUNTER — HOSPITAL ENCOUNTER (OUTPATIENT)
Dept: INFUSION THERAPY | Age: 68
Setting detail: INFUSION SERIES
Discharge: HOME | End: 2023-05-08
Payer: MEDICARE

## 2023-05-08 VITALS
OXYGEN SATURATION: 99 % | SYSTOLIC BLOOD PRESSURE: 152 MMHG | DIASTOLIC BLOOD PRESSURE: 73 MMHG | TEMPERATURE: 98.1 F | RESPIRATION RATE: 18 BRPM | HEART RATE: 89 BPM

## 2023-05-08 DIAGNOSIS — I21.3 ST ELEVATION MYOCARDIAL INFARCTION (STEMI), UNSPECIFIED ARTERY (HCC): ICD-10-CM

## 2023-05-08 DIAGNOSIS — I25.5 ISCHEMIC CARDIOMYOPATHY: ICD-10-CM

## 2023-05-08 DIAGNOSIS — K21.9 GASTROESOPHAGEAL REFLUX DISEASE WITHOUT ESOPHAGITIS: ICD-10-CM

## 2023-05-08 DIAGNOSIS — D64.9 ANEMIA, UNSPECIFIED TYPE: Primary | ICD-10-CM

## 2023-05-08 DIAGNOSIS — K74.69 OTHER CIRRHOSIS OF LIVER (HCC): ICD-10-CM

## 2023-05-08 DIAGNOSIS — I25.83 CORONARY ARTERY DISEASE DUE TO LIPID RICH PLAQUE: Primary | ICD-10-CM

## 2023-05-08 DIAGNOSIS — Z90.410 HISTORY OF PANCREATECTOMY: ICD-10-CM

## 2023-05-08 DIAGNOSIS — D50.0 IRON DEFICIENCY ANEMIA DUE TO CHRONIC BLOOD LOSS: ICD-10-CM

## 2023-05-08 DIAGNOSIS — R06.00 DYSPNEA, UNSPECIFIED TYPE: ICD-10-CM

## 2023-05-08 DIAGNOSIS — I25.10 CORONARY ARTERY DISEASE DUE TO LIPID RICH PLAQUE: Primary | ICD-10-CM

## 2023-05-08 PROCEDURE — 2580000003 HC RX 258: Performed by: NURSE PRACTITIONER

## 2023-05-08 PROCEDURE — 96365 THER/PROPH/DIAG IV INF INIT: CPT

## 2023-05-08 PROCEDURE — 6360000002 HC RX W HCPCS: Performed by: NURSE PRACTITIONER

## 2023-05-08 RX ORDER — ALBUTEROL SULFATE 90 UG/1
4 AEROSOL, METERED RESPIRATORY (INHALATION) PRN
Status: CANCELLED | OUTPATIENT
Start: 2023-05-15

## 2023-05-08 RX ORDER — ACETAMINOPHEN 325 MG/1
650 TABLET ORAL
Status: CANCELLED | OUTPATIENT
Start: 2023-05-15

## 2023-05-08 RX ORDER — ONDANSETRON 2 MG/ML
8 INJECTION INTRAMUSCULAR; INTRAVENOUS
Status: CANCELLED | OUTPATIENT
Start: 2023-05-15

## 2023-05-08 RX ORDER — DIPHENHYDRAMINE HYDROCHLORIDE 50 MG/ML
50 INJECTION INTRAMUSCULAR; INTRAVENOUS
Status: CANCELLED | OUTPATIENT
Start: 2023-05-15

## 2023-05-08 RX ORDER — HEPARIN SODIUM (PORCINE) LOCK FLUSH IV SOLN 100 UNIT/ML 100 UNIT/ML
500 SOLUTION INTRAVENOUS PRN
Status: CANCELLED | OUTPATIENT
Start: 2023-05-15

## 2023-05-08 RX ORDER — SODIUM CHLORIDE 9 MG/ML
INJECTION, SOLUTION INTRAVENOUS CONTINUOUS
Status: CANCELLED | OUTPATIENT
Start: 2023-05-15

## 2023-05-08 RX ORDER — FAMOTIDINE 10 MG/ML
20 INJECTION, SOLUTION INTRAVENOUS
Status: CANCELLED | OUTPATIENT
Start: 2023-05-15

## 2023-05-08 RX ORDER — EPINEPHRINE 1 MG/ML
0.3 INJECTION, SOLUTION, CONCENTRATE INTRAVENOUS PRN
Status: CANCELLED | OUTPATIENT
Start: 2023-05-15

## 2023-05-08 RX ORDER — SODIUM CHLORIDE 9 MG/ML
5-250 INJECTION, SOLUTION INTRAVENOUS PRN
Status: CANCELLED | OUTPATIENT
Start: 2023-05-15

## 2023-05-08 RX ORDER — SODIUM CHLORIDE 0.9 % (FLUSH) 0.9 %
5-40 SYRINGE (ML) INJECTION PRN
Status: CANCELLED | OUTPATIENT
Start: 2023-05-15

## 2023-05-08 RX ORDER — FUROSEMIDE 20 MG/1
40 TABLET ORAL DAILY
Qty: 180 TABLET | Refills: 5
Start: 2023-05-08 | End: 2023-05-12 | Stop reason: SDUPTHER

## 2023-05-08 RX ADMIN — IRON SUCROSE 200 MG: 20 INJECTION, SOLUTION INTRAVENOUS at 13:38

## 2023-05-08 NOTE — FLOWSHEET NOTE
Patient to the floor ambulatory for his iron infusion. Vital signs taken. Denies any discomfort. Education given both verbal and handout regarding this medication. Verbalized understanding. Call light within reach.

## 2023-05-08 NOTE — FLOWSHEET NOTE
Infusion is complete. Tolerated well. Patient has had this medication before. AVS printed and given to patient. Left the unit ambulatory. All equipment used in the care for this patient has been cleaned.

## 2023-05-08 NOTE — TELEPHONE ENCOUNTER
Pt very upset that Dr Selena Tim has not answered his message. Pt is having feet swelling, very weak and SOB. Appt scheduled on 5/12/2023 with Dr Selena Tim. Pt refusing to go back to the ER.

## 2023-05-09 ENCOUNTER — OFFICE VISIT (OUTPATIENT)
Dept: FAMILY MEDICINE CLINIC | Age: 68
End: 2023-05-09
Payer: MEDICARE

## 2023-05-09 VITALS
DIASTOLIC BLOOD PRESSURE: 70 MMHG | SYSTOLIC BLOOD PRESSURE: 150 MMHG | WEIGHT: 200.4 LBS | HEIGHT: 73 IN | OXYGEN SATURATION: 99 % | TEMPERATURE: 97.2 F | BODY MASS INDEX: 26.56 KG/M2 | HEART RATE: 68 BPM

## 2023-05-09 DIAGNOSIS — D64.9 SYMPTOMATIC ANEMIA: Primary | ICD-10-CM

## 2023-05-09 DIAGNOSIS — G45.9 TIA (TRANSIENT ISCHEMIC ATTACK): ICD-10-CM

## 2023-05-09 DIAGNOSIS — I21.3 ST ELEVATION MYOCARDIAL INFARCTION (STEMI), UNSPECIFIED ARTERY (HCC): ICD-10-CM

## 2023-05-09 DIAGNOSIS — E11.65 TYPE 2 DIABETES MELLITUS WITH HYPERGLYCEMIA, WITHOUT LONG-TERM CURRENT USE OF INSULIN (HCC): ICD-10-CM

## 2023-05-09 DIAGNOSIS — C25.7 MALIGNANT NEOPLASM OF OTHER PARTS OF PANCREAS (HCC): ICD-10-CM

## 2023-05-09 DIAGNOSIS — I50.22 CHRONIC SYSTOLIC (CONGESTIVE) HEART FAILURE (HCC): ICD-10-CM

## 2023-05-09 DIAGNOSIS — I25.10 CAD S/P PERCUTANEOUS CORONARY ANGIOPLASTY: ICD-10-CM

## 2023-05-09 DIAGNOSIS — K76.6 PORTAL HYPERTENSION (HCC): ICD-10-CM

## 2023-05-09 DIAGNOSIS — Z98.61 CAD S/P PERCUTANEOUS CORONARY ANGIOPLASTY: ICD-10-CM

## 2023-05-09 DIAGNOSIS — K74.69 OTHER CIRRHOSIS OF LIVER (HCC): ICD-10-CM

## 2023-05-09 PROCEDURE — 99214 OFFICE O/P EST MOD 30 MIN: CPT | Performed by: FAMILY MEDICINE

## 2023-05-09 PROCEDURE — 3077F SYST BP >= 140 MM HG: CPT | Performed by: FAMILY MEDICINE

## 2023-05-09 PROCEDURE — 3046F HEMOGLOBIN A1C LEVEL >9.0%: CPT | Performed by: FAMILY MEDICINE

## 2023-05-09 PROCEDURE — 1123F ACP DISCUSS/DSCN MKR DOCD: CPT | Performed by: FAMILY MEDICINE

## 2023-05-09 PROCEDURE — 3078F DIAST BP <80 MM HG: CPT | Performed by: FAMILY MEDICINE

## 2023-05-09 NOTE — PROGRESS NOTES
Chief Complaint   Patient presents with    Fatigue     Having a lot of fatigue, discuss Chronic conditions, update   Getting infusion for iron       HPI:  Liliane Summers is a 79 y.o. male      Follow up to update me  Seeing multiple specialists   No longer on eliquis  Brillinta, asa    Has been to ER twice in last few weeks  Heart ok     Hgb very low  Getting iron     Following with GI        Pancreatic cancer  Dr. Segun Mercado 4 liver cirrhosis  had Partial Pancreatectomy/splenectomy      Following with endocrinology now     He is avoiding asa/nsaids      Wt Readings from Last 3 Encounters:   05/09/23 200 lb 6.4 oz (90.9 kg)   04/28/23 195 lb (88.5 kg)   04/25/23 198 lb (89.8 kg)       Lab Results   Component Value Date    LABA1C 9.3 (H) 01/30/2023     Lab Results   Component Value Date     07/25/2022               Past Medical History:   Diagnosis Date    Cancer [facility])     Pancreatic    History of skin cancer     HTN (hypertension)     Malignant neoplasm of other parts of pancreas ([facility] [address]     Type II or unspecified type diabetes mellitus without mention of complication, not stated as uncontrolled      Past Surgical History:   Procedure Laterality Date    ABDOMEN SURGERY      COLONOSCOPY  02/20/2015    DR Angelica Roche    COLONOSCOPY N/A 01/20/2020    COLONOSCOPY DIAGNOSTIC performed by Dexter Blanco MD at [address] [place] [address] ([facility]) N/A 12/17/2019    EUS performed by Dexter Blanco MD at [address] [place] [address]  08/04/2022    SPLENECTOMY, PARTIAL  08/04/2022    UPPER GASTROINTESTINAL ENDOSCOPY N/A 04/04/2022    EGD ESOPHAGOGASTRODUODENOSCOPY performed by Dexter Blanco MD at [address] 1/27/2023    EGD DIAGNOSTIC ONLY performed by Dexter Blanco MD at [facility]     Family History   Problem Relation Age of Onset    Cancer Mother         lung cancer    Colon Cancer Neg Hx     Celiac Disease Neg Hx     Crohn's

## 2023-05-10 DIAGNOSIS — D64.9 ANEMIA, UNSPECIFIED TYPE: ICD-10-CM

## 2023-05-10 DIAGNOSIS — E11.9 TYPE 2 DIABETES MELLITUS WITHOUT COMPLICATION, WITHOUT LONG-TERM CURRENT USE OF INSULIN (HCC): ICD-10-CM

## 2023-05-10 DIAGNOSIS — R06.00 DYSPNEA, UNSPECIFIED TYPE: ICD-10-CM

## 2023-05-10 LAB
ANION GAP SERPL CALCULATED.3IONS-SCNC: 9 MEQ/L (ref 9–15)
BUN SERPL-MCNC: 14 MG/DL (ref 8–23)
CALCIUM SERPL-MCNC: 8.9 MG/DL (ref 8.5–9.9)
CHLORIDE SERPL-SCNC: 111 MEQ/L (ref 95–107)
CO2 SERPL-SCNC: 23 MEQ/L (ref 20–31)
CREAT SERPL-MCNC: 0.77 MG/DL (ref 0.7–1.2)
ERYTHROCYTE [DISTWIDTH] IN BLOOD BY AUTOMATED COUNT: 20 % (ref 11.5–14.5)
GLUCOSE SERPL-MCNC: 142 MG/DL (ref 70–99)
HBA1C MFR BLD: 6 % (ref 4.8–5.9)
HCT VFR BLD AUTO: 25.5 % (ref 42–52)
HGB BLD-MCNC: 7.9 G/DL (ref 14–18)
MCH RBC QN AUTO: 29.1 PG (ref 27–31.3)
MCHC RBC AUTO-ENTMCNC: 31.1 % (ref 33–37)
MCV RBC AUTO: 93.6 FL (ref 79–92.2)
PLATELET # BLD AUTO: 449 K/UL (ref 130–400)
POTASSIUM SERPL-SCNC: 4.3 MEQ/L (ref 3.4–4.9)
RBC # BLD AUTO: 2.72 M/UL (ref 4.7–6.1)
SODIUM SERPL-SCNC: 143 MEQ/L (ref 135–144)
WBC # BLD AUTO: 8.9 K/UL (ref 4.8–10.8)

## 2023-05-11 RX ORDER — ESOMEPRAZOLE MAGNESIUM 40 MG/1
CAPSULE, DELAYED RELEASE ORAL
Qty: 30 CAPSULE | Refills: 3 | Status: SHIPPED | OUTPATIENT
Start: 2023-05-11

## 2023-05-12 ENCOUNTER — OFFICE VISIT (OUTPATIENT)
Dept: CARDIOLOGY CLINIC | Age: 68
End: 2023-05-12
Payer: MEDICARE

## 2023-05-12 VITALS
WEIGHT: 203.4 LBS | BODY MASS INDEX: 26.96 KG/M2 | HEIGHT: 73 IN | SYSTOLIC BLOOD PRESSURE: 124 MMHG | HEART RATE: 90 BPM | OXYGEN SATURATION: 99 % | DIASTOLIC BLOOD PRESSURE: 60 MMHG

## 2023-05-12 DIAGNOSIS — I25.83 CORONARY ARTERY DISEASE DUE TO LIPID RICH PLAQUE: ICD-10-CM

## 2023-05-12 DIAGNOSIS — I21.3 ST ELEVATION MYOCARDIAL INFARCTION (STEMI), UNSPECIFIED ARTERY (HCC): Primary | ICD-10-CM

## 2023-05-12 DIAGNOSIS — I25.10 CORONARY ARTERY DISEASE DUE TO LIPID RICH PLAQUE: ICD-10-CM

## 2023-05-12 DIAGNOSIS — I15.9 SECONDARY HYPERTENSION: ICD-10-CM

## 2023-05-12 PROCEDURE — 3078F DIAST BP <80 MM HG: CPT | Performed by: INTERNAL MEDICINE

## 2023-05-12 PROCEDURE — 3074F SYST BP LT 130 MM HG: CPT | Performed by: INTERNAL MEDICINE

## 2023-05-12 PROCEDURE — 1123F ACP DISCUSS/DSCN MKR DOCD: CPT | Performed by: INTERNAL MEDICINE

## 2023-05-12 PROCEDURE — 99213 OFFICE O/P EST LOW 20 MIN: CPT | Performed by: INTERNAL MEDICINE

## 2023-05-12 RX ORDER — FUROSEMIDE 20 MG/1
40 TABLET ORAL DAILY
Qty: 180 TABLET | Refills: 5 | Status: SHIPPED | OUTPATIENT
Start: 2023-05-12

## 2023-05-12 ASSESSMENT — ENCOUNTER SYMPTOMS
DIARRHEA: 0
WHEEZING: 0
VOMITING: 0
COLOR CHANGE: 0
CHEST TIGHTNESS: 0
ABDOMINAL PAIN: 0
NAUSEA: 0
APNEA: 0
RHINORRHEA: 0
EYE REDNESS: 0
CONSTIPATION: 0
COUGH: 0
SHORTNESS OF BREATH: 1

## 2023-05-12 NOTE — PROGRESS NOTES
Chief Complaint   Patient presents with    Swelling     Feet x3 weeks. Every evening have been swollen states right now he can tell they are swollen as well. Shortness of Breath     X3 weeks. Went to the ED for gallbladder, sent for surgery to remove due to reecent heart attack they did not complete the surgery. Other     Iron infusions takes the 3rd on Monday. Feels the same way he did when he had his Heart attack, some chest pains due to stress. Sx have gotten better, feels weak they say due to low iron. Patient presents for initial medical evaluation. Patient is followed on a regular basis by Dr. Nicky Banerjee MD.     CAD 1/20/2023 STEMI successful PCI of the proximal and mid LAD with ZAKI x3.  2/16/2023 PCI to the distal RCA ZAKI x2, IFR of the circumflex negative (0.94)  Ischemic cardiomyopathy EF 40 to 45% by TTE 1/30/2023  Hypertension  Hyperlipidemia  Diabetes  Pancreatic cancer on chemotherapy, neuroendocrine tumor status post resection August 4, 2022 at [facility]  Liver cirrhosis  Portal vein thrombosis on Eliquis  TTE EF 65% on 1/10/2023 at [facility]  TTE 12/11/2019 EF 65%  TTE 4/15/2022 EF 65%  TTE 1/30/2023 EF 40 to 45%  Nuclear stress test 5/11/2022 EF 60% no evidence of ischemia or infarct  No prior coronary angiograms  =======================  5/12/2023  Follow-up on CAD  Patient comes with wife for this appointment  Patient had called in the office because of generalized weakness patient. Patient was found with low hemoglobin. Currently undergoing IV iron infusions.   So far he has had 2  Patient reports that overall he is feeling better  Lasix was also increased from 20 mg daily to 40 mg daily    3/3/2023  Follow-up on CAD  Last month patient returned to the Cath Lab for planned PCI of the RCA and IFR of the circumflex  Patient underwent PCI to the distal RCA with ZAKI x2  IFR of the circumflex was negative at 0.94  Today patient reports feeling well, states that now he is able to do activities that

## 2023-05-15 ENCOUNTER — HOSPITAL ENCOUNTER (OUTPATIENT)
Dept: INFUSION THERAPY | Age: 68
Setting detail: INFUSION SERIES
Discharge: HOME | End: 2023-05-15
Payer: MEDICARE

## 2023-05-15 VITALS — DIASTOLIC BLOOD PRESSURE: 69 MMHG | HEART RATE: 78 BPM | TEMPERATURE: 98.1 F | SYSTOLIC BLOOD PRESSURE: 127 MMHG

## 2023-05-15 DIAGNOSIS — D50.0 IRON DEFICIENCY ANEMIA DUE TO CHRONIC BLOOD LOSS: ICD-10-CM

## 2023-05-15 DIAGNOSIS — I25.10 CORONARY ARTERY DISEASE DUE TO LIPID RICH PLAQUE: Primary | ICD-10-CM

## 2023-05-15 DIAGNOSIS — I25.5 ISCHEMIC CARDIOMYOPATHY: ICD-10-CM

## 2023-05-15 DIAGNOSIS — I25.83 CORONARY ARTERY DISEASE DUE TO LIPID RICH PLAQUE: Primary | ICD-10-CM

## 2023-05-15 DIAGNOSIS — Z90.410 HISTORY OF PANCREATECTOMY: ICD-10-CM

## 2023-05-15 DIAGNOSIS — K21.9 GASTROESOPHAGEAL REFLUX DISEASE WITHOUT ESOPHAGITIS: ICD-10-CM

## 2023-05-15 DIAGNOSIS — I21.3 ST ELEVATION MYOCARDIAL INFARCTION (STEMI), UNSPECIFIED ARTERY (HCC): ICD-10-CM

## 2023-05-15 DIAGNOSIS — K74.69 OTHER CIRRHOSIS OF LIVER (HCC): ICD-10-CM

## 2023-05-15 PROCEDURE — 2580000003 HC RX 258: Performed by: NURSE PRACTITIONER

## 2023-05-15 PROCEDURE — 6360000002 HC RX W HCPCS: Performed by: NURSE PRACTITIONER

## 2023-05-15 PROCEDURE — 96365 THER/PROPH/DIAG IV INF INIT: CPT

## 2023-05-15 RX ORDER — DIPHENHYDRAMINE HYDROCHLORIDE 50 MG/ML
50 INJECTION INTRAMUSCULAR; INTRAVENOUS
Status: CANCELLED | OUTPATIENT
Start: 2023-05-22

## 2023-05-15 RX ORDER — ALBUTEROL SULFATE 90 UG/1
4 AEROSOL, METERED RESPIRATORY (INHALATION) PRN
Status: CANCELLED | OUTPATIENT
Start: 2023-05-22

## 2023-05-15 RX ORDER — SODIUM CHLORIDE 9 MG/ML
INJECTION, SOLUTION INTRAVENOUS CONTINUOUS
Status: CANCELLED | OUTPATIENT
Start: 2023-05-22

## 2023-05-15 RX ORDER — FAMOTIDINE 10 MG/ML
20 INJECTION, SOLUTION INTRAVENOUS
Status: CANCELLED | OUTPATIENT
Start: 2023-05-22

## 2023-05-15 RX ORDER — ACETAMINOPHEN 325 MG/1
650 TABLET ORAL
Status: CANCELLED | OUTPATIENT
Start: 2023-05-22

## 2023-05-15 RX ORDER — ONDANSETRON 2 MG/ML
8 INJECTION INTRAMUSCULAR; INTRAVENOUS
Status: CANCELLED | OUTPATIENT
Start: 2023-05-22

## 2023-05-15 RX ORDER — SODIUM CHLORIDE 9 MG/ML
5-250 INJECTION, SOLUTION INTRAVENOUS PRN
Status: DISCONTINUED | OUTPATIENT
Start: 2023-05-15 | End: 2023-05-16 | Stop reason: HOSPADM

## 2023-05-15 RX ORDER — SODIUM CHLORIDE 9 MG/ML
5-250 INJECTION, SOLUTION INTRAVENOUS PRN
Status: CANCELLED | OUTPATIENT
Start: 2023-05-22

## 2023-05-15 RX ORDER — SODIUM CHLORIDE 0.9 % (FLUSH) 0.9 %
5-40 SYRINGE (ML) INJECTION PRN
Status: CANCELLED | OUTPATIENT
Start: 2023-05-22

## 2023-05-15 RX ORDER — EPINEPHRINE 1 MG/ML
0.3 INJECTION, SOLUTION, CONCENTRATE INTRAVENOUS PRN
Status: CANCELLED | OUTPATIENT
Start: 2023-05-22

## 2023-05-15 RX ORDER — HEPARIN SODIUM (PORCINE) LOCK FLUSH IV SOLN 100 UNIT/ML 100 UNIT/ML
500 SOLUTION INTRAVENOUS PRN
Status: CANCELLED | OUTPATIENT
Start: 2023-05-22

## 2023-05-15 RX ADMIN — IRON SUCROSE 200 MG: 20 INJECTION, SOLUTION INTRAVENOUS at 10:05

## 2023-05-22 ENCOUNTER — HOSPITAL ENCOUNTER (OUTPATIENT)
Dept: INFUSION THERAPY | Age: 68
Setting detail: INFUSION SERIES
Discharge: HOME | End: 2023-05-22
Payer: MEDICARE

## 2023-05-22 VITALS
HEART RATE: 82 BPM | TEMPERATURE: 98 F | DIASTOLIC BLOOD PRESSURE: 69 MMHG | SYSTOLIC BLOOD PRESSURE: 144 MMHG | RESPIRATION RATE: 18 BRPM

## 2023-05-22 DIAGNOSIS — Z90.410 HISTORY OF PANCREATECTOMY: ICD-10-CM

## 2023-05-22 DIAGNOSIS — I21.3 ST ELEVATION MYOCARDIAL INFARCTION (STEMI), UNSPECIFIED ARTERY (HCC): ICD-10-CM

## 2023-05-22 DIAGNOSIS — K21.9 GASTROESOPHAGEAL REFLUX DISEASE WITHOUT ESOPHAGITIS: ICD-10-CM

## 2023-05-22 DIAGNOSIS — D50.0 IRON DEFICIENCY ANEMIA DUE TO CHRONIC BLOOD LOSS: ICD-10-CM

## 2023-05-22 DIAGNOSIS — I25.83 CORONARY ARTERY DISEASE DUE TO LIPID RICH PLAQUE: Primary | ICD-10-CM

## 2023-05-22 DIAGNOSIS — K74.69 OTHER CIRRHOSIS OF LIVER (HCC): ICD-10-CM

## 2023-05-22 DIAGNOSIS — I25.5 ISCHEMIC CARDIOMYOPATHY: ICD-10-CM

## 2023-05-22 DIAGNOSIS — I25.10 CORONARY ARTERY DISEASE DUE TO LIPID RICH PLAQUE: Primary | ICD-10-CM

## 2023-05-22 PROCEDURE — 96365 THER/PROPH/DIAG IV INF INIT: CPT

## 2023-05-22 PROCEDURE — 6360000002 HC RX W HCPCS: Performed by: NURSE PRACTITIONER

## 2023-05-22 PROCEDURE — 2580000003 HC RX 258: Performed by: NURSE PRACTITIONER

## 2023-05-22 RX ORDER — SODIUM CHLORIDE 9 MG/ML
INJECTION, SOLUTION INTRAVENOUS CONTINUOUS
Status: CANCELLED | OUTPATIENT
Start: 2023-05-29

## 2023-05-22 RX ORDER — HEPARIN SODIUM (PORCINE) LOCK FLUSH IV SOLN 100 UNIT/ML 100 UNIT/ML
500 SOLUTION INTRAVENOUS PRN
Status: CANCELLED | OUTPATIENT
Start: 2023-05-29

## 2023-05-22 RX ORDER — ALBUTEROL SULFATE 90 UG/1
4 AEROSOL, METERED RESPIRATORY (INHALATION) PRN
Status: CANCELLED | OUTPATIENT
Start: 2023-05-29

## 2023-05-22 RX ORDER — SODIUM CHLORIDE 9 MG/ML
5-250 INJECTION, SOLUTION INTRAVENOUS PRN
Status: CANCELLED | OUTPATIENT
Start: 2023-05-29

## 2023-05-22 RX ORDER — ACETAMINOPHEN 325 MG/1
650 TABLET ORAL
Status: CANCELLED | OUTPATIENT
Start: 2023-05-29

## 2023-05-22 RX ORDER — FAMOTIDINE 10 MG/ML
20 INJECTION, SOLUTION INTRAVENOUS
Status: CANCELLED | OUTPATIENT
Start: 2023-05-29

## 2023-05-22 RX ORDER — SODIUM CHLORIDE 0.9 % (FLUSH) 0.9 %
5-40 SYRINGE (ML) INJECTION PRN
Status: CANCELLED | OUTPATIENT
Start: 2023-05-29

## 2023-05-22 RX ORDER — ONDANSETRON 2 MG/ML
8 INJECTION INTRAMUSCULAR; INTRAVENOUS
Status: CANCELLED | OUTPATIENT
Start: 2023-05-29

## 2023-05-22 RX ORDER — DIPHENHYDRAMINE HYDROCHLORIDE 50 MG/ML
50 INJECTION INTRAMUSCULAR; INTRAVENOUS
Status: CANCELLED | OUTPATIENT
Start: 2023-05-29

## 2023-05-22 RX ORDER — EPINEPHRINE 1 MG/ML
0.3 INJECTION, SOLUTION, CONCENTRATE INTRAVENOUS PRN
Status: CANCELLED | OUTPATIENT
Start: 2023-05-29

## 2023-05-22 RX ADMIN — IRON SUCROSE 200 MG: 20 INJECTION, SOLUTION INTRAVENOUS at 13:46

## 2023-05-22 NOTE — FLOWSHEET NOTE
Infusion is complete. Tolerated well. Left the floor ambulatory. All equipment used in the care for this patient has been cleaned.

## 2023-05-22 NOTE — FLOWSHEET NOTE
Patient to the floor ambulatory for his iron infusion. Vital signs taken. Denies any discomfort. Call light within reach.

## 2023-05-27 RX ORDER — ATORVASTATIN CALCIUM 80 MG/1
80 TABLET, FILM COATED ORAL NIGHTLY
Qty: 90 TABLET | Refills: 3 | Status: SHIPPED | OUTPATIENT
Start: 2023-05-27

## 2023-05-27 RX ORDER — METOPROLOL SUCCINATE 50 MG/1
50 TABLET, EXTENDED RELEASE ORAL EVERY EVENING
Qty: 90 TABLET | Refills: 3 | Status: SHIPPED | OUTPATIENT
Start: 2023-05-27

## 2023-05-30 ENCOUNTER — HOSPITAL ENCOUNTER (OUTPATIENT)
Dept: INFUSION THERAPY | Age: 68
Setting detail: INFUSION SERIES
Discharge: HOME | End: 2023-05-30
Payer: MEDICARE

## 2023-05-30 VITALS
TEMPERATURE: 97.8 F | SYSTOLIC BLOOD PRESSURE: 138 MMHG | DIASTOLIC BLOOD PRESSURE: 67 MMHG | RESPIRATION RATE: 18 BRPM | HEART RATE: 74 BPM

## 2023-05-30 DIAGNOSIS — Z90.410 HISTORY OF PANCREATECTOMY: ICD-10-CM

## 2023-05-30 DIAGNOSIS — K21.9 GASTROESOPHAGEAL REFLUX DISEASE WITHOUT ESOPHAGITIS: ICD-10-CM

## 2023-05-30 DIAGNOSIS — K74.69 OTHER CIRRHOSIS OF LIVER (HCC): ICD-10-CM

## 2023-05-30 DIAGNOSIS — I25.5 ISCHEMIC CARDIOMYOPATHY: ICD-10-CM

## 2023-05-30 DIAGNOSIS — D50.0 IRON DEFICIENCY ANEMIA DUE TO CHRONIC BLOOD LOSS: ICD-10-CM

## 2023-05-30 DIAGNOSIS — I25.83 CORONARY ARTERY DISEASE DUE TO LIPID RICH PLAQUE: Primary | ICD-10-CM

## 2023-05-30 DIAGNOSIS — I25.10 CORONARY ARTERY DISEASE DUE TO LIPID RICH PLAQUE: Primary | ICD-10-CM

## 2023-05-30 DIAGNOSIS — I21.3 ST ELEVATION MYOCARDIAL INFARCTION (STEMI), UNSPECIFIED ARTERY (HCC): ICD-10-CM

## 2023-05-30 PROCEDURE — 6360000002 HC RX W HCPCS: Performed by: NURSE PRACTITIONER

## 2023-05-30 PROCEDURE — 96365 THER/PROPH/DIAG IV INF INIT: CPT

## 2023-05-30 PROCEDURE — 2580000003 HC RX 258: Performed by: NURSE PRACTITIONER

## 2023-05-30 RX ORDER — FAMOTIDINE 10 MG/ML
20 INJECTION, SOLUTION INTRAVENOUS
Status: CANCELLED | OUTPATIENT
Start: 2023-06-05

## 2023-05-30 RX ORDER — ONDANSETRON 2 MG/ML
8 INJECTION INTRAMUSCULAR; INTRAVENOUS
Status: CANCELLED | OUTPATIENT
Start: 2023-06-05

## 2023-05-30 RX ORDER — HEPARIN SODIUM (PORCINE) LOCK FLUSH IV SOLN 100 UNIT/ML 100 UNIT/ML
500 SOLUTION INTRAVENOUS PRN
Status: CANCELLED | OUTPATIENT
Start: 2023-06-05

## 2023-05-30 RX ORDER — EPINEPHRINE 1 MG/ML
0.3 INJECTION, SOLUTION, CONCENTRATE INTRAVENOUS PRN
Status: CANCELLED | OUTPATIENT
Start: 2023-06-05

## 2023-05-30 RX ORDER — ALBUTEROL SULFATE 90 UG/1
4 AEROSOL, METERED RESPIRATORY (INHALATION) PRN
Status: CANCELLED | OUTPATIENT
Start: 2023-06-05

## 2023-05-30 RX ORDER — SODIUM CHLORIDE 9 MG/ML
5-250 INJECTION, SOLUTION INTRAVENOUS PRN
Status: CANCELLED | OUTPATIENT
Start: 2023-06-05

## 2023-05-30 RX ORDER — ACETAMINOPHEN 325 MG/1
650 TABLET ORAL
Status: CANCELLED | OUTPATIENT
Start: 2023-06-05

## 2023-05-30 RX ORDER — SODIUM CHLORIDE 9 MG/ML
INJECTION, SOLUTION INTRAVENOUS CONTINUOUS
Status: CANCELLED | OUTPATIENT
Start: 2023-06-05

## 2023-05-30 RX ORDER — SODIUM CHLORIDE 0.9 % (FLUSH) 0.9 %
5-40 SYRINGE (ML) INJECTION PRN
Status: CANCELLED | OUTPATIENT
Start: 2023-06-05

## 2023-05-30 RX ORDER — DIPHENHYDRAMINE HYDROCHLORIDE 50 MG/ML
50 INJECTION INTRAMUSCULAR; INTRAVENOUS
Status: CANCELLED | OUTPATIENT
Start: 2023-06-05

## 2023-05-30 RX ADMIN — IRON SUCROSE 200 MG: 20 INJECTION, SOLUTION INTRAVENOUS at 13:05

## 2023-06-05 ENCOUNTER — HOSPITAL ENCOUNTER (OUTPATIENT)
Dept: INFUSION THERAPY | Age: 68
Setting detail: INFUSION SERIES
Discharge: HOME | End: 2023-06-05
Payer: MEDICARE

## 2023-06-05 VITALS
SYSTOLIC BLOOD PRESSURE: 146 MMHG | HEART RATE: 68 BPM | TEMPERATURE: 97.9 F | RESPIRATION RATE: 18 BRPM | DIASTOLIC BLOOD PRESSURE: 74 MMHG

## 2023-06-05 DIAGNOSIS — Z90.410 HISTORY OF PANCREATECTOMY: ICD-10-CM

## 2023-06-05 DIAGNOSIS — I25.5 ISCHEMIC CARDIOMYOPATHY: ICD-10-CM

## 2023-06-05 DIAGNOSIS — D50.0 IRON DEFICIENCY ANEMIA DUE TO CHRONIC BLOOD LOSS: ICD-10-CM

## 2023-06-05 DIAGNOSIS — I25.83 CORONARY ARTERY DISEASE DUE TO LIPID RICH PLAQUE: Primary | ICD-10-CM

## 2023-06-05 DIAGNOSIS — K21.9 GASTROESOPHAGEAL REFLUX DISEASE WITHOUT ESOPHAGITIS: ICD-10-CM

## 2023-06-05 DIAGNOSIS — K74.69 OTHER CIRRHOSIS OF LIVER (HCC): ICD-10-CM

## 2023-06-05 DIAGNOSIS — I25.10 CORONARY ARTERY DISEASE DUE TO LIPID RICH PLAQUE: Primary | ICD-10-CM

## 2023-06-05 DIAGNOSIS — I21.3 ST ELEVATION MYOCARDIAL INFARCTION (STEMI), UNSPECIFIED ARTERY (HCC): ICD-10-CM

## 2023-06-05 PROCEDURE — 96365 THER/PROPH/DIAG IV INF INIT: CPT

## 2023-06-05 PROCEDURE — 2580000003 HC RX 258: Performed by: NURSE PRACTITIONER

## 2023-06-05 PROCEDURE — 6360000002 HC RX W HCPCS: Performed by: NURSE PRACTITIONER

## 2023-06-05 RX ORDER — SODIUM CHLORIDE 0.9 % (FLUSH) 0.9 %
5-40 SYRINGE (ML) INJECTION PRN
Status: CANCELLED | OUTPATIENT
Start: 2023-06-05

## 2023-06-05 RX ORDER — SODIUM CHLORIDE 9 MG/ML
5-250 INJECTION, SOLUTION INTRAVENOUS PRN
Status: CANCELLED | OUTPATIENT
Start: 2023-06-05

## 2023-06-05 RX ORDER — FAMOTIDINE 10 MG/ML
20 INJECTION, SOLUTION INTRAVENOUS
Status: CANCELLED | OUTPATIENT
Start: 2023-06-05

## 2023-06-05 RX ORDER — ALBUTEROL SULFATE 90 UG/1
4 AEROSOL, METERED RESPIRATORY (INHALATION) PRN
Status: CANCELLED | OUTPATIENT
Start: 2023-06-05

## 2023-06-05 RX ORDER — SODIUM CHLORIDE 9 MG/ML
INJECTION, SOLUTION INTRAVENOUS CONTINUOUS
Status: CANCELLED | OUTPATIENT
Start: 2023-06-05

## 2023-06-05 RX ORDER — ONDANSETRON 2 MG/ML
8 INJECTION INTRAMUSCULAR; INTRAVENOUS
Status: CANCELLED | OUTPATIENT
Start: 2023-06-05

## 2023-06-05 RX ORDER — ACETAMINOPHEN 325 MG/1
650 TABLET ORAL
Status: CANCELLED | OUTPATIENT
Start: 2023-06-05

## 2023-06-05 RX ORDER — HEPARIN SODIUM (PORCINE) LOCK FLUSH IV SOLN 100 UNIT/ML 100 UNIT/ML
500 SOLUTION INTRAVENOUS PRN
Status: CANCELLED | OUTPATIENT
Start: 2023-06-05

## 2023-06-05 RX ORDER — EPINEPHRINE 1 MG/ML
0.3 INJECTION, SOLUTION, CONCENTRATE INTRAVENOUS PRN
Status: CANCELLED | OUTPATIENT
Start: 2023-06-05

## 2023-06-05 RX ORDER — SODIUM CHLORIDE 9 MG/ML
5-250 INJECTION, SOLUTION INTRAVENOUS PRN
Status: DISCONTINUED | OUTPATIENT
Start: 2023-06-05 | End: 2023-06-05

## 2023-06-05 RX ORDER — DIPHENHYDRAMINE HYDROCHLORIDE 50 MG/ML
50 INJECTION INTRAMUSCULAR; INTRAVENOUS
Status: CANCELLED | OUTPATIENT
Start: 2023-06-05

## 2023-06-05 RX ADMIN — IRON SUCROSE 200 MG: 20 INJECTION, SOLUTION INTRAVENOUS at 11:16

## 2023-06-05 NOTE — PROGRESS NOTES
Patient to the floor ambulatory for infusion. Pt made comfortable in chair. Call light within reach.

## 2023-06-05 NOTE — PROGRESS NOTES
Pt left unit. AVS given. Pt states no c/o. All equipment used in the care for this patient has been cleaned.

## 2023-06-08 DIAGNOSIS — D50.9 IRON DEFICIENCY ANEMIA, UNSPECIFIED IRON DEFICIENCY ANEMIA TYPE: ICD-10-CM

## 2023-06-08 DIAGNOSIS — E11.65 INADEQUATELY CONTROLLED DIABETES MELLITUS (HCC): ICD-10-CM

## 2023-06-08 LAB
ANION GAP SERPL CALCULATED.3IONS-SCNC: 10 MEQ/L (ref 9–15)
BUN SERPL-MCNC: 10 MG/DL (ref 8–23)
CALCIUM SERPL-MCNC: 9.2 MG/DL (ref 8.5–9.9)
CHLORIDE SERPL-SCNC: 108 MEQ/L (ref 95–107)
CHOLEST SERPL-MCNC: 121 MG/DL (ref 0–199)
CO2 SERPL-SCNC: 26 MEQ/L (ref 20–31)
CREAT SERPL-MCNC: 0.76 MG/DL (ref 0.7–1.2)
ERYTHROCYTE [DISTWIDTH] IN BLOOD BY AUTOMATED COUNT: 17.9 % (ref 11.5–14.5)
GLUCOSE SERPL-MCNC: 89 MG/DL (ref 70–99)
HBA1C MFR BLD: 6.5 % (ref 4.8–5.9)
HCT VFR BLD AUTO: 36.1 % (ref 42–52)
HDLC SERPL-MCNC: 62 MG/DL (ref 40–59)
HGB BLD-MCNC: 11.3 G/DL (ref 14–18)
LDLC SERPL CALC-MCNC: 50 MG/DL (ref 0–129)
MCH RBC QN AUTO: 28.6 PG (ref 27–31.3)
MCHC RBC AUTO-ENTMCNC: 31.2 % (ref 33–37)
MCV RBC AUTO: 91.4 FL (ref 79–92.2)
PLATELET # BLD AUTO: 363 K/UL (ref 130–400)
POTASSIUM SERPL-SCNC: 4.4 MEQ/L (ref 3.4–4.9)
RBC # BLD AUTO: 3.95 M/UL (ref 4.7–6.1)
SODIUM SERPL-SCNC: 144 MEQ/L (ref 135–144)
TRIGL SERPL-MCNC: 46 MG/DL (ref 0–150)
WBC # BLD AUTO: 8.6 K/UL (ref 4.8–10.8)

## 2023-06-19 NOTE — TELEPHONE ENCOUNTER
PATIENT CALLED OFFICE REQUESTING SAMPLES OF BRILINTA    PLEASE CALL BACK WHEN SAMPLES READY    PATIENT WOULD LIKE A CALL BACK TODAY TO LET HIM KNOW IF WE HAVE IT OR NOT

## 2023-07-03 DIAGNOSIS — D50.9 IRON DEFICIENCY ANEMIA, UNSPECIFIED IRON DEFICIENCY ANEMIA TYPE: ICD-10-CM

## 2023-07-03 LAB
ERYTHROCYTE [DISTWIDTH] IN BLOOD BY AUTOMATED COUNT: 17.1 % (ref 11.5–14.5)
HCT VFR BLD AUTO: 38.3 % (ref 42–52)
HGB BLD-MCNC: 12.5 G/DL (ref 14–18)
MCH RBC QN AUTO: 29 PG (ref 27–31.3)
MCHC RBC AUTO-ENTMCNC: 32.6 % (ref 33–37)
MCV RBC AUTO: 89 FL (ref 79–92.2)
PLATELET # BLD AUTO: 321 K/UL (ref 130–400)
RBC # BLD AUTO: 4.31 M/UL (ref 4.7–6.1)
WBC # BLD AUTO: 9.7 K/UL (ref 4.8–10.8)

## 2023-07-11 ENCOUNTER — OFFICE VISIT (OUTPATIENT)
Dept: GASTROENTEROLOGY | Age: 68
End: 2023-07-11
Payer: MEDICARE

## 2023-07-11 VITALS — WEIGHT: 193 LBS | HEART RATE: 62 BPM | OXYGEN SATURATION: 99 % | BODY MASS INDEX: 25.58 KG/M2 | HEIGHT: 73 IN

## 2023-07-11 DIAGNOSIS — K21.9 GASTROESOPHAGEAL REFLUX DISEASE WITHOUT ESOPHAGITIS: ICD-10-CM

## 2023-07-11 DIAGNOSIS — D3A.8 NEUROENDOCRINE TUMOR OF PANCREAS: Primary | ICD-10-CM

## 2023-07-11 DIAGNOSIS — D50.9 IRON DEFICIENCY ANEMIA, UNSPECIFIED IRON DEFICIENCY ANEMIA TYPE: ICD-10-CM

## 2023-07-11 DIAGNOSIS — Z90.410 HISTORY OF PANCREATECTOMY: ICD-10-CM

## 2023-07-11 DIAGNOSIS — Z90.81 H/O SPLENECTOMY: ICD-10-CM

## 2023-07-11 DIAGNOSIS — K74.69 OTHER CIRRHOSIS OF LIVER (HCC): ICD-10-CM

## 2023-07-11 DIAGNOSIS — Z86.2 HISTORY OF ANEMIA: ICD-10-CM

## 2023-07-11 PROCEDURE — 1123F ACP DISCUSS/DSCN MKR DOCD: CPT | Performed by: INTERNAL MEDICINE

## 2023-07-11 PROCEDURE — 99214 OFFICE O/P EST MOD 30 MIN: CPT | Performed by: INTERNAL MEDICINE

## 2023-07-11 ASSESSMENT — ENCOUNTER SYMPTOMS
ANAL BLEEDING: 0
VOICE CHANGE: 0
BLOOD IN STOOL: 0
CHOKING: 0
DIARRHEA: 0
RECTAL PAIN: 0
TROUBLE SWALLOWING: 0
CONSTIPATION: 0
ABDOMINAL PAIN: 0
NAUSEA: 0
VOMITING: 0
ABDOMINAL DISTENTION: 0
EYES NEGATIVE: 1

## 2023-07-14 DIAGNOSIS — Z86.2 HISTORY OF ANEMIA: ICD-10-CM

## 2023-07-14 LAB
ALBUMIN SERPL-MCNC: 3.4 G/DL (ref 3.5–4.6)
ALP SERPL-CCNC: 151 U/L (ref 35–104)
ALT SERPL-CCNC: 55 U/L (ref 0–41)
ANION GAP SERPL CALCULATED.3IONS-SCNC: 9 MEQ/L (ref 9–15)
AST SERPL-CCNC: 52 U/L (ref 0–40)
BILIRUB SERPL-MCNC: 0.7 MG/DL (ref 0.2–0.7)
BUN SERPL-MCNC: 12 MG/DL (ref 8–23)
CALCIUM SERPL-MCNC: 9.4 MG/DL (ref 8.5–9.9)
CHLORIDE SERPL-SCNC: 105 MEQ/L (ref 95–107)
CO2 SERPL-SCNC: 25 MEQ/L (ref 20–31)
CREAT SERPL-MCNC: 0.72 MG/DL (ref 0.7–1.2)
GLOBULIN SER CALC-MCNC: 4.2 G/DL (ref 2.3–3.5)
GLUCOSE SERPL-MCNC: 222 MG/DL (ref 70–99)
POTASSIUM SERPL-SCNC: 4.6 MEQ/L (ref 3.4–4.9)
PROT SERPL-MCNC: 7.6 G/DL (ref 6.3–8)
SODIUM SERPL-SCNC: 139 MEQ/L (ref 135–144)

## 2023-07-14 RX ORDER — FERROUS SULFATE 325(65) MG
TABLET ORAL
Qty: 90 TABLET | Refills: 3 | Status: SHIPPED | OUTPATIENT
Start: 2023-07-14

## 2023-07-15 LAB
IRON SATURATION: 21 % (ref 20–55)
IRON: 55 UG/DL (ref 59–158)
TOTAL IRON BINDING CAPACITY: 265 UG/DL (ref 250–450)
UNSATURATED IRON BINDING CAPACITY: 210 UG/DL (ref 112–347)

## 2023-07-17 NOTE — TELEPHONE ENCOUNTER
Pt states that his dermatologist office do not send clearance forms. Pt needs to know when he should stop taking blood thinners?     Skin removal behind the left ear    Pt # [phone]

## 2023-07-19 NOTE — TELEPHONE ENCOUNTER
From cardiology standpoint patient is not cleared to stop Brilinta given recent PCI    Patient had a major heart attack January 2023

## 2023-07-19 NOTE — TELEPHONE ENCOUNTER
Patient aware of message, states dermatology office said they have to do the procedure asap. Please advise.

## 2023-07-19 NOTE — TELEPHONE ENCOUNTER
Patient should not stop Brilinta until he completes 1 year of therapy    Stop date February 2024    For this reason no procedures should be performed unless they are life-threatening  Thanks

## 2023-07-24 LAB
ALANINE AMINOTRANSFERASE (SGPT) (U/L) IN SER/PLAS: 42 U/L (ref 10–52)
ALBUMIN (G/DL) IN SER/PLAS: 3.7 G/DL (ref 3.4–5)
ALKALINE PHOSPHATASE (U/L) IN SER/PLAS: 141 U/L (ref 33–136)
ANION GAP IN SER/PLAS: 10 MMOL/L (ref 10–20)
ASPARTATE AMINOTRANSFERASE (SGOT) (U/L) IN SER/PLAS: 46 U/L (ref 9–39)
BASOPHILS (10*3/UL) IN BLOOD BY AUTOMATED COUNT: 0.09 X10E9/L (ref 0–0.1)
BASOPHILS/100 LEUKOCYTES IN BLOOD BY AUTOMATED COUNT: 1 % (ref 0–2)
BILIRUBIN TOTAL (MG/DL) IN SER/PLAS: 0.9 MG/DL (ref 0–1.2)
CALCIUM (MG/DL) IN SER/PLAS: 9.4 MG/DL (ref 8.6–10.3)
CARBON DIOXIDE, TOTAL (MMOL/L) IN SER/PLAS: 30 MMOL/L (ref 21–32)
CHLORIDE (MMOL/L) IN SER/PLAS: 105 MMOL/L (ref 98–107)
CREATININE (MG/DL) IN SER/PLAS: 0.86 MG/DL (ref 0.5–1.3)
EOSINOPHILS (10*3/UL) IN BLOOD BY AUTOMATED COUNT: 0.38 X10E9/L (ref 0–0.7)
EOSINOPHILS/100 LEUKOCYTES IN BLOOD BY AUTOMATED COUNT: 4.3 % (ref 0–6)
ERYTHROCYTE DISTRIBUTION WIDTH (RATIO) BY AUTOMATED COUNT: 16.8 % (ref 11.5–14.5)
ERYTHROCYTE MEAN CORPUSCULAR HEMOGLOBIN CONCENTRATION (G/DL) BY AUTOMATED: 31.8 G/DL (ref 32–36)
ERYTHROCYTE MEAN CORPUSCULAR VOLUME (FL) BY AUTOMATED COUNT: 90 FL (ref 80–100)
ERYTHROCYTES (10*6/UL) IN BLOOD BY AUTOMATED COUNT: 4.42 X10E12/L (ref 4.5–5.9)
GFR MALE: >90 ML/MIN/1.73M2
GLUCOSE (MG/DL) IN SER/PLAS: 183 MG/DL (ref 74–99)
HEMATOCRIT (%) IN BLOOD BY AUTOMATED COUNT: 39.6 % (ref 41–52)
HEMOGLOBIN (G/DL) IN BLOOD: 12.6 G/DL (ref 13.5–17.5)
IMMATURE GRANULOCYTES/100 LEUKOCYTES IN BLOOD BY AUTOMATED COUNT: 0.2 % (ref 0–0.9)
LEUKOCYTES (10*3/UL) IN BLOOD BY AUTOMATED COUNT: 8.9 X10E9/L (ref 4.4–11.3)
LYMPHOCYTES (10*3/UL) IN BLOOD BY AUTOMATED COUNT: 2.15 X10E9/L (ref 1.2–4.8)
LYMPHOCYTES/100 LEUKOCYTES IN BLOOD BY AUTOMATED COUNT: 24.1 % (ref 13–44)
MONOCYTES (10*3/UL) IN BLOOD BY AUTOMATED COUNT: 1.27 X10E9/L (ref 0.1–1)
MONOCYTES/100 LEUKOCYTES IN BLOOD BY AUTOMATED COUNT: 14.2 % (ref 2–10)
NEUTROPHILS (10*3/UL) IN BLOOD BY AUTOMATED COUNT: 5.02 X10E9/L (ref 1.2–7.7)
NEUTROPHILS/100 LEUKOCYTES IN BLOOD BY AUTOMATED COUNT: 56.2 % (ref 40–80)
PLATELETS (10*3/UL) IN BLOOD AUTOMATED COUNT: 316 X10E9/L (ref 150–450)
POTASSIUM (MMOL/L) IN SER/PLAS: 3.8 MMOL/L (ref 3.5–5.3)
PROTEIN TOTAL: 7.8 G/DL (ref 6.4–8.2)
SODIUM (MMOL/L) IN SER/PLAS: 141 MMOL/L (ref 136–145)
UREA NITROGEN (MG/DL) IN SER/PLAS: 8 MG/DL (ref 6–23)

## 2023-08-07 ENCOUNTER — TELEPHONE (OUTPATIENT)
Dept: CARDIOLOGY CLINIC | Age: 68
End: 2023-08-07

## 2023-08-25 ENCOUNTER — OFFICE VISIT (OUTPATIENT)
Dept: CARDIOLOGY CLINIC | Age: 68
End: 2023-08-25
Payer: MEDICARE

## 2023-08-25 ENCOUNTER — TELEPHONE (OUTPATIENT)
Dept: CARDIOLOGY CLINIC | Age: 68
End: 2023-08-25

## 2023-08-25 VITALS
HEIGHT: 73 IN | RESPIRATION RATE: 15 BRPM | DIASTOLIC BLOOD PRESSURE: 78 MMHG | BODY MASS INDEX: 26.64 KG/M2 | SYSTOLIC BLOOD PRESSURE: 132 MMHG | WEIGHT: 201 LBS | HEART RATE: 83 BPM | OXYGEN SATURATION: 96 %

## 2023-08-25 DIAGNOSIS — I21.3 ST ELEVATION MYOCARDIAL INFARCTION (STEMI), UNSPECIFIED ARTERY (HCC): ICD-10-CM

## 2023-08-25 DIAGNOSIS — I42.9 CARDIOMYOPATHY, UNSPECIFIED TYPE (HCC): Primary | ICD-10-CM

## 2023-08-25 PROCEDURE — 99214 OFFICE O/P EST MOD 30 MIN: CPT | Performed by: INTERNAL MEDICINE

## 2023-08-25 PROCEDURE — 3075F SYST BP GE 130 - 139MM HG: CPT | Performed by: INTERNAL MEDICINE

## 2023-08-25 PROCEDURE — 1123F ACP DISCUSS/DSCN MKR DOCD: CPT | Performed by: INTERNAL MEDICINE

## 2023-08-25 PROCEDURE — 3078F DIAST BP <80 MM HG: CPT | Performed by: INTERNAL MEDICINE

## 2023-08-25 ASSESSMENT — ENCOUNTER SYMPTOMS
CONSTIPATION: 0
WHEEZING: 0
RHINORRHEA: 0
VOMITING: 0
EYE REDNESS: 0
CHEST TIGHTNESS: 0
SHORTNESS OF BREATH: 1
ABDOMINAL PAIN: 0
APNEA: 0
DIARRHEA: 0
COUGH: 0
NAUSEA: 0
COLOR CHANGE: 0

## 2023-08-25 NOTE — PROGRESS NOTES
Chief Complaint   Patient presents with    3 Month Follow-Up     For STEMI, CAD, and HTN.   - Swelling in Hands & Ankles (when walking distances)        Patient presents for initial medical evaluation. Patient is followed on a regular basis by Dr. London Caal MD.     CAD 1/20/2023 STEMI successful PCI of the proximal and mid LAD with ZAKI x3.  2/16/2023 PCI to the distal RCA ZAKI x2, IFR of the circumflex negative (0.94)  Ischemic cardiomyopathy EF 40 to 45% by TTE 1/30/2023  Hypertension  Hyperlipidemia  Diabetes  Pancreatic cancer on chemotherapy, neuroendocrine tumor status post resection August 4, 2022 at [facility]  Liver cirrhosis  Portal vein thrombosis on Eliquis  TTE EF 65% on 1/10/2023 at [facility]  TTE 12/11/2019 EF 65%  TTE 4/15/2022 EF 65%  TTE 1/30/2023 EF 40 to 45%  Nuclear stress test 5/11/2022 EF 60% no evidence of ischemia or infarct  No prior coronary angiograms  =======================  8/25/2023  Follow-up on CAD  Patient reports feeling well denies chest pain or shortness of breath  Had an episode of leg swelling when he went to [facility] about 3 weeks ago which went away after the next day      5/12/2023  Follow-up on CAD  Patient comes with wife for this appointment  Patient had called in the office because of generalized weakness patient. Patient was found with low hemoglobin. Currently undergoing IV iron infusions.   So far he has had 2  Patient reports that overall he is feeling better  Lasix was also increased from 20 mg daily to 40 mg daily    3/3/2023  Follow-up on CAD  Last month patient returned to the Cath Lab for planned PCI of the RCA and IFR of the circumflex  Patient underwent PCI to the distal RCA with ZAKI x2  IFR of the circumflex was negative at 0.94  Today patient reports feeling well, states that now he is able to do activities that he was not able to do before the stents  However he is endorsing itching in his head possibly related to the Entresto  I would like to switch Entresto to

## 2023-09-11 RX ORDER — ESOMEPRAZOLE MAGNESIUM 40 MG/1
CAPSULE, DELAYED RELEASE ORAL
Qty: 30 CAPSULE | Refills: 3 | Status: SHIPPED | OUTPATIENT
Start: 2023-09-11

## 2023-09-15 DIAGNOSIS — E11.65 INADEQUATELY CONTROLLED DIABETES MELLITUS (HCC): ICD-10-CM

## 2023-09-15 LAB
ANION GAP SERPL CALCULATED.3IONS-SCNC: 12 MEQ/L (ref 9–15)
BUN SERPL-MCNC: 10 MG/DL (ref 8–23)
CALCIUM SERPL-MCNC: 8.8 MG/DL (ref 8.5–9.9)
CHLORIDE SERPL-SCNC: 105 MEQ/L (ref 95–107)
CO2 SERPL-SCNC: 26 MEQ/L (ref 20–31)
CREAT SERPL-MCNC: 0.68 MG/DL (ref 0.7–1.2)
GLUCOSE SERPL-MCNC: 178 MG/DL (ref 70–99)
HBA1C MFR BLD: 9 % (ref 4.8–5.9)
POTASSIUM SERPL-SCNC: 4.3 MEQ/L (ref 3.4–4.9)
SODIUM SERPL-SCNC: 143 MEQ/L (ref 135–144)

## 2023-10-26 ENCOUNTER — TELEPHONE (OUTPATIENT)
Dept: GASTROENTEROLOGY | Age: 68
End: 2023-10-26

## 2023-10-26 DIAGNOSIS — R53.1 GENERAL WEAKNESS: Primary | ICD-10-CM

## 2023-10-26 NOTE — TELEPHONE ENCOUNTER
The patient is scheduled for a follow up visit on 12/12/23, he wants to know if you could order some lab work. The patient said he feeling week and tired the last 3 weeks. Please advise. The patient is not scheduled with his pcp until 11/9/23.

## 2023-10-27 NOTE — TELEPHONE ENCOUNTER
Labs ordered, please advise patient to check    Patient should also get a early evaluation either with his primary care provider or at an urgent care clinic visit.     Tristan Adame MD

## 2023-10-30 DIAGNOSIS — R53.1 GENERAL WEAKNESS: ICD-10-CM

## 2023-10-30 LAB
ALBUMIN SERPL-MCNC: 3.5 G/DL (ref 3.5–4.6)
ALP SERPL-CCNC: 148 U/L (ref 35–104)
ALT SERPL-CCNC: 37 U/L (ref 0–41)
ANION GAP SERPL CALCULATED.3IONS-SCNC: 10 MEQ/L (ref 9–15)
AST SERPL-CCNC: 50 U/L (ref 0–40)
BILIRUB SERPL-MCNC: 0.9 MG/DL (ref 0.2–0.7)
BUN SERPL-MCNC: 11 MG/DL (ref 8–23)
CALCIUM SERPL-MCNC: 9.1 MG/DL (ref 8.5–9.9)
CHLORIDE SERPL-SCNC: 106 MEQ/L (ref 95–107)
CO2 SERPL-SCNC: 25 MEQ/L (ref 20–31)
CREAT SERPL-MCNC: 0.72 MG/DL (ref 0.7–1.2)
ERYTHROCYTE [DISTWIDTH] IN BLOOD BY AUTOMATED COUNT: 15.9 % (ref 11.5–14.5)
GLOBULIN SER CALC-MCNC: 3.9 G/DL (ref 2.3–3.5)
GLUCOSE SERPL-MCNC: 225 MG/DL (ref 70–99)
HCT VFR BLD AUTO: 38.8 % (ref 42–52)
HGB BLD-MCNC: 12.5 G/DL (ref 14–18)
MCH RBC QN AUTO: 29 PG (ref 27–31.3)
MCHC RBC AUTO-ENTMCNC: 32.2 % (ref 33–37)
MCV RBC AUTO: 90 FL (ref 79–92.2)
PLATELET # BLD AUTO: 326 K/UL (ref 130–400)
POTASSIUM SERPL-SCNC: 4.3 MEQ/L (ref 3.4–4.9)
PROT SERPL-MCNC: 7.4 G/DL (ref 6.3–8)
RBC # BLD AUTO: 4.31 M/UL (ref 4.7–6.1)
SODIUM SERPL-SCNC: 141 MEQ/L (ref 135–144)
WBC # BLD AUTO: 8.7 K/UL (ref 4.8–10.8)

## 2023-11-09 ENCOUNTER — OFFICE VISIT (OUTPATIENT)
Dept: FAMILY MEDICINE CLINIC | Age: 68
End: 2023-11-09
Payer: MEDICARE

## 2023-11-09 VITALS
SYSTOLIC BLOOD PRESSURE: 150 MMHG | TEMPERATURE: 97.5 F | DIASTOLIC BLOOD PRESSURE: 74 MMHG | BODY MASS INDEX: 27.49 KG/M2 | OXYGEN SATURATION: 97 % | WEIGHT: 207.4 LBS | HEART RATE: 72 BPM | HEIGHT: 73 IN

## 2023-11-09 DIAGNOSIS — R53.83 OTHER FATIGUE: ICD-10-CM

## 2023-11-09 DIAGNOSIS — I25.10 CAD S/P PERCUTANEOUS CORONARY ANGIOPLASTY: ICD-10-CM

## 2023-11-09 DIAGNOSIS — E11.65 INADEQUATELY CONTROLLED DIABETES MELLITUS (HCC): ICD-10-CM

## 2023-11-09 DIAGNOSIS — Z23 NEEDS FLU SHOT: ICD-10-CM

## 2023-11-09 DIAGNOSIS — Z00.00 MEDICARE ANNUAL WELLNESS VISIT, SUBSEQUENT: Primary | ICD-10-CM

## 2023-11-09 DIAGNOSIS — I50.22 CHRONIC SYSTOLIC (CONGESTIVE) HEART FAILURE (HCC): ICD-10-CM

## 2023-11-09 DIAGNOSIS — G47.00 INSOMNIA, UNSPECIFIED TYPE: ICD-10-CM

## 2023-11-09 DIAGNOSIS — K74.69 OTHER CIRRHOSIS OF LIVER (HCC): ICD-10-CM

## 2023-11-09 DIAGNOSIS — D64.9 SYMPTOMATIC ANEMIA: ICD-10-CM

## 2023-11-09 DIAGNOSIS — K59.09 CHRONIC CONSTIPATION: ICD-10-CM

## 2023-11-09 DIAGNOSIS — K76.6 PORTAL HYPERTENSION (HCC): ICD-10-CM

## 2023-11-09 DIAGNOSIS — C25.7 MALIGNANT NEOPLASM OF OTHER PARTS OF PANCREAS (HCC): ICD-10-CM

## 2023-11-09 DIAGNOSIS — I15.9 SECONDARY HYPERTENSION: ICD-10-CM

## 2023-11-09 DIAGNOSIS — G45.9 TIA (TRANSIENT ISCHEMIC ATTACK): ICD-10-CM

## 2023-11-09 DIAGNOSIS — I21.3 ST ELEVATION MYOCARDIAL INFARCTION (STEMI), UNSPECIFIED ARTERY (HCC): ICD-10-CM

## 2023-11-09 DIAGNOSIS — Z98.61 CAD S/P PERCUTANEOUS CORONARY ANGIOPLASTY: ICD-10-CM

## 2023-11-09 DIAGNOSIS — E11.65 TYPE 2 DIABETES MELLITUS WITH HYPERGLYCEMIA, WITHOUT LONG-TERM CURRENT USE OF INSULIN (HCC): ICD-10-CM

## 2023-11-09 LAB — TSH SERPL-MCNC: 0.95 UIU/ML (ref 0.44–3.86)

## 2023-11-09 PROCEDURE — G0008 ADMIN INFLUENZA VIRUS VAC: HCPCS | Performed by: FAMILY MEDICINE

## 2023-11-09 PROCEDURE — G0439 PPPS, SUBSEQ VISIT: HCPCS | Performed by: FAMILY MEDICINE

## 2023-11-09 PROCEDURE — 3078F DIAST BP <80 MM HG: CPT | Performed by: FAMILY MEDICINE

## 2023-11-09 PROCEDURE — 90694 VACC AIIV4 NO PRSRV 0.5ML IM: CPT | Performed by: FAMILY MEDICINE

## 2023-11-09 PROCEDURE — 3077F SYST BP >= 140 MM HG: CPT | Performed by: FAMILY MEDICINE

## 2023-11-09 PROCEDURE — 1123F ACP DISCUSS/DSCN MKR DOCD: CPT | Performed by: FAMILY MEDICINE

## 2023-11-09 PROCEDURE — 3052F HG A1C>EQUAL 8.0%<EQUAL 9.0%: CPT | Performed by: FAMILY MEDICINE

## 2023-11-09 RX ORDER — ESOMEPRAZOLE MAGNESIUM 40 MG/1
40 CAPSULE, DELAYED RELEASE ORAL DAILY
Qty: 90 CAPSULE | Refills: 3 | Status: SHIPPED | OUTPATIENT
Start: 2023-11-09

## 2023-11-09 RX ORDER — LUBIPROSTONE 24 UG/1
24 CAPSULE ORAL 2 TIMES DAILY WITH MEALS
Qty: 60 CAPSULE | Refills: 3 | Status: SHIPPED | OUTPATIENT
Start: 2023-11-09

## 2023-11-09 RX ORDER — FUROSEMIDE 20 MG/1
40 TABLET ORAL DAILY
Qty: 180 TABLET | Refills: 5 | Status: SHIPPED | OUTPATIENT
Start: 2023-11-09

## 2023-11-09 RX ORDER — LOSARTAN POTASSIUM 25 MG/1
25 TABLET ORAL DAILY
Qty: 180 TABLET | Refills: 5 | Status: SHIPPED | OUTPATIENT
Start: 2023-11-09

## 2023-11-09 RX ORDER — INSULIN GLARGINE 100 [IU]/ML
INJECTION, SOLUTION SUBCUTANEOUS
Qty: 30 ADJUSTABLE DOSE PRE-FILLED PEN SYRINGE | Refills: 3 | Status: SHIPPED | OUTPATIENT
Start: 2023-11-09

## 2023-11-09 RX ORDER — METOPROLOL SUCCINATE 50 MG/1
50 TABLET, EXTENDED RELEASE ORAL EVERY EVENING
Qty: 90 TABLET | Refills: 3 | Status: SHIPPED | OUTPATIENT
Start: 2023-11-09

## 2023-11-09 RX ORDER — TRAZODONE HYDROCHLORIDE 50 MG/1
50 TABLET ORAL NIGHTLY PRN
Qty: 30 TABLET | Refills: 5 | Status: SHIPPED | OUTPATIENT
Start: 2023-11-09

## 2023-11-09 RX ORDER — INSULIN LISPRO 100 [IU]/ML
INJECTION, SOLUTION INTRAVENOUS; SUBCUTANEOUS
Qty: 30 ADJUSTABLE DOSE PRE-FILLED PEN SYRINGE | Refills: 3 | Status: SHIPPED | OUTPATIENT
Start: 2023-11-09

## 2023-11-09 RX ORDER — ATORVASTATIN CALCIUM 80 MG/1
80 TABLET, FILM COATED ORAL NIGHTLY
Qty: 90 TABLET | Refills: 3 | Status: SHIPPED | OUTPATIENT
Start: 2023-11-09

## 2023-11-09 ASSESSMENT — PATIENT HEALTH QUESTIONNAIRE - PHQ9
SUM OF ALL RESPONSES TO PHQ QUESTIONS 1-9: 0
2. FEELING DOWN, DEPRESSED OR HOPELESS: 0
1. LITTLE INTEREST OR PLEASURE IN DOING THINGS: 0
SUM OF ALL RESPONSES TO PHQ QUESTIONS 1-9: 0
SUM OF ALL RESPONSES TO PHQ QUESTIONS 1-9: 0
SUM OF ALL RESPONSES TO PHQ9 QUESTIONS 1 & 2: 0
SUM OF ALL RESPONSES TO PHQ QUESTIONS 1-9: 0

## 2023-11-09 NOTE — PATIENT INSTRUCTIONS

## 2023-11-09 NOTE — PROGRESS NOTES
After obtaining consent, and per orders of Dr. Estevan Martell, injection of flu given in Right deltoid by Zeyad García CMA (AAMA). Patient instructed to remain in clinic for 20 minutes afterwards, and to report any adverse reaction to me immediately. Vaccine Information Sheet, \"Influenza - Inactivated\"  given to Brando Wrightelor, or parent/legal guardian of  Brando Hicks and verbalized understanding. Patient responses:    Have you ever had a reaction to a flu vaccine? No  Are you able to eat eggs without adverse effects? Yes  Do you have any current illness? No  Have you ever had Guillian Largo Syndrome? No    Flu vaccine given per order. Please see immunization tab.

## 2023-11-09 NOTE — PROGRESS NOTES
Medicare Annual Wellness Visit    Reva Ash is here for Medicare AWV    Assessment & Plan   Needs flu shot  Inadequately controlled diabetes mellitus ([address])  The following orders have not been finalized:  -     insulin lispro, 1 Unit Dial, (HUMALOG KWIKPEN) 100 UNIT/ML SOPN  -     LANTUS SOLOSTAR 100 UNIT/ML injection pen  Portal hypertension ([address])  TIA (transient ischemic attack)  Symptomatic anemia  ST elevation myocardial infarction (STEMI), unspecified artery (HCC)  Malignant neoplasm of other parts of pancreas ([address])  Chronic systolic (congestive) heart failure  CAD S/P percutaneous coronary angioplasty  Other cirrhosis of liver ([address])  Type 2 diabetes mellitus with hyperglycemia, without long-term current use of insulin ([address])  Medicare annual wellness visit, subsequent    F/u with endo and oncology and cardiology     Recommendations for Preventive Services Due: see orders and patient instructions/AVS.  Recommended screening schedule for the next 5-10 years is provided to the patient in written form: see Patient Instructions/AVS.     No follow-ups on file.      Subjective   Chief Complaint   Patient presents with    Medicare AWV     Patient Active Problem List   Diagnosis    HTN (hypertension)    Type 2 diabetes mellitus without complication ([address])    Lumbar paraspinal muscle spasm    Allergic rhinosinusitis    TIA (transient ischemic attack)    Abdominal pain    Malignant neoplasm of other parts of pancreas ([address])    Other cirrhosis of liver (HCC)    Gastroesophageal reflux disease without esophagitis    Chest pain    History of pancreatectomy    Portal hypertension (HCC)    Melena    Epigastric pain    Iron deficiency anemia due to chronic blood loss    STEMI (ST elevation myocardial infarction) ([address])    Coronary artery disease due to lipid rich plaque    Hypokalemia    Ischemic cardiomyopathy    Inadequately controlled diabetes mellitus ([address])    Chronic systolic (congestive) heart failure         Patient's

## 2023-11-09 NOTE — PROGRESS NOTES
Chief Complaint   Patient presents with    Medicare AWV       HPI:  Jaspal Stone is a 76 y.o. male      Follow up to update me  Seeing multiple specialists   No longer on eliquis  Brillinta, asa    Has been to ER twice in last few weeks  Heart ok     Hgb very low  Getting iron     Following with GI        Pancreatic cancer  Dr. Neil Benitez 4 liver cirrhosis  had Partial Pancreatectomy/splenectomy      Following with endocrinology now     He is avoiding asa/nsaids      Wt Readings from Last 3 Encounters:   11/09/23 94.1 kg (207 lb 6.4 oz)   08/25/23 91.2 kg (201 lb)   07/11/23 87.5 kg (193 lb)       Lab Results   Component Value Date    LABA1C 9.0 (H) 09/15/2023     Lab Results   Component Value Date     07/25/2022               Past Medical History:   Diagnosis Date    Cancer [facility])     Pancreatic    History of skin cancer     HTN (hypertension)     Malignant neoplasm of other parts of pancreas ([address])     Type II or unspecified type diabetes mellitus without mention of complication, not stated as uncontrolled      Past Surgical History:   Procedure Laterality Date    ABDOMEN SURGERY      COLONOSCOPY  02/20/2015    DR Ramírez Lawson    COLONOSCOPY N/A 01/20/2020    COLONOSCOPY DIAGNOSTIC performed by Amy Woods MD at [address] (LOWER) N/A 12/17/2019    EUS performed by Amy Woods MD at [facility]  08/04/2022    SPLENECTOMY, PARTIAL  08/04/2022    UPPER GASTROINTESTINAL ENDOSCOPY N/A 04/04/2022    EGD ESOPHAGOGASTRODUODENOSCOPY performed by Amy Woods MD at [address] [place] [address]. N/A 1/27/2023    EGD DIAGNOSTIC ONLY performed by Amy Woods MD at [facility]     Family History   Problem Relation Age of Onset    Cancer Mother         lung cancer    Colon Cancer Neg Hx     Celiac Disease Neg Hx     Crohn's Disease Neg Hx     Coronary Art Dis Neg Hx      Social History     Socioeconomic History

## 2023-11-17 PROBLEM — Z98.890 HISTORY OF ABLATION OF NEOPLASM OF LIVER: Status: ACTIVE | Noted: 2023-11-17

## 2023-11-17 PROBLEM — C25.9 PANCREATIC CANCER (MULTI): Status: ACTIVE | Noted: 2023-11-17

## 2023-11-17 PROBLEM — E87.6 HYPOKALEMIA: Status: ACTIVE | Noted: 2023-11-17
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
## 2023-11-17 PROBLEM — R73.9 HYPERGLYCEMIA: Status: ACTIVE | Noted: 2023-11-17

## 2023-11-17 PROBLEM — Z90.410 HISTORY OF PANCREATECTOMY: Status: ACTIVE | Noted: 2023-11-17

## 2023-11-17 PROBLEM — Z90.81 H/O SPLENECTOMY: Status: ACTIVE | Noted: 2023-11-17

## 2023-11-17 PROBLEM — E11.9 DIABETES MELLITUS (MULTI): Status: ACTIVE | Noted: 2023-11-17

## 2023-11-17 PROBLEM — R52 BODY ACHES: Status: ACTIVE | Noted: 2023-11-17

## 2023-11-17 PROBLEM — D3A.8 PRIMARY PANCREATIC NEUROENDOCRINE TUMOR (CMS-HCC): Status: ACTIVE | Noted: 2023-11-17

## 2023-11-17 PROBLEM — R50.9 FEVER AND CHILLS: Status: ACTIVE | Noted: 2023-11-17

## 2023-11-17 PROBLEM — M25.559 HIP JOINT PAIN: Status: ACTIVE | Noted: 2023-11-17

## 2023-11-17 PROBLEM — K81.0 ACUTE CHOLECYSTITIS: Status: ACTIVE | Noted: 2023-11-17

## 2023-11-17 PROBLEM — M16.11 OSTEOARTHRITIS OF RIGHT HIP: Status: ACTIVE | Noted: 2021-04-29

## 2023-11-17 PROBLEM — R07.81 RIB PAIN: Status: ACTIVE | Noted: 2023-11-17

## 2023-11-17 PROBLEM — R10.9 RT FLANK PAIN: Status: ACTIVE | Noted: 2023-11-17

## 2023-11-17 PROBLEM — Z96.649 STATUS POST TOTAL REPLACEMENT OF HIP: Status: ACTIVE | Noted: 2023-11-17

## 2023-11-17 PROBLEM — K74.60 HEPATIC CIRRHOSIS (MULTI): Status: ACTIVE | Noted: 2023-11-17

## 2023-11-17 PROBLEM — R50.9 FEVER: Status: ACTIVE | Noted: 2023-11-17

## 2023-11-17 PROBLEM — K59.00 CONSTIPATION: Status: ACTIVE | Noted: 2023-11-17

## 2023-11-17 PROBLEM — Z96.641 HX OF TOTAL HIP ARTHROPLASTY, RIGHT: Status: ACTIVE | Noted: 2023-11-17

## 2023-11-17 PROBLEM — C25.9: Status: ACTIVE | Noted: 2023-11-17

## 2023-11-17 PROBLEM — D84.9 IMMUNOCOMPROMISED STATE (MULTI): Status: ACTIVE | Noted: 2023-11-17

## 2023-11-17 RX ORDER — INSULIN GLARGINE 100 [IU]/ML
INJECTION, SOLUTION SUBCUTANEOUS
COMMUNITY

## 2023-11-17 RX ORDER — OMEPRAZOLE 40 MG/1
CAPSULE, DELAYED RELEASE ORAL
COMMUNITY
Start: 2022-04-29

## 2023-11-17 RX ORDER — LISINOPRIL 20 MG/1
1 TABLET ORAL DAILY
COMMUNITY
Start: 2022-04-27

## 2023-11-17 RX ORDER — PEN NEEDLE, DIABETIC 30 GX3/16"
NEEDLE, DISPOSABLE MISCELLANEOUS
COMMUNITY
Start: 2022-07-13

## 2023-11-17 RX ORDER — POLYETHYLENE GLYCOL 3350 17 G/17G
17 POWDER, FOR SOLUTION ORAL
COMMUNITY
Start: 2022-08-05

## 2023-11-17 RX ORDER — NITROGLYCERIN 0.4 MG/1
TABLET SUBLINGUAL EVERY 5 MIN PRN
COMMUNITY

## 2023-11-17 RX ORDER — CARVEDILOL 6.25 MG/1
1 TABLET ORAL 2 TIMES DAILY
COMMUNITY
Start: 2022-04-27

## 2023-11-17 RX ORDER — INSULIN LISPRO 100 [IU]/ML
INJECTION, SOLUTION INTRAVENOUS; SUBCUTANEOUS
COMMUNITY

## 2023-11-17 RX ORDER — CHLORTHALIDONE 25 MG/1
1 TABLET ORAL DAILY
COMMUNITY
Start: 2022-04-27

## 2023-11-17 RX ORDER — ACETAMINOPHEN 325 MG/1
TABLET ORAL EVERY 6 HOURS PRN
COMMUNITY
Start: 2022-08-05

## 2023-11-22 ENCOUNTER — TELEPHONE (OUTPATIENT)
Dept: ADMISSION | Facility: HOSPITAL | Age: 68
End: 2023-11-22
Payer: MEDICARE

## 2023-11-22 ENCOUNTER — HOSPITAL ENCOUNTER (OUTPATIENT)
Age: 68
Discharge: HOME | End: 2023-11-24
Attending: INTERNAL MEDICINE
Payer: MEDICARE

## 2023-11-22 VITALS
WEIGHT: 207.45 LBS | SYSTOLIC BLOOD PRESSURE: 150 MMHG | DIASTOLIC BLOOD PRESSURE: 74 MMHG | BODY MASS INDEX: 27.49 KG/M2 | HEIGHT: 73 IN

## 2023-11-22 DIAGNOSIS — R94.31 ABNORMAL EKG: ICD-10-CM

## 2023-11-22 PROCEDURE — 93306 TTE W/DOPPLER COMPLETE: CPT

## 2023-11-25 LAB
ECHO AO ROOT DIAM: 3.9 CM
ECHO AO ROOT INDEX: 1.78 CM/M2
ECHO AV AREA PEAK VELOCITY: 2.1 CM2
ECHO AV AREA VTI: 2.3 CM2
ECHO AV AREA/BSA PEAK VELOCITY: 1 CM2/M2
ECHO AV AREA/BSA VTI: 1.1 CM2/M2
ECHO AV CUSP MM: 2.1 CM
ECHO AV MEAN GRADIENT: 3 MMHG
ECHO AV MEAN VELOCITY: 0.8 M/S
ECHO AV PEAK GRADIENT: 5 MMHG
ECHO AV PEAK VELOCITY: 1.3 M/S
ECHO AV VELOCITY RATIO: 0.62
ECHO AV VTI: 30.7 CM
ECHO BSA: 2.2 M2
ECHO EST RA PRESSURE: 5 MMHG
ECHO LA VOL A-L A2C: 72 ML (ref 18–58)
ECHO LA VOL A-L A4C: 89 ML (ref 18–58)
ECHO LA VOL MOD A2C: 67 ML (ref 18–58)
ECHO LA VOL MOD A4C: 80 ML (ref 18–58)
ECHO LA VOLUME AREA LENGTH: 81 ML
ECHO LA VOLUME INDEX A-L A2C: 33 ML/M2 (ref 16–34)
ECHO LA VOLUME INDEX A-L A4C: 41 ML/M2 (ref 16–34)
ECHO LA VOLUME INDEX AREA LENGTH: 37 ML/M2 (ref 16–34)
ECHO LA VOLUME INDEX MOD A2C: 31 ML/M2 (ref 16–34)
ECHO LA VOLUME INDEX MOD A4C: 37 ML/M2 (ref 16–34)
ECHO LV E' LATERAL VELOCITY: 9 CM/S
ECHO LV E' SEPTAL VELOCITY: 8 CM/S
ECHO LV EDV A2C: 151 ML
ECHO LV EDV A4C: 137 ML
ECHO LV EDV BP: 150 ML (ref 67–155)
ECHO LV EDV INDEX A4C: 63 ML/M2
ECHO LV EDV INDEX BP: 68 ML/M2
ECHO LV EDV NDEX A2C: 69 ML/M2
ECHO LV EJECTION FRACTION A2C: 46 %
ECHO LV EJECTION FRACTION A4C: 48 %
ECHO LV EJECTION FRACTION BIPLANE: 47 % (ref 55–100)
ECHO LV ESV A2C: 82 ML
ECHO LV ESV A4C: 71 ML
ECHO LV ESV BP: 80 ML (ref 22–58)
ECHO LV ESV INDEX A2C: 37 ML/M2
ECHO LV ESV INDEX A4C: 32 ML/M2
ECHO LV ESV INDEX BP: 37 ML/M2
ECHO LV FRACTIONAL SHORTENING: 24 % (ref 28–44)
ECHO LV INTERNAL DIMENSION DIASTOLE INDEX: 2.05 CM/M2
ECHO LV INTERNAL DIMENSION DIASTOLIC: 4.5 CM (ref 4.2–5.9)
ECHO LV INTERNAL DIMENSION SYSTOLIC INDEX: 1.55 CM/M2
ECHO LV INTERNAL DIMENSION SYSTOLIC: 3.4 CM
ECHO LV IVSD: 1.5 CM (ref 0.6–1)
ECHO LV IVSS: 1.5 CM
ECHO LV MASS 2D: 248.4 G (ref 88–224)
ECHO LV MASS INDEX 2D: 113.4 G/M2 (ref 49–115)
ECHO LV POSTERIOR WALL DIASTOLIC: 1.3 CM (ref 0.6–1)
ECHO LV POSTERIOR WALL SYSTOLIC: 1.6 CM
ECHO LV RELATIVE WALL THICKNESS RATIO: 0.58
ECHO LVOT AREA: 3.1 CM2
ECHO LVOT AV VTI INDEX: 0.71
ECHO LVOT DIAM: 2 CM
ECHO LVOT MEAN GRADIENT: 1 MMHG
ECHO LVOT PEAK GRADIENT: 2 MMHG
ECHO LVOT PEAK VELOCITY: 0.8 M/S
ECHO LVOT STROKE VOLUME INDEX: 31.4 ML/M2
ECHO LVOT SV: 68.8 ML
ECHO LVOT VTI: 21.9 CM
ECHO MV A VELOCITY: 0.7 M/S
ECHO MV AREA VTI: 2.9 CM2
ECHO MV E DECELERATION TIME (DT): 221.7 MS
ECHO MV E VELOCITY: 0.6 M/S
ECHO MV E/A RATIO: 0.86
ECHO MV E/E' LATERAL: 6.67
ECHO MV E/E' RATIO (AVERAGED): 7.08
ECHO MV LVOT VTI INDEX: 1.08
ECHO MV MAX VELOCITY: 1 M/S
ECHO MV MEAN GRADIENT: 1 MMHG
ECHO MV MEAN VELOCITY: 0.5 M/S
ECHO MV PEAK GRADIENT: 4 MMHG
ECHO MV VTI: 23.6 CM
ECHO PV MAX VELOCITY: 1.1 M/S
ECHO PV PEAK GRADIENT: 5 MMHG
ECHO RIGHT VENTRICULAR SYSTOLIC PRESSURE (RVSP): 10 MMHG
ECHO RV INTERNAL DIMENSION: 2.3 CM
ECHO TV REGURGITANT MAX VELOCITY: 1.07 M/S
ECHO TV REGURGITANT PEAK GRADIENT: 5 MMHG

## 2023-11-29 ENCOUNTER — OFFICE VISIT (OUTPATIENT)
Dept: CARDIOLOGY CLINIC | Age: 68
End: 2023-11-29
Payer: MEDICARE

## 2023-11-29 VITALS
BODY MASS INDEX: 26.51 KG/M2 | OXYGEN SATURATION: 97 % | HEART RATE: 71 BPM | DIASTOLIC BLOOD PRESSURE: 68 MMHG | SYSTOLIC BLOOD PRESSURE: 124 MMHG | RESPIRATION RATE: 14 BRPM | HEIGHT: 73 IN | WEIGHT: 200 LBS

## 2023-11-29 DIAGNOSIS — Z98.61 CAD S/P PERCUTANEOUS CORONARY ANGIOPLASTY: ICD-10-CM

## 2023-11-29 DIAGNOSIS — I25.10 CAD S/P PERCUTANEOUS CORONARY ANGIOPLASTY: ICD-10-CM

## 2023-11-29 PROCEDURE — 1123F ACP DISCUSS/DSCN MKR DOCD: CPT | Performed by: INTERNAL MEDICINE

## 2023-11-29 PROCEDURE — 3074F SYST BP LT 130 MM HG: CPT | Performed by: INTERNAL MEDICINE

## 2023-11-29 PROCEDURE — 3078F DIAST BP <80 MM HG: CPT | Performed by: INTERNAL MEDICINE

## 2023-11-29 PROCEDURE — 99214 OFFICE O/P EST MOD 30 MIN: CPT | Performed by: INTERNAL MEDICINE

## 2023-11-29 ASSESSMENT — ENCOUNTER SYMPTOMS
DIARRHEA: 0
SHORTNESS OF BREATH: 1
APNEA: 0
CHEST TIGHTNESS: 0
ABDOMINAL PAIN: 0
CONSTIPATION: 0
EYE REDNESS: 0
COUGH: 0
NAUSEA: 0
WHEEZING: 0
COLOR CHANGE: 0
RHINORRHEA: 0
VOMITING: 0

## 2023-11-29 NOTE — PROGRESS NOTES
3x daily 30 Adjustable Dose Pre-filled Pen Syringe 3    furosemide (LASIX) 20 MG tablet Take 2 tablets by mouth daily 180 tablet 5    esomeprazole (NEXIUM) 40 MG delayed release capsule Take 1 capsule by mouth daily 90 capsule 3    atorvastatin (LIPITOR) 80 MG tablet Take 1 tablet by mouth nightly 90 tablet 3    traZODone (DESYREL) 50 MG tablet Take 1 tablet by mouth nightly as needed for Sleep 30 tablet 5    lubiprostone (AMITIZA) 24 MCG capsule Take 1 capsule by mouth 2 times daily (with meals) 60 capsule 3    ticagrelor (BRILINTA) 90 MG TABS tablet Take 1 tablet by mouth 2 times daily Brilinta 90mg 180 tablet 3    Insulin Pen Needle (PEN NEEDLES) 31G X 6 MM MISC 1 each by Does not apply route daily      Continuous Blood Gluc Sensor (FREESTYLE NATALY 14 DAY SENSOR) MISC 1 each by Does not apply route every 14 days 6 each 3    aspirin 81 MG chewable tablet Take 1 tablet by mouth daily 30 tablet 3    nitroGLYCERIN (NITROSTAT) 0.4 MG SL tablet Place 1 tablet under the tongue every 5 minutes as needed for Chest pain up to max of 3 total doses. If no relief after 1 dose, call 911. 25 tablet 3    Continuous Blood Gluc  (FREESTYLE NATALY 14 DAY READER) KISHA Test daily and prn. Dx: DM2 1 Device 0     No current facility-administered medications for this visit. Patient has no known allergies. Review of Systems:  Review of Systems   Constitutional:  Negative for activity change, chills, diaphoresis and fever. HENT:  Negative for congestion, ear pain, nosebleeds and rhinorrhea. Eyes:  Negative for redness and visual disturbance. Respiratory:  Positive for shortness of breath. Negative for apnea, cough, chest tightness and wheezing. Cardiovascular:  Negative for chest pain, palpitations and leg swelling. Gastrointestinal:  Negative for abdominal pain, constipation, diarrhea, nausea and vomiting. Genitourinary:  Negative for difficulty urinating and dysuria. Musculoskeletal: Negative.   Negative

## 2023-12-04 ENCOUNTER — ANCILLARY PROCEDURE (OUTPATIENT)
Dept: RADIOLOGY | Facility: CLINIC | Age: 68
End: 2023-12-04
Payer: MEDICARE

## 2023-12-04 DIAGNOSIS — D3A.8 OTHER BENIGN NEUROENDOCRINE TUMORS (CMS-HCC): ICD-10-CM

## 2023-12-04 PROCEDURE — 74183 MRI ABD W/O CNTR FLWD CNTR: CPT

## 2023-12-04 PROCEDURE — 72197 MRI PELVIS W/O & W/DYE: CPT | Performed by: RADIOLOGY

## 2023-12-04 PROCEDURE — 74183 MRI ABD W/O CNTR FLWD CNTR: CPT | Performed by: RADIOLOGY

## 2023-12-04 PROCEDURE — 72197 MRI PELVIS W/O & W/DYE: CPT

## 2023-12-04 PROCEDURE — A9575 INJ GADOTERATE MEGLUMI 0.1ML: HCPCS | Performed by: INTERNAL MEDICINE

## 2023-12-04 PROCEDURE — 2550000001 HC RX 255 CONTRASTS: Performed by: INTERNAL MEDICINE

## 2023-12-04 RX ORDER — GADOTERATE MEGLUMINE 376.9 MG/ML
19 INJECTION INTRAVENOUS
Status: COMPLETED | OUTPATIENT
Start: 2023-12-04 | End: 2023-12-04

## 2023-12-04 RX ADMIN — GADOTERATE MEGLUMINE 19 ML: 376.9 INJECTION INTRAVENOUS at 12:29

## 2023-12-05 NOTE — PROGRESS NOTES
Patient ID: Adam Castillo is a 68 y.o. male.  Visit type: Follow up visit      ONCOLOGIC HISTORY  64-year-old man with PMH of DM, HTN who has been diagnosed with Metastatic Well differentiated Pancreatic Neuroendocrine tumor with liver mets. On 12/10/2019  he had abdominal pain radiated to his back was about 8/10 associated with nausea and vomiting. He went to the ER at [facility] where he got admitted and had a CT scan showed evidence of cirrhosis as well as a 2 cm soft tissue mass at the till the  pancreas. I reviewed his CT scan. There is a calcified 2 cm mass noted at the anterior aspect of the tail the pancreas near the splenic hilum. He does have splenomegaly with spleen measuring about 17 cm or so in greatest dimension. Endoscopic ultrasound  with biopsy showed well-differentiated neuroendocrine tumor. On the description of the endoscopic ultrasound, 2.1 x 1.5 cm oval lesion was noted towards the till the pancreas abutting the till the pancreas. This did not appear to directly involve the  pancreas endoscopically.  He was taken to the OR on 3/17/20 for laprscopic distal pancreatectomy, but was found to have liver mets with laparoscopy with wedge liver biopsy confirmed metastatic neuroendocrine tumor to liver. A 2nd lesion was also visualized  on the surface of the left liver. Final path showed well diff. pNET with Ki67 4% (G2). Initially he started oin LAURYN and after discuss at the TB we agreed on neoadjuvant therapy and restaging for response. He started on cytotoxic chemotherapy with capecitabine  and temozolomide on 06/21/2020. He went to the ER after the finished the w2 weeks of C1 due to fever and his labs only showed reactive HAV AB and he was COVID negative.   He finished 8 cycles so far and tolerated well except for nausea, and constipation and malignancy induced fever which resolved  Restaging after C4 showed mild decrease in his pancreatic mass and liver mets   Due to his myelosuppression we  stopped chemotherapy and switch back to SSA  He had surgery on 08/03/2022  Restaging scans showed post surgical changes and no evidence of new lesions        Past Medical History History of cirrhosis (V12.79) (Z87.19) History of diabetes mellitus (V12.29) (Z86.39) History of hypertension (V12.59) (Z86.79) History of malignant neoplasm  of skin (V10.83) (Z85.828) History of Pancreatic tumor (239.0) (D49.0)   Surgical History History of Hemorrhoidectomy      Family History Family history of lung cancer in hi smother side  (V16.1) (Z80.1)     History of Present Illness:  Chief complaint: Metastatic well differentiated pancreatic neuroendocrine tumor with liver mets    SUBJECTIVE:  Interval History:  Adam Castillo is a 68 y.o. male who presents for follow up and to review scan results. He is accompanied by his wife. Patient states he had a heart attack in January and had stent replacements, he is healing well and follows with cardiology. He has nausea with empty stomach that resolves when he eats. He had problems with constipation and was started on laxative by his PCP which is working. He denies pain, no fever or chills, no shortness of breath or chest pain.    Review of Systems   All other systems reviewed and are negative.      OBJECTIVE:    VS:  /79 (BP Location: Right arm, Patient Position: Sitting, BP Cuff Size: Large adult)   Pulse 79   Temp 36.6 °C (97.9 °F) (Tympanic)   Resp 18   Wt 91.9 kg (202 lb 9.6 oz)   SpO2 95%   BMI 26.73 kg/m²   Weight:   Vitals:    12/07/23 1252   Weight: 91.9 kg (202 lb 9.6 oz)       BSA: 2.18 meters squared    Physical Exam  Vitals reviewed.   Constitutional:       General: He is not in acute distress.     Appearance: Normal appearance.   HENT:      Head: Normocephalic and atraumatic.      Nose: Nose normal.      Mouth/Throat:      Mouth: Mucous membranes are moist.      Pharynx: Oropharynx is clear.   Eyes:      Extraocular Movements: Extraocular movements intact.       Conjunctiva/sclera: Conjunctivae normal.   Cardiovascular:      Rate and Rhythm: Normal rate and regular rhythm.      Pulses: Normal pulses.      Heart sounds: Normal heart sounds.   Pulmonary:      Effort: Pulmonary effort is normal.      Breath sounds: Normal breath sounds.   Abdominal:      General: Abdomen is flat. Bowel sounds are normal.      Palpations: Abdomen is soft.   Musculoskeletal:         General: Normal range of motion.      Cervical back: Normal range of motion and neck supple.   Skin:     General: Skin is warm and dry.   Neurological:      General: No focal deficit present.      Mental Status: He is alert and oriented to person, place, and time. Mental status is at baseline.   Psychiatric:         Mood and Affect: Mood normal.         Behavior: Behavior normal.         Thought Content: Thought content normal.         Judgment: Judgment normal.           Diagnostic Results     Orders Only on 07/24/2023   Component Date Value    Glucose 07/24/2023 183 (H)     Sodium 07/24/2023 141     Potassium 07/24/2023 3.8     Chloride 07/24/2023 105     Bicarbonate 07/24/2023 30     Anion Gap 07/24/2023 10     Urea Nitrogen 07/24/2023 8     Creatinine 07/24/2023 0.86     GFR MALE 07/24/2023 >90     Calcium 07/24/2023 9.4     Albumin 07/24/2023 3.7     Alkaline Phosphatase 07/24/2023 141 (H)     Total Protein 07/24/2023 7.8     AST 07/24/2023 46 (H)     Total Bilirubin 07/24/2023 0.9     ALT (SGPT) 07/24/2023 42     WBC 07/24/2023 8.9     RBC 07/24/2023 4.42 (L)     Hemoglobin 07/24/2023 12.6 (L)     Hematocrit 07/24/2023 39.6 (L)     MCV 07/24/2023 90     MCHC 07/24/2023 31.8 (L)     Platelets 07/24/2023 316     RDW 07/24/2023 16.8 (H)     Neutrophils % 07/24/2023 56.2     Immature Granulocytes %,* 07/24/2023 0.2     Lymphocytes % 07/24/2023 24.1     Monocytes % 07/24/2023 14.2     Eosinophils % 07/24/2023 4.3     Basophils % 07/24/2023 1.0     Neutrophils Absolute 07/24/2023 5.02     Lymphocytes Absolute  07/24/2023 2.15     Monocytes Absolute 07/24/2023 1.27 (H)     Eosinophils Absolute 07/24/2023 0.38     Basophils Absolute 07/24/2023 0.09            MR pelvis w and wo IV contrast, MR abdomen w and wo IV contrast  Narrative: Interpreted By:  Garcia Davidson,   STUDY:  MR ABDOMEN W AND WO IV CONTRAST; MR PELVIS W AND WO IV CONTRAST;  12/4/2023 12:28 pm; 12/4/2023 12:30 pm      INDICATION:  Signs/Symptoms: Neuroendocrine tumors  D3A.8: Primary pancreatic  neuroendocrine tumor.      COMPARISON:  CT 07/20/2023, MR 04/20/2023.      ACCESSION NUMBER(S):  [id number]; [id number]      ORDERING CLINICIAN:  TROY BOBBY      TECHNIQUE:  Axial and coronal T1 and T2 weighted sequences of the abdomen and  pelvis were obtained. 19 ml of  Gadolinium contrast agent Dotarem  were administered intravenously without immediate complication.      FINDINGS:  LIVER:  Diffusely nodular and cirrhotic. 4 mm arterial enhancing observation  in segment VI is unchanged. As before there is no contrast washout or  enhancing capsule. No new hepatic lesions.      BILE DUCTS:  No dilatation.      GALLBLADDER:  No definite stone or wall thickening.      PANCREAS:  Post distal pancreatectomy. Unremarkable appearance of the pancreatic  remnant without mass or ductal dilatation.      SPLEEN:  Absent.      ADRENAL GLANDS:  Unremarkable      KIDNEYS/GENITOURINARY:  Few scattered tiny cysts. Otherwise unremarkable kidneys without  hydronephrosis. Urinary bladder is nondistended which can limit  evaluation.      LYMPH NODES:  No lymphadenopathy.      ABDOMINAL VESSELS:  Abdominal aorta is patent without aneurysm. Major visceral arterial  branches are patent. IVC and visualized major branches are patent.  Major portal venous branches are patent. Paraesophageal varices are  present. Major pelvic vasculature grossly appear patent.      BOWEL:  No dilated bowel is identified.      PERITONEUM/RETROPERITONEUM:  Trace ascites.  Prominent vessels in the left upper  quadrant unchanged. Previously  suspected fat necrosis in the left upper quadrant also unchanged.      BONES AND LOWER THORAX:  Partially imaged right hip arthroplasty which creates regional  susceptibility artifact which could obscure findings. No new  enhancing marrow lesions identified. No change in left lumbar  paraspinal subcutaneous cyst.      Impression: 1.  No new metastatic disease of the abdomen or pelvis.  2. Cirrhosis and portal hypertension. Trace ascites.  3. 4 mm right hepatic lobe LI-RADS LR 3 observation is stable. No new  suspicious hepatic lesions identified.  4. Previously described nodularity in the left upper quadrant as well  as fat necrosis not significantly changed, likely postoperative  changes.          MACRO:  None      Signed by: Garcia Davidson 12/4/2023 4:02 PM  Dictation workstation:   [id number]       Assessment/Plan   Metastatic pNET (G2) with liver mets   64-year-old man with PMH of DM, HTN who has been diagnosed with Metastatic Well differentiated Pancreatic Neuroendocrine tumor with liver mets. On 12/10/2019 he had abdominal pain radiated to his back  was about 8/10 associated with nausea and vomiting. He went to the ER at [facility] where he got admitted and had a CT scan showed evidence of cirrhosis as well as a 2 cm soft tissue mass at the till the pancreas. I reviewed his CT scan. There is  a calcified 2 cm mass noted at the anterior aspect of the tail the pancreas near the splenic hilum. He does have splenomegaly with spleen measuring about 17 cm or so in greatest dimension. Endoscopic ultrasound with biopsy showed well-differentiated neuroendocrine  tumor. On the description of the endoscopic ultrasound, 2.1 x 1.5 cm oval lesion was noted towards the till the pancreas abutting the till the pancreas. This did not appear to directly involve the pancreas endoscopically.  He was taken to the OR on 3/17/20  for laprscopic distal pancreatectomy, but was found to have liver mets  with laparoscopy with wedge liver biopsy confirmed metastatic neuroendocrine tumor to liver. A 2nd lesion was also visualized on the surface of the left liver. Final path showed well  diff. pNET with Ki67 4% (G2). PET-Ga68 showed somatostatin avid lesions in the pancreas and liver lesions. MRI confirmed same findings of Pancreatic lesions which is almost same size like CT on 12/2019, liver lesions 5 of them all subcentimeter and RP  LN. Pt started on SSA after that and is tolerated lanreotide well.     We discussed the case at the [facility] and he has limited disease mainly pancreatic and 5-6 liver lesions. We agreed on starting on neoadjuvant chemotherapy with CAPTEM and restaging for surgical resection   He started on cytotoxic chemotherapy with capecitabine and temozolomide on 06/21/2020 and finished 2 cycles. He went to the ER after the finished the w2 weeks of C1 due to  fever and his labs only showed reactive HAV AB and IgM was negative and he was COVID negative.  He finished 8 cycles so far and tolerated well except for nausea, and constipation and malignancy induced fever which resolved. However pt don't want to resume more chemo at this point   Restaging after C4 showed mild decrease in his pancreatic mass and liver mets   Restaging scans after C8 showed stable disease mainly 3 small liver lesions and stable pancreatic tail mass  He met with our surgeon Dr. Reardon and he is not a candidate for surgery given high risk due to his liver cirrhosis  We started him on SSA with lanreotide  Restaging MRI AP on 08/20/2021 showed stable disease arterial enhancing 1.6 x 2.2 cm mass of the pancreatic tail and Vague approximate 4 mm arterial enhancing foci in the lateral aspect of the right hepatic lobe and anteriorly in the left hepatic lobe  similar to prior. No new focal hepatic lesion identified.  His last restaging scans on 12/16/2021 and 04/2022 showed stable disease  Serum glucagon is 243  08/03/2022: Pt had distal  pancreatectomy and spelnectomny with final path confirmed G1 Well diff. PanNet with Li67 1.4%  Restaging scans showed post surgical changes and no evidence of new lesions . However there was a fluid collection concern for abscess and he finished 1 week of Abs and also showed splenic vein thrombus   Restaging MRI showed only post surgical changes and no evidence of disease. There is concern of progressed portal vein thrombosis  Started on anticoagulation and sent to hematology for follow up  Per last CT AP excluded recurrent/metastatic disease scan there is concern about liver cirrhosis   Restaging MRI excluded evidence of recurrence/metastatic diseases   07/2023: Restaging CT AP excluded recurrent/metastatic disease except for a small omental nodule and a new  rim enhancing  1 cm lesion along the anterior capsule of the left liver     MRI Liver showed arterially enhancing 0.4 cm lesion in hepatic segment VI without washout compared to 04/10/2023 MRI. Findings stranding may represent LI-RADS 3 lesion in the setting of liver cirrhosis versus  neuroendocrine metastasis.Focal peritoneal thickening with slight anterior omental nodularity in the left upper quadrant may represent omental fat necrosis and post operative scaring      PET-CU64 excluded any SS avid lesions     12/4/23 MRI AP excludes recurrent/metastatic disease.     Plan:    1- Patient is doing well and has no complaints  2- Reviewed scan results with patient and his wife which excludes recurrent/metastatic disease  3- Labs pending  4- MRI AP in 4 months (ordered)  5- RTC after scans for follow up and to review results        Assess/Plan SmartLinks: Diagnoses and all orders for this visit:  Primary pancreatic neuroendocrine tumor  -     CBC and Auto Differential; Future  -     Comprehensive Metabolic Panel; Future  -     MR abdomen w and wo IV contrast; Future  -     MR pelvis w and wo IV contrast; Future  -     Clinic Appointment Request Follow Up; [facility]; [facility]; Future  -     CBC and Auto Differential; Future  -     Comprehensive Metabolic Panel; Future        Some elements copied from Dr. Cui's note on 8/21/23, the elements have been updated and all reflect current decision making from today, 12/07/23      Sophie Whalen, PALLAVI-CNP  12/07/23

## 2023-12-07 ENCOUNTER — OFFICE VISIT (OUTPATIENT)
Dept: HEMATOLOGY/ONCOLOGY | Facility: CLINIC | Age: 68
End: 2023-12-07
Payer: MEDICARE

## 2023-12-07 VITALS
WEIGHT: 202.6 LBS | OXYGEN SATURATION: 95 % | HEART RATE: 79 BPM | RESPIRATION RATE: 18 BRPM | DIASTOLIC BLOOD PRESSURE: 79 MMHG | BODY MASS INDEX: 26.73 KG/M2 | SYSTOLIC BLOOD PRESSURE: 166 MMHG | TEMPERATURE: 97.9 F

## 2023-12-07 DIAGNOSIS — D3A.8 PRIMARY PANCREATIC NEUROENDOCRINE TUMOR (CMS-HCC): Primary | ICD-10-CM

## 2023-12-07 PROCEDURE — 99215 OFFICE O/P EST HI 40 MIN: CPT | Performed by: NURSE PRACTITIONER

## 2023-12-07 PROCEDURE — 1036F TOBACCO NON-USER: CPT | Performed by: NURSE PRACTITIONER

## 2023-12-07 PROCEDURE — 1159F MED LIST DOCD IN RCRD: CPT | Performed by: NURSE PRACTITIONER

## 2023-12-07 PROCEDURE — 1126F AMNT PAIN NOTED NONE PRSNT: CPT | Performed by: NURSE PRACTITIONER

## 2023-12-07 PROCEDURE — 3077F SYST BP >= 140 MM HG: CPT | Performed by: NURSE PRACTITIONER

## 2023-12-07 PROCEDURE — 4010F ACE/ARB THERAPY RXD/TAKEN: CPT | Performed by: NURSE PRACTITIONER

## 2023-12-07 PROCEDURE — 3078F DIAST BP <80 MM HG: CPT | Performed by: NURSE PRACTITIONER

## 2023-12-07 ASSESSMENT — COLUMBIA-SUICIDE SEVERITY RATING SCALE - C-SSRS
2. HAVE YOU ACTUALLY HAD ANY THOUGHTS OF KILLING YOURSELF?: NO
6. HAVE YOU EVER DONE ANYTHING, STARTED TO DO ANYTHING, OR PREPARED TO DO ANYTHING TO END YOUR LIFE?: NO
1. IN THE PAST MONTH, HAVE YOU WISHED YOU WERE DEAD OR WISHED YOU COULD GO TO SLEEP AND NOT WAKE UP?: NO

## 2023-12-07 ASSESSMENT — PATIENT HEALTH QUESTIONNAIRE - PHQ9
2. FEELING DOWN, DEPRESSED OR HOPELESS: NOT AT ALL
1. LITTLE INTEREST OR PLEASURE IN DOING THINGS: NOT AT ALL
SUM OF ALL RESPONSES TO PHQ9 QUESTIONS 1 AND 2: 0

## 2023-12-07 ASSESSMENT — ENCOUNTER SYMPTOMS
LOSS OF SENSATION IN FEET: 0
DEPRESSION: 0
OCCASIONAL FEELINGS OF UNSTEADINESS: 0

## 2023-12-07 ASSESSMENT — PAIN SCALES - GENERAL: PAINLEVEL: 0-NO PAIN

## 2023-12-12 ENCOUNTER — OFFICE VISIT (OUTPATIENT)
Dept: GASTROENTEROLOGY | Age: 68
End: 2023-12-12
Payer: MEDICARE

## 2023-12-12 VITALS — HEIGHT: 72 IN | HEART RATE: 62 BPM | BODY MASS INDEX: 27.63 KG/M2 | WEIGHT: 204 LBS | OXYGEN SATURATION: 98 %

## 2023-12-12 DIAGNOSIS — K21.9 GASTROESOPHAGEAL REFLUX DISEASE WITHOUT ESOPHAGITIS: ICD-10-CM

## 2023-12-12 DIAGNOSIS — K74.69 OTHER CIRRHOSIS OF LIVER (HCC): ICD-10-CM

## 2023-12-12 DIAGNOSIS — Z90.410 HISTORY OF PANCREATECTOMY: ICD-10-CM

## 2023-12-12 DIAGNOSIS — D3A.8 NEUROENDOCRINE TUMOR OF PANCREAS: ICD-10-CM

## 2023-12-12 DIAGNOSIS — K59.00 CONSTIPATION, UNSPECIFIED CONSTIPATION TYPE: Primary | ICD-10-CM

## 2023-12-12 DIAGNOSIS — D50.9 IRON DEFICIENCY ANEMIA, UNSPECIFIED IRON DEFICIENCY ANEMIA TYPE: ICD-10-CM

## 2023-12-12 PROCEDURE — 99214 OFFICE O/P EST MOD 30 MIN: CPT | Performed by: INTERNAL MEDICINE

## 2023-12-12 PROCEDURE — 1123F ACP DISCUSS/DSCN MKR DOCD: CPT | Performed by: INTERNAL MEDICINE

## 2023-12-12 ASSESSMENT — ENCOUNTER SYMPTOMS
CONSTIPATION: 0
TROUBLE SWALLOWING: 0
BLOOD IN STOOL: 0
CHOKING: 0
ABDOMINAL PAIN: 0
VOMITING: 0
ABDOMINAL DISTENTION: 0
VOICE CHANGE: 0
ANAL BLEEDING: 0
RECTAL PAIN: 0
NAUSEA: 0
DIARRHEA: 0
EYES NEGATIVE: 1

## 2023-12-12 NOTE — PROGRESS NOTES
or metastatic disease.  - Hepatic encephalopathy: No history. - Advised to limit/discontinue NSAID use   - Continue complete alcohol abstinence. Last drink >13 years ago. - Hepatitis status:               Hep A immunity: reactive 6/30/2020              Hep B immunity: non-reactive 6/30/2020              Hep C Status: non-reactive 6/30/2020  -Continue Brilinta per patient's hematologist/oncologist recommendations for portal vein thrombosis     7. Gastroesophageal reflux disease without esophagitis  - Continue Nexium 40 mg by mouth daily  - Anti-reflux lifestyle modification discussed at length               8.  Iron deficiency anemia  -Patient responded well to IV iron infusion, off oral iron. - Monitor CBC every 2-3 months  - IV iron infusions on as-needed basis    9. Constipation  Patient advised to use Amitiza on a regular basis if symptoms recur    Return in about 6 months (around 6/12/2024). Aneesh Berry MD   Staff Gastroenterology Nurse Practitioner  [facility]    Please note this report has been partially produced using speech recognition software and contain errors related to that system including grammar, punctuation and spelling as well as words and phrases that may seem inappropriate. If there are questions or concerns please feel free to contact me to clarify.

## 2024-02-07 ENCOUNTER — TELEPHONE (OUTPATIENT)
Dept: CARDIOLOGY CLINIC | Age: 69
End: 2024-02-07

## 2024-02-07 NOTE — TELEPHONE ENCOUNTER
PATIENT C/O LE EDEMA AND  FATIGUE AND CAN'T SLEEP. HE WAS TOLD TO CALL OFFICE BACK IF HE HAS ANY EDEMA    PLEASE ADVISE

## 2024-02-09 ENCOUNTER — OFFICE VISIT (OUTPATIENT)
Dept: CARDIOLOGY CLINIC | Age: 69
End: 2024-02-09
Payer: MEDICARE

## 2024-02-09 VITALS
SYSTOLIC BLOOD PRESSURE: 132 MMHG | HEART RATE: 73 BPM | DIASTOLIC BLOOD PRESSURE: 68 MMHG | OXYGEN SATURATION: 97 % | RESPIRATION RATE: 14 BRPM | WEIGHT: 207 LBS | HEIGHT: 72 IN | BODY MASS INDEX: 28.04 KG/M2

## 2024-02-09 DIAGNOSIS — I50.22 CHRONIC SYSTOLIC (CONGESTIVE) HEART FAILURE (HCC): ICD-10-CM

## 2024-02-09 DIAGNOSIS — F33.1 MAJOR DEPRESSIVE DISORDER, RECURRENT, MODERATE (HCC): ICD-10-CM

## 2024-02-09 DIAGNOSIS — I25.10 CAD S/P PERCUTANEOUS CORONARY ANGIOPLASTY: ICD-10-CM

## 2024-02-09 DIAGNOSIS — F33.0 MAJOR DEPRESSIVE DISORDER, RECURRENT, MILD (HCC): ICD-10-CM

## 2024-02-09 DIAGNOSIS — I21.3 ST ELEVATION MYOCARDIAL INFARCTION (STEMI), UNSPECIFIED ARTERY (HCC): Primary | ICD-10-CM

## 2024-02-09 DIAGNOSIS — Z98.61 CAD S/P PERCUTANEOUS CORONARY ANGIOPLASTY: ICD-10-CM

## 2024-02-09 PROBLEM — F33.9 MAJOR DEPRESSIVE DISORDER, RECURRENT, UNSPECIFIED (HCC): Status: ACTIVE | Noted: 2024-02-09

## 2024-02-09 PROCEDURE — 3078F DIAST BP <80 MM HG: CPT | Performed by: INTERNAL MEDICINE

## 2024-02-09 PROCEDURE — 93000 ELECTROCARDIOGRAM COMPLETE: CPT | Performed by: INTERNAL MEDICINE

## 2024-02-09 PROCEDURE — 3075F SYST BP GE 130 - 139MM HG: CPT | Performed by: INTERNAL MEDICINE

## 2024-02-09 PROCEDURE — 99214 OFFICE O/P EST MOD 30 MIN: CPT | Performed by: INTERNAL MEDICINE

## 2024-02-09 PROCEDURE — 1123F ACP DISCUSS/DSCN MKR DOCD: CPT | Performed by: INTERNAL MEDICINE

## 2024-02-09 RX ORDER — TORSEMIDE 20 MG/1
10 TABLET ORAL DAILY
Qty: 30 TABLET | Refills: 5 | Status: SHIPPED | OUTPATIENT
Start: 2024-02-09

## 2024-02-09 RX ORDER — FUROSEMIDE 20 MG/1
20 TABLET ORAL 2 TIMES DAILY
Qty: 180 TABLET | Refills: 5 | Status: SHIPPED | OUTPATIENT
Start: 2024-02-09

## 2024-02-09 ASSESSMENT — ENCOUNTER SYMPTOMS
SHORTNESS OF BREATH: 1
RHINORRHEA: 0
NAUSEA: 0
APNEA: 0
CONSTIPATION: 0
COUGH: 0
CHEST TIGHTNESS: 0
ABDOMINAL PAIN: 0
COLOR CHANGE: 0
VOMITING: 0
WHEEZING: 0
DIARRHEA: 0
EYE REDNESS: 0

## 2024-02-09 NOTE — PROGRESS NOTES
vigorous-intensity physical activity as per 2019 ACC/AHA guideline on primary prevention of CVD

## 2024-02-12 DIAGNOSIS — I50.22 CHRONIC SYSTOLIC (CONGESTIVE) HEART FAILURE (HCC): ICD-10-CM

## 2024-02-12 LAB
ANION GAP SERPL CALCULATED.3IONS-SCNC: 9 MEQ/L (ref 9–15)
BUN SERPL-MCNC: 12 MG/DL (ref 8–23)
CALCIUM SERPL-MCNC: 9.1 MG/DL (ref 8.5–9.9)
CHLORIDE SERPL-SCNC: 107 MEQ/L (ref 95–107)
CO2 SERPL-SCNC: 26 MEQ/L (ref 20–31)
CREAT SERPL-MCNC: 0.81 MG/DL (ref 0.7–1.2)
GLUCOSE SERPL-MCNC: 194 MG/DL (ref 70–99)
POTASSIUM SERPL-SCNC: 4.2 MEQ/L (ref 3.4–4.9)
SODIUM SERPL-SCNC: 142 MEQ/L (ref 135–144)

## 2024-03-08 ENCOUNTER — TELEPHONE (OUTPATIENT)
Dept: FAMILY MEDICINE CLINIC | Age: 69
End: 2024-03-08

## 2024-03-08 NOTE — TELEPHONE ENCOUNTER
Pt thinks he may be allergic to the prescription lubiprostone (AMITIZA) 24 MCG capsule .  He is getting sick to his stomach, constipation, headache and makes his bp rise.  He would like to have something else.    Please advise pt.

## 2024-03-08 NOTE — TELEPHONE ENCOUNTER
That doesn't make sense.  Any other meds in that category work the same.     They are not absorbed and don't really cause systemic side effects.     I guess try miralax

## 2024-03-08 NOTE — TELEPHONE ENCOUNTER
Pt aware of message, said he tried that before and didn't work but will try again, has been constipated for 6 days. Asked when he should to go the ER, advised if Miralax doesn't work and if he is in a lot of pain.

## 2024-03-18 ENCOUNTER — OFFICE VISIT (OUTPATIENT)
Dept: ENDOCRINOLOGY | Age: 69
End: 2024-03-18
Payer: MEDICARE

## 2024-03-18 VITALS
HEIGHT: 73 IN | RESPIRATION RATE: 16 BRPM | WEIGHT: 211 LBS | OXYGEN SATURATION: 98 % | DIASTOLIC BLOOD PRESSURE: 74 MMHG | BODY MASS INDEX: 27.96 KG/M2 | SYSTOLIC BLOOD PRESSURE: 180 MMHG | HEART RATE: 73 BPM

## 2024-03-18 DIAGNOSIS — Z46.81 COUNSELING FOR INSULIN PUMP: ICD-10-CM

## 2024-03-18 DIAGNOSIS — E11.65 INADEQUATELY CONTROLLED DIABETES MELLITUS (HCC): ICD-10-CM

## 2024-03-18 DIAGNOSIS — E11.65 INADEQUATELY CONTROLLED DIABETES MELLITUS (HCC): Primary | ICD-10-CM

## 2024-03-18 LAB
ANION GAP SERPL CALCULATED.3IONS-SCNC: 12 MEQ/L (ref 9–15)
BUN SERPL-MCNC: 10 MG/DL (ref 8–23)
CALCIUM SERPL-MCNC: 9 MG/DL (ref 8.5–9.9)
CHLORIDE SERPL-SCNC: 104 MEQ/L (ref 95–107)
CHP ED QC CHECK: NORMAL
CO2 SERPL-SCNC: 26 MEQ/L (ref 20–31)
CREAT SERPL-MCNC: 0.85 MG/DL (ref 0.7–1.2)
CREAT UR-MCNC: 48.4 MG/DL
GLUCOSE BLD-MCNC: 237 MG/DL
GLUCOSE SERPL-MCNC: 187 MG/DL (ref 70–99)
HBA1C MFR BLD: 8.9 % (ref 4.8–5.9)
MICROALBUMIN UR-MCNC: <1.2 MG/DL
MICROALBUMIN/CREAT UR-RTO: NORMAL MG/G (ref 0–30)
POTASSIUM SERPL-SCNC: 4.3 MEQ/L (ref 3.4–4.9)
SODIUM SERPL-SCNC: 142 MEQ/L (ref 135–144)

## 2024-03-18 PROCEDURE — 3052F HG A1C>EQUAL 8.0%<EQUAL 9.0%: CPT | Performed by: INTERNAL MEDICINE

## 2024-03-18 PROCEDURE — 3078F DIAST BP <80 MM HG: CPT | Performed by: INTERNAL MEDICINE

## 2024-03-18 PROCEDURE — 1123F ACP DISCUSS/DSCN MKR DOCD: CPT | Performed by: INTERNAL MEDICINE

## 2024-03-18 PROCEDURE — 99213 OFFICE O/P EST LOW 20 MIN: CPT | Performed by: INTERNAL MEDICINE

## 2024-03-18 PROCEDURE — 3077F SYST BP >= 140 MM HG: CPT | Performed by: INTERNAL MEDICINE

## 2024-03-18 PROCEDURE — 95251 CONT GLUC MNTR ANALYSIS I&R: CPT | Performed by: INTERNAL MEDICINE

## 2024-03-18 PROCEDURE — 82962 GLUCOSE BLOOD TEST: CPT | Performed by: INTERNAL MEDICINE

## 2024-03-18 NOTE — PROGRESS NOTES
3/18/2024    Assessment:       Diagnosis Orders   1. Inadequately controlled diabetes mellitus (HCC)  POCT Glucose    Basic Metabolic Panel    Hemoglobin A1C    Microalbumin / Creatinine Urine Ratio      2. Counseling for insulin pump              PLAN:     Orders Placed This Encounter   Procedures    Basic Metabolic Panel     Standing Status:   Future     Number of Occurrences:   1     Standing Expiration Date:   3/18/2025    Hemoglobin A1C     Standing Status:   Future     Number of Occurrences:   1     Standing Expiration Date:   3/18/2025    Microalbumin / Creatinine Urine Ratio     Standing Status:   Future     Number of Occurrences:   1     Standing Expiration Date:   3/18/2025    POCT Glucose    CA CONTINUOUS GLUCOSE MONITORING ANALYSIS I&R     Continue patient on Lantus 45 units daily plus Humalog 25 to 30 units with each meals given information regarding [employer] insulin pump for improved control of A1c goal of less than 7  Diabetes education provided today:    Insulin pumps, how they work and how they affect blood sugar levels.  Continuous Glucose monitor. How it works and checks blood sugars every 5 min. for 4 days during our tests.      Orders Placed This Encounter   Procedures    POCT Glucose     No orders of the defined types were placed in this encounter.    No follow-ups on file.  Subjective:     Chief Complaint   Patient presents with    Diabetes     Counseling for insulin pump follow up      Vitals:    03/18/24 1419 03/18/24 1424   BP: (!) 180/74 (!) 180/74   Pulse: 73    Resp: 16    SpO2: 98%    Weight: 95.7 kg (211 lb)    Height: 1.854 m (6' 1\")      Wt Readings from Last 3 Encounters:   03/18/24 95.7 kg (211 lb)   02/09/24 93.9 kg (207 lb)   12/12/23 92.5 kg (204 lb)     BP Readings from Last 3 Encounters:   03/18/24 (!) 180/74   02/09/24 132/68   11/29/23 124/68         Follow-up on her type 2 diabetes on Lantus and Humalog patient using freestyle kellen continuous glucose monitoring which was

## 2024-03-20 ENCOUNTER — HOSPITAL ENCOUNTER (OUTPATIENT)
Age: 69
Discharge: HOME | End: 2024-03-22
Payer: MEDICARE

## 2024-03-20 VITALS
BODY MASS INDEX: 27.96 KG/M2 | DIASTOLIC BLOOD PRESSURE: 74 MMHG | SYSTOLIC BLOOD PRESSURE: 180 MMHG | WEIGHT: 211 LBS | HEIGHT: 73 IN

## 2024-03-20 DIAGNOSIS — I50.22 CHRONIC SYSTOLIC (CONGESTIVE) HEART FAILURE (HCC): ICD-10-CM

## 2024-03-20 PROCEDURE — 93306 TTE W/DOPPLER COMPLETE: CPT

## 2024-03-21 LAB
ECHO AO ROOT DIAM: 3.2 CM
ECHO AO ROOT INDEX: 1.45 CM/M2
ECHO AV AREA PEAK VELOCITY: 2.3 CM2
ECHO AV AREA VTI: 2.4 CM2
ECHO AV AREA/BSA PEAK VELOCITY: 1 CM2/M2
ECHO AV AREA/BSA VTI: 1.1 CM2/M2
ECHO AV MEAN GRADIENT: 5 MMHG
ECHO AV MEAN VELOCITY: 1 M/S
ECHO AV PEAK GRADIENT: 8 MMHG
ECHO AV PEAK VELOCITY: 1.4 M/S
ECHO AV VELOCITY RATIO: 0.71
ECHO AV VTI: 30.7 CM
ECHO BSA: 2.22 M2
ECHO EST RA PRESSURE: 3 MMHG
ECHO LA DIAMETER INDEX: 2.23 CM/M2
ECHO LA DIAMETER: 4.9 CM
ECHO LA TO AORTIC ROOT RATIO: 1.53
ECHO LA VOL A-L A2C: 86 ML (ref 18–58)
ECHO LA VOL A-L A4C: 85 ML (ref 18–58)
ECHO LA VOL MOD A2C: 85 ML (ref 18–58)
ECHO LA VOL MOD A4C: 77 ML (ref 18–58)
ECHO LA VOLUME AREA LENGTH: 88 ML
ECHO LA VOLUME INDEX A-L A2C: 39 ML/M2 (ref 16–34)
ECHO LA VOLUME INDEX A-L A4C: 39 ML/M2 (ref 16–34)
ECHO LA VOLUME INDEX AREA LENGTH: 40 ML/M2 (ref 16–34)
ECHO LA VOLUME INDEX MOD A2C: 39 ML/M2 (ref 16–34)
ECHO LA VOLUME INDEX MOD A4C: 35 ML/M2 (ref 16–34)
ECHO LV E' LATERAL VELOCITY: 8 CM/S
ECHO LV E' SEPTAL VELOCITY: 7 CM/S
ECHO LV EDV A2C: 150 ML
ECHO LV EDV A4C: 179 ML
ECHO LV EDV BP: 165 ML (ref 67–155)
ECHO LV EDV INDEX A4C: 81 ML/M2
ECHO LV EDV INDEX BP: 75 ML/M2
ECHO LV EDV NDEX A2C: 68 ML/M2
ECHO LV EJECTION FRACTION A2C: 52 %
ECHO LV EJECTION FRACTION A4C: 47 %
ECHO LV EJECTION FRACTION BIPLANE: 48 % (ref 55–100)
ECHO LV ESV A2C: 72 ML
ECHO LV ESV A4C: 96 ML
ECHO LV ESV BP: 85 ML (ref 22–58)
ECHO LV ESV INDEX A2C: 33 ML/M2
ECHO LV ESV INDEX A4C: 44 ML/M2
ECHO LV ESV INDEX BP: 39 ML/M2
ECHO LV FRACTIONAL SHORTENING: 28 % (ref 28–44)
ECHO LV INTERNAL DIMENSION DIASTOLE INDEX: 2.27 CM/M2
ECHO LV INTERNAL DIMENSION DIASTOLIC: 5 CM (ref 4.2–5.9)
ECHO LV INTERNAL DIMENSION SYSTOLIC INDEX: 1.64 CM/M2
ECHO LV INTERNAL DIMENSION SYSTOLIC: 3.6 CM
ECHO LV IVSD: 1.3 CM (ref 0.6–1)
ECHO LV IVSS: 1.3 CM
ECHO LV MASS 2D: 233.7 G (ref 88–224)
ECHO LV MASS INDEX 2D: 106.2 G/M2 (ref 49–115)
ECHO LV POSTERIOR WALL DIASTOLIC: 1.1 CM (ref 0.6–1)
ECHO LV POSTERIOR WALL SYSTOLIC: 1.4 CM
ECHO LV RELATIVE WALL THICKNESS RATIO: 0.44
ECHO LVOT AREA: 3.1 CM2
ECHO LVOT AV VTI INDEX: 0.75
ECHO LVOT DIAM: 2 CM
ECHO LVOT MEAN GRADIENT: 2 MMHG
ECHO LVOT PEAK GRADIENT: 4 MMHG
ECHO LVOT PEAK VELOCITY: 1 M/S
ECHO LVOT STROKE VOLUME INDEX: 32.8 ML/M2
ECHO LVOT SV: 72.2 ML
ECHO LVOT VTI: 23 CM
ECHO MV A VELOCITY: 0.98 M/S
ECHO MV E DECELERATION TIME (DT): 248.9 MS
ECHO MV E VELOCITY: 0.58 M/S
ECHO MV E/A RATIO: 0.59
ECHO MV E/E' LATERAL: 7.25
ECHO MV E/E' RATIO (AVERAGED): 7.77
ECHO PV ACCELERATION TIME (AT): 138.9 MS
ECHO PV MAX VELOCITY: 1.5 M/S
ECHO PV PEAK GRADIENT: 9 MMHG
ECHO PVEIN A DURATION: 165.6 MS
ECHO PVEIN A VELOCITY: 0.4 M/S
ECHO PVEIN PEAK D VELOCITY: 0.6 M/S
ECHO PVEIN PEAK S VELOCITY: 0.9 M/S
ECHO PVEIN S/D RATIO: 1.5
ECHO RIGHT VENTRICULAR SYSTOLIC PRESSURE (RVSP): 40 MMHG
ECHO RV TAPSE: 2.8 CM (ref 1.7–?)
ECHO TV REGURGITANT MAX VELOCITY: 3.05 M/S
ECHO TV REGURGITANT PEAK GRADIENT: 37 MMHG

## 2024-03-21 PROCEDURE — 93306 TTE W/DOPPLER COMPLETE: CPT | Performed by: INTERNAL MEDICINE

## 2024-04-01 ENCOUNTER — HOSPITAL ENCOUNTER (OUTPATIENT)
Dept: RADIOLOGY | Facility: CLINIC | Age: 69
Discharge: HOME | End: 2024-04-01
Payer: MEDICARE

## 2024-04-01 DIAGNOSIS — D3A.8 PRIMARY PANCREATIC NEUROENDOCRINE TUMOR (CMS-HCC): ICD-10-CM

## 2024-04-01 PROCEDURE — 72197 MRI PELVIS W/O & W/DYE: CPT | Performed by: RADIOLOGY

## 2024-04-01 PROCEDURE — 2550000001 HC RX 255 CONTRASTS: Performed by: NURSE PRACTITIONER

## 2024-04-01 PROCEDURE — 74183 MRI ABD W/O CNTR FLWD CNTR: CPT

## 2024-04-01 PROCEDURE — 72197 MRI PELVIS W/O & W/DYE: CPT

## 2024-04-01 PROCEDURE — 74183 MRI ABD W/O CNTR FLWD CNTR: CPT | Performed by: RADIOLOGY

## 2024-04-01 PROCEDURE — A9575 INJ GADOTERATE MEGLUMI 0.1ML: HCPCS | Performed by: NURSE PRACTITIONER

## 2024-04-01 RX ORDER — GADOTERATE MEGLUMINE 376.9 MG/ML
19 INJECTION INTRAVENOUS
Status: COMPLETED | OUTPATIENT
Start: 2024-04-01 | End: 2024-04-01

## 2024-04-01 RX ADMIN — GADOTERATE MEGLUMINE 19 ML: 376.9 INJECTION INTRAVENOUS at 10:22

## 2024-04-04 ENCOUNTER — APPOINTMENT (OUTPATIENT)
Dept: HEMATOLOGY/ONCOLOGY | Facility: CLINIC | Age: 69
End: 2024-04-04
Payer: MEDICARE

## 2024-04-09 ENCOUNTER — APPOINTMENT (OUTPATIENT)
Dept: HEMATOLOGY/ONCOLOGY | Facility: CLINIC | Age: 69
End: 2024-04-09
Payer: MEDICARE

## 2024-04-16 ENCOUNTER — OFFICE VISIT (OUTPATIENT)
Dept: HEMATOLOGY/ONCOLOGY | Facility: CLINIC | Age: 69
End: 2024-04-16
Payer: MEDICARE

## 2024-04-16 ENCOUNTER — LAB (OUTPATIENT)
Dept: LAB | Facility: CLINIC | Age: 69
End: 2024-04-16
Payer: MEDICARE

## 2024-04-16 VITALS
BODY MASS INDEX: 27.95 KG/M2 | OXYGEN SATURATION: 94 % | RESPIRATION RATE: 18 BRPM | HEART RATE: 75 BPM | TEMPERATURE: 98.2 F | WEIGHT: 211.86 LBS | SYSTOLIC BLOOD PRESSURE: 164 MMHG | DIASTOLIC BLOOD PRESSURE: 79 MMHG

## 2024-04-16 DIAGNOSIS — D3A.8 PRIMARY PANCREATIC NEUROENDOCRINE TUMOR (CMS-HCC): ICD-10-CM

## 2024-04-16 LAB
ALBUMIN SERPL BCP-MCNC: 3.5 G/DL (ref 3.4–5)
ALP SERPL-CCNC: 121 U/L (ref 33–136)
ALT SERPL W P-5'-P-CCNC: 26 U/L (ref 10–52)
ANION GAP SERPL CALC-SCNC: 11 MMOL/L (ref 10–20)
AST SERPL W P-5'-P-CCNC: 34 U/L (ref 9–39)
BASOPHILS # BLD AUTO: 0.09 X10*3/UL (ref 0–0.1)
BASOPHILS NFR BLD AUTO: 1.1 %
BILIRUB SERPL-MCNC: 1.1 MG/DL (ref 0–1.2)
BUN SERPL-MCNC: 14 MG/DL (ref 6–23)
CALCIUM SERPL-MCNC: 8.7 MG/DL (ref 8.6–10.3)
CHLORIDE SERPL-SCNC: 107 MMOL/L (ref 98–107)
CO2 SERPL-SCNC: 25 MMOL/L (ref 21–32)
CREAT SERPL-MCNC: 0.88 MG/DL (ref 0.5–1.3)
EGFRCR SERPLBLD CKD-EPI 2021: >90 ML/MIN/1.73M*2
EOSINOPHIL # BLD AUTO: 0.4 X10*3/UL (ref 0–0.7)
EOSINOPHIL NFR BLD AUTO: 4.9 %
ERYTHROCYTE [DISTWIDTH] IN BLOOD BY AUTOMATED COUNT: 16.8 % (ref 11.5–14.5)
GLUCOSE SERPL-MCNC: 278 MG/DL (ref 74–99)
HCT VFR BLD AUTO: 35.2 % (ref 41–52)
HGB BLD-MCNC: 10.9 G/DL (ref 13.5–17.5)
IMM GRANULOCYTES # BLD AUTO: 0.01 X10*3/UL (ref 0–0.7)
IMM GRANULOCYTES NFR BLD AUTO: 0.1 % (ref 0–0.9)
LYMPHOCYTES # BLD AUTO: 2.2 X10*3/UL (ref 1.2–4.8)
LYMPHOCYTES NFR BLD AUTO: 26.8 %
MCH RBC QN AUTO: 25.8 PG (ref 26–34)
MCHC RBC AUTO-ENTMCNC: 31 G/DL (ref 32–36)
MCV RBC AUTO: 83 FL (ref 80–100)
MONOCYTES # BLD AUTO: 1.08 X10*3/UL (ref 0.1–1)
MONOCYTES NFR BLD AUTO: 13.1 %
NEUTROPHILS # BLD AUTO: 4.44 X10*3/UL (ref 1.2–7.7)
NEUTROPHILS NFR BLD AUTO: 54 %
NRBC BLD-RTO: ABNORMAL /100{WBCS}
PLATELET # BLD AUTO: 371 X10*3/UL (ref 150–450)
POTASSIUM SERPL-SCNC: 3.9 MMOL/L (ref 3.5–5.3)
PROT SERPL-MCNC: 7 G/DL (ref 6.4–8.2)
RBC # BLD AUTO: 4.22 X10*6/UL (ref 4.5–5.9)
SODIUM SERPL-SCNC: 139 MMOL/L (ref 136–145)
WBC # BLD AUTO: 8.2 X10*3/UL (ref 4.4–11.3)

## 2024-04-16 PROCEDURE — 1159F MED LIST DOCD IN RCRD: CPT | Performed by: INTERNAL MEDICINE

## 2024-04-16 PROCEDURE — 3078F DIAST BP <80 MM HG: CPT | Performed by: INTERNAL MEDICINE

## 2024-04-16 PROCEDURE — 85025 COMPLETE CBC W/AUTO DIFF WBC: CPT

## 2024-04-16 PROCEDURE — 99215 OFFICE O/P EST HI 40 MIN: CPT | Performed by: INTERNAL MEDICINE

## 2024-04-16 PROCEDURE — 80053 COMPREHEN METABOLIC PANEL: CPT

## 2024-04-16 PROCEDURE — 1126F AMNT PAIN NOTED NONE PRSNT: CPT | Performed by: INTERNAL MEDICINE

## 2024-04-16 PROCEDURE — 4010F ACE/ARB THERAPY RXD/TAKEN: CPT | Performed by: INTERNAL MEDICINE

## 2024-04-16 PROCEDURE — 3077F SYST BP >= 140 MM HG: CPT | Performed by: INTERNAL MEDICINE

## 2024-04-16 PROCEDURE — 36415 COLL VENOUS BLD VENIPUNCTURE: CPT

## 2024-04-16 RX ORDER — HYDROGEN PEROXIDE 3 %
20 SOLUTION, NON-ORAL MISCELLANEOUS
COMMUNITY

## 2024-04-16 RX ORDER — ASPIRIN 81 MG/1
81 TABLET ORAL DAILY
COMMUNITY

## 2024-04-16 RX ORDER — LOSARTAN POTASSIUM AND HYDROCHLOROTHIAZIDE 12.5; 5 MG/1; MG/1
1 TABLET ORAL DAILY
COMMUNITY

## 2024-04-16 RX ORDER — METOPROLOL TARTRATE 25 MG/1
TABLET, FILM COATED ORAL
COMMUNITY

## 2024-04-16 RX ORDER — TORSEMIDE 20 MG/1
20 TABLET ORAL DAILY
COMMUNITY

## 2024-04-16 RX ORDER — FUROSEMIDE 20 MG/1
20 TABLET ORAL 2 TIMES DAILY
COMMUNITY

## 2024-04-16 RX ORDER — ATORVASTATIN CALCIUM 40 MG/1
40 TABLET, FILM COATED ORAL DAILY
COMMUNITY

## 2024-04-16 ASSESSMENT — PAIN SCALES - GENERAL: PAINLEVEL: 0-NO PAIN

## 2024-04-16 NOTE — PROGRESS NOTES
Patient ID: Adam Castillo is a 68 y.o. male.  Visit type: Follow up visit      ONCOLOGIC HISTORY  68-year-old man with PMH of DM, HTN who has been diagnosed with Metastatic Well differentiated Pancreatic Neuroendocrine tumor with liver mets. On 12/10/2019  he had abdominal pain radiated to his back was about 8/10 associated with nausea and vomiting. He went to the ER at [facility] where he got admitted and had a CT scan showed evidence of cirrhosis as well as a 2 cm soft tissue mass at the till the  pancreas. I reviewed his CT scan. There is a calcified 2 cm mass noted at the anterior aspect of the tail the pancreas near the splenic hilum. He does have splenomegaly with spleen measuring about 17 cm or so in greatest dimension. Endoscopic ultrasound  with biopsy showed well-differentiated neuroendocrine tumor. On the description of the endoscopic ultrasound, 2.1 x 1.5 cm oval lesion was noted towards the till the pancreas abutting the till the pancreas. This did not appear to directly involve the  pancreas endoscopically.  He was taken to the OR on 3/17/20 for laprscopic distal pancreatectomy, but was found to have liver mets with laparoscopy with wedge liver biopsy confirmed metastatic neuroendocrine tumor to liver. A 2nd lesion was also visualized  on the surface of the left liver. Final path showed well diff. pNET with Ki67 4% (G2). Initially he started oin LAURYN and after discuss at the TB we agreed on neoadjuvant therapy and restaging for response. He started on cytotoxic chemotherapy with capecitabine  and temozolomide on 06/21/2020. He went to the ER after the finished the w2 weeks of C1 due to fever and his labs only showed reactive HAV AB and he was COVID negative.   He finished 8 cycles so far and tolerated well except for nausea, and constipation and malignancy induced fever which resolved  Restaging after C4 showed mild decrease in his pancreatic mass and liver mets   Due to his myelosuppression we  stopped chemotherapy and switch back to SSA  He had surgery on 08/03/2022  Restaging scans showed post surgical changes and no evidence of new lesions        Past Medical History History of cirrhosis (V12.79) (Z87.19) History of diabetes mellitus (V12.29) (Z86.39) History of hypertension (V12.59) (Z86.79) History of malignant neoplasm  of skin (V10.83) (Z85.828) History of Pancreatic tumor (239.0) (D49.0)   Surgical History History of Hemorrhoidectomy      Family History Family history of lung cancer in hi smother side  (V16.1) (Z80.1)     History of Present Illness:  Chief complaint: Metastatic well differentiated pancreatic neuroendocrine tumor with liver mets    SUBJECTIVE:  Interval History:  Adam Castillo is a 68 y.o. male who presents for follow up and to review scan results. He denies pain, no fever or chills, no shortness of breath or chest pain.    Review of Systems   All other systems reviewed and are negative.      OBJECTIVE:    VS:  /79 (BP Location: Left arm, Patient Position: Sitting, BP Cuff Size: Large adult)   Pulse 75   Temp 36.8 °C (98.2 °F) (Temporal)   Resp 18   Wt 96.1 kg (211 lb 13.8 oz)   SpO2 94%   BMI 27.95 kg/m²   Weight:   Vitals:    04/16/24 1253   Weight: 96.1 kg (211 lb 13.8 oz)       BSA: 2.22 meters squared    Physical Exam  Vitals reviewed.   Constitutional:       General: He is not in acute distress.     Appearance: Normal appearance.   HENT:      Head: Normocephalic and atraumatic.      Nose: Nose normal.      Mouth/Throat:      Mouth: Mucous membranes are moist.      Pharynx: Oropharynx is clear.   Eyes:      Extraocular Movements: Extraocular movements intact.      Conjunctiva/sclera: Conjunctivae normal.   Cardiovascular:      Rate and Rhythm: Normal rate and regular rhythm.      Pulses: Normal pulses.      Heart sounds: Normal heart sounds.   Pulmonary:      Effort: Pulmonary effort is normal.      Breath sounds: Normal breath sounds.   Abdominal:      General:  Abdomen is flat. Bowel sounds are normal.      Palpations: Abdomen is soft.   Musculoskeletal:         General: Normal range of motion.      Cervical back: Normal range of motion and neck supple.   Skin:     General: Skin is warm and dry.   Neurological:      General: No focal deficit present.      Mental Status: He is alert and oriented to person, place, and time. Mental status is at baseline.   Psychiatric:         Mood and Affect: Mood normal.         Behavior: Behavior normal.         Thought Content: Thought content normal.         Judgment: Judgment normal.           Diagnostic Results     Lab on 04/16/2024   Component Date Value    WBC 04/16/2024 8.2     nRBC 04/16/2024      RBC 04/16/2024 4.22 (L)     Hemoglobin 04/16/2024 10.9 (L)     Hematocrit 04/16/2024 35.2 (L)     MCV 04/16/2024 83     MCH 04/16/2024 25.8 (L)     MCHC 04/16/2024 31.0 (L)     RDW 04/16/2024 16.8 (H)     Platelets 04/16/2024 371     Neutrophils % 04/16/2024 54.0     Immature Granulocytes %,* 04/16/2024 0.1     Lymphocytes % 04/16/2024 26.8     Monocytes % 04/16/2024 13.1     Eosinophils % 04/16/2024 4.9     Basophils % 04/16/2024 1.1     Neutrophils Absolute 04/16/2024 4.44     Immature Granulocytes Ab* 04/16/2024 0.01     Lymphocytes Absolute 04/16/2024 2.20     Monocytes Absolute 04/16/2024 1.08 (H)     Eosinophils Absolute 04/16/2024 0.40     Basophils Absolute 04/16/2024 0.09            MR abdomen w and wo IV contrast, MR pelvis w and wo IV contrast  Narrative: Interpreted By:  Joao Mccall,   STUDY:  MR ABDOMEN W AND WO IV CONTRAST; MR PELVIS W AND WO IV CONTRAST;  4/1/2024 11:00 am      INDICATION:  Signs/Symptoms:Pancreatic neuroendocrine tumor restaging scans.      COMPARISON:  Selected examinations as far back as May 2, 2020 MRI abdomen and  pelvis including April 6, 2020 and July 31, 2023 PET-CT examinations,  in December 4, 2023 MRI abdomen and pelvis examinations for  comparison measurements. July 28, 2023 CT chest  abdomen and pelvis      ACCESSION NUMBER(S):  [id number]; [id number]      ORDERING CLINICIAN:  TING OWENS      TECHNIQUE:  MRI abdomen and pelvis:  Multiplanar magnetic resonance images of the  abdomen were obtained including the following sequences; T2-weighted  SSFSE with and without fat saturation, T1-weighted GRE in/opposed  phase, DWI, fat saturated 3D-T1w GRE pre and dynamically post  contrast.  19 ml of  Gadolinium contrast agent Dotarem were administered  intravenously without immediate complication.      FINDINGS:  LIVER:  Cirrhotic morphology. No suspicious abnormal enhancing mass or  restricted diffusion. Previously described LI-RADS 3 4 mm transient  hyperenhancement lateral right lobe of the liver series 1305, image  80 is less conspicuous than May 2, 2020 baseline examination series  100, image 46.      BILE DUCTS:  No intrahepatic or extrahepatic bile duct dilatation is demonstrated.      GALLBLADDER:  Nondistended. Tiny hypointensity these inseparable from the posterior  lumen series 4, image 18 compatible with tiny stones.      PANCREAS:  Status post distal pancreatectomy without evident recurrent abnormal  enhancing pancreatic lesion. No duct dilation.      SPLEEN:  Status post splenectomy. No detected suspicious left upper quadrant  mass or fluid collection. Similar post treatment changes.      ADRENAL GLANDS:  Within normal limits.      KIDNEYS:  Normal morphology without suspicious mass or hydronephrosis. A few  tiny scattered cysts.      LYMPH NODES:  Similar pre-existing lymph nodes including 11 mm short axis portal  caval node series 1601, image 64 compared with 11 mm May 2, 2020.      ABDOMINAL VESSELS:  The principal central vessels are patent without detected aneurysm.  Similar perigastric and paraesophageal varices.      BOWEL:  The stomach and bowel are normal caliber without inflammatory change  or detected mass.      PERITONEUM/RETROPERITONEUM:  Similar trace pelvic free  fluid.      BLADDER: Unremarkable although limited assessment due to metallic  hardware artifact right hip arthroplasty.      REPRODUCTIVE ORGANS: Grossly unremarkable although limited assessment  due to adjacent artifact.      BONES AND LOWER THORAX:  No abnormally enhancing focal bony lesions are identified. Multilevel  discogenic degenerative disease is noted in several levels of the  thoraco- lumbar spine.  The visualized lower lung fields are  unremarkable. The heart is normal in size.      Impression: 1.  No MRI evidence of residual or recurrent neuroendocrine tumor.  2. Cirrhosis without MRI evidence of HCC.  3. Nonspecific focal transient hyperenhancement right lobe of the  liver is not substantially measurably changed as far back as May 2,  2020. Attention recommended on follow-up assessment.  4. Tiny dependent gallstones.          MACRO:  None      Signed by: Joao Mccall 4/1/2024 5:07 PM  Dictation workstation:   [id number]       Assessment/Plan   Metastatic pNET (G2) with liver mets   68-year-old man with PMH of DM, HTN who has been diagnosed with Metastatic Well differentiated Pancreatic Neuroendocrine tumor with liver mets. On 12/10/2019 he had abdominal pain radiated to his back  was about 8/10 associated with nausea and vomiting. He went to the ER at [facility] where he got admitted and had a CT scan showed evidence of cirrhosis as well as a 2 cm soft tissue mass at the till the pancreas. I reviewed his CT scan. There is  a calcified 2 cm mass noted at the anterior aspect of the tail the pancreas near the splenic hilum. He does have splenomegaly with spleen measuring about 17 cm or so in greatest dimension. Endoscopic ultrasound with biopsy showed well-differentiated neuroendocrine  tumor. On the description of the endoscopic ultrasound, 2.1 x 1.5 cm oval lesion was noted towards the till the pancreas abutting the till the pancreas. This did not appear to directly involve the pancreas  endoscopically.  He was taken to the OR on 3/17/20  for laprscopic distal pancreatectomy, but was found to have liver mets with laparoscopy with wedge liver biopsy confirmed metastatic neuroendocrine tumor to liver. A 2nd lesion was also visualized on the surface of the left liver. Final path showed well  diff. pNET with Ki67 4% (G2). PET-Ga68 showed somatostatin avid lesions in the pancreas and liver lesions. MRI confirmed same findings of Pancreatic lesions which is almost same size like CT on 12/2019, liver lesions 5 of them all subcentimeter and RP  LN. Pt started on SSA after that and is tolerated lanreotide well.     We discussed the case at the [facility] and he has limited disease mainly pancreatic and 5-6 liver lesions. We agreed on starting on neoadjuvant chemotherapy with CAPTEM and restaging for surgical resection   He started on cytotoxic chemotherapy with capecitabine and temozolomide on 06/21/2020 and finished 2 cycles. He went to the ER after the finished the w2 weeks of C1 due to  fever and his labs only showed reactive HAV AB and IgM was negative and he was COVID negative.  He finished 8 cycles so far and tolerated well except for nausea, and constipation and malignancy induced fever which resolved. However pt don't want to resume more chemo at this point   Restaging after C4 showed mild decrease in his pancreatic mass and liver mets   Restaging scans after C8 showed stable disease mainly 3 small liver lesions and stable pancreatic tail mass  He met with our surgeon Dr. Reardon and he is not a candidate for surgery given high risk due to his liver cirrhosis  We started him on SSA with lanreotide  Restaging MRI AP on 08/20/2021 showed stable disease arterial enhancing 1.6 x 2.2 cm mass of the pancreatic tail and Vague approximate 4 mm arterial enhancing foci in the lateral aspect of the right hepatic lobe and anteriorly in the left hepatic lobe  similar to prior. No new focal hepatic lesion  identified.  His last restaging scans on 12/16/2021 and 04/2022 showed stable disease  Serum glucagon is 243  08/03/2022: Pt had distal pancreatectomy and spelnectomny with final path confirmed G1 Well diff. PanNet with Li67 1.4%  Restaging scans showed post surgical changes and no evidence of new lesions . However there was a fluid collection concern for abscess and he finished 1 week of Abs and also showed splenic vein thrombus   Restaging MRI showed only post surgical changes and no evidence of disease. There is concern of progressed portal vein thrombosis  Started on anticoagulation and sent to hematology for follow up  Per last CT AP excluded recurrent/metastatic disease scan there is concern about liver cirrhosis   Restaging MRI excluded evidence of recurrence/metastatic diseases   07/2023: Restaging CT AP excluded recurrent/metastatic disease except for a small omental nodule and a new  rim enhancing  1 cm lesion along the anterior capsule of the left liver     MRI Liver showed arterially enhancing 0.4 cm lesion in hepatic segment VI without washout compared to 04/10/2023 MRI. Findings stranding may represent LI-RADS 3 lesion in the setting of liver cirrhosis versus  neuroendocrine metastasis.Focal peritoneal thickening with slight anterior omental nodularity in the left upper quadrant may represent omental fat necrosis and post operative scaring      PET-CU64 excluded any SS avid lesions     12/4/23 MRI AP excludes recurrent/metastatic disease    04/01/2024: MRI AP excluded recurrent or metastatic disease      Plan:    1- Patient is doing well and has no complaints  2- Reviewed scan results with patient and his wife which excludes recurrent/metastatic disease  3- MRI AP in 6 months (ordered)  5- RTC after scans for follow up and to review results            Erin Cui M.D.   and Director of the Neuroendocrine Tumor Program  Gastrointestinal Medical Oncology  Department of Hematology and  Oncology  [facility], [place] [address]  Phone (Office): [phone]  Fax:  [phone]   Email: [email or web address]  Learn more about [facility] Comprehensive Neuroendocrine Tumor Program: Click Here  Learn more about [place] [employer]: [email or web address]

## 2024-04-22 RX ORDER — INSULIN LISPRO 100 [IU]/ML
INJECTION, SOLUTION INTRAVENOUS; SUBCUTANEOUS
Qty: 30 ML | Refills: 3 | Status: SHIPPED | OUTPATIENT
Start: 2024-04-22

## 2024-04-29 DIAGNOSIS — E11.65 INADEQUATELY CONTROLLED DIABETES MELLITUS (HCC): ICD-10-CM

## 2024-04-29 RX ORDER — FLASH GLUCOSE SENSOR
1 KIT MISCELLANEOUS
Qty: 6 EACH | Refills: 3 | Status: SHIPPED | OUTPATIENT
Start: 2024-04-29

## 2024-04-29 NOTE — TELEPHONE ENCOUNTER
Patient requesting medication refill. Please approve or deny this request.    Rx requested:  Requested Prescriptions     Pending Prescriptions Disp Refills    Continuous Blood Gluc Sensor (FREESTYLE NATALY 14 DAY SENSOR) MISC 6 each 3     Si each by Does not apply route every 14 days         Last Office Visit:   3/18/2024      Next Visit Date:  Future Appointments   Date Time Provider Department Center   2024  2:30 PM Rashid, García K, MD [place] [facility]   2024  1:45 PM Chi Tejada MD [place] [facility]

## 2024-05-07 ENCOUNTER — TELEPHONE (OUTPATIENT)
Dept: ENDOCRINOLOGY | Age: 69
End: 2024-05-07

## 2024-05-07 DIAGNOSIS — E11.65 INADEQUATELY CONTROLLED DIABETES MELLITUS (HCC): ICD-10-CM

## 2024-05-07 RX ORDER — INSULIN LISPRO 100 [IU]/ML
INJECTION, SOLUTION INTRAVENOUS; SUBCUTANEOUS
Qty: 30 ADJUSTABLE DOSE PRE-FILLED PEN SYRINGE | Refills: 3 | Status: SHIPPED | OUTPATIENT
Start: 2024-05-07

## 2024-05-07 NOTE — TELEPHONE ENCOUNTER
Patient needs a new prescription for Humalog. He states that he is taking more than he is prescribed and is running out. Please advise. Thanks!

## 2024-05-14 RX ORDER — INSULIN LISPRO 100 [IU]/ML
INJECTION, SOLUTION INTRAVENOUS; SUBCUTANEOUS
Qty: 50 ML | Refills: 3 | Status: SHIPPED | OUTPATIENT
Start: 2024-05-14

## 2024-05-28 ENCOUNTER — OFFICE VISIT (OUTPATIENT)
Dept: FAMILY MEDICINE CLINIC | Age: 69
End: 2024-05-28
Payer: MEDICARE

## 2024-05-28 VITALS
HEIGHT: 73 IN | TEMPERATURE: 97.4 F | DIASTOLIC BLOOD PRESSURE: 78 MMHG | WEIGHT: 212 LBS | BODY MASS INDEX: 28.1 KG/M2 | SYSTOLIC BLOOD PRESSURE: 136 MMHG | HEART RATE: 66 BPM | OXYGEN SATURATION: 97 %

## 2024-05-28 DIAGNOSIS — K76.6 PORTAL HYPERTENSION (HCC): ICD-10-CM

## 2024-05-28 DIAGNOSIS — M54.2 NECK PAIN: Primary | ICD-10-CM

## 2024-05-28 DIAGNOSIS — C25.7 MALIGNANT NEOPLASM OF OTHER PARTS OF PANCREAS (HCC): ICD-10-CM

## 2024-05-28 DIAGNOSIS — E11.59 TYPE 2 DIABETES MELLITUS WITH OTHER CIRCULATORY COMPLICATIONS (HCC): ICD-10-CM

## 2024-05-28 PROCEDURE — 3075F SYST BP GE 130 - 139MM HG: CPT | Performed by: FAMILY MEDICINE

## 2024-05-28 PROCEDURE — 1123F ACP DISCUSS/DSCN MKR DOCD: CPT | Performed by: FAMILY MEDICINE

## 2024-05-28 PROCEDURE — 99214 OFFICE O/P EST MOD 30 MIN: CPT | Performed by: FAMILY MEDICINE

## 2024-05-28 PROCEDURE — 3052F HG A1C>EQUAL 8.0%<EQUAL 9.0%: CPT | Performed by: FAMILY MEDICINE

## 2024-05-28 PROCEDURE — 3078F DIAST BP <80 MM HG: CPT | Performed by: FAMILY MEDICINE

## 2024-05-28 RX ORDER — TIZANIDINE 2 MG/1
2 TABLET ORAL NIGHTLY PRN
Qty: 30 TABLET | Refills: 0 | Status: SHIPPED | OUTPATIENT
Start: 2024-05-28

## 2024-05-28 RX ORDER — NABUMETONE 500 MG/1
500 TABLET, FILM COATED ORAL 2 TIMES DAILY
Qty: 60 TABLET | Refills: 3 | Status: SHIPPED | OUTPATIENT
Start: 2024-05-28

## 2024-05-28 SDOH — ECONOMIC STABILITY: FOOD INSECURITY: WITHIN THE PAST 12 MONTHS, THE FOOD YOU BOUGHT JUST DIDN'T LAST AND YOU DIDN'T HAVE MONEY TO GET MORE.: NEVER TRUE

## 2024-05-28 SDOH — ECONOMIC STABILITY: INCOME INSECURITY: HOW HARD IS IT FOR YOU TO PAY FOR THE VERY BASICS LIKE FOOD, HOUSING, MEDICAL CARE, AND HEATING?: NOT HARD AT ALL

## 2024-05-28 SDOH — ECONOMIC STABILITY: FOOD INSECURITY: WITHIN THE PAST 12 MONTHS, YOU WORRIED THAT YOUR FOOD WOULD RUN OUT BEFORE YOU GOT MONEY TO BUY MORE.: NEVER TRUE

## 2024-05-28 ASSESSMENT — PATIENT HEALTH QUESTIONNAIRE - PHQ9
8. MOVING OR SPEAKING SO SLOWLY THAT OTHER PEOPLE COULD HAVE NOTICED. OR THE OPPOSITE, BEING SO FIGETY OR RESTLESS THAT YOU HAVE BEEN MOVING AROUND A LOT MORE THAN USUAL: NOT AT ALL
4. FEELING TIRED OR HAVING LITTLE ENERGY: NEARLY EVERY DAY
8. MOVING OR SPEAKING SO SLOWLY THAT OTHER PEOPLE COULD HAVE NOTICED. OR THE OPPOSITE - BEING SO FIDGETY OR RESTLESS THAT YOU HAVE BEEN MOVING AROUND A LOT MORE THAN USUAL: NOT AT ALL
SUM OF ALL RESPONSES TO PHQ QUESTIONS 1-9: 3
1. LITTLE INTEREST OR PLEASURE IN DOING THINGS: NOT AT ALL
6. FEELING BAD ABOUT YOURSELF - OR THAT YOU ARE A FAILURE OR HAVE LET YOURSELF OR YOUR FAMILY DOWN: NOT AT ALL
4. FEELING TIRED OR HAVING LITTLE ENERGY: NEARLY EVERY DAY
10. IF YOU CHECKED OFF ANY PROBLEMS, HOW DIFFICULT HAVE THESE PROBLEMS MADE IT FOR YOU TO DO YOUR WORK, TAKE CARE OF THINGS AT HOME, OR GET ALONG WITH OTHER PEOPLE: NOT DIFFICULT AT ALL
3. TROUBLE FALLING OR STAYING ASLEEP: NOT AT ALL
SUM OF ALL RESPONSES TO PHQ QUESTIONS 1-9: 3
SUM OF ALL RESPONSES TO PHQ QUESTIONS 1-9: 3
9. THOUGHTS THAT YOU WOULD BE BETTER OFF DEAD, OR OF HURTING YOURSELF: NOT AT ALL
1. LITTLE INTEREST OR PLEASURE IN DOING THINGS: NOT AT ALL
SUM OF ALL RESPONSES TO PHQ9 QUESTIONS 1 & 2: 0
3. TROUBLE FALLING OR STAYING ASLEEP: NOT AT ALL
10. IF YOU CHECKED OFF ANY PROBLEMS, HOW DIFFICULT HAVE THESE PROBLEMS MADE IT FOR YOU TO DO YOUR WORK, TAKE CARE OF THINGS AT HOME, OR GET ALONG WITH OTHER PEOPLE: NOT DIFFICULT AT ALL
2. FEELING DOWN, DEPRESSED OR HOPELESS: NOT AT ALL
9. THOUGHTS THAT YOU WOULD BE BETTER OFF DEAD, OR OF HURTING YOURSELF: NOT AT ALL
7. TROUBLE CONCENTRATING ON THINGS, SUCH AS READING THE NEWSPAPER OR WATCHING TELEVISION: NOT AT ALL
7. TROUBLE CONCENTRATING ON THINGS, SUCH AS READING THE NEWSPAPER OR WATCHING TELEVISION: NOT AT ALL
SUM OF ALL RESPONSES TO PHQ QUESTIONS 1-9: 3
6. FEELING BAD ABOUT YOURSELF - OR THAT YOU ARE A FAILURE OR HAVE LET YOURSELF OR YOUR FAMILY DOWN: NOT AT ALL
2. FEELING DOWN, DEPRESSED OR HOPELESS: NOT AT ALL
SUM OF ALL RESPONSES TO PHQ QUESTIONS 1-9: 3
5. POOR APPETITE OR OVEREATING: NOT AT ALL
5. POOR APPETITE OR OVEREATING: NOT AT ALL

## 2024-05-28 NOTE — PROGRESS NOTES
Chief Complaint   Patient presents with    Annual Exam     Check up, having a lot of soreness in muscle, taking a lot of ibuprofen, mostly around neck and shoulderse        HPI:  Singh Lim is a 69 y.o. male      Follow up to update me  Seeing multiple specialists   No longer on eliquis  Brillinta, asa    Now on insulin pump  Has been much better      Pancreatic cancer  Dr. Tiago Dela Cruz  Stage 4 liver cirrhosis  had Partial Pancreatectomy/splenectomy      Following with endocrinology now           Wt Readings from Last 3 Encounters:   05/28/24 96.2 kg (212 lb)   03/20/24 95.7 kg (211 lb)   03/18/24 95.7 kg (211 lb)       Lab Results   Component Value Date    LABA1C 8.9 (H) 03/18/2024     Lab Results   Component Value Date     07/25/2022               Past Medical History:   Diagnosis Date    Cancer (HCC)     Pancreatic    History of skin cancer     HTN (hypertension)     Malignant neoplasm of other parts of pancreas (HCC)     Type II or unspecified type diabetes mellitus without mention of complication, not stated as uncontrolled      Past Surgical History:   Procedure Laterality Date    ABDOMEN SURGERY      COLONOSCOPY  02/20/2015    DR DIETZ    COLONOSCOPY N/A 01/20/2020    COLONOSCOPY DIAGNOSTIC performed by García Rashid MD at [facility]    ENDOSCOPIC ULTRASOUND (LOWER) N/A 12/17/2019    EUS performed by García Rashid MD at [facility] OR    HEMORRHOID SURGERY      PANCREATECTOMY  08/04/2022    SPLENECTOMY, PARTIAL  08/04/2022    UPPER GASTROINTESTINAL ENDOSCOPY N/A 04/04/2022    EGD ESOPHAGOGASTRODUODENOSCOPY performed by García Rashid MD at [facility]    UPPER GASTROINTESTINAL ENDOSCOPY N/A 1/27/2023    EGD DIAGNOSTIC ONLY performed by García Rashid MD at [facility]     Family History   Problem Relation Age of Onset    Cancer Mother         lung cancer    Colon Cancer Neg Hx     Celiac Disease Neg Hx     Crohn's Disease Neg Hx     Coronary Art Dis Neg Hx      Social History

## 2024-05-29 DIAGNOSIS — I15.9 SECONDARY HYPERTENSION: ICD-10-CM

## 2024-05-29 DIAGNOSIS — I25.10 CAD S/P PERCUTANEOUS CORONARY ANGIOPLASTY: ICD-10-CM

## 2024-05-29 DIAGNOSIS — Z98.61 CAD S/P PERCUTANEOUS CORONARY ANGIOPLASTY: ICD-10-CM

## 2024-05-29 RX ORDER — LOSARTAN POTASSIUM 25 MG/1
25 TABLET ORAL DAILY
Qty: 90 TABLET | Refills: 3 | Status: SHIPPED | OUTPATIENT
Start: 2024-05-29

## 2024-05-29 RX ORDER — METOPROLOL SUCCINATE 50 MG/1
50 TABLET, EXTENDED RELEASE ORAL EVERY EVENING
Qty: 90 TABLET | Refills: 3 | Status: SHIPPED | OUTPATIENT
Start: 2024-05-29

## 2024-05-29 NOTE — TELEPHONE ENCOUNTER
Requesting medication refill. Please approve or deny this request.    Rx requested:  Requested Prescriptions     Pending Prescriptions Disp Refills    metoprolol succinate (TOPROL XL) 50 MG extended release tablet 90 tablet 3     Sig: Take 1 tablet by mouth every evening    losartan (COZAAR) 25 MG tablet 180 tablet 5     Sig: Take 1 tablet by mouth daily         Last Office Visit:   2/9/2024      Next Visit Date:  Future Appointments   Date Time Provider Department Center   6/4/2024  2:30 PM Rashid, García K, MD [place] [facility] [place]   6/17/2024  1:45 PM Chi Tejada MD [facility]

## 2024-06-17 ENCOUNTER — OFFICE VISIT (OUTPATIENT)
Dept: ENDOCRINOLOGY | Age: 69
End: 2024-06-17
Payer: MEDICARE

## 2024-06-17 VITALS
BODY MASS INDEX: 28.23 KG/M2 | WEIGHT: 213 LBS | HEART RATE: 78 BPM | OXYGEN SATURATION: 95 % | DIASTOLIC BLOOD PRESSURE: 70 MMHG | SYSTOLIC BLOOD PRESSURE: 134 MMHG | HEIGHT: 73 IN

## 2024-06-17 DIAGNOSIS — Z96.41 INSULIN PUMP IN PLACE: ICD-10-CM

## 2024-06-17 DIAGNOSIS — E11.9 TYPE 2 DIABETES MELLITUS WITHOUT COMPLICATION, WITHOUT LONG-TERM CURRENT USE OF INSULIN (HCC): Primary | ICD-10-CM

## 2024-06-17 LAB
CHP ED QC CHECK: ABNORMAL
GLUCOSE BLD-MCNC: 224 MG/DL
HBA1C MFR BLD: 7.8 %

## 2024-06-17 PROCEDURE — 3051F HG A1C>EQUAL 7.0%<8.0%: CPT | Performed by: INTERNAL MEDICINE

## 2024-06-17 PROCEDURE — 99213 OFFICE O/P EST LOW 20 MIN: CPT | Performed by: INTERNAL MEDICINE

## 2024-06-17 PROCEDURE — 1123F ACP DISCUSS/DSCN MKR DOCD: CPT | Performed by: INTERNAL MEDICINE

## 2024-06-17 PROCEDURE — 83036 HEMOGLOBIN GLYCOSYLATED A1C: CPT | Performed by: INTERNAL MEDICINE

## 2024-06-17 PROCEDURE — 82962 GLUCOSE BLOOD TEST: CPT | Performed by: INTERNAL MEDICINE

## 2024-06-17 PROCEDURE — 3075F SYST BP GE 130 - 139MM HG: CPT | Performed by: INTERNAL MEDICINE

## 2024-06-17 PROCEDURE — 3078F DIAST BP <80 MM HG: CPT | Performed by: INTERNAL MEDICINE

## 2024-06-17 RX ORDER — INSULIN LISPRO 100 [IU]/ML
INJECTION, SOLUTION INTRAVENOUS; SUBCUTANEOUS
Qty: 70 ML | Refills: 3 | Status: SHIPPED | OUTPATIENT
Start: 2024-06-17

## 2024-06-17 NOTE — PROGRESS NOTES
6/17/2024    Assessment:       Diagnosis Orders   1. Type 2 diabetes mellitus without complication, without long-term current use of insulin ([facility])  POCT Glucose    POCT glycosylated hemoglobin (Hb A1C)    Basic Metabolic Panel    Hemoglobin A1C      2. Insulin pump in place              PLAN:     Orders Placed This Encounter   Procedures    Basic Metabolic Panel     Standing Status:   Future     Standing Expiration Date:   6/17/2025    Hemoglobin A1C     Standing Status:   Future     Standing Expiration Date:   6/17/2025    POCT Glucose    POCT glycosylated hemoglobin (Hb A1C)     Orders Placed This Encounter   Medications    insulin lispro (HUMALOG,ADMELOG) 100 UNIT/ML SOLN injection vial     Sig: Use via insulin pump max daily dose 250 units     Dispense:  70 mL     Refill:  3   Continue patient on Humalog via pump as per current pump setting  Follow-up in 3 to 6 months A1c goal of less than 7      Orders Placed This Encounter   Procedures    POCT Glucose    POCT glycosylated hemoglobin (Hb A1C)     No orders of the defined types were placed in this encounter.    No follow-ups on file.  Subjective:     Chief Complaint   Patient presents with    Diabetes     Vitals:    06/17/24 1324   BP: 134/70   Pulse: 78   SpO2: 95%   Weight: 96.6 kg (213 lb)   Height: 1.854 m (6' 0.99\")     Wt Readings from Last 3 Encounters:   06/17/24 96.6 kg (213 lb)   05/28/24 96.2 kg (212 lb)   03/20/24 95.7 kg (211 lb)     BP Readings from Last 3 Encounters:   06/17/24 134/70   05/28/24 136/78   03/20/24 (!) 180/74       Follow-up on type 2 diabetes patient on [employer] insulin pump hemoglobin A1c was 7.8 pump was reviewed downloaded    Hemoglobin A1C       Date                     Value               Ref Range           Status                06/17/2024               7.8                 %                   Final            ----------  2-week pump download average glucose was 171±75  55% in range 36% slightly high 9% very high  Basal rate

## 2024-06-19 ASSESSMENT — ENCOUNTER SYMPTOMS: EYES NEGATIVE: 1

## 2024-06-22 DIAGNOSIS — M54.2 NECK PAIN: ICD-10-CM

## 2024-06-24 RX ORDER — TIZANIDINE 2 MG/1
TABLET ORAL
Qty: 30 TABLET | Refills: 0 | OUTPATIENT
Start: 2024-06-24

## 2024-06-24 NOTE — TELEPHONE ENCOUNTER
Comments:     Last Office Visit (last PCP visit):   5/28/2024    Next Visit Date:  Future Appointments   Date Time Provider Department Center   6/25/2024  2:30 PM Rashid, García K, MD [place] [facility]   9/17/2024  1:15 PM Chi Tejada MD [facility]       **If hasn't been seen in over a year OR hasn't followed up according to last diabetes/ADHD visit, make appointment for patient before sending refill to provider.    Rx requested:  Requested Prescriptions     Pending Prescriptions Disp Refills    tiZANidine (ZANAFLEX) 2 MG tablet [Pharmacy Med Name: TIZANIDINE HCL 2 MG TABLET] 30 tablet 0     Sig: take 1 tablet by mouth nightly if needed for muscle spasm

## 2024-06-25 ENCOUNTER — OFFICE VISIT (OUTPATIENT)
Dept: GASTROENTEROLOGY | Age: 69
End: 2024-06-25
Payer: MEDICARE

## 2024-06-25 VITALS — HEART RATE: 62 BPM | BODY MASS INDEX: 28.76 KG/M2 | OXYGEN SATURATION: 97 % | WEIGHT: 217 LBS | HEIGHT: 73 IN

## 2024-06-25 DIAGNOSIS — K59.00 CONSTIPATION, UNSPECIFIED CONSTIPATION TYPE: ICD-10-CM

## 2024-06-25 DIAGNOSIS — D3A.8 NEUROENDOCRINE TUMOR OF PANCREAS: Primary | ICD-10-CM

## 2024-06-25 DIAGNOSIS — K74.69 OTHER CIRRHOSIS OF LIVER (HCC): ICD-10-CM

## 2024-06-25 DIAGNOSIS — R14.0 ABDOMINAL BLOATING: ICD-10-CM

## 2024-06-25 DIAGNOSIS — K21.9 GASTROESOPHAGEAL REFLUX DISEASE, UNSPECIFIED WHETHER ESOPHAGITIS PRESENT: ICD-10-CM

## 2024-06-25 DIAGNOSIS — Z90.410 HISTORY OF PANCREATECTOMY: ICD-10-CM

## 2024-06-25 PROCEDURE — 1123F ACP DISCUSS/DSCN MKR DOCD: CPT | Performed by: INTERNAL MEDICINE

## 2024-06-25 PROCEDURE — 99214 OFFICE O/P EST MOD 30 MIN: CPT | Performed by: INTERNAL MEDICINE

## 2024-06-25 ASSESSMENT — ENCOUNTER SYMPTOMS
RECTAL PAIN: 0
EYES NEGATIVE: 1
VOMITING: 0
CHOKING: 0
NAUSEA: 0
DIARRHEA: 0
ANAL BLEEDING: 0
CONSTIPATION: 0
ABDOMINAL PAIN: 0
BLOOD IN STOOL: 0
ABDOMINAL DISTENTION: 0
VOICE CHANGE: 0
TROUBLE SWALLOWING: 0

## 2024-06-25 NOTE — PROGRESS NOTES
some nights he is a restless he just gets up and does not sleep hardly at all.  However, lately he states he has been taking more naps (short 10-minute naps) throughout the day.  Denies overt signs of HE (confusion, slurred speech or lethargy). He denies increasing abdominal girth other than feeling bloated after eating (relief w/gasx).  He denies lower extremity edema.  He does report following with endocrinology every month, he continues to have trouble with controlling his glucose levels (reports highs and lows).  He denies GERD symptoms, nausea/vomiting, hematemesis, dysphagia, abdominal pain, hematochezia, melena or weight loss.    Patient denies chest pain, shortness of breath or palpitations.  Smoking status: denies  Alcohol use: denies  Illicit drug use: denies  NSAID use: takes meloxicam daily, occasional ibuprofen when he has pain, around 1 time per month.     HPI from last GI clinic visit on 1/20/2022  summarized below:  Patient reports following up in the GI clinic regarding primary pancreatic neuroendocrine tumor, diagnosed in 2019.  States he was previously referred to Dr. Verduzco for possible resection, however he was also found to have neuroendocrine tumor of the liver during the operation along with portal hypertension and it was decided the surgery was too risky at that time so he was closed up without any tumor resections. States he was placed on chemotherapy.  States he has been on oral chemo and now gets 1 IV per month every 4 weeks. He was referred to Dr. Wen for further management of his nonalcoholic cirrhosis of the liver and portal hypertension.  Patient reports Dr. Wen states it is reasonable for him to come back to our GI clinic for further management of his liver disease so patient does not have to drive so far.  Patient denies upper GI symptoms.  He endorses fatigue and constipation.  States he will have a dark green bowel movement every 2 days (small in caliber).  He 
contain errors related to that system including grammar, punctuation and spelling as well as words and phrases that may seem inappropriate. If there are questions or concerns please feel free to contact me to clarify.

## 2024-09-03 DIAGNOSIS — E11.9 TYPE 2 DIABETES MELLITUS WITHOUT COMPLICATION, WITHOUT LONG-TERM CURRENT USE OF INSULIN (HCC): ICD-10-CM

## 2024-09-04 ENCOUNTER — OFFICE VISIT (OUTPATIENT)
Dept: GASTROENTEROLOGY | Age: 69
End: 2024-09-04
Payer: MEDICARE

## 2024-09-04 VITALS — OXYGEN SATURATION: 95 % | HEART RATE: 62 BPM | HEIGHT: 73 IN | WEIGHT: 221 LBS | BODY MASS INDEX: 29.29 KG/M2

## 2024-09-04 DIAGNOSIS — K74.69 OTHER CIRRHOSIS OF LIVER (HCC): Primary | ICD-10-CM

## 2024-09-04 DIAGNOSIS — K21.9 GASTROESOPHAGEAL REFLUX DISEASE, UNSPECIFIED WHETHER ESOPHAGITIS PRESENT: ICD-10-CM

## 2024-09-04 DIAGNOSIS — L29.9 ITCHING: ICD-10-CM

## 2024-09-04 DIAGNOSIS — Z90.81 H/O SPLENECTOMY: ICD-10-CM

## 2024-09-04 DIAGNOSIS — Z90.410 HISTORY OF PANCREATECTOMY: ICD-10-CM

## 2024-09-04 DIAGNOSIS — K59.04 CHRONIC IDIOPATHIC CONSTIPATION: ICD-10-CM

## 2024-09-04 PROCEDURE — 1123F ACP DISCUSS/DSCN MKR DOCD: CPT | Performed by: NURSE PRACTITIONER

## 2024-09-04 PROCEDURE — 99213 OFFICE O/P EST LOW 20 MIN: CPT | Performed by: NURSE PRACTITIONER

## 2024-09-04 RX ORDER — HYDROXYZINE HYDROCHLORIDE 25 MG/1
25 TABLET, FILM COATED ORAL 4 TIMES DAILY PRN
Qty: 120 TABLET | Refills: 2 | Status: SHIPPED | OUTPATIENT
Start: 2024-09-04

## 2024-09-04 ASSESSMENT — ENCOUNTER SYMPTOMS: CONSTIPATION: 1

## 2024-09-04 NOTE — PROGRESS NOTES
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  Patient had received oral and IV chemotherapy with last MRI for 2024 noting cirrhosis without MRI evidence of hepatocellular carcinoma.  Patient is status post pancreatic ectomy and splenectomy with MRI 12/20/2022 no evidence for residual or metastatic disease.  Patient has been followed in GI clinic for history of liver cirrhosis with FibroScan 1/22/2020 revealing stage IV fibrosis F4 moderate to severe steatosis.  Last EGD 1/20/2023 noted grade 1 esophageal varices.  Patient has continued constipation, and intermittent bloating.  He has been taking MiraLAX 2-3 times daily with some relief.  Constipation likely related to history of neuroendocrine tumor of the pancreas and radiation and chemotherapy treatments.      1. Other cirrhosis of liver (HCC)  2. Itching  3. Chronic idiopathic constipation  4. H/O splenectomy  5. History of pancreatectomy  - Hepatic Function Panel; Future  - hydrOXYzine HCl (ATARAX) 25 MG tablet; Take 1 tablet by mouth 4 times daily as needed for Itching  Dispense: 120 tablet; Refill: 2  - EGD for Variceal screening 1/2023.   - HCC screening/surveillance: Recommend 6 monthly, Last imaging: MRI 4/2024, patient is scheduled in October at  for MRI    6. Gastroesophageal reflux disease, unspecified whether esophagitis present  -Continue Nexium 40 mg daily  -Antireflux lifestyle measures discussed at length      Addendum:  Patient will schedule for EGD for varices surveillance after Cardiology evaluation.   -EGD is indicated, procedure was discussed at length, including the risks and benefits.    Return for Follow up after procedure to discuss results..    Kelly J Slavik-Bosworth, APRN - CNP   Staff Gastroenterology Nurse Practitioner  [facility]    Please note this report has been partially produced using speech recognition software and may cause/contain errors related to that

## 2024-09-05 ENCOUNTER — PREP FOR PROCEDURE (OUTPATIENT)
Dept: GASTROENTEROLOGY | Age: 69
End: 2024-09-05

## 2024-09-05 RX ORDER — SODIUM CHLORIDE 9 MG/ML
INJECTION, SOLUTION INTRAVENOUS PRN
Status: CANCELLED | OUTPATIENT
Start: 2024-09-05

## 2024-09-05 RX ORDER — SODIUM CHLORIDE 0.9 % (FLUSH) 0.9 %
5-40 SYRINGE (ML) INJECTION PRN
Status: CANCELLED | OUTPATIENT
Start: 2024-09-05

## 2024-09-05 RX ORDER — SODIUM CHLORIDE 9 MG/ML
INJECTION, SOLUTION INTRAVENOUS CONTINUOUS
Status: CANCELLED | OUTPATIENT
Start: 2024-09-05

## 2024-09-05 RX ORDER — SODIUM CHLORIDE 0.9 % (FLUSH) 0.9 %
5-40 SYRINGE (ML) INJECTION EVERY 12 HOURS SCHEDULED
Status: CANCELLED | OUTPATIENT
Start: 2024-09-05

## 2024-09-10 DIAGNOSIS — K74.69 OTHER CIRRHOSIS OF LIVER (HCC): ICD-10-CM

## 2024-09-10 LAB
ALBUMIN SERPL-MCNC: 3.8 G/DL (ref 3.5–4.6)
ALP SERPL-CCNC: 139 U/L (ref 35–104)
ALT SERPL-CCNC: 29 U/L (ref 0–41)
AST SERPL-CCNC: 42 U/L (ref 0–40)
BILIRUB DIRECT SERPL-MCNC: <0.2 MG/DL (ref 0–0.4)
BILIRUB INDIRECT SERPL-MCNC: ABNORMAL MG/DL (ref 0–0.6)
BILIRUB SERPL-MCNC: 1 MG/DL (ref 0.2–0.7)
PROT SERPL-MCNC: 7.7 G/DL (ref 6.3–8)

## 2024-09-13 ENCOUNTER — OFFICE VISIT (OUTPATIENT)
Dept: CARDIOLOGY CLINIC | Age: 69
End: 2024-09-13
Payer: MEDICARE

## 2024-09-13 ENCOUNTER — TELEPHONE (OUTPATIENT)
Dept: GASTROENTEROLOGY | Age: 69
End: 2024-09-13

## 2024-09-13 VITALS
BODY MASS INDEX: 29.17 KG/M2 | DIASTOLIC BLOOD PRESSURE: 90 MMHG | OXYGEN SATURATION: 96 % | WEIGHT: 221 LBS | RESPIRATION RATE: 16 BRPM | HEART RATE: 90 BPM | SYSTOLIC BLOOD PRESSURE: 168 MMHG

## 2024-09-13 DIAGNOSIS — I15.9 SECONDARY HYPERTENSION: Primary | ICD-10-CM

## 2024-09-13 DIAGNOSIS — I25.10 CAD S/P PERCUTANEOUS CORONARY ANGIOPLASTY: ICD-10-CM

## 2024-09-13 DIAGNOSIS — Z98.61 CAD S/P PERCUTANEOUS CORONARY ANGIOPLASTY: ICD-10-CM

## 2024-09-13 DIAGNOSIS — I21.3 ST ELEVATION MYOCARDIAL INFARCTION (STEMI), UNSPECIFIED ARTERY (HCC): ICD-10-CM

## 2024-09-13 DIAGNOSIS — I50.22 CHRONIC SYSTOLIC (CONGESTIVE) HEART FAILURE (HCC): Primary | ICD-10-CM

## 2024-09-13 PROCEDURE — 3077F SYST BP >= 140 MM HG: CPT | Performed by: INTERNAL MEDICINE

## 2024-09-13 PROCEDURE — 1123F ACP DISCUSS/DSCN MKR DOCD: CPT | Performed by: INTERNAL MEDICINE

## 2024-09-13 PROCEDURE — 3080F DIAST BP >= 90 MM HG: CPT | Performed by: INTERNAL MEDICINE

## 2024-09-13 PROCEDURE — 93000 ELECTROCARDIOGRAM COMPLETE: CPT | Performed by: INTERNAL MEDICINE

## 2024-09-13 PROCEDURE — 99214 OFFICE O/P EST MOD 30 MIN: CPT | Performed by: INTERNAL MEDICINE

## 2024-09-13 RX ORDER — FUROSEMIDE 20 MG
40 TABLET ORAL 2 TIMES DAILY
Qty: 180 TABLET | Refills: 5 | Status: SHIPPED | OUTPATIENT
Start: 2024-09-13

## 2024-09-13 RX ORDER — LOSARTAN POTASSIUM 25 MG/1
50 TABLET ORAL DAILY
Qty: 90 TABLET | Refills: 5 | Status: SHIPPED | OUTPATIENT
Start: 2024-09-13

## 2024-09-13 ASSESSMENT — ENCOUNTER SYMPTOMS
APNEA: 0
COUGH: 0
ABDOMINAL PAIN: 0
COLOR CHANGE: 0
EYE REDNESS: 0
CONSTIPATION: 0
SHORTNESS OF BREATH: 1
WHEEZING: 0
NAUSEA: 0
VOMITING: 0
CHEST TIGHTNESS: 0
RHINORRHEA: 0
DIARRHEA: 0

## 2024-09-17 ENCOUNTER — OFFICE VISIT (OUTPATIENT)
Dept: ENDOCRINOLOGY | Age: 69
End: 2024-09-17
Payer: MEDICARE

## 2024-09-17 VITALS
WEIGHT: 220 LBS | SYSTOLIC BLOOD PRESSURE: 133 MMHG | OXYGEN SATURATION: 96 % | HEART RATE: 71 BPM | BODY MASS INDEX: 29.16 KG/M2 | HEIGHT: 73 IN | DIASTOLIC BLOOD PRESSURE: 61 MMHG

## 2024-09-17 DIAGNOSIS — Z96.41 INSULIN PUMP IN PLACE: ICD-10-CM

## 2024-09-17 DIAGNOSIS — I15.9 SECONDARY HYPERTENSION: ICD-10-CM

## 2024-09-17 DIAGNOSIS — E11.9 TYPE 2 DIABETES MELLITUS WITHOUT COMPLICATION, WITHOUT LONG-TERM CURRENT USE OF INSULIN (HCC): Primary | ICD-10-CM

## 2024-09-17 LAB
ANION GAP SERPL CALCULATED.3IONS-SCNC: 9 MEQ/L (ref 9–15)
BUN SERPL-MCNC: 11 MG/DL (ref 8–23)
CALCIUM SERPL-MCNC: 8.8 MG/DL (ref 8.5–9.9)
CHLORIDE SERPL-SCNC: 106 MEQ/L (ref 95–107)
CHP ED QC CHECK: NORMAL
CO2 SERPL-SCNC: 26 MEQ/L (ref 20–31)
CREAT SERPL-MCNC: 0.85 MG/DL (ref 0.7–1.2)
GLUCOSE BLD-MCNC: 130 MG/DL
GLUCOSE SERPL-MCNC: 249 MG/DL (ref 70–99)
HBA1C MFR BLD: 7.8 %
POTASSIUM SERPL-SCNC: 4.2 MEQ/L (ref 3.4–4.9)
SODIUM SERPL-SCNC: 141 MEQ/L (ref 135–144)

## 2024-09-17 PROCEDURE — 1123F ACP DISCUSS/DSCN MKR DOCD: CPT | Performed by: INTERNAL MEDICINE

## 2024-09-17 PROCEDURE — 3078F DIAST BP <80 MM HG: CPT | Performed by: INTERNAL MEDICINE

## 2024-09-17 PROCEDURE — 3051F HG A1C>EQUAL 7.0%<8.0%: CPT | Performed by: INTERNAL MEDICINE

## 2024-09-17 PROCEDURE — 82962 GLUCOSE BLOOD TEST: CPT | Performed by: INTERNAL MEDICINE

## 2024-09-17 PROCEDURE — 3075F SYST BP GE 130 - 139MM HG: CPT | Performed by: INTERNAL MEDICINE

## 2024-09-17 PROCEDURE — 83036 HEMOGLOBIN GLYCOSYLATED A1C: CPT | Performed by: INTERNAL MEDICINE

## 2024-09-17 PROCEDURE — 99213 OFFICE O/P EST LOW 20 MIN: CPT | Performed by: INTERNAL MEDICINE

## 2024-09-25 ASSESSMENT — ENCOUNTER SYMPTOMS: EYES NEGATIVE: 1

## 2024-10-04 ENCOUNTER — ANESTHESIA EVENT (OUTPATIENT)
Dept: ENDOSCOPY | Age: 69
End: 2024-10-04
Payer: MEDICARE

## 2024-10-04 NOTE — ANESTHESIA PRE PROCEDURE
Department of Anesthesiology  Preprocedure Note       Name:  Singh Lim   Age:  69 y.o.  :  1955                                          MRN:  [id number]         Date:  10/4/2024      Surgeon: Surgeon(s):  García Rashid MD    Procedure: Procedure(s):  ESOPHAGOGASTRODUODENOSCOPY    Medications prior to admission:   Prior to Admission medications    Medication Sig Start Date End Date Taking? Authorizing Provider   losartan (COZAAR) 25 MG tablet Take 2 tablets by mouth daily 24   Dane Snyder MD   furosemide (LASIX) 20 MG tablet Take 2 tablets by mouth 2 times daily 24   Dane Snyder MD   hydrOXYzine HCl (ATARAX) 25 MG tablet Take 1 tablet by mouth 4 times daily as needed for Itching 24   Slavik-Bosworth, Kelly J, APRN - CNP   insulin lispro (HUMALOG,ADMELOG) 100 UNIT/ML SOLN injection vial Use via insulin pump max daily dose 250 units 24   Chi Tejada MD   metoprolol succinate (TOPROL XL) 50 MG extended release tablet Take 1 tablet by mouth every evening 24   Dane Snyder MD   tiZANidine (ZANAFLEX) 2 MG tablet Take 1 tablet by mouth nightly as needed (muscle spasms) 24   Joey Keller MD   nabumetone (RELAFEN) 500 MG tablet Take 1 tablet by mouth 2 times daily 24   Joey Keller MD   torsemide (DEMADEX) 20 MG tablet Take 0.5 tablets by mouth daily 24   Dane Snyder MD   esomeprazole (NEXIUM) 40 MG delayed release capsule Take 1 capsule by mouth daily 23   Joey Keller MD   atorvastatin (LIPITOR) 80 MG tablet Take 1 tablet by mouth nightly 23   Joey Keller MD   traZODone (DESYREL) 50 MG tablet Take 1 tablet by mouth nightly as needed for Sleep 23   Joey Keller MD   Insulin Pen Needle (PEN NEEDLES) 31G X 6 MM MISC 1 each by Does not apply route daily    ProviderVeronica MD   aspirin 81 MG chewable tablet Take 1 tablet by mouth daily 23   Gala Puente PA   nitroGLYCERIN (NITROSTAT) 0.4 MG SL tablet Place 1 tablet under the 
To get better and follow your care plan as instructed.

## 2024-10-07 ENCOUNTER — ANESTHESIA (OUTPATIENT)
Dept: ENDOSCOPY | Age: 69
End: 2024-10-07
Payer: MEDICARE

## 2024-10-07 ENCOUNTER — HOSPITAL ENCOUNTER (OUTPATIENT)
Age: 69
Setting detail: OUTPATIENT SURGERY
Discharge: HOME | End: 2024-10-07
Attending: INTERNAL MEDICINE | Admitting: INTERNAL MEDICINE
Payer: MEDICARE

## 2024-10-07 VITALS
SYSTOLIC BLOOD PRESSURE: 183 MMHG | TEMPERATURE: 98.3 F | RESPIRATION RATE: 16 BRPM | OXYGEN SATURATION: 95 % | DIASTOLIC BLOOD PRESSURE: 86 MMHG | HEIGHT: 73 IN | WEIGHT: 220 LBS | BODY MASS INDEX: 29.16 KG/M2 | HEART RATE: 79 BPM

## 2024-10-07 LAB
GLUCOSE BLD-MCNC: 360 MG/DL (ref 70–99)
PERFORMED ON: ABNORMAL

## 2024-10-07 PROCEDURE — 2580000003 HC RX 258: Performed by: INTERNAL MEDICINE

## 2024-10-07 RX ORDER — SODIUM CHLORIDE 9 MG/ML
INJECTION, SOLUTION INTRAVENOUS
Status: DISCONTINUED
Start: 2024-10-07 | End: 2024-10-07 | Stop reason: HOSPADM

## 2024-10-07 RX ORDER — SODIUM CHLORIDE 9 MG/ML
INJECTION, SOLUTION INTRAVENOUS CONTINUOUS
Status: DISCONTINUED | OUTPATIENT
Start: 2024-10-07 | End: 2024-10-08 | Stop reason: HOSPADM

## 2024-10-07 RX ORDER — SODIUM CHLORIDE 0.9 % (FLUSH) 0.9 %
5-40 SYRINGE (ML) INJECTION EVERY 12 HOURS SCHEDULED
Status: DISCONTINUED | OUTPATIENT
Start: 2024-10-07 | End: 2024-10-08 | Stop reason: HOSPADM

## 2024-10-07 RX ORDER — SODIUM CHLORIDE 0.9 % (FLUSH) 0.9 %
5-40 SYRINGE (ML) INJECTION PRN
Status: DISCONTINUED | OUTPATIENT
Start: 2024-10-07 | End: 2024-10-08 | Stop reason: HOSPADM

## 2024-10-07 RX ORDER — SODIUM CHLORIDE 9 MG/ML
INJECTION, SOLUTION INTRAVENOUS PRN
Status: DISCONTINUED | OUTPATIENT
Start: 2024-10-07 | End: 2024-10-08 | Stop reason: HOSPADM

## 2024-10-07 RX ADMIN — SODIUM CHLORIDE: 9 INJECTION, SOLUTION INTRAVENOUS at 10:58

## 2024-10-07 ASSESSMENT — PAIN - FUNCTIONAL ASSESSMENT: PAIN_FUNCTIONAL_ASSESSMENT: 0-10

## 2024-10-07 NOTE — PROGRESS NOTES
Patient told this nurse upon check in that he had food at 4 AM, once Martha from anesthesia spoke with him he told her between 4-5am. Then this nurse brought the patient's wife back and he told her that he ate between 5-6am. Dr. Rashid did not feel comfortable proceeding since the patient was a poor historian.

## 2024-10-08 ENCOUNTER — HOSPITAL ENCOUNTER (OUTPATIENT)
Dept: RADIOLOGY | Facility: CLINIC | Age: 69
Discharge: HOME | End: 2024-10-08
Payer: MEDICARE

## 2024-10-08 DIAGNOSIS — D3A.8 PRIMARY PANCREATIC NEUROENDOCRINE TUMOR (CMS-HCC): ICD-10-CM

## 2024-10-08 PROCEDURE — 74183 MRI ABD W/O CNTR FLWD CNTR: CPT

## 2024-10-08 PROCEDURE — 74183 MRI ABD W/O CNTR FLWD CNTR: CPT | Performed by: STUDENT IN AN ORGANIZED HEALTH CARE EDUCATION/TRAINING PROGRAM

## 2024-10-08 PROCEDURE — A9575 INJ GADOTERATE MEGLUMI 0.1ML: HCPCS | Performed by: INTERNAL MEDICINE

## 2024-10-08 PROCEDURE — 2550000001 HC RX 255 CONTRASTS: Performed by: INTERNAL MEDICINE

## 2024-10-08 RX ORDER — GADOTERATE MEGLUMINE 376.9 MG/ML
19 INJECTION INTRAVENOUS
Status: COMPLETED | OUTPATIENT
Start: 2024-10-08 | End: 2024-10-08

## 2024-10-10 ENCOUNTER — ANESTHESIA EVENT (OUTPATIENT)
Dept: ENDOSCOPY | Age: 69
End: 2024-10-10
Payer: MEDICARE

## 2024-10-10 RX ORDER — SODIUM CHLORIDE 0.9 % (FLUSH) 0.9 %
5-40 SYRINGE (ML) INJECTION EVERY 12 HOURS SCHEDULED
Status: CANCELLED | OUTPATIENT
Start: 2024-10-10

## 2024-10-10 RX ORDER — SODIUM CHLORIDE 9 MG/ML
INJECTION, SOLUTION INTRAVENOUS CONTINUOUS
Status: CANCELLED | OUTPATIENT
Start: 2024-10-10

## 2024-10-10 RX ORDER — SODIUM CHLORIDE 9 MG/ML
INJECTION, SOLUTION INTRAVENOUS PRN
Status: CANCELLED | OUTPATIENT
Start: 2024-10-10

## 2024-10-10 RX ORDER — SODIUM CHLORIDE 0.9 % (FLUSH) 0.9 %
5-40 SYRINGE (ML) INJECTION PRN
Status: CANCELLED | OUTPATIENT
Start: 2024-10-10

## 2024-10-10 NOTE — ANESTHESIA PRE PROCEDURE
Department of Anesthesiology  Preprocedure Note       Name:  Singh Lim   Age:  69 y.o.  :  1955                                          MRN:  [id number]         Date:  10/10/2024      Surgeon: Surgeon(s):  García Rashid MD    Procedure: Procedure(s):  ESOPHAGOGASTRODUODENOSCOPY    Medications prior to admission:   Prior to Admission medications    Medication Sig Start Date End Date Taking? Authorizing Provider   losartan (COZAAR) 25 MG tablet Take 2 tablets by mouth daily 24   Dane Snyder MD   furosemide (LASIX) 20 MG tablet Take 2 tablets by mouth 2 times daily 24   Dane Snyder MD   hydrOXYzine HCl (ATARAX) 25 MG tablet Take 1 tablet by mouth 4 times daily as needed for Itching 24   Slavik-Bosworth, Kelly J, APRN - CNP   insulin lispro (HUMALOG,ADMELOG) 100 UNIT/ML SOLN injection vial Use via insulin pump max daily dose 250 units 24   Chi Tejada MD   metoprolol succinate (TOPROL XL) 50 MG extended release tablet Take 1 tablet by mouth every evening 24   Dane Snyder MD   tiZANidine (ZANAFLEX) 2 MG tablet Take 1 tablet by mouth nightly as needed (muscle spasms) 24   Joey Keller MD   nabumetone (RELAFEN) 500 MG tablet Take 1 tablet by mouth 2 times daily 24   Joey Keller MD   torsemide (DEMADEX) 20 MG tablet Take 0.5 tablets by mouth daily 24   Dane Snyder MD   esomeprazole (NEXIUM) 40 MG delayed release capsule Take 1 capsule by mouth daily 23   Joey Keller MD   atorvastatin (LIPITOR) 80 MG tablet Take 1 tablet by mouth nightly 23   Joey Keller MD   traZODone (DESYREL) 50 MG tablet Take 1 tablet by mouth nightly as needed for Sleep 23   Joey Keller MD   Insulin Pen Needle (PEN NEEDLES) 31G X 6 MM MISC 1 each by Does not apply route daily    ProviderVeronica MD   aspirin 81 MG chewable tablet Take 1 tablet by mouth daily 23   Gala Puente PA   nitroGLYCERIN (NITROSTAT) 0.4 MG SL tablet Place 1 tablet under the

## 2024-10-11 ENCOUNTER — ANESTHESIA (OUTPATIENT)
Dept: ENDOSCOPY | Age: 69
End: 2024-10-11
Payer: MEDICARE

## 2024-10-11 ENCOUNTER — HOSPITAL ENCOUNTER (OUTPATIENT)
Age: 69
Setting detail: OUTPATIENT SURGERY
Discharge: HOME | End: 2024-10-11
Attending: INTERNAL MEDICINE | Admitting: INTERNAL MEDICINE
Payer: MEDICARE

## 2024-10-11 VITALS
RESPIRATION RATE: 20 BRPM | BODY MASS INDEX: 29.16 KG/M2 | DIASTOLIC BLOOD PRESSURE: 86 MMHG | WEIGHT: 220 LBS | HEART RATE: 88 BPM | SYSTOLIC BLOOD PRESSURE: 190 MMHG | HEIGHT: 73 IN | TEMPERATURE: 98 F | OXYGEN SATURATION: 96 %

## 2024-10-11 DIAGNOSIS — K21.9 GASTROESOPHAGEAL REFLUX DISEASE, UNSPECIFIED WHETHER ESOPHAGITIS PRESENT: ICD-10-CM

## 2024-10-11 PROCEDURE — 88305 TISSUE EXAM BY PATHOLOGIST: CPT

## 2024-10-11 PROCEDURE — 2720000010 HC SURG SUPPLY STERILE: Performed by: INTERNAL MEDICINE

## 2024-10-11 PROCEDURE — 3609017100 HC EGD: Performed by: INTERNAL MEDICINE

## 2024-10-11 PROCEDURE — 6360000002 HC RX W HCPCS: Performed by: REGISTERED NURSE

## 2024-10-11 PROCEDURE — 43239 EGD BIOPSY SINGLE/MULTIPLE: CPT | Performed by: INTERNAL MEDICINE

## 2024-10-11 PROCEDURE — 2500000003 HC RX 250 WO HCPCS: Performed by: REGISTERED NURSE

## 2024-10-11 PROCEDURE — 2580000003 HC RX 258: Performed by: INTERNAL MEDICINE

## 2024-10-11 PROCEDURE — 2709999900 HC NON-CHARGEABLE SUPPLY: Performed by: INTERNAL MEDICINE

## 2024-10-11 PROCEDURE — 7100000011 HC PHASE II RECOVERY - ADDTL 15 MIN: Performed by: INTERNAL MEDICINE

## 2024-10-11 PROCEDURE — 7100000010 HC PHASE II RECOVERY - FIRST 15 MIN: Performed by: INTERNAL MEDICINE

## 2024-10-11 PROCEDURE — 2580000003 HC RX 258

## 2024-10-11 PROCEDURE — 43244 EGD VARICES LIGATION: CPT | Performed by: INTERNAL MEDICINE

## 2024-10-11 PROCEDURE — 3700000000 HC ANESTHESIA ATTENDED CARE: Performed by: INTERNAL MEDICINE

## 2024-10-11 PROCEDURE — 88342 IMHCHEM/IMCYTCHM 1ST ANTB: CPT

## 2024-10-11 PROCEDURE — 3700000001 HC ADD 15 MINUTES (ANESTHESIA): Performed by: INTERNAL MEDICINE

## 2024-10-11 DEVICE — INSTINCT PLUS ENDOSCOPIC CLIPPING DEVICE
Type: IMPLANTABLE DEVICE | Site: STOMACH | Status: FUNCTIONAL
Brand: INSTINCT

## 2024-10-11 RX ORDER — GLYCOPYRROLATE 1 MG/5 ML
SYRINGE (ML) INTRAVENOUS
Status: DISCONTINUED | OUTPATIENT
Start: 2024-10-11 | End: 2024-10-11 | Stop reason: SDUPTHER

## 2024-10-11 RX ORDER — SODIUM CHLORIDE 9 MG/ML
INJECTION, SOLUTION INTRAVENOUS CONTINUOUS
Status: DISCONTINUED | OUTPATIENT
Start: 2024-10-11 | End: 2024-10-11 | Stop reason: HOSPADM

## 2024-10-11 RX ORDER — SODIUM CHLORIDE 9 MG/ML
INJECTION, SOLUTION INTRAVENOUS
Status: COMPLETED
Start: 2024-10-11 | End: 2024-10-11

## 2024-10-11 RX ORDER — PROPOFOL 10 MG/ML
INJECTION, EMULSION INTRAVENOUS
Status: DISCONTINUED | OUTPATIENT
Start: 2024-10-11 | End: 2024-10-11 | Stop reason: SDUPTHER

## 2024-10-11 RX ORDER — LIDOCAINE HYDROCHLORIDE 20 MG/ML
INJECTION, SOLUTION INFILTRATION; PERINEURAL
Status: DISCONTINUED | OUTPATIENT
Start: 2024-10-11 | End: 2024-10-11 | Stop reason: SDUPTHER

## 2024-10-11 RX ORDER — SODIUM CHLORIDE 9 MG/ML
INJECTION, SOLUTION INTRAVENOUS PRN
Status: DISCONTINUED | OUTPATIENT
Start: 2024-10-11 | End: 2024-10-11 | Stop reason: HOSPADM

## 2024-10-11 RX ORDER — ONDANSETRON 2 MG/ML
INJECTION INTRAMUSCULAR; INTRAVENOUS
Status: DISCONTINUED | OUTPATIENT
Start: 2024-10-11 | End: 2024-10-11 | Stop reason: SDUPTHER

## 2024-10-11 RX ORDER — SODIUM CHLORIDE 0.9 % (FLUSH) 0.9 %
5-40 SYRINGE (ML) INJECTION EVERY 12 HOURS SCHEDULED
Status: DISCONTINUED | OUTPATIENT
Start: 2024-10-11 | End: 2024-10-11 | Stop reason: HOSPADM

## 2024-10-11 RX ORDER — SODIUM CHLORIDE 0.9 % (FLUSH) 0.9 %
5-40 SYRINGE (ML) INJECTION PRN
Status: DISCONTINUED | OUTPATIENT
Start: 2024-10-11 | End: 2024-10-11 | Stop reason: HOSPADM

## 2024-10-11 RX ADMIN — PROPOFOL 20 MG: 10 INJECTION, EMULSION INTRAVENOUS at 08:03

## 2024-10-11 RX ADMIN — LIDOCAINE HYDROCHLORIDE 60 MG: 20 INJECTION, SOLUTION INFILTRATION; PERINEURAL at 07:46

## 2024-10-11 RX ADMIN — SODIUM CHLORIDE: 9 INJECTION, SOLUTION INTRAVENOUS at 06:57

## 2024-10-11 RX ADMIN — Medication 0.2 MG: at 07:46

## 2024-10-11 RX ADMIN — ONDANSETRON 4 MG: 2 INJECTION INTRAMUSCULAR; INTRAVENOUS at 08:02

## 2024-10-11 RX ADMIN — PROPOFOL 130 MG: 10 INJECTION, EMULSION INTRAVENOUS at 07:46

## 2024-10-11 RX ADMIN — PROPOFOL 30 MG: 10 INJECTION, EMULSION INTRAVENOUS at 07:50

## 2024-10-11 RX ADMIN — PROPOFOL 40 MG: 10 INJECTION, EMULSION INTRAVENOUS at 07:55

## 2024-10-11 RX ADMIN — PROPOFOL 30 MG: 10 INJECTION, EMULSION INTRAVENOUS at 07:59

## 2024-10-11 ASSESSMENT — PAIN DESCRIPTION - LOCATION: LOCATION: CHEST

## 2024-10-11 ASSESSMENT — PAIN - FUNCTIONAL ASSESSMENT: PAIN_FUNCTIONAL_ASSESSMENT: NONE - DENIES PAIN

## 2024-10-11 ASSESSMENT — PAIN SCALES - GENERAL: PAINLEVEL_OUTOF10: 10

## 2024-10-11 NOTE — H&P
Patient Name: Singh iLm  : 1955  MRN: [id number]  DATE: 10/11/24      ENDOSCOPY  History and Physical    Procedure:    [] Diagnostic Colonoscopy       [] Screening Colonoscopy  [x] EGD      [] ERCP      [] EUS       [] Other    [x] Previous office notes/History and Physical reviewed from the patients chart. Please see EMR for further details of HPI. I have examined the patient's status immediately prior to the procedure and:      Indications/HPI:    []Abdominal Pain   []Cancer- GI/Lung  []Fhx of colon CA  []History of Polyps   []Alfonso’s   []Melena  []Abnormal Imaging   []Dysphagia    []Persistent Pneumonia  []Anemia   []Food Impaction  []History of Polyps  []GI Bleed   []Pulmonary nodule/Mass  []Change in bowel habits  []Heartburn/Reflux  []Rectal Bleed (BRBPR)  []Chest Pain - Non Cardiac  []Heme (+) Stool  []Ulcers  []Constipation   []Hemoptysis   []Varices  []Diarrhea   []Hypoxemia  []Nausea/Vomiting   []Screening   []Crohns/Colitis  []Other: 69-year-old male with previous 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  S/p chemotherapy with last MRI in  noting cirrhosis without any evidence for HCC.  Patient s/p pancreatectomy and splenectomy with no evidence for residual disease on MRI in 2022.  Patient with complaints of abdominal bloating, intermittent issues with swallowing, history of reflux, history of constipation.  Status post EGD in 2023 for variceal surveillance/screening noting grade 1 esophageal varices.  Currently on Nexium 40 mg once daily for reflux symptoms.    Anesthesia:   [x] MAC [] Moderate Sedation   [] General   [] None     ROS: 12 pt Review of Symptoms was negative unless mentioned above    Medications:   Prior to Admission medications    Medication Sig Start Date End Date Taking? Authorizing Provider   losartan (COZAAR) 25 MG tablet Take 2 tablets by mouth daily 24  Yes Dane Snyder MD   furosemide (LASIX)  (HCC)     Type II or unspecified type diabetes mellitus without mention of complication, not stated as uncontrolled      Past Surgical History:  Past Surgical History:   Procedure Laterality Date    ABDOMEN SURGERY      COLONOSCOPY  02/20/2015    DR DIETZ    COLONOSCOPY N/A 01/20/2020    COLONOSCOPY DIAGNOSTIC performed by García Rashid MD at [facility]    ENDOSCOPIC ULTRASOUND (LOWER) N/A 12/17/2019    EUS performed by García Rashid MD at [facility]    HEMORRHOID SURGERY      PANCREATECTOMY  08/04/2022    SPLENECTOMY, PARTIAL  08/04/2022    UPPER GASTROINTESTINAL ENDOSCOPY N/A 04/04/2022    EGD ESOPHAGOGASTRODUODENOSCOPY performed by García Rashid MD at [facility]    UPPER GASTROINTESTINAL ENDOSCOPY N/A 1/27/2023    EGD DIAGNOSTIC ONLY performed by García Rashid MD at [facility]     Social History:  Social History     Tobacco Use    Smoking status: Never     Passive exposure: Never    Smokeless tobacco: Never   Vaping Use    Vaping status: Never Used   Substance Use Topics    Alcohol use: No    Drug use: No     Vital Signs:   Vitals:    10/11/24 0706   BP: (!) 177/86   Pulse: 71   Resp: 18   Temp: 98 °F (36.7 °C)   SpO2: 96%       Physical Exam:  Cardiac:  [x]WNL []Comments:  Pulmonary:  [x]WNL []Comments:   Neuro/Mental Status:  [x]WNL []Comments:  Abdominal:  [x]WNL []Comments:  Other:   []WNL []Comments:    Informed Consent:  The risks and benefits of the procedure have been discussed with either the patient or if they cannot consent, their representative.    Assessment:  Patient examined and appropriate for planned sedation and procedure.     Plan:  Proceed with planned sedation and procedure as above.    The patient was counseled at length about risks of gladis COVID-19 in the perioperative and any recovery window from the procedure.  The patient was made aware that gladis COVID-19 may worsen their prognosis for recovery from their procedure and lend to a higher morbidity

## 2024-10-11 NOTE — ANESTHESIA POSTPROCEDURE EVALUATION
Department of Anesthesiology  Postprocedure Note    Patient: Singh Lim  MRN: [id number]  YOB: 1955  Date of evaluation: 10/11/2024    Procedure Summary       Date: 10/11/24 Room / Location: [facility] OR 02 / [facility]    Anesthesia Start: 0742 Anesthesia Stop: 0812    Procedure: ESOPHAGOGASTRODUODENOSCOPY with biopsies and clip placement with banding Diagnosis:       Gastroesophageal reflux disease, unspecified whether esophagitis present      (Gastroesophageal reflux disease, unspecified whether esophagitis present [K21.9])    Surgeons: García Rashid MD Responsible Provider: Oz De Souza APRN - CRNA    Anesthesia Type: MAC ASA Status: 3            Anesthesia Type: No value filed.    Latoya Phase I:      Latoya Phase II: Latoya Score: 6    Anesthesia Post Evaluation    Patient location during evaluation: bedside  Patient participation: complete - patient participated  Level of consciousness: awake and awake and alert  Airway patency: patent  Nausea & Vomiting: no nausea and no vomiting  Cardiovascular status: blood pressure returned to baseline and hemodynamically stable  Respiratory status: acceptable  Hydration status: euvolemic  Pain management: adequate        No notable events documented.

## 2024-10-14 RX ORDER — TORSEMIDE 20 MG/1
10 TABLET ORAL DAILY
Qty: 30 TABLET | Refills: 5 | Status: SHIPPED | OUTPATIENT
Start: 2024-10-14

## 2024-10-14 NOTE — TELEPHONE ENCOUNTER
Requesting medication refill. Please approve or deny this request.    Rx requested:  Requested Prescriptions     Pending Prescriptions Disp Refills    torsemide (DEMADEX) 20 MG tablet 30 tablet 5     Sig: Take 0.5 tablets by mouth daily         Last Office Visit:   9/13/2024      Next Visit Date:  Future Appointments   Date Time Provider Department Center   10/28/2024  2:30 PM Slavik-Bosworth, Kelly J, APRN - CNP [facility]   10/31/2024 12:00 PM Gala Puente PA [facility]   11/1/2024  3:15 PM Snyder, Dane, MD [facility]   11/19/2024  1:15 PM Joey Keller MD [facility]   1/17/2025  3:00 PM Chi Tejada MD [facility]

## 2024-10-16 ENCOUNTER — OFFICE VISIT (OUTPATIENT)
Dept: HEMATOLOGY/ONCOLOGY | Facility: HOSPITAL | Age: 69
End: 2024-10-16
Payer: MEDICARE

## 2024-10-16 VITALS
OXYGEN SATURATION: 98 % | WEIGHT: 220.68 LBS | TEMPERATURE: 97.7 F | BODY MASS INDEX: 29.25 KG/M2 | HEIGHT: 73 IN | SYSTOLIC BLOOD PRESSURE: 165 MMHG | HEART RATE: 70 BPM | DIASTOLIC BLOOD PRESSURE: 67 MMHG

## 2024-10-16 DIAGNOSIS — D3A.8 PRIMARY PANCREATIC NEUROENDOCRINE TUMOR (CMS-HCC): ICD-10-CM

## 2024-10-16 PROCEDURE — 1159F MED LIST DOCD IN RCRD: CPT | Performed by: INTERNAL MEDICINE

## 2024-10-16 PROCEDURE — 3078F DIAST BP <80 MM HG: CPT | Performed by: INTERNAL MEDICINE

## 2024-10-16 PROCEDURE — 4010F ACE/ARB THERAPY RXD/TAKEN: CPT | Performed by: INTERNAL MEDICINE

## 2024-10-16 PROCEDURE — 1036F TOBACCO NON-USER: CPT | Performed by: INTERNAL MEDICINE

## 2024-10-16 PROCEDURE — 1126F AMNT PAIN NOTED NONE PRSNT: CPT | Performed by: INTERNAL MEDICINE

## 2024-10-16 PROCEDURE — 99215 OFFICE O/P EST HI 40 MIN: CPT | Performed by: INTERNAL MEDICINE

## 2024-10-16 PROCEDURE — 3008F BODY MASS INDEX DOCD: CPT | Performed by: INTERNAL MEDICINE

## 2024-10-16 PROCEDURE — 3077F SYST BP >= 140 MM HG: CPT | Performed by: INTERNAL MEDICINE

## 2024-10-16 ASSESSMENT — PAIN SCALES - GENERAL: PAINLEVEL_OUTOF10: 0-NO PAIN

## 2024-10-16 NOTE — PROGRESS NOTES
Patient ID: Adam Castillo is a 69 y.o. male.  Visit type: Follow up visit      ONCOLOGIC HISTORY  69-year-old man with PMH of DM, HTN who has been diagnosed with Metastatic Well differentiated Pancreatic Neuroendocrine tumor with liver mets. On 12/10/2019  he had abdominal pain radiated to his back was about 8/10 associated with nausea and vomiting. He went to the ER at [facility] where he got admitted and had a CT scan showed evidence of cirrhosis as well as a 2 cm soft tissue mass at the till the  pancreas. I reviewed his CT scan. There is a calcified 2 cm mass noted at the anterior aspect of the tail the pancreas near the splenic hilum. He does have splenomegaly with spleen measuring about 17 cm or so in greatest dimension. Endoscopic ultrasound  with biopsy showed well-differentiated neuroendocrine tumor. On the description of the endoscopic ultrasound, 2.1 x 1.5 cm oval lesion was noted towards the till the pancreas abutting the till the pancreas. This did not appear to directly involve the  pancreas endoscopically.  He was taken to the OR on 3/17/20 for laprscopic distal pancreatectomy, but was found to have liver mets with laparoscopy with wedge liver biopsy confirmed metastatic neuroendocrine tumor to liver. A 2nd lesion was also visualized  on the surface of the left liver. Final path showed well diff. pNET with Ki67 4% (G2). Initially he started oin LAURYN and after discuss at the TB we agreed on neoadjuvant therapy and restaging for response. He started on cytotoxic chemotherapy with capecitabine  and temozolomide on 06/21/2020. He went to the ER after the finished the w2 weeks of C1 due to fever and his labs only showed reactive HAV AB and he was COVID negative.   He finished 8 cycles so far and tolerated well except for nausea, and constipation and malignancy induced fever which resolved  Restaging after C4 showed mild decrease in his pancreatic mass and liver mets   Due to his myelosuppression we  "stopped chemotherapy and switch back to SSA  He had surgery on 08/03/2022  Restaging scans showed post surgical changes and no evidence of new lesions        Past Medical History History of cirrhosis (V12.79) (Z87.19) History of diabetes mellitus (V12.29) (Z86.39) History of hypertension (V12.59) (Z86.79) History of malignant neoplasm  of skin (V10.83) (Z85.828) History of Pancreatic tumor (239.0) (D49.0)   Surgical History History of Hemorrhoidectomy      Family History Family history of lung cancer in hi smother side  (V16.1) (Z80.1)     History of Present Illness:  Chief complaint: Metastatic well differentiated pancreatic neuroendocrine tumor with liver mets    SUBJECTIVE:  Interval History:  Adam Castillo is a 69 y.o. male who presents for follow up and to review scan results. He denies pain, no fever or chills, no shortness of breath or chest pain.    Review of Systems   All other systems reviewed and are negative.      OBJECTIVE:    VS:  /67 (BP Location: Left arm, Patient Position: Sitting, BP Cuff Size: Adult)   Pulse 70   Temp 36.5 °C (97.7 °F) (Temporal)   Ht 1.847 m (6' 0.72\")   Wt 100 kg (220 lb 10.9 oz)   SpO2 98%   BMI 29.34 kg/m²   Weight:   Vitals:    10/16/24 0922   Weight: 100 kg (220 lb 10.9 oz)       BSA: 2.27 meters squared    Physical Exam  Vitals reviewed.   Constitutional:       General: He is not in acute distress.     Appearance: Normal appearance.   HENT:      Head: Normocephalic and atraumatic.      Nose: Nose normal.      Mouth/Throat:      Mouth: Mucous membranes are moist.      Pharynx: Oropharynx is clear.   Eyes:      Extraocular Movements: Extraocular movements intact.      Conjunctiva/sclera: Conjunctivae normal.   Cardiovascular:      Rate and Rhythm: Normal rate and regular rhythm.      Pulses: Normal pulses.      Heart sounds: Normal heart sounds.   Pulmonary:      Effort: Pulmonary effort is normal.      Breath sounds: Normal breath sounds.   Abdominal:      General: " Abdomen is flat. Bowel sounds are normal.      Palpations: Abdomen is soft.   Musculoskeletal:         General: Normal range of motion.      Cervical back: Normal range of motion and neck supple.   Skin:     General: Skin is warm and dry.   Neurological:      General: No focal deficit present.      Mental Status: He is alert and oriented to person, place, and time. Mental status is at baseline.   Psychiatric:         Mood and Affect: Mood normal.         Behavior: Behavior normal.         Thought Content: Thought content normal.         Judgment: Judgment normal.           Diagnostic Results     Lab on 04/16/2024   Component Date Value    WBC 04/16/2024 8.2     nRBC 04/16/2024      RBC 04/16/2024 4.22 (L)     Hemoglobin 04/16/2024 10.9 (L)     Hematocrit 04/16/2024 35.2 (L)     MCV 04/16/2024 83     MCH 04/16/2024 25.8 (L)     MCHC 04/16/2024 31.0 (L)     RDW 04/16/2024 16.8 (H)     Platelets 04/16/2024 371     Neutrophils % 04/16/2024 54.0     Immature Granulocytes %,* 04/16/2024 0.1     Lymphocytes % 04/16/2024 26.8     Monocytes % 04/16/2024 13.1     Eosinophils % 04/16/2024 4.9     Basophils % 04/16/2024 1.1     Neutrophils Absolute 04/16/2024 4.44     Immature Granulocytes Ab* 04/16/2024 0.01     Lymphocytes Absolute 04/16/2024 2.20     Monocytes Absolute 04/16/2024 1.08 (H)     Eosinophils Absolute 04/16/2024 0.40     Basophils Absolute 04/16/2024 0.09     Glucose 04/16/2024 278 (H)     Sodium 04/16/2024 139     Potassium 04/16/2024 3.9     Chloride 04/16/2024 107     Bicarbonate 04/16/2024 25     Anion Gap 04/16/2024 11     Urea Nitrogen 04/16/2024 14     Creatinine 04/16/2024 0.88     eGFR 04/16/2024 >90     Calcium 04/16/2024 8.7     Albumin 04/16/2024 3.5     Alkaline Phosphatase 04/16/2024 121     Total Protein 04/16/2024 7.0     AST 04/16/2024 34     Bilirubin, Total 04/16/2024 1.1     ALT 04/16/2024 26            MR abdomen w and wo IV contrast  Narrative: Interpreted By:  Juaquin Gandhi,   STUDY:  MR  ABDOMEN W AND WO IV CONTRAST;  10/8/2024 9:15 am      INDICATION:  Signs/Symptoms:NET restaging.      ,D3A.8 Other benign neuroendocrine tumors (CMS-HCC)      COMPARISON:  None.      ACCESSION NUMBER(S):  [id number]      ORDERING CLINICIAN:  TROY BOBBY      TECHNIQUE:  MRI LIVER; Multiplanar magnetic resonance images of the abdomen were  obtained including the following sequences; T2-weighted SSFSE with  and without fat saturation, T1-weighted GRE in/opposed phase, DWI,  fat saturated 3D-T1w GRE pre and dynamically post contrast. 16 mL of  Dotarem gadolinium contrast was administered intravenously without  immediate complication.      FINDINGS:  LIVER:  Morphological changes of cirrhosis and steatosis. Stable tiny  subcapsular segment 5 arterial enhancing focus (series 603, image  86). No suspicious lesions.      BILE DUCTS:  No intrahepatic or extrahepatic bile duct dilatation is demonstrated.      GALLBLADDER:  Cholelithiasis      PANCREAS:  Status post distal pancreatectomy with no gross soft tissue mass at  the surgical bed. Residual pancreas appears grossly unchanged with no  duct dilatation or solid masses.      SPLEEN:  Surgically absent.      ADRENAL GLANDS:  Within normal limits.      KIDNEYS:  No hydronephrosis. Few tiny bilateral cortical cysts.      LYMPH NODES:  Stable portacaval lymph node measuring 1.1 cm in short axis dimension  (series 603, image 69).      ABDOMINAL VESSELS:  Aorta and the major abdominal arterial vessels demonstrate no gross  abnormality.  Superior mesenteric vein, splenic vein, and main, right  and left portal vein are patent. Hepatic veins are patent.  Perigastric varices noted.      BOWEL:  No bowel dilatation      PERITONEUM/RETROPERITONEUM:  No ascites.      BONES AND LOWER THORAX:  Multilevel degenerative spine changes and facet joint disease. The  visualized lower lung fields are unremarkable. The heart is normal in  size.      Impression: 1. Status post distal  pancreatectomy/splenectomy with no evidence of  locoregional recurrence.  2. Morphological changes of cirrhosis. Stable subcapsular segment 5  tiny enhancing focus likely vascular shunt, unchanged since 2020. No  suspicious high-grade LI-RADS observations.  3. No new concerning findings in the abdomen or pelvis.          MACRO:  None      Signed by: Juaquin Gandhi 10/8/2024 12:47 PM  Dictation workstation:   [id number]       Assessment/Plan   Metastatic pNET (G2) with liver mets   68-year-old man with PMH of DM, HTN who has been diagnosed with Metastatic Well differentiated Pancreatic Neuroendocrine tumor with liver mets. On 12/10/2019 he had abdominal pain radiated to his back  was about 8/10 associated with nausea and vomiting. He went to the ER at [facility] where he got admitted and had a CT scan showed evidence of cirrhosis as well as a 2 cm soft tissue mass at the till the pancreas. I reviewed his CT scan. There is  a calcified 2 cm mass noted at the anterior aspect of the tail the pancreas near the splenic hilum. He does have splenomegaly with spleen measuring about 17 cm or so in greatest dimension. Endoscopic ultrasound with biopsy showed well-differentiated neuroendocrine  tumor. On the description of the endoscopic ultrasound, 2.1 x 1.5 cm oval lesion was noted towards the till the pancreas abutting the till the pancreas. This did not appear to directly involve the pancreas endoscopically.  He was taken to the OR on 3/17/20  for laprscopic distal pancreatectomy, but was found to have liver mets with laparoscopy with wedge liver biopsy confirmed metastatic neuroendocrine tumor to liver. A 2nd lesion was also visualized on the surface of the left liver. Final path showed well  diff. pNET with Ki67 4% (G2). PET-Ga68 showed somatostatin avid lesions in the pancreas and liver lesions. MRI confirmed same findings of Pancreatic lesions which is almost same size like CT on 12/2019, liver lesions 5 of them all  subcentimeter and RP  LN. Pt started on SSA after that and is tolerated lanreotide well.     We discussed the case at the [facility] and he has limited disease mainly pancreatic and 5-6 liver lesions. We agreed on starting on neoadjuvant chemotherapy with CAPTEM and restaging for surgical resection   He started on cytotoxic chemotherapy with capecitabine and temozolomide on 06/21/2020 and finished 2 cycles. He went to the ER after the finished the w2 weeks of C1 due to  fever and his labs only showed reactive HAV AB and IgM was negative and he was COVID negative.  He finished 8 cycles so far and tolerated well except for nausea, and constipation and malignancy induced fever which resolved. However pt don't want to resume more chemo at this point   Restaging after C4 showed mild decrease in his pancreatic mass and liver mets   Restaging scans after C8 showed stable disease mainly 3 small liver lesions and stable pancreatic tail mass  He met with our surgeon Dr. Reardon and he is not a candidate for surgery given high risk due to his liver cirrhosis  We started him on SSA with lanreotide  Restaging MRI AP on 08/20/2021 showed stable disease arterial enhancing 1.6 x 2.2 cm mass of the pancreatic tail and Vague approximate 4 mm arterial enhancing foci in the lateral aspect of the right hepatic lobe and anteriorly in the left hepatic lobe  similar to prior. No new focal hepatic lesion identified.  His last restaging scans on 12/16/2021 and 04/2022 showed stable disease  Serum glucagon is 243  08/03/2022: Pt had distal pancreatectomy and spelnectomny with final path confirmed G1 Well diff. PanNet with Li67 1.4%  Restaging scans showed post surgical changes and no evidence of new lesions . However there was a fluid collection concern for abscess and he finished 1 week of Abs and also showed splenic vein thrombus   Restaging MRI showed only post surgical changes and no evidence of disease. There is concern of progressed portal  vein thrombosis  Started on anticoagulation and sent to hematology for follow up  Per last CT AP excluded recurrent/metastatic disease scan there is concern about liver cirrhosis   Restaging MRI excluded evidence of recurrence/metastatic diseases   07/2023: Restaging CT AP excluded recurrent/metastatic disease except for a small omental nodule and a new  rim enhancing  1 cm lesion along the anterior capsule of the left liver     MRI Liver showed arterially enhancing 0.4 cm lesion in hepatic segment VI without washout compared to 04/10/2023 MRI. Findings stranding may represent LI-RADS 3 lesion in the setting of liver cirrhosis versus  neuroendocrine metastasis.Focal peritoneal thickening with slight anterior omental nodularity in the left upper quadrant may represent omental fat necrosis and post operative scaring      PET-CU64 excluded any SS avid lesions     12/4/23 MRI AP excludes recurrent/metastatic disease    04/01/2024 and 10/08/2024: MRI AP excluded recurrent or metastatic disease      Plan:    1- Patient is doing well and has no complaints  2- Reviewed scan results with patient and his wife which excludes recurrent/metastatic disease  3- MRI AP in 6 months (ordered)  5- RTC after scans for follow up and to review results            Erin Cui M.D.   and Director of the Neuroendocrine Tumor Program  Gastrointestinal Medical Oncology  Department of Hematology and Oncology  [facility], [place] [address]  Phone (Office): [phone]  Fax:  [phone]   Email: [email or web address]  Learn more about [facility] Comprehensive Neuroendocrine Tumor Program: Click Here  Learn more about [place] [employer]: [email or web address]

## 2024-10-28 ENCOUNTER — OFFICE VISIT (OUTPATIENT)
Dept: GASTROENTEROLOGY | Age: 69
End: 2024-10-28
Payer: MEDICARE

## 2024-10-28 VITALS — OXYGEN SATURATION: 98 % | BODY MASS INDEX: 29.69 KG/M2 | WEIGHT: 224 LBS | HEIGHT: 73 IN | HEART RATE: 63 BPM

## 2024-10-28 DIAGNOSIS — I85.00 ESOPHAGEAL VARICES DETERMINED BY ENDOSCOPY (HCC): ICD-10-CM

## 2024-10-28 DIAGNOSIS — K59.04 CHRONIC IDIOPATHIC CONSTIPATION: ICD-10-CM

## 2024-10-28 DIAGNOSIS — D3A.8 NEUROENDOCRINE TUMOR OF PANCREAS: ICD-10-CM

## 2024-10-28 DIAGNOSIS — Z90.410 HISTORY OF PANCREATECTOMY: ICD-10-CM

## 2024-10-28 DIAGNOSIS — K74.69 OTHER CIRRHOSIS OF LIVER (HCC): ICD-10-CM

## 2024-10-28 DIAGNOSIS — K21.9 GASTROESOPHAGEAL REFLUX DISEASE, UNSPECIFIED WHETHER ESOPHAGITIS PRESENT: Primary | ICD-10-CM

## 2024-10-28 PROCEDURE — 1123F ACP DISCUSS/DSCN MKR DOCD: CPT | Performed by: NURSE PRACTITIONER

## 2024-10-28 PROCEDURE — 99214 OFFICE O/P EST MOD 30 MIN: CPT | Performed by: NURSE PRACTITIONER

## 2024-10-28 PROCEDURE — 1159F MED LIST DOCD IN RCRD: CPT | Performed by: NURSE PRACTITIONER

## 2024-10-28 NOTE — PROGRESS NOTES
Gastroenterology Clinic Follow up Visit    Singh Lim  [id number]  Chief Complaint   Patient presents with    Follow-up       HPI: 69 y.o. male following up after last GI clinic on 9/4/2024     Interval change: Patient is follow-up after EGD 10/11/2024.  EGD noted grade 2 esophageal varices with 6 bands placed, incomplete eradication.  Patient reports GERD symptoms well-controlled with Nexium 40 mg daily.  Patient continues to follow with oncology at [facility], MRI completed 10/20/2024.Patient continues with Miralax daily to facilitate bowel movements daily. He uses Gas-Ex OTC as needed.   Denies any unintentional weight loss, dysphagia, hematemesis, hematochezia, nor melena.    HPI from last GI clinic visit on 9/4/2024 summarized below:  Patient is follow-up to GI clinic with some improvement in bloating after meals.  Patient reports having small sometimes thin bowel movements every 1 to 2 days.  Patient does take MiraLAX 2-3 times daily to facilitate a larger bowel movement, however otherwise feels inadequate emptying after bowel movement.   Patient continues to take Gas-X as needed for bloating.He reports itching on chest, arms, back, and head.  He was followed up by dermatology, with no prescription received nor diagnosis.  He denies any unintentional weight loss, abdominal distention, nor jaundice.  Patient has follow-up MRI scheduled with [facility] in October, as he follows with oncology there.  Patient is recently being followed with cardiology due to shortness of breath and lower extremity edema.  Denies any unintentional weight loss, dysphagia, hematemesis, hematochezia, nor melena.     HPI from last GI clinic visit on 6/25/2024 summarized below:  Patient is follow-up to GI clinic with a complaint of constant bloating after meals.  Patient reports having small soft bowel movements daily, and not feeling completely evacuated after bowel movement.  Patient has been taking senna 2 pills

## 2024-10-29 ASSESSMENT — ENCOUNTER SYMPTOMS
ABDOMINAL PAIN: 0
CONSTIPATION: 1
BLOOD IN STOOL: 0
VOMITING: 0
RECTAL PAIN: 0
ANAL BLEEDING: 0
DIARRHEA: 0
ABDOMINAL DISTENTION: 0
NAUSEA: 0
TROUBLE SWALLOWING: 0

## 2024-10-31 ENCOUNTER — OFFICE VISIT (OUTPATIENT)
Dept: CARDIOLOGY CLINIC | Age: 69
End: 2024-10-31

## 2024-10-31 VITALS
SYSTOLIC BLOOD PRESSURE: 132 MMHG | OXYGEN SATURATION: 95 % | DIASTOLIC BLOOD PRESSURE: 70 MMHG | BODY MASS INDEX: 29.24 KG/M2 | HEART RATE: 79 BPM | WEIGHT: 221.6 LBS | RESPIRATION RATE: 16 BRPM

## 2024-10-31 DIAGNOSIS — I85.00 ESOPHAGEAL VARICES WITHOUT BLEEDING, UNSPECIFIED ESOPHAGEAL VARICES TYPE (HCC): ICD-10-CM

## 2024-10-31 DIAGNOSIS — I38 VALVULAR HEART DISEASE: ICD-10-CM

## 2024-10-31 DIAGNOSIS — R06.09 DYSPNEA ON EXERTION: ICD-10-CM

## 2024-10-31 DIAGNOSIS — E11.9 TYPE 2 DIABETES MELLITUS WITH INSULIN THERAPY (HCC): ICD-10-CM

## 2024-10-31 DIAGNOSIS — Z79.4 TYPE 2 DIABETES MELLITUS WITH INSULIN THERAPY (HCC): ICD-10-CM

## 2024-10-31 DIAGNOSIS — I50.30 HEART FAILURE WITH PRESERVED EJECTION FRACTION, UNSPECIFIED HF CHRONICITY (HCC): ICD-10-CM

## 2024-10-31 DIAGNOSIS — Z86.79 HISTORY OF CARDIOMYOPATHY: ICD-10-CM

## 2024-10-31 DIAGNOSIS — I27.20 PULMONARY HTN (HCC): ICD-10-CM

## 2024-10-31 DIAGNOSIS — I10 HYPERTENSION, UNSPECIFIED TYPE: ICD-10-CM

## 2024-10-31 RX ORDER — SPIRONOLACTONE 25 MG/1
25 TABLET ORAL DAILY
Qty: 30 TABLET | Refills: 3 | Status: SHIPPED | OUTPATIENT
Start: 2024-10-31

## 2024-10-31 ASSESSMENT — ENCOUNTER SYMPTOMS
COLOR CHANGE: 0
BLOOD IN STOOL: 0
COUGH: 0
ABDOMINAL DISTENTION: 1
CONSTIPATION: 1
VOMITING: 0
SHORTNESS OF BREATH: 1
NAUSEA: 0

## 2024-10-31 NOTE — PATIENT INSTRUCTIONS
STOP torsemide 20mg tab--1/2 tab daily  Add Aldactone 25mg PO daily      Check labs (BMP and BNP) in 1 week      -Check daily weight every morning and notify CHF clinic if gaining more than 3 pounds in 2 days    -Check blood pressure twice daily in a.m. and p.m. prior to taking medications and keep log of blood pressure trends to review at next office visit  Notify office if BP running low with  mmHg or below prior to taking medications  Notify office if BP running high with  mmHg or above or if DBP 90 mmHg or above    -Recommend 2000 mg daily sodium restriction    -Recommend 2 liter (64 ounces) daily fluid restriction

## 2024-10-31 NOTE — PROGRESS NOTES
08:07 AM     Magnesium:    Lab Results   Component Value Date/Time    MG 2.1 04/28/2023 05:30 PM     TSH:  Lab Results   Component Value Date    TSH 0.947 11/09/2023     .result  No results for input(s): \"PROBNP\" in the last 72 hours.  No results for input(s): \"INR\" in the last 72 hours.            Patient Active Problem List   Diagnosis    HTN (hypertension)    Type 2 diabetes mellitus without complication (HCC)    Lumbar paraspinal muscle spasm    Allergic rhinosinusitis    TIA (transient ischemic attack)    Abdominal pain    Malignant neoplasm of other parts of pancreas (HCC)    Other cirrhosis of liver (HCC)    Gastroesophageal reflux disease without esophagitis    Chest pain    History of pancreatectomy    Portal hypertension (HCC)    Melena    Epigastric pain    Iron deficiency anemia due to chronic blood loss    STEMI (ST elevation myocardial infarction) (HCC)    Coronary artery disease due to lipid rich plaque    Hypokalemia    Ischemic cardiomyopathy    Type 2 diabetes mellitus with other circulatory complications (HCC)    Chronic systolic (congestive) heart failure    Major depressive disorder, recurrent, mild    Major depressive disorder, recurrent, moderate    Major depressive disorder, recurrent, unspecified       Assessment/Plan:     Diagnosis Orders   1. Heart failure with preserved ejection fraction, unspecified HF chronicity (HCC)  Basic Metabolic Panel    Brain Natriuretic Peptide    NYHA class II-III. LE edema improved since recent increase in Lasix. Still with SOB. Aldactone added      2. Dyspnea on exertion      and occasional exertional chest pressure. ? repeat ischemic eval in future      3. History of cardiomyopathy      Hx recovered ICMP with EF 50-55% per echo 3/20/24      4. Valvular heart disease      Mild MR/TR per echo 3/20/24      5. Pulmonary HTN (HCC)      Mild PHTN with RVSP 40mmHg per echo 3/20/24      6. Esophageal varices without bleeding, unspecified esophageal varices type

## 2024-11-01 ENCOUNTER — OFFICE VISIT (OUTPATIENT)
Dept: CARDIOLOGY CLINIC | Age: 69
End: 2024-11-01

## 2024-11-01 VITALS
DIASTOLIC BLOOD PRESSURE: 62 MMHG | HEART RATE: 70 BPM | OXYGEN SATURATION: 97 % | RESPIRATION RATE: 16 BRPM | WEIGHT: 221 LBS | SYSTOLIC BLOOD PRESSURE: 135 MMHG | BODY MASS INDEX: 29.16 KG/M2

## 2024-11-01 DIAGNOSIS — D64.9 ANEMIA, UNSPECIFIED TYPE: Primary | ICD-10-CM

## 2024-11-01 DIAGNOSIS — I25.10 CORONARY ARTERY DISEASE DUE TO LIPID RICH PLAQUE: ICD-10-CM

## 2024-11-01 DIAGNOSIS — I25.83 CORONARY ARTERY DISEASE DUE TO LIPID RICH PLAQUE: ICD-10-CM

## 2024-11-01 ASSESSMENT — ENCOUNTER SYMPTOMS
RHINORRHEA: 0
COLOR CHANGE: 0
CHEST TIGHTNESS: 0
VOMITING: 0
ABDOMINAL PAIN: 0
CONSTIPATION: 0
DIARRHEA: 0
NAUSEA: 0
COUGH: 0
APNEA: 0
EYE REDNESS: 0
SHORTNESS OF BREATH: 1
WHEEZING: 0

## 2024-11-01 NOTE — PROGRESS NOTES
Chief Complaint   Patient presents with    Follow-up    Hypertension    Coronary Artery Disease       Patient presents for initial medical evaluation. Patient is followed on a regular basis by Joey Lopez MD.     CAD 1/20/2023 STEMI successful PCI of the proximal and mid LAD with ZAKI x3.  2/16/2023 PCI to the distal RCA ZAKI x2, IFR of the circumflex negative (0.94)  Ischemic cardiomyopathy EF 40 to 45% by TTE 1/30/2023  Hypertension  Hyperlipidemia  Diabetes  Pancreatic cancer on chemotherapy, neuroendocrine tumor status post resection August 4, 2022 at   Liver cirrhosis  Portal vein thrombosis on Eliquis    Cardiac studies:  TTE EF 65% on 1/10/2023 at   TTE 11/25/2023 EF 45 to 50%  TTE 12/11/2019 EF 65%  TTE 4/15/2022 EF 65%  TTE 1/30/2023 EF 40 to 45%  TTE 3/20/2024 EF 50 to 55%  Nuclear stress test 5/11/2022 EF 60% no evidence of ischemia or infarct  No prior coronary angiograms  =======================  11/1/2024  Follow-up on CAD  Patient reportS feeling fatigued  Lower extremity edema has improved, currently on elicit any swelling on the lower extremities  However patient still endorsing shortness of breath  Patient on Lasix and Aldactone  I would like to continue with Lasix and Aldactone, follow-up BMP in a week  Patient with history of anemia, I would like to obtain a CBC in a week      9/13/2024  Follow-up on CAD  Patient comes with wife for this appointment  Patient reports that for the last 3 weeks patient has been feeling more short of breath than usual  Patient has also noted leg swelling  To my exam patient has 1+ pitting edema  Per patient and wife sometimes arms and hands swell up as well  Patient underwent echocardiogram on 3/2024 which showed EF of 50 to 55%  Patient also reports that about 3 weeks ago patient has been noticing some chest discomfort  Last time patient came to the clinic in February he was instructed to obtain an echocardiogram and to follow-up with me right after  Patient

## 2024-11-07 DIAGNOSIS — I50.30 HEART FAILURE WITH PRESERVED EJECTION FRACTION, UNSPECIFIED HF CHRONICITY (HCC): ICD-10-CM

## 2024-11-07 DIAGNOSIS — D64.9 ANEMIA, UNSPECIFIED TYPE: ICD-10-CM

## 2024-11-07 LAB
ANION GAP SERPL CALCULATED.3IONS-SCNC: 8 MEQ/L (ref 9–15)
BNP BLD-MCNC: 141 PG/ML
BUN SERPL-MCNC: 12 MG/DL (ref 8–23)
CALCIUM SERPL-MCNC: 9.4 MG/DL (ref 8.5–9.9)
CHLORIDE SERPL-SCNC: 107 MEQ/L (ref 95–107)
CO2 SERPL-SCNC: 27 MEQ/L (ref 20–31)
CREAT SERPL-MCNC: 0.97 MG/DL (ref 0.7–1.2)
ERYTHROCYTE [DISTWIDTH] IN BLOOD BY AUTOMATED COUNT: 18.8 % (ref 11.5–14.5)
GLUCOSE SERPL-MCNC: 100 MG/DL (ref 70–99)
HCT VFR BLD AUTO: 35.6 % (ref 42–52)
HGB BLD-MCNC: 11.1 G/DL (ref 14–18)
MCH RBC QN AUTO: 25.4 PG (ref 27–31.3)
MCHC RBC AUTO-ENTMCNC: 31.2 % (ref 33–37)
MCV RBC AUTO: 81.5 FL (ref 79–92.2)
PLATELET # BLD AUTO: 352 K/UL (ref 130–400)
POTASSIUM SERPL-SCNC: 4.5 MEQ/L (ref 3.4–4.9)
RBC # BLD AUTO: 4.37 M/UL (ref 4.7–6.1)
SODIUM SERPL-SCNC: 142 MEQ/L (ref 135–144)
WBC # BLD AUTO: 10 K/UL (ref 4.8–10.8)

## 2024-11-11 NOTE — PROGRESS NOTES
Assessment and Plan:  ***    I spent 60 minutes in the professional and overall care of this patient.    Davon Mcqueen MD  Assistant Professor of Surgery  Division of Surgical Oncology  [phone]  [email or web address]      History Of Present Illness  Adam Castillo is a 69 y.o. male referred by Erin Mcdonald for metastatic PNET.    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  6/21/2020-started capecitabine and temozolomide after a brief course of somatostatin analog  Switch back to somatostatin analog due to myelosuppression  8/3/2022-robotic converted to open distal pancreatectomy and splenectomy.  Final path showed well-differentiated neuroendocrine tumor, grade 1, pT3 N0, 13 negative lymph nodes    ***    All other systems have been reviewed and are negative except as noted in the HPI.    I personally reviewed all necessary laboratory results, pathology reports, and radiologic images for this patient.    Past Medical History  He has a past medical history of Neoplasm of unspecified behavior of digestive system, Other malignant neuroendocrine tumors (07/25/2022), Personal history of other diseases of the circulatory system, Personal history of other endocrine, nutritional and metabolic disease, and Personal history of other malignant neoplasm of skin.    Surgical History  He has a past surgical history that includes Other surgical history (02/13/2020).     Social History  He reports that he has never smoked. He has never been exposed to tobacco smoke. He has never used smokeless tobacco. He reports that he does not currently use alcohol. He reports that he does not use drugs.    Family History  No family history on file.     Allergies  Patient has no known allergies.     Last Recorded Vitals  There were no vitals taken for this  visit.    Physical Exam  General: no acute distress, well-nourished  Eyes: intact EOM, no scleral icterus  ENT: hearing intact, no drainage  Respiratory: symmetric chest rise, no cough  Cardiovascular: intact distal pulses, no pitting edema  Abdominal: soft, nontender, nondistended  Musculoskeletal: no deformities, intact strength  Integumentary: warm, dry, no lymphadenopathy  Neuro: no focal deficits, sensation intact  Psych: normal mood and affect       Relevant Results  Interpreted By:  Juaquin Gandhi,   STUDY:  MR ABDOMEN W AND WO IV CONTRAST;  10/8/2024 9:15 am      INDICATION:  Signs/Symptoms:NET restaging.      ,D3A.8 Other benign neuroendocrine tumors (CMS-HCC)      COMPARISON:  None.      ACCESSION NUMBER(S):  [id number]      ORDERING CLINICIAN:  TROY BOBBY      TECHNIQUE:  MRI LIVER; Multiplanar magnetic resonance images of the abdomen were  obtained including the following sequences; T2-weighted SSFSE with  and without fat saturation, T1-weighted GRE in/opposed phase, DWI,  fat saturated 3D-T1w GRE pre and dynamically post contrast. 16 mL of  Dotarem gadolinium contrast was administered intravenously without  immediate complication.      FINDINGS:  LIVER:  Morphological changes of cirrhosis and steatosis. Stable tiny  subcapsular segment 5 arterial enhancing focus (series 603, image  86). No suspicious lesions.      BILE DUCTS:  No intrahepatic or extrahepatic bile duct dilatation is demonstrated.      GALLBLADDER:  Cholelithiasis      PANCREAS:  Status post distal pancreatectomy with no gross soft tissue mass at  the surgical bed. Residual pancreas appears grossly unchanged with no  duct dilatation or solid masses.      SPLEEN:  Surgically absent.      ADRENAL GLANDS:  Within normal limits.      KIDNEYS:  No hydronephrosis. Few tiny bilateral cortical cysts.      LYMPH NODES:  Stable portacaval lymph node measuring 1.1 cm in short axis dimension  (series 603, image 69).      ABDOMINAL VESSELS:  Aorta  and the major abdominal arterial vessels demonstrate no gross  abnormality.  Superior mesenteric vein, splenic vein, and main, right  and left portal vein are patent. Hepatic veins are patent.  Perigastric varices noted.      BOWEL:  No bowel dilatation      PERITONEUM/RETROPERITONEUM:  No ascites.      BONES AND LOWER THORAX:  Multilevel degenerative spine changes and facet joint disease. The  visualized lower lung fields are unremarkable. The heart is normal in  size.      IMPRESSION:  1. Status post distal pancreatectomy/splenectomy with no evidence of  locoregional recurrence.  2. Morphological changes of cirrhosis. Stable subcapsular segment 5  tiny enhancing focus likely vascular shunt, unchanged since 2020. No  suspicious high-grade LI-RADS observations.  3. No new concerning findings in the abdomen or pelvis.

## 2024-11-12 ENCOUNTER — APPOINTMENT (OUTPATIENT)
Dept: SURGICAL ONCOLOGY | Facility: CLINIC | Age: 69
End: 2024-11-12
Payer: MEDICARE

## 2024-11-18 ENCOUNTER — PREP FOR PROCEDURE (OUTPATIENT)
Dept: GASTROENTEROLOGY | Age: 69
End: 2024-11-18

## 2024-11-18 RX ORDER — INSULIN LISPRO 100 [IU]/ML
INJECTION, SOLUTION INTRAVENOUS; SUBCUTANEOUS
Qty: 70 ML | Refills: 3 | Status: CANCELLED | OUTPATIENT
Start: 2024-11-18

## 2024-11-18 RX ORDER — SODIUM CHLORIDE 0.9 % (FLUSH) 0.9 %
5-40 SYRINGE (ML) INJECTION PRN
Status: CANCELLED | OUTPATIENT
Start: 2024-11-18

## 2024-11-18 RX ORDER — SODIUM CHLORIDE 9 MG/ML
INJECTION, SOLUTION INTRAVENOUS PRN
Status: CANCELLED | OUTPATIENT
Start: 2024-11-18

## 2024-11-18 RX ORDER — SODIUM CHLORIDE 9 MG/ML
INJECTION, SOLUTION INTRAVENOUS CONTINUOUS
Status: CANCELLED | OUTPATIENT
Start: 2024-11-18

## 2024-11-18 RX ORDER — SODIUM CHLORIDE 0.9 % (FLUSH) 0.9 %
5-40 SYRINGE (ML) INJECTION EVERY 12 HOURS SCHEDULED
Status: CANCELLED | OUTPATIENT
Start: 2024-11-18

## 2024-11-19 ENCOUNTER — OFFICE VISIT (OUTPATIENT)
Dept: FAMILY MEDICINE CLINIC | Age: 69
End: 2024-11-19
Payer: MEDICARE

## 2024-11-19 VITALS
TEMPERATURE: 98 F | SYSTOLIC BLOOD PRESSURE: 134 MMHG | DIASTOLIC BLOOD PRESSURE: 80 MMHG | WEIGHT: 225 LBS | BODY MASS INDEX: 29.82 KG/M2 | HEIGHT: 73 IN | HEART RATE: 65 BPM | OXYGEN SATURATION: 98 %

## 2024-11-19 DIAGNOSIS — R53.83 OTHER FATIGUE: ICD-10-CM

## 2024-11-19 DIAGNOSIS — J20.9 ACUTE BRONCHITIS, UNSPECIFIED ORGANISM: ICD-10-CM

## 2024-11-19 DIAGNOSIS — K76.6 PORTAL HYPERTENSION (HCC): ICD-10-CM

## 2024-11-19 DIAGNOSIS — Z00.00 MEDICARE ANNUAL WELLNESS VISIT, SUBSEQUENT: Primary | ICD-10-CM

## 2024-11-19 DIAGNOSIS — E11.59 TYPE 2 DIABETES MELLITUS WITH OTHER CIRCULATORY COMPLICATIONS (HCC): ICD-10-CM

## 2024-11-19 DIAGNOSIS — Z23 NEEDS FLU SHOT: ICD-10-CM

## 2024-11-19 PROCEDURE — 1160F RVW MEDS BY RX/DR IN RCRD: CPT | Performed by: FAMILY MEDICINE

## 2024-11-19 PROCEDURE — G0008 ADMIN INFLUENZA VIRUS VAC: HCPCS | Performed by: FAMILY MEDICINE

## 2024-11-19 PROCEDURE — 1123F ACP DISCUSS/DSCN MKR DOCD: CPT | Performed by: FAMILY MEDICINE

## 2024-11-19 PROCEDURE — 90653 IIV ADJUVANT VACCINE IM: CPT | Performed by: FAMILY MEDICINE

## 2024-11-19 PROCEDURE — G0439 PPPS, SUBSEQ VISIT: HCPCS | Performed by: FAMILY MEDICINE

## 2024-11-19 PROCEDURE — 3079F DIAST BP 80-89 MM HG: CPT | Performed by: FAMILY MEDICINE

## 2024-11-19 PROCEDURE — 3051F HG A1C>EQUAL 7.0%<8.0%: CPT | Performed by: FAMILY MEDICINE

## 2024-11-19 PROCEDURE — 3075F SYST BP GE 130 - 139MM HG: CPT | Performed by: FAMILY MEDICINE

## 2024-11-19 PROCEDURE — 1159F MED LIST DOCD IN RCRD: CPT | Performed by: FAMILY MEDICINE

## 2024-11-19 PROCEDURE — 96372 THER/PROPH/DIAG INJ SC/IM: CPT | Performed by: FAMILY MEDICINE

## 2024-11-19 RX ORDER — AZITHROMYCIN 250 MG/1
TABLET, FILM COATED ORAL
Qty: 1 PACKET | Refills: 0 | Status: SHIPPED | OUTPATIENT
Start: 2024-11-19 | End: 2024-11-29

## 2024-11-19 RX ORDER — CIPROFLOXACIN AND DEXAMETHASONE 3; 1 MG/ML; MG/ML
4 SUSPENSION/ DROPS AURICULAR (OTIC) 2 TIMES DAILY
Qty: 7.5 ML | Refills: 0 | Status: SHIPPED | OUTPATIENT
Start: 2024-11-19 | End: 2024-11-26

## 2024-11-19 RX ORDER — CYANOCOBALAMIN 1000 UG/ML
1000 INJECTION, SOLUTION INTRAMUSCULAR; SUBCUTANEOUS ONCE
Status: COMPLETED | OUTPATIENT
Start: 2024-11-19 | End: 2024-11-19

## 2024-11-19 RX ORDER — SPIRONOLACTONE 25 MG/1
25 TABLET ORAL DAILY
Qty: 90 TABLET | Refills: 3 | Status: SHIPPED | OUTPATIENT
Start: 2024-11-19

## 2024-11-19 RX ADMIN — CYANOCOBALAMIN 1000 MCG: 1000 INJECTION, SOLUTION INTRAMUSCULAR; SUBCUTANEOUS at 13:42

## 2024-11-19 ASSESSMENT — PATIENT HEALTH QUESTIONNAIRE - PHQ9
SUM OF ALL RESPONSES TO PHQ9 QUESTIONS 1 & 2: 0
SUM OF ALL RESPONSES TO PHQ QUESTIONS 1-9: 5
SUM OF ALL RESPONSES TO PHQ QUESTIONS 1-9: 5
9. THOUGHTS THAT YOU WOULD BE BETTER OFF DEAD, OR OF HURTING YOURSELF: NOT AT ALL
5. POOR APPETITE OR OVEREATING: NOT AT ALL
1. LITTLE INTEREST OR PLEASURE IN DOING THINGS: NOT AT ALL
7. TROUBLE CONCENTRATING ON THINGS, SUCH AS READING THE NEWSPAPER OR WATCHING TELEVISION: NOT AT ALL
8. MOVING OR SPEAKING SO SLOWLY THAT OTHER PEOPLE COULD HAVE NOTICED. OR THE OPPOSITE, BEING SO FIGETY OR RESTLESS THAT YOU HAVE BEEN MOVING AROUND A LOT MORE THAN USUAL: NOT AT ALL
4. FEELING TIRED OR HAVING LITTLE ENERGY: NEARLY EVERY DAY
3. TROUBLE FALLING OR STAYING ASLEEP: MORE THAN HALF THE DAYS
10. IF YOU CHECKED OFF ANY PROBLEMS, HOW DIFFICULT HAVE THESE PROBLEMS MADE IT FOR YOU TO DO YOUR WORK, TAKE CARE OF THINGS AT HOME, OR GET ALONG WITH OTHER PEOPLE: SOMEWHAT DIFFICULT
6. FEELING BAD ABOUT YOURSELF - OR THAT YOU ARE A FAILURE OR HAVE LET YOURSELF OR YOUR FAMILY DOWN: NOT AT ALL
2. FEELING DOWN, DEPRESSED OR HOPELESS: NOT AT ALL
SUM OF ALL RESPONSES TO PHQ QUESTIONS 1-9: 5
SUM OF ALL RESPONSES TO PHQ QUESTIONS 1-9: 5

## 2024-11-19 NOTE — PROGRESS NOTES
Medicare Annual Wellness Visit    Singh Lim is here for Medicare AWV (Discuss vaccines, b-12 )    Assessment & Plan   Medicare annual wellness visit, subsequent  Portal hypertension (HCC)  Type 2 diabetes mellitus with other circulatory complications (HCC)  Needs flu shot  -     Influenza, FLUAD Trivalent, (age 65 y+), IM, Preservative Free, 0.5mL  Other fatigue  -     cyanocobalamin injection 1,000 mcg; 1,000 mcg, IntraMUSCular, ONCE, 1 dose, On Tue 11/19/24 at 1400  Acute bronchitis, unspecified organism  -     ciprofloxacin-dexAMETHasone (CIPRODEX) 0.3-0.1 % otic suspension; Place 4 drops into the left ear 2 times daily for 7 days, Disp-7.5 mL, R-0Normal  -     azithromycin (ZITHROMAX) 250 MG tablet; 2 tabs orally on first day, then one tab daily for four days, Disp-1 packet, R-0Normal  Recommendations for Preventive Services Due: see orders and patient instructions/AVS.  Recommended screening schedule for the next 5-10 years is provided to the patient in written form: see Patient Instructions/AVS.    Drops for left ear    If not improving, ENT because is impacted     No follow-ups on file.     Subjective   Chief Complaint   Patient presents with    Medicare AWV     Discuss vaccines, b-12      Ringing in left ear for a bit     Fatigued, feels ok overall, just tired a lot     Patient's complete Health Risk Assessment and screening values have been reviewed and are found in Flowsheets. The following problems were reviewed today and where indicated follow up appointments were made and/or referrals ordered.    Positive Risk Factor Screenings with Interventions:    Fall Risk:  Do you feel unsteady or are you worried about falling? : (!) yes  2 or more falls in past year?: no  Fall with injury in past year?: no     Interventions:    Reviewed medications, home hazards, visual acuity, and co-morbidities that can increase risk for falls  See AVS for additional education material  See A/P for plan and any pertinent

## 2024-11-19 NOTE — PROGRESS NOTES
After obtaining consent, and per orders of Dr. Keller, injection of flu and b-12 given in Left deltoid and right gluteal by Elena Alvares CMA ([facility]). Patient instructed to remain in clinic for 20 minutes afterwards, and to report any adverse reaction to me immediately.

## 2024-11-19 NOTE — TELEPHONE ENCOUNTER
Requesting medication refill. Please approve or deny this request.    Rx requested:  Requested Prescriptions     Pending Prescriptions Disp Refills    spironolactone (ALDACTONE) 25 MG tablet 30 tablet 3     Sig: Take 1 tablet by mouth daily         Last Office Visit:   10/31/2024      Next Visit Date:  Future Appointments   Date Time Provider Department Center   11/19/2024  1:15 PM Joey Keller MD [facility] DEP   12/4/2024  2:00 PM Gala Puente PA LOR CHF [facility]   12/6/2024  2:15 PM Snyder, Dane, MD [facility]   12/18/2024  1:30 PM Slavik-Bosworth, Kelly J, APRN - CNP [facility]   1/17/2025  3:00 PM Chi Tejada MD [facility]

## 2024-11-20 RX ORDER — INSULIN LISPRO 100 [IU]/ML
INJECTION, SOLUTION INTRAVENOUS; SUBCUTANEOUS
Qty: 70 ML | Refills: 0 | OUTPATIENT
Start: 2024-11-20

## 2024-11-20 RX ORDER — INSULIN LISPRO 100 [IU]/ML
INJECTION, SOLUTION INTRAVENOUS; SUBCUTANEOUS
Qty: 70 ML | Refills: 3 | Status: SHIPPED | OUTPATIENT
Start: 2024-11-20

## 2024-11-21 ENCOUNTER — ANESTHESIA EVENT (OUTPATIENT)
Dept: ENDOSCOPY | Age: 69
End: 2024-11-21
Payer: MEDICARE

## 2024-11-21 NOTE — ANESTHESIA PRE PROCEDURE
Department of Anesthesiology  Preprocedure Note       Name:  Singh Lim   Age:  69 y.o.  :  1955                                          MRN:  [id number]         Date:  2024      Surgeon: Surgeon(s):  García Rashid MD    Procedure: Procedure(s):  ESOPHAGOGASTRODUODENOSCOPY DIAGNOSTIC ONLY    Medications prior to admission:   Prior to Admission medications    Medication Sig Start Date End Date Taking? Authorizing Provider   insulin lispro (HUMALOG,ADMELOG) 100 UNIT/ML SOLN injection vial Use via insulin pump max daily dose 250 units 24   Chi Tejada MD   spironolactone (ALDACTONE) 25 MG tablet Take 1 tablet by mouth daily 24   Dane Snyder MD   ciprofloxacin-dexAMETHasone (CIPRODEX) 0.3-0.1 % otic suspension Place 4 drops into the left ear 2 times daily for 7 days 24  Joey Keller MD   azithromycin (ZITHROMAX) 250 MG tablet 2 tabs orally on first day, then one tab daily for four days 24  Joey Keller MD   losartan (COZAAR) 25 MG tablet Take 2 tablets by mouth daily  Patient taking differently: Take 1 tablet by mouth in the morning and at bedtime 24   Dane Snyder MD   furosemide (LASIX) 20 MG tablet Take 2 tablets by mouth 2 times daily  Patient taking differently: Take 2 tablets by mouth in the morning, at noon, in the evening, and at bedtime 24   Dane Snyder MD   hydrOXYzine HCl (ATARAX) 25 MG tablet Take 1 tablet by mouth 4 times daily as needed for Itching 24   Slavik-Bosworth, Kelly J, APRN - CNP   metoprolol succinate (TOPROL XL) 50 MG extended release tablet Take 1 tablet by mouth every evening 24   Dane Snyder MD   tiZANidine (ZANAFLEX) 2 MG tablet Take 1 tablet by mouth nightly as needed (muscle spasms) 24   Joey Keller MD   nabumetone (RELAFEN) 500 MG tablet Take 1 tablet by mouth 2 times daily 24   Joey Keller MD   esomeprazole (NEXIUM) 40 MG delayed release capsule Take 1 capsule by mouth daily

## 2024-11-22 ENCOUNTER — HOSPITAL ENCOUNTER (OUTPATIENT)
Age: 69
Setting detail: OUTPATIENT SURGERY
Discharge: HOME | End: 2024-11-22
Attending: INTERNAL MEDICINE | Admitting: INTERNAL MEDICINE
Payer: MEDICARE

## 2024-11-22 ENCOUNTER — ANESTHESIA (OUTPATIENT)
Dept: ENDOSCOPY | Age: 69
End: 2024-11-22
Payer: MEDICARE

## 2024-11-22 VITALS
TEMPERATURE: 98 F | BODY MASS INDEX: 29.16 KG/M2 | SYSTOLIC BLOOD PRESSURE: 176 MMHG | HEART RATE: 84 BPM | HEIGHT: 73 IN | WEIGHT: 220 LBS | RESPIRATION RATE: 20 BRPM | DIASTOLIC BLOOD PRESSURE: 91 MMHG | OXYGEN SATURATION: 97 %

## 2024-11-22 PROCEDURE — 3700000000 HC ANESTHESIA ATTENDED CARE: Performed by: INTERNAL MEDICINE

## 2024-11-22 PROCEDURE — 3700000001 HC ADD 15 MINUTES (ANESTHESIA): Performed by: INTERNAL MEDICINE

## 2024-11-22 PROCEDURE — 7100000011 HC PHASE II RECOVERY - ADDTL 15 MIN: Performed by: INTERNAL MEDICINE

## 2024-11-22 PROCEDURE — 7100000010 HC PHASE II RECOVERY - FIRST 15 MIN: Performed by: INTERNAL MEDICINE

## 2024-11-22 PROCEDURE — 2709999900 HC NON-CHARGEABLE SUPPLY: Performed by: INTERNAL MEDICINE

## 2024-11-22 PROCEDURE — 43244 EGD VARICES LIGATION: CPT | Performed by: INTERNAL MEDICINE

## 2024-11-22 PROCEDURE — 2580000003 HC RX 258: Performed by: INTERNAL MEDICINE

## 2024-11-22 PROCEDURE — 2720000010 HC SURG SUPPLY STERILE: Performed by: INTERNAL MEDICINE

## 2024-11-22 PROCEDURE — 6370000000 HC RX 637 (ALT 250 FOR IP): Performed by: INTERNAL MEDICINE

## 2024-11-22 PROCEDURE — 3609017100 HC EGD: Performed by: INTERNAL MEDICINE

## 2024-11-22 PROCEDURE — 6360000002 HC RX W HCPCS: Performed by: REGISTERED NURSE

## 2024-11-22 RX ORDER — PROPOFOL 10 MG/ML
INJECTION, EMULSION INTRAVENOUS
Status: DISCONTINUED | OUTPATIENT
Start: 2024-11-22 | End: 2024-11-22 | Stop reason: SDUPTHER

## 2024-11-22 RX ORDER — ONDANSETRON 2 MG/ML
INJECTION INTRAMUSCULAR; INTRAVENOUS
Status: DISCONTINUED | OUTPATIENT
Start: 2024-11-22 | End: 2024-11-22 | Stop reason: SDUPTHER

## 2024-11-22 RX ORDER — LIDOCAINE HYDROCHLORIDE 20 MG/ML
INJECTION, SOLUTION INFILTRATION; PERINEURAL
Status: DISCONTINUED | OUTPATIENT
Start: 2024-11-22 | End: 2024-11-22 | Stop reason: SDUPTHER

## 2024-11-22 RX ORDER — SODIUM CHLORIDE 9 MG/ML
INJECTION, SOLUTION INTRAVENOUS PRN
Status: DISCONTINUED | OUTPATIENT
Start: 2024-11-22 | End: 2024-11-22 | Stop reason: HOSPADM

## 2024-11-22 RX ORDER — SODIUM CHLORIDE 9 MG/ML
INJECTION, SOLUTION INTRAVENOUS CONTINUOUS
Status: DISCONTINUED | OUTPATIENT
Start: 2024-11-22 | End: 2024-11-22 | Stop reason: HOSPADM

## 2024-11-22 RX ORDER — SODIUM CHLORIDE 0.9 % (FLUSH) 0.9 %
5-40 SYRINGE (ML) INJECTION PRN
Status: DISCONTINUED | OUTPATIENT
Start: 2024-11-22 | End: 2024-11-22 | Stop reason: HOSPADM

## 2024-11-22 RX ORDER — SODIUM CHLORIDE 0.9 % (FLUSH) 0.9 %
5-40 SYRINGE (ML) INJECTION EVERY 12 HOURS SCHEDULED
Status: DISCONTINUED | OUTPATIENT
Start: 2024-11-22 | End: 2024-11-22 | Stop reason: HOSPADM

## 2024-11-22 RX ORDER — GLYCOPYRROLATE 1 MG/5 ML
SYRINGE (ML) INTRAVENOUS
Status: DISCONTINUED | OUTPATIENT
Start: 2024-11-22 | End: 2024-11-22 | Stop reason: SDUPTHER

## 2024-11-22 RX ORDER — LIDOCAINE HYDROCHLORIDE 20 MG/ML
15 SOLUTION OROPHARYNGEAL
Status: DISCONTINUED | OUTPATIENT
Start: 2024-11-22 | End: 2024-11-22 | Stop reason: HOSPADM

## 2024-11-22 RX ORDER — LIDOCAINE HYDROCHLORIDE 20 MG/ML
10 SOLUTION OROPHARYNGEAL PRN
Qty: 120 ML | Refills: 1 | Status: SHIPPED | OUTPATIENT
Start: 2024-11-22

## 2024-11-22 RX ADMIN — PROPOFOL 50 MG: 10 INJECTION, EMULSION INTRAVENOUS at 09:26

## 2024-11-22 RX ADMIN — Medication 0.2 MG: at 09:13

## 2024-11-22 RX ADMIN — PROPOFOL 180 MG: 10 INJECTION, EMULSION INTRAVENOUS at 09:13

## 2024-11-22 RX ADMIN — PROPOFOL 50 MG: 10 INJECTION, EMULSION INTRAVENOUS at 09:24

## 2024-11-22 RX ADMIN — PROPOFOL 50 MG: 10 INJECTION, EMULSION INTRAVENOUS at 09:19

## 2024-11-22 RX ADMIN — PROPOFOL 50 MG: 10 INJECTION, EMULSION INTRAVENOUS at 09:22

## 2024-11-22 RX ADMIN — LIDOCAINE HYDROCHLORIDE 60 MG: 20 INJECTION, SOLUTION INFILTRATION; PERINEURAL at 09:13

## 2024-11-22 RX ADMIN — PROPOFOL 30 MG: 10 INJECTION, EMULSION INTRAVENOUS at 09:16

## 2024-11-22 RX ADMIN — ONDANSETRON 4 MG: 2 INJECTION INTRAMUSCULAR; INTRAVENOUS at 09:54

## 2024-11-22 RX ADMIN — LIDOCAINE HYDROCHLORIDE 15 ML: 20 SOLUTION ORAL at 10:31

## 2024-11-22 ASSESSMENT — PAIN - FUNCTIONAL ASSESSMENT
PAIN_FUNCTIONAL_ASSESSMENT: 0-10
PAIN_FUNCTIONAL_ASSESSMENT: PREVENTS OR INTERFERES SOME ACTIVE ACTIVITIES AND ADLS

## 2024-11-22 ASSESSMENT — PAIN DESCRIPTION - LOCATION: LOCATION: THROAT;CHEST;BACK

## 2024-11-22 ASSESSMENT — PAIN DESCRIPTION - DESCRIPTORS: DESCRIPTORS: BURNING;STABBING

## 2024-11-22 ASSESSMENT — PAIN SCALES - GENERAL: PAINLEVEL_OUTOF10: 7

## 2024-11-22 ASSESSMENT — PAIN DESCRIPTION - ORIENTATION: ORIENTATION: INNER

## 2024-11-22 NOTE — ADDENDUM NOTE
Addendum  created 11/22/24 1011 by Oz De Souza APRN - CATHY    Intraprocedure Meds edited, Orders acknowledged in Narrator

## 2024-11-22 NOTE — ANESTHESIA POSTPROCEDURE EVALUATION
Department of Anesthesiology  Postprocedure Note    Patient: Singh Lim  MRN: [id number]  YOB: 1955  Date of evaluation: 11/22/2024    Procedure Summary       Date: 11/22/24 Room / Location: [facility] OR 02 / [facility]    Anesthesia Start: 0910 Anesthesia Stop: 0934    Procedure: ESOPHAGOGASTRODUODENOSCOPY DIAGNOSTIC with banding Diagnosis:       Gastroesophageal reflux disease without esophagitis      (Gastroesophageal reflux disease without esophagitis [K21.9])    Surgeons: García Rashid MD Responsible Provider: Oz De Souza APRN - CRNA    Anesthesia Type: MAC ASA Status: 3            Anesthesia Type: No value filed.    Latoya Phase I: Latoya Score: 10    Latoya Phase II: Latoya Score: 10    Anesthesia Post Evaluation    Patient location during evaluation: bedside  Patient participation: complete - patient participated  Level of consciousness: awake and awake and alert  Airway patency: patent  Nausea & Vomiting: no nausea and no vomiting  Cardiovascular status: blood pressure returned to baseline and hemodynamically stable  Respiratory status: acceptable  Hydration status: euvolemic  Pain management: adequate        No notable events documented.

## 2024-11-22 NOTE — H&P
Patient Name: Singh Lim  : 1955  MRN: [id number]  DATE: 24      ENDOSCOPY  History and Physical    Procedure:    [] Diagnostic Colonoscopy       [] Screening Colonoscopy  [x] EGD      [] ERCP      [] EUS       [] Other    [x] Previous office notes/History and Physical reviewed from the patients chart. Please see EMR for further details of HPI. I have examined the patient's status immediately prior to the procedure and:      Indications/HPI:    []Abdominal Pain   []Cancer- GI/Lung  []Fhx of colon CA  []History of Polyps   []Alfonso’s   []Melena  []Abnormal Imaging   []Dysphagia    []Persistent Pneumonia  []Anemia   []Food Impaction  []History of Polyps  []GI Bleed   []Pulmonary nodule/Mass  []Change in bowel habits  []Heartburn/Reflux  []Rectal Bleed (BRBPR)  []Chest Pain - Non Cardiac  []Heme (+) Stool  []Ulcers  []Constipation   []Hemoptysis   []Varices  []Diarrhea   []Hypoxemia  []Nausea/Vomiting   []Screening   []Crohns/Colitis  [x]Other: 69-year-old male with history of well-differentiated pancreatic neuroendocrine tumor with metastasis to the liver.  Patient with cirrhosis without evidence of hepatocellular carcinoma. Completed oral and IV chemotherapy.  Patient history of pancreatectomy and splenectomy. Last EGD 10/11/2024 noting Grade II esophageal varices.  Incompletely eradicated.  6 bands placed.  Erosive gastropathy with no bleeding and no stigmata of recent bleeding.  Persistent oozing from biopsy sites, clips were placed. Patient for surveillance upper endoscopy +/- banding.     Anesthesia:   [x] MAC [] Moderate Sedation   [] General   [] None     ROS: 12 pt Review of Symptoms was negative unless mentioned above    Medications:   Prior to Admission medications    Medication Sig Start Date End Date Taking? Authorizing Provider   insulin lispro (HUMALOG,ADMELOG) 100 UNIT/ML SOLN injection vial Use via insulin pump max daily dose 250 units 24  Yes Chi Tejada MD   spironolactone

## 2024-12-04 ENCOUNTER — OFFICE VISIT (OUTPATIENT)
Dept: CARDIOLOGY CLINIC | Age: 69
End: 2024-12-04
Payer: MEDICARE

## 2024-12-04 VITALS
OXYGEN SATURATION: 97 % | BODY MASS INDEX: 29.39 KG/M2 | WEIGHT: 222.8 LBS | SYSTOLIC BLOOD PRESSURE: 138 MMHG | DIASTOLIC BLOOD PRESSURE: 68 MMHG | RESPIRATION RATE: 16 BRPM | HEART RATE: 82 BPM

## 2024-12-04 DIAGNOSIS — I25.10 CAD S/P PERCUTANEOUS CORONARY ANGIOPLASTY: ICD-10-CM

## 2024-12-04 DIAGNOSIS — Z86.79 HISTORY OF CARDIOMYOPATHY: ICD-10-CM

## 2024-12-04 DIAGNOSIS — I50.30 HEART FAILURE WITH PRESERVED EJECTION FRACTION, UNSPECIFIED HF CHRONICITY (HCC): ICD-10-CM

## 2024-12-04 DIAGNOSIS — I10 HYPERTENSION, UNSPECIFIED TYPE: ICD-10-CM

## 2024-12-04 DIAGNOSIS — Z98.61 CAD S/P PERCUTANEOUS CORONARY ANGIOPLASTY: ICD-10-CM

## 2024-12-04 DIAGNOSIS — I27.20 PULMONARY HTN (HCC): ICD-10-CM

## 2024-12-04 DIAGNOSIS — I38 VALVULAR HEART DISEASE: ICD-10-CM

## 2024-12-04 PROCEDURE — 3075F SYST BP GE 130 - 139MM HG: CPT | Performed by: PHYSICIAN ASSISTANT

## 2024-12-04 PROCEDURE — 99213 OFFICE O/P EST LOW 20 MIN: CPT | Performed by: PHYSICIAN ASSISTANT

## 2024-12-04 PROCEDURE — 1123F ACP DISCUSS/DSCN MKR DOCD: CPT | Performed by: PHYSICIAN ASSISTANT

## 2024-12-04 PROCEDURE — 3078F DIAST BP <80 MM HG: CPT | Performed by: PHYSICIAN ASSISTANT

## 2024-12-04 PROCEDURE — 1159F MED LIST DOCD IN RCRD: CPT | Performed by: PHYSICIAN ASSISTANT

## 2024-12-04 RX ORDER — FUROSEMIDE 40 MG/1
40 TABLET ORAL 2 TIMES DAILY
Qty: 180 TABLET | Refills: 3 | Status: SHIPPED | OUTPATIENT
Start: 2024-12-04

## 2024-12-04 ASSESSMENT — ENCOUNTER SYMPTOMS
COUGH: 0
VOMITING: 0
SHORTNESS OF BREATH: 1
NAUSEA: 0
ABDOMINAL DISTENTION: 1
BLOOD IN STOOL: 0
COLOR CHANGE: 0

## 2024-12-04 NOTE — PATIENT INSTRUCTIONS
New prescription sent for Lasix 40mg tab--take 1 tab twice daily. (Instead of lasix 20mg tab--take 2 tabs twice daily)    -Check daily weight every morning and notify CHF clinic if gaining more than 3 pounds in 2 days    -Check blood pressure twice daily in a.m. and p.m. prior to taking medications and keep log of blood pressure trends to review at next office visit  Notify office if BP running low with  mmHg or below prior to taking medications  Notify office if BP running high with  mmHg or above or if DBP 90 mmHg or above    -Recommend 2000 mg daily sodium restriction    -Recommend  2 liter (64 ounces) daily fluid restriction

## 2024-12-04 NOTE — PROGRESS NOTES
Patient: Singh Lim  YOB: 1955  MRN: [id number]    Chief Complaint:  Chief Complaint   Patient presents with    1 Month Follow-Up    Congestive Heart Failure    Medication Check     lasix    Shortness of Breath     improved         Subjective/HPI       12/4/24: Here for follow-up of heart failure with preserved EF and history of recovered ischemic cardiomyopathy with EF of 50 to 55% per echo 3/20/2024.    Patient was seen for initial CHF clinic visit on 10/31/2024 at which time torsemide was discontinued as he was also on Lasix and he was added on Aldactone 25 mg p.o. daily.  He was advised to check BMP and proBNP in 1 week following addition of Aldactone.    Saw Dr. Keller on 11/19/24 with complaints of SOB, fatigue and ear pain and prescribed Z-alexandrea and ear drops, received B12 injection and flu shot and after course of abx symptoms much improved.    Continues to follow with GI, Dr. Rashid and had EGD on 11/22/24 and 5 varices banded.    He is doing well overall with no new or worsening heart failure symptoms.  Reports some shortness of breath with more moderate degrees of activity but states that this is overall improved from prior.  Also reports that lower extremity edema has improved from prior as has his fatigue and tiredness.  He does continue to have intermittent weight gain complaints of approximately 3 to 4 pounds but not as significant weight gain is what he was experiencing previously.  He has chronic abdominal distention/bloating.  Has occasional lightheadedness upon standing quickly.  Denies any chest pain, palpitations, orthopnea, PND, hematochezia or melena, dysuria hematuria, syncope, fever or chills.  Compliant with all of his medications and with low-sodium diet and fluid restriction.      Most recent labs reviewed and documented below.  CBC 11/7/2024: WBC 10.0, hemoglobin 11.1, hematocrit 35.6, platelets 352.  BMP on 11/7/2024: Sodium 142, potassium 4.5, chloride 107, total CO2

## 2024-12-18 ENCOUNTER — OFFICE VISIT (OUTPATIENT)
Dept: GASTROENTEROLOGY | Age: 69
End: 2024-12-18
Payer: MEDICARE

## 2024-12-18 VITALS — BODY MASS INDEX: 29.82 KG/M2 | OXYGEN SATURATION: 97 % | WEIGHT: 225 LBS | HEIGHT: 73 IN | HEART RATE: 63 BPM

## 2024-12-18 DIAGNOSIS — K21.9 GASTROESOPHAGEAL REFLUX DISEASE, UNSPECIFIED WHETHER ESOPHAGITIS PRESENT: ICD-10-CM

## 2024-12-18 DIAGNOSIS — I85.00 ESOPHAGEAL VARICES DETERMINED BY ENDOSCOPY (HCC): ICD-10-CM

## 2024-12-18 DIAGNOSIS — Z90.410 HISTORY OF PANCREATECTOMY: Primary | ICD-10-CM

## 2024-12-18 DIAGNOSIS — R14.0 ABDOMINAL BLOATING: ICD-10-CM

## 2024-12-18 DIAGNOSIS — Z90.81 H/O SPLENECTOMY: ICD-10-CM

## 2024-12-18 DIAGNOSIS — K59.04 CHRONIC IDIOPATHIC CONSTIPATION: ICD-10-CM

## 2024-12-18 DIAGNOSIS — K74.69 OTHER CIRRHOSIS OF LIVER (HCC): ICD-10-CM

## 2024-12-18 PROCEDURE — 99214 OFFICE O/P EST MOD 30 MIN: CPT | Performed by: NURSE PRACTITIONER

## 2024-12-18 PROCEDURE — 1159F MED LIST DOCD IN RCRD: CPT | Performed by: NURSE PRACTITIONER

## 2024-12-18 PROCEDURE — 1123F ACP DISCUSS/DSCN MKR DOCD: CPT | Performed by: NURSE PRACTITIONER

## 2024-12-18 ASSESSMENT — ENCOUNTER SYMPTOMS
ANAL BLEEDING: 0
BLOOD IN STOOL: 0
RECTAL PAIN: 0
ABDOMINAL PAIN: 0
VOMITING: 0
CONSTIPATION: 1
TROUBLE SWALLOWING: 1
NAUSEA: 0
ABDOMINAL DISTENTION: 1
DIARRHEA: 0

## 2024-12-18 NOTE — PROGRESS NOTES
Gastroenterology Clinic Follow up Visit    Singh Lim  [id number]  Chief Complaint   Patient presents with    Follow-up       HPI: 69 y.o. male following up after last GI clinic on 10/28/2024     Interval change: Patient is follow-up to GI clinic after EGD 11/22/2024 noting esophageal varices grade 2 and 5 bands were placed.  This was repeat EGD from 10/20/2024 noting esophageal varices grade 2 with 6 bands placed.  Patient reported increased esophageal discomfort after both EGDs lasting 1 to 2 days.  Patient is follow-up to cardiology and had Lasix doubled to 40 mg twice daily.  Patient had lower extremity edema and is following up with CHF clinic.  Patient reports constipation having 1-3 small stools daily, without feeling fully evacuated.  Patient reports previously Amitiza however had allergic reaction and discontinued medication.  Patient has been taking MiraLAX 2-3 times daily. Denies any unintentional weight loss, dysphagia, hematemesis, hematochezia, nor melena.    HPI from last GI clinic visit on 10/28/2024 summarized below:  Patient is follow-up after EGD 10/11/2024.  EGD noted grade 2 esophageal varices with 6 bands placed, incomplete eradication.  Patient reports GERD symptoms well-controlled with Nexium 40 mg daily.  Patient continues to follow with oncology at [facility], MRI completed 10/20/2024.Patient continues with Miralax daily to facilitate bowel movements daily. He uses Gas-Ex OTC as needed.   Denies any unintentional weight loss, dysphagia, hematemesis, hematochezia, nor melena.     HPI from last GI clinic visit on 9/4/2024 summarized below:  Patient is follow-up to GI clinic with some improvement in bloating after meals.  Patient reports having small sometimes thin bowel movements every 1 to 2 days.  Patient does take MiraLAX 2-3 times daily to facilitate a larger bowel movement, however otherwise feels inadequate emptying after bowel movement.   Patient continues to take Gas-X as

## 2024-12-31 DIAGNOSIS — Z98.61 CAD S/P PERCUTANEOUS CORONARY ANGIOPLASTY: ICD-10-CM

## 2024-12-31 DIAGNOSIS — I25.10 CAD S/P PERCUTANEOUS CORONARY ANGIOPLASTY: ICD-10-CM

## 2024-12-31 RX ORDER — ATORVASTATIN CALCIUM 80 MG/1
80 TABLET, FILM COATED ORAL NIGHTLY
Qty: 90 TABLET | Refills: 3 | Status: SHIPPED | OUTPATIENT
Start: 2024-12-31

## 2024-12-31 NOTE — TELEPHONE ENCOUNTER
Comments:     Last Office Visit (last PCP visit):   11/19/2024    Next Visit Date:  Future Appointments   Date Time Provider Department Center   1/17/2025  3:00 PM Chi Tejada MD [facility]   2/6/2025  1:30 PM Slavik-Bosworth, Kelly J, APRN - CNP [facility]   2/7/2025  3:00 PM Snyder, Dane, MD [facility]   11/20/2025  1:15 PM Joey Keller MD [facility] DEP       **If hasn't been seen in over a year OR hasn't followed up according to last diabetes/ADHD visit, make appointment for patient before sending refill to provider.    Rx requested:  Requested Prescriptions     Pending Prescriptions Disp Refills    atorvastatin (LIPITOR) 80 MG tablet 90 tablet 3     Sig: Take 1 tablet by mouth nightly

## 2025-01-17 ENCOUNTER — OFFICE VISIT (OUTPATIENT)
Dept: ENDOCRINOLOGY | Age: 70
End: 2025-01-17

## 2025-01-17 VITALS
WEIGHT: 227.07 LBS | DIASTOLIC BLOOD PRESSURE: 65 MMHG | HEART RATE: 72 BPM | BODY MASS INDEX: 30.09 KG/M2 | HEIGHT: 73 IN | SYSTOLIC BLOOD PRESSURE: 141 MMHG

## 2025-01-17 DIAGNOSIS — Z46.81 INSULIN PUMP TITRATION: ICD-10-CM

## 2025-01-17 DIAGNOSIS — Z96.41 INSULIN PUMP IN PLACE: Primary | ICD-10-CM

## 2025-01-17 DIAGNOSIS — E11.59 TYPE 2 DIABETES MELLITUS WITH OTHER CIRCULATORY COMPLICATIONS (HCC): ICD-10-CM

## 2025-01-17 LAB
CHP ED QC CHECK: ABNORMAL
GLUCOSE BLD-MCNC: 284 MG/DL
HBA1C MFR BLD: 8.2 %

## 2025-01-17 RX ORDER — BLOOD-GLUCOSE SENSOR
EACH MISCELLANEOUS
COMMUNITY
Start: 2024-12-20

## 2025-01-22 ENCOUNTER — PREP FOR PROCEDURE (OUTPATIENT)
Dept: GASTROENTEROLOGY | Age: 70
End: 2025-01-22

## 2025-01-22 RX ORDER — SODIUM CHLORIDE 0.9 % (FLUSH) 0.9 %
5-40 SYRINGE (ML) INJECTION EVERY 12 HOURS SCHEDULED
Status: CANCELLED | OUTPATIENT
Start: 2025-01-22

## 2025-01-22 RX ORDER — SODIUM CHLORIDE 0.9 % (FLUSH) 0.9 %
5-40 SYRINGE (ML) INJECTION PRN
Status: CANCELLED | OUTPATIENT
Start: 2025-01-22

## 2025-01-22 RX ORDER — SODIUM CHLORIDE 9 MG/ML
INJECTION, SOLUTION INTRAVENOUS PRN
Status: CANCELLED | OUTPATIENT
Start: 2025-01-22

## 2025-01-22 RX ORDER — SODIUM CHLORIDE 9 MG/ML
INJECTION, SOLUTION INTRAVENOUS CONTINUOUS
Status: CANCELLED | OUTPATIENT
Start: 2025-01-22

## 2025-01-23 DIAGNOSIS — Z98.61 CAD S/P PERCUTANEOUS CORONARY ANGIOPLASTY: ICD-10-CM

## 2025-01-23 DIAGNOSIS — I25.10 CAD S/P PERCUTANEOUS CORONARY ANGIOPLASTY: ICD-10-CM

## 2025-01-24 ENCOUNTER — ANESTHESIA (OUTPATIENT)
Dept: ENDOSCOPY | Age: 70
End: 2025-01-24
Payer: MEDICARE

## 2025-01-24 ENCOUNTER — ANESTHESIA EVENT (OUTPATIENT)
Dept: ENDOSCOPY | Age: 70
End: 2025-01-24
Payer: MEDICARE

## 2025-01-24 ENCOUNTER — HOSPITAL ENCOUNTER (OUTPATIENT)
Age: 70
Setting detail: OUTPATIENT SURGERY
Discharge: HOME | End: 2025-01-24
Attending: INTERNAL MEDICINE | Admitting: INTERNAL MEDICINE
Payer: MEDICARE

## 2025-01-24 VITALS
OXYGEN SATURATION: 95 % | HEART RATE: 69 BPM | SYSTOLIC BLOOD PRESSURE: 138 MMHG | HEIGHT: 72 IN | TEMPERATURE: 97.3 F | BODY MASS INDEX: 30.75 KG/M2 | RESPIRATION RATE: 18 BRPM | DIASTOLIC BLOOD PRESSURE: 70 MMHG | WEIGHT: 227 LBS

## 2025-01-24 DIAGNOSIS — I85.00 ESOPHAGEAL VARICES WITHOUT BLEEDING, UNSPECIFIED ESOPHAGEAL VARICES TYPE (HCC): ICD-10-CM

## 2025-01-24 PROCEDURE — 3700000001 HC ADD 15 MINUTES (ANESTHESIA): Performed by: INTERNAL MEDICINE

## 2025-01-24 PROCEDURE — 88342 IMHCHEM/IMCYTCHM 1ST ANTB: CPT

## 2025-01-24 PROCEDURE — 43239 EGD BIOPSY SINGLE/MULTIPLE: CPT | Performed by: INTERNAL MEDICINE

## 2025-01-24 PROCEDURE — 88305 TISSUE EXAM BY PATHOLOGIST: CPT

## 2025-01-24 PROCEDURE — 3609017100 HC EGD: Performed by: INTERNAL MEDICINE

## 2025-01-24 PROCEDURE — 6360000002 HC RX W HCPCS: Performed by: NURSE ANESTHETIST, CERTIFIED REGISTERED

## 2025-01-24 PROCEDURE — 7100000011 HC PHASE II RECOVERY - ADDTL 15 MIN: Performed by: INTERNAL MEDICINE

## 2025-01-24 PROCEDURE — 3700000000 HC ANESTHESIA ATTENDED CARE: Performed by: INTERNAL MEDICINE

## 2025-01-24 PROCEDURE — 2500000003 HC RX 250 WO HCPCS: Performed by: INTERNAL MEDICINE

## 2025-01-24 PROCEDURE — 7100000010 HC PHASE II RECOVERY - FIRST 15 MIN: Performed by: INTERNAL MEDICINE

## 2025-01-24 PROCEDURE — 2709999900 HC NON-CHARGEABLE SUPPLY: Performed by: INTERNAL MEDICINE

## 2025-01-24 RX ORDER — SODIUM CHLORIDE 9 MG/ML
INJECTION, SOLUTION INTRAVENOUS PRN
Status: DISCONTINUED | OUTPATIENT
Start: 2025-01-24 | End: 2025-01-24 | Stop reason: HOSPADM

## 2025-01-24 RX ORDER — SODIUM CHLORIDE 9 MG/ML
INJECTION, SOLUTION INTRAVENOUS CONTINUOUS
Status: DISCONTINUED | OUTPATIENT
Start: 2025-01-24 | End: 2025-01-24 | Stop reason: HOSPADM

## 2025-01-24 RX ORDER — SODIUM CHLORIDE 0.9 % (FLUSH) 0.9 %
5-40 SYRINGE (ML) INJECTION EVERY 12 HOURS SCHEDULED
Status: DISCONTINUED | OUTPATIENT
Start: 2025-01-24 | End: 2025-01-24 | Stop reason: HOSPADM

## 2025-01-24 RX ORDER — ESOMEPRAZOLE MAGNESIUM 40 MG/1
40 CAPSULE, DELAYED RELEASE ORAL DAILY
Qty: 90 CAPSULE | Refills: 3 | Status: SHIPPED | OUTPATIENT
Start: 2025-01-24

## 2025-01-24 RX ORDER — LIDOCAINE HYDROCHLORIDE 20 MG/ML
INJECTION, SOLUTION INFILTRATION; PERINEURAL
Status: DISCONTINUED | OUTPATIENT
Start: 2025-01-24 | End: 2025-01-24 | Stop reason: SDUPTHER

## 2025-01-24 RX ORDER — SODIUM CHLORIDE 0.9 % (FLUSH) 0.9 %
5-40 SYRINGE (ML) INJECTION PRN
Status: DISCONTINUED | OUTPATIENT
Start: 2025-01-24 | End: 2025-01-24 | Stop reason: HOSPADM

## 2025-01-24 RX ORDER — PROPOFOL 10 MG/ML
INJECTION, EMULSION INTRAVENOUS
Status: DISCONTINUED | OUTPATIENT
Start: 2025-01-24 | End: 2025-01-24 | Stop reason: SDUPTHER

## 2025-01-24 RX ADMIN — PROPOFOL 50 MG: 10 INJECTION, EMULSION INTRAVENOUS at 09:01

## 2025-01-24 RX ADMIN — PROPOFOL 50 MG: 10 INJECTION, EMULSION INTRAVENOUS at 09:03

## 2025-01-24 RX ADMIN — LIDOCAINE HYDROCHLORIDE 60 MG: 20 INJECTION, SOLUTION INFILTRATION; PERINEURAL at 09:01

## 2025-01-24 ASSESSMENT — PAIN - FUNCTIONAL ASSESSMENT: PAIN_FUNCTIONAL_ASSESSMENT: 0-10

## 2025-01-24 NOTE — ANESTHESIA PRE PROCEDURE
Department of Anesthesiology  Preprocedure Note       Name:  Singh Lim   Age:  69 y.o.  :  1955                                          MRN:  [id number]         Date:  2025      Surgeon: Surgeon(s):  García Rashid MD    Procedure: Procedure(s):  ESOPHAGOGASTRODUODENOSCOPY DIAGNOSTIC ONLY    Medications prior to admission:   Prior to Admission medications    Medication Sig Start Date End Date Taking? Authorizing Provider   Continuous Glucose Sensor (GUARDIAN 4 GLUCOSE SENSOR) MISC  24   Provider, MD Veronica   atorvastatin (LIPITOR) 80 MG tablet Take 1 tablet by mouth nightly 24   Joey Keller MD   furosemide (LASIX) 40 MG tablet Take 1 tablet by mouth 2 times daily 24   Gala Puente PA   lidocaine viscous hcl (XYLOCAINE) 2 % SOLN solution Take 10 mLs by mouth as needed for Irritation 24   García Rashid MD   insulin lispro (HUMALOG,ADMELOG) 100 UNIT/ML SOLN injection vial Use via insulin pump max daily dose 250 units 24   Chi Tejada MD   spironolactone (ALDACTONE) 25 MG tablet Take 1 tablet by mouth daily 24   Dane Snyder MD   losartan (COZAAR) 25 MG tablet Take 2 tablets by mouth daily  Patient taking differently: Take 1 tablet by mouth in the morning and at bedtime 24   Dane Snyder MD   hydrOXYzine HCl (ATARAX) 25 MG tablet Take 1 tablet by mouth 4 times daily as needed for Itching 24   Slavik-Bosworth, Kelly J, APRN - CNP   metoprolol succinate (TOPROL XL) 50 MG extended release tablet Take 1 tablet by mouth every evening 24   Dane Snyder MD   tiZANidine (ZANAFLEX) 2 MG tablet Take 1 tablet by mouth nightly as needed (muscle spasms) 24   Joey Keller MD   nabumetone (RELAFEN) 500 MG tablet Take 1 tablet by mouth 2 times daily 24   Joey Keller MD   esomeprazole (NEXIUM) 40 MG delayed release capsule Take 1 capsule by mouth daily 23   Joey Keller MD   traZODone (DESYREL) 50 MG tablet Take 1 tablet by mouth

## 2025-01-24 NOTE — TELEPHONE ENCOUNTER
Comments: Pt currently admitted, please advise    Last Office Visit (last PCP visit):   11/19/2024    Next Visit Date:  Future Appointments   Date Time Provider Department Center   2/6/2025  1:30 PM Slavik-Bosworth, Kelly J, APRN - CNP [facility]   2/7/2025  3:00 PM Snyder, Dane, MD [place] [facility]   5/23/2025  1:30 PM Chi Tejada MD [facility]   11/20/2025  1:15 PM Joey Keller MD [facility] DEP       **If hasn't been seen in over a year OR hasn't followed up according to last diabetes/ADHD visit, make appointment for patient before sending refill to provider.    Rx requested:  Requested Prescriptions     Pending Prescriptions Disp Refills    esomeprazole (NEXIUM) 40 MG delayed release capsule 90 capsule 3     Sig: Take 1 capsule by mouth daily

## 2025-01-24 NOTE — H&P
was made aware that gladis COVID-19 may worsen their prognosis for recovery from their procedure and lend to a higher morbidity and-all mortality risk.  The patient was given the option of postponing the procedure all material risks, benefits, and alternatives were discussed.  The patient does wish to proceed with the procedure at this time.    García Rashid MD  9:14 AM

## 2025-01-24 NOTE — ANESTHESIA POSTPROCEDURE EVALUATION
Department of Anesthesiology  Postprocedure Note    Patient: Singh Lim  MRN: [id number]  YOB: 1955  Date of evaluation: 1/24/2025    Procedure Summary       Date: 01/24/25 Room / Location: [facility] OR 02 / [facility]    Anesthesia Start: 0859 Anesthesia Stop: 0910    Procedure: ESOPHAGOGASTRODUODENOSCOPY DIAGNOSTIC ONLY Diagnosis:       Esophageal varices without bleeding, unspecified esophageal varices type (HCC)      (Esophageal varices without bleeding, unspecified esophageal varices type (HCC) [I85.00])    Surgeons: García Rashid MD Responsible Provider: Ayla Fox APRN - CRNA    Anesthesia Type: MAC ASA Status: 4            Anesthesia Type: No value filed.    Latoya Phase I: Latoya Score: 10    Latoya Phase II:      Anesthesia Post Evaluation    Patient location during evaluation: bedside  Patient participation: complete - patient participated  Level of consciousness: awake and alert  Pain score: 0  Airway patency: patent  Nausea & Vomiting: no nausea and no vomiting  Cardiovascular status: blood pressure returned to baseline and hemodynamically stable  Respiratory status: acceptable, nonlabored ventilation, spontaneous ventilation and nasal cannula  Hydration status: euvolemic  Pain management: adequate        No notable events documented.

## 2025-01-29 ENCOUNTER — TELEPHONE (OUTPATIENT)
Dept: ADMISSION | Facility: HOSPITAL | Age: 70
End: 2025-01-29
Payer: MEDICARE

## 2025-01-29 NOTE — TELEPHONE ENCOUNTER
Pt called to report that his nipples are very sensitive and one nipple feels hard beneath the skin. There is no redness, warmth or discharge.  This has been present for several weeks.  Pt started spironolactone per cardiology about a month ago and per medication handout he received, nipple pain is a known side effect.  Pt advised to contact his cardiologist about this.  Pt agreeable.

## 2025-02-04 NOTE — PROGRESS NOTES
Gastroenterology Clinic Follow up Visit    Singh Lim  [id number]  Chief Complaint   Patient presents with    Follow-up       HPI: 69 y.o. male following up after last GI clinic on 12/18/2024     Interval change: Patient is follow-up to GI clinic after EGD 1/24/2025 noting grade 1 small varix, previous banded sites with scarring and mild portal hypertensive gastropathy noted.  Patient denies any acid reflux symptoms taking Nexium 40 mg daily.  Patient denies any lower extremity edema nor abdominal distention.  Patient has bowel movement once every 2 to 3 days reporting small and hard in consistency.  Patient takes MiraLAX on an almost daily basis.  Patient has used MiraLAX Gatorade cleanout, with significant improvement in constipation symptoms however returning to every 2 to 3 days.  Patient does endorse having some intermittent dizziness, and has follow-up appointment with cardiology in the a.m.  Patient currently takes furosemide 40 mg and spironolactone 25 mg daily.  Patient does endorse having inflammation in bilateral breasts.  Patient was advised to discontinue spironolactone, however he wishes to continue until he discusses with cardiology tomorrow. Denies any unintentional weight loss, dysphagia, hematemesis, hematochezia, nor melena.    HPI from last GI clinic visit on 12/18/2024 summarized below:  Patient is follow-up to GI clinic after EGD 11/22/2024 noting esophageal varices grade 2 and 5 bands were placed.  This was repeat EGD from 10/20/2024 noting esophageal varices grade 2 with 6 bands placed.  Patient reported increased esophageal discomfort after both EGDs lasting 1 to 2 days.  Patient is follow-up to cardiology and had Lasix doubled to 40 mg twice daily.  Patient had lower extremity edema and is following up with CHF clinic.  Patient reports constipation having 1-3 small stools daily, without feeling fully evacuated.  Patient reports previously Amitiza however had allergic reaction and

## 2025-02-06 ENCOUNTER — OFFICE VISIT (OUTPATIENT)
Dept: GASTROENTEROLOGY | Age: 70
End: 2025-02-06
Payer: MEDICARE

## 2025-02-06 VITALS — HEIGHT: 72 IN | OXYGEN SATURATION: 98 % | HEART RATE: 86 BPM | WEIGHT: 224 LBS | BODY MASS INDEX: 30.34 KG/M2

## 2025-02-06 DIAGNOSIS — I85.00 ESOPHAGEAL VARICES DETERMINED BY ENDOSCOPY (HCC): ICD-10-CM

## 2025-02-06 DIAGNOSIS — D3A.8 NEUROENDOCRINE TUMOR OF PANCREAS: ICD-10-CM

## 2025-02-06 DIAGNOSIS — K74.69 OTHER CIRRHOSIS OF LIVER (HCC): Primary | ICD-10-CM

## 2025-02-06 DIAGNOSIS — Z90.410 HISTORY OF PANCREATECTOMY: ICD-10-CM

## 2025-02-06 DIAGNOSIS — Z90.81 H/O SPLENECTOMY: ICD-10-CM

## 2025-02-06 DIAGNOSIS — K59.00 CONSTIPATION, UNSPECIFIED CONSTIPATION TYPE: ICD-10-CM

## 2025-02-06 PROCEDURE — 1159F MED LIST DOCD IN RCRD: CPT | Performed by: NURSE PRACTITIONER

## 2025-02-06 PROCEDURE — 99213 OFFICE O/P EST LOW 20 MIN: CPT | Performed by: NURSE PRACTITIONER

## 2025-02-06 PROCEDURE — 1123F ACP DISCUSS/DSCN MKR DOCD: CPT | Performed by: NURSE PRACTITIONER

## 2025-02-06 ASSESSMENT — ENCOUNTER SYMPTOMS
BLOOD IN STOOL: 0
CHEST TIGHTNESS: 0
RECTAL PAIN: 0
CONSTIPATION: 1
TROUBLE SWALLOWING: 0
VOMITING: 0
NAUSEA: 0
DIARRHEA: 0
ABDOMINAL PAIN: 0
ABDOMINAL DISTENTION: 0
ANAL BLEEDING: 0

## 2025-02-07 ENCOUNTER — OFFICE VISIT (OUTPATIENT)
Dept: CARDIOLOGY CLINIC | Age: 70
End: 2025-02-07
Payer: MEDICARE

## 2025-02-07 VITALS
OXYGEN SATURATION: 95 % | HEART RATE: 80 BPM | RESPIRATION RATE: 16 BRPM | WEIGHT: 224 LBS | BODY MASS INDEX: 30.38 KG/M2

## 2025-02-07 DIAGNOSIS — I25.10 CORONARY ARTERY DISEASE DUE TO LIPID RICH PLAQUE: Primary | ICD-10-CM

## 2025-02-07 DIAGNOSIS — I25.83 CORONARY ARTERY DISEASE DUE TO LIPID RICH PLAQUE: Primary | ICD-10-CM

## 2025-02-07 PROCEDURE — 1123F ACP DISCUSS/DSCN MKR DOCD: CPT | Performed by: INTERNAL MEDICINE

## 2025-02-07 PROCEDURE — 99214 OFFICE O/P EST MOD 30 MIN: CPT | Performed by: INTERNAL MEDICINE

## 2025-02-07 PROCEDURE — 1159F MED LIST DOCD IN RCRD: CPT | Performed by: INTERNAL MEDICINE

## 2025-02-07 ASSESSMENT — ENCOUNTER SYMPTOMS
COUGH: 0
RHINORRHEA: 0
WHEEZING: 0
NAUSEA: 0
CONSTIPATION: 0
ABDOMINAL PAIN: 0
EYE REDNESS: 0
VOMITING: 0
DIARRHEA: 0
CHEST TIGHTNESS: 0
SHORTNESS OF BREATH: 1
APNEA: 0
COLOR CHANGE: 0

## 2025-02-07 NOTE — PROGRESS NOTES
well  Patient underwent echocardiogram on 3/2024 which showed EF of 50 to 55%  Patient also reports that about 3 weeks ago patient has been noticing some chest discomfort  Last time patient came to the clinic in February he was instructed to obtain an echocardiogram and to follow-up with me right after  Patient and wife do not remember this recommendation      2/9/2024  Follow-up on CAD  Patient reports that for the last week he has been noticing bilateral lower extremity edema more so in the evenings  Also reports that in December he was positive for COVID.  Since then he has been feeling more fatigued  For unknown reason he has been also gaining weight  EKG today sinus rhythm no ischemia no major arrhythmias    11/29/2023  Follow-up on CAD  Patient comes with wife for this appointment  Patient reports feeling well denies chest pain or shortness of breath  Recently patient was pretty busy at home painting walls and floors  No cardiac limitations with this activity  Echocardiogram showed EF of 45 to 50%      8/25/2023  Follow-up on CAD  Patient reports feeling well denies chest pain or shortness of breath  Had an episode of leg swelling when he went to [facility] about 3 weeks ago which went away after the next day      5/12/2023  Follow-up on CAD  Patient comes with wife for this appointment  Patient had called in the office because of generalized weakness patient.  Patient was found with low hemoglobin.  Currently undergoing IV iron infusions.  So far he has had 2  Patient reports that overall he is feeling better  Lasix was also increased from 20 mg daily to 40 mg daily    3/3/2023  Follow-up on CAD  Last month patient returned to the Cath Lab for planned PCI of the RCA and IFR of the circumflex  Patient underwent PCI to the distal RCA with ZAKI x2  IFR of the circumflex was negative at 0.94  Today patient reports feeling well, states that now he is able to do activities that he was not able to do before the

## 2025-02-10 DIAGNOSIS — K74.69 OTHER CIRRHOSIS OF LIVER (HCC): ICD-10-CM

## 2025-02-10 LAB
ALBUMIN SERPL-MCNC: 3.8 G/DL (ref 3.5–4.6)
ALP SERPL-CCNC: 132 U/L (ref 35–104)
ALT SERPL-CCNC: 27 U/L (ref 0–41)
ANION GAP SERPL CALCULATED.3IONS-SCNC: 11 MEQ/L (ref 9–15)
AST SERPL-CCNC: 42 U/L (ref 0–40)
BILIRUB SERPL-MCNC: 0.7 MG/DL (ref 0.2–0.7)
BUN SERPL-MCNC: 12 MG/DL (ref 8–23)
CALCIUM SERPL-MCNC: 9 MG/DL (ref 8.5–9.9)
CHLORIDE SERPL-SCNC: 106 MEQ/L (ref 95–107)
CO2 SERPL-SCNC: 26 MEQ/L (ref 20–31)
CREAT SERPL-MCNC: 0.85 MG/DL (ref 0.7–1.2)
ERYTHROCYTE [DISTWIDTH] IN BLOOD BY AUTOMATED COUNT: 17.8 % (ref 11.5–14.5)
GLOBULIN SER CALC-MCNC: 3.8 G/DL (ref 2.3–3.5)
GLUCOSE SERPL-MCNC: 145 MG/DL (ref 70–99)
HCT VFR BLD AUTO: 38.4 % (ref 42–52)
HGB BLD-MCNC: 12.4 G/DL (ref 14–18)
INR PPP: 1.1
MCH RBC QN AUTO: 26.8 PG (ref 27–31.3)
MCHC RBC AUTO-ENTMCNC: 32.3 % (ref 33–37)
MCV RBC AUTO: 83.1 FL (ref 79–92.2)
PLATELET # BLD AUTO: 373 K/UL (ref 130–400)
POTASSIUM SERPL-SCNC: 4.9 MEQ/L (ref 3.4–4.9)
PROT SERPL-MCNC: 7.6 G/DL (ref 6.3–8)
PROTHROMBIN TIME: 14.5 SEC (ref 12.3–14.9)
RBC # BLD AUTO: 4.62 M/UL (ref 4.7–6.1)
SODIUM SERPL-SCNC: 143 MEQ/L (ref 135–144)
WBC # BLD AUTO: 10.7 K/UL (ref 4.8–10.8)

## 2025-02-12 DIAGNOSIS — Z90.410 HISTORY OF PANCREATECTOMY: ICD-10-CM

## 2025-02-12 DIAGNOSIS — Z90.81 H/O SPLENECTOMY: ICD-10-CM

## 2025-02-12 DIAGNOSIS — I85.00 ESOPHAGEAL VARICES DETERMINED BY ENDOSCOPY (HCC): ICD-10-CM

## 2025-02-12 DIAGNOSIS — K74.69 OTHER CIRRHOSIS OF LIVER (HCC): Primary | ICD-10-CM

## 2025-02-12 NOTE — RESULT ENCOUNTER NOTE
Attempted to reach patient regarding lab work results, left message to contact office and will attempt to contact patient again later today.  Calculation of MELD score is 7.  Per request of patient referral for liver transplant team at [facility] ordered.

## 2025-03-13 ENCOUNTER — OFFICE VISIT (OUTPATIENT)
Dept: FAMILY MEDICINE CLINIC | Age: 70
End: 2025-03-13
Payer: MEDICARE

## 2025-03-13 ENCOUNTER — TELEPHONE (OUTPATIENT)
Dept: ENDOCRINOLOGY | Age: 70
End: 2025-03-13

## 2025-03-13 VITALS
TEMPERATURE: 98.3 F | SYSTOLIC BLOOD PRESSURE: 134 MMHG | WEIGHT: 228 LBS | DIASTOLIC BLOOD PRESSURE: 78 MMHG | BODY MASS INDEX: 30.88 KG/M2 | OXYGEN SATURATION: 98 % | HEIGHT: 72 IN | HEART RATE: 78 BPM

## 2025-03-13 DIAGNOSIS — K59.00 CONSTIPATION, UNSPECIFIED CONSTIPATION TYPE: ICD-10-CM

## 2025-03-13 DIAGNOSIS — A08.4 VIRAL GASTROENTERITIS: Primary | ICD-10-CM

## 2025-03-13 PROCEDURE — 1123F ACP DISCUSS/DSCN MKR DOCD: CPT | Performed by: NURSE PRACTITIONER

## 2025-03-13 PROCEDURE — 99213 OFFICE O/P EST LOW 20 MIN: CPT | Performed by: NURSE PRACTITIONER

## 2025-03-13 PROCEDURE — 1159F MED LIST DOCD IN RCRD: CPT | Performed by: NURSE PRACTITIONER

## 2025-03-13 PROCEDURE — 3075F SYST BP GE 130 - 139MM HG: CPT | Performed by: NURSE PRACTITIONER

## 2025-03-13 PROCEDURE — 1160F RVW MEDS BY RX/DR IN RCRD: CPT | Performed by: NURSE PRACTITIONER

## 2025-03-13 PROCEDURE — 3078F DIAST BP <80 MM HG: CPT | Performed by: NURSE PRACTITIONER

## 2025-03-13 RX ORDER — DICYCLOMINE HCL 20 MG
20 TABLET ORAL 4 TIMES DAILY
Qty: 40 TABLET | Refills: 0 | Status: SHIPPED | OUTPATIENT
Start: 2025-03-13

## 2025-03-13 RX ORDER — ONDANSETRON 4 MG/1
4 TABLET, ORALLY DISINTEGRATING ORAL 3 TIMES DAILY PRN
Qty: 21 TABLET | Refills: 0 | Status: SHIPPED | OUTPATIENT
Start: 2025-03-13

## 2025-03-13 RX ORDER — LACTULOSE 10 G/15ML
20 SOLUTION ORAL 2 TIMES DAILY PRN
Qty: 237 ML | Refills: 1 | Status: SHIPPED | OUTPATIENT
Start: 2025-03-13

## 2025-03-13 SDOH — ECONOMIC STABILITY: FOOD INSECURITY: WITHIN THE PAST 12 MONTHS, YOU WORRIED THAT YOUR FOOD WOULD RUN OUT BEFORE YOU GOT MONEY TO BUY MORE.: NEVER TRUE

## 2025-03-13 SDOH — ECONOMIC STABILITY: FOOD INSECURITY: WITHIN THE PAST 12 MONTHS, THE FOOD YOU BOUGHT JUST DIDN'T LAST AND YOU DIDN'T HAVE MONEY TO GET MORE.: NEVER TRUE

## 2025-03-13 ASSESSMENT — PATIENT HEALTH QUESTIONNAIRE - PHQ9
8. MOVING OR SPEAKING SO SLOWLY THAT OTHER PEOPLE COULD HAVE NOTICED. OR THE OPPOSITE, BEING SO FIGETY OR RESTLESS THAT YOU HAVE BEEN MOVING AROUND A LOT MORE THAN USUAL: NOT AT ALL
SUM OF ALL RESPONSES TO PHQ QUESTIONS 1-9: 0
5. POOR APPETITE OR OVEREATING: NOT AT ALL
6. FEELING BAD ABOUT YOURSELF - OR THAT YOU ARE A FAILURE OR HAVE LET YOURSELF OR YOUR FAMILY DOWN: NOT AT ALL
2. FEELING DOWN, DEPRESSED OR HOPELESS: NOT AT ALL
1. LITTLE INTEREST OR PLEASURE IN DOING THINGS: NOT AT ALL
3. TROUBLE FALLING OR STAYING ASLEEP: NOT AT ALL
SUM OF ALL RESPONSES TO PHQ QUESTIONS 1-9: 0
9. THOUGHTS THAT YOU WOULD BE BETTER OFF DEAD, OR OF HURTING YOURSELF: NOT AT ALL
10. IF YOU CHECKED OFF ANY PROBLEMS, HOW DIFFICULT HAVE THESE PROBLEMS MADE IT FOR YOU TO DO YOUR WORK, TAKE CARE OF THINGS AT HOME, OR GET ALONG WITH OTHER PEOPLE: NOT DIFFICULT AT ALL
SUM OF ALL RESPONSES TO PHQ QUESTIONS 1-9: 0
SUM OF ALL RESPONSES TO PHQ QUESTIONS 1-9: 0
4. FEELING TIRED OR HAVING LITTLE ENERGY: NOT AT ALL
7. TROUBLE CONCENTRATING ON THINGS, SUCH AS READING THE NEWSPAPER OR WATCHING TELEVISION: NOT AT ALL

## 2025-03-13 ASSESSMENT — ENCOUNTER SYMPTOMS
ABDOMINAL PAIN: 1
TROUBLE SWALLOWING: 0
SORE THROAT: 0
SINUS PRESSURE: 0
SHORTNESS OF BREATH: 0
VOMITING: 0
WHEEZING: 0
CONSTIPATION: 1
CHEST TIGHTNESS: 0
RHINORRHEA: 1
COUGH: 1
SINUS PAIN: 0
NAUSEA: 1
DIARRHEA: 0

## 2025-03-13 NOTE — PROGRESS NOTES
Date of Visit:  3/13/2025  Patient Name: Singh Lim   Patient :  1955     CHIEF COMPLAINT:     Singh Lim is a 69 y.o. male who presents today for an general visit to be evaluated for the following condition(s):  Chief Complaint   Patient presents with    Constipation     Onset- this morning   Headache- Y  Body aches-Y    Ear ache- N  Runny/stuffy nose- Y   Problem with smell- N   Problem with taste- N  Sore throat- N  Cough- Y  Scratchy throat-N  Chest symptoms- Y  SOB/ NIEVES- Y  Hx of Asthma Chest cold or bronchitis- N  Fever/chills- Y   Nausea/ vomiting- Y  Gi symptoms- N   Treatments- no        HISTORY OF PRESENT ILLNESS     I reviewed staff HPI/chief complaint and do agree with above    Patient with past medical history significant for CAD 2023 STEMI successful PCI    Ischemic cardiomyopathy EF 40 to 45% by TTE 2023  Hypertension  Hyperlipidemia  Diabetes  Pancreatic cancer on chemotherapy, neuroendocrine tumor status post resection 2022 at   Liver cirrhosis  Portal vein thrombosis on Eliquis    Patient presents today for concerns of headache/body aches, nasal drainage, coughing, mild shortness of breath, nausea/abdominal discomfort with 1 episode of vomiting that has been present since this morning.  Also states he has not had a bowel movement over the last 2-1/2 days, this is not completely abnormal for patient as he does struggle with constipation currently taking MiraLAX twice daily.  Has had tactile fevers and chills at home.  Is also noticed some mild weight gain at home recently.  Denies any recorded fevers, chest pain/discomfort, palpitations, diarrhea with symptoms.        REVIEW OF SYSTEM      Review of Systems   Constitutional:  Positive for fatigue and fever. Negative for appetite change and chills.   HENT:  Positive for rhinorrhea. Negative for congestion, ear pain, hearing loss, sinus pressure, sinus pain, sore throat and trouble swallowing.    Respiratory:

## 2025-04-02 RX ORDER — INSULIN LISPRO 100 [IU]/ML
INJECTION, SOLUTION INTRAVENOUS; SUBCUTANEOUS
Qty: 80 ML | Refills: 3 | Status: SHIPPED | OUTPATIENT
Start: 2025-04-02

## 2025-04-22 ENCOUNTER — OFFICE VISIT (OUTPATIENT)
Dept: HEMATOLOGY/ONCOLOGY | Facility: CLINIC | Age: 70
End: 2025-04-22
Payer: MEDICARE

## 2025-04-22 ENCOUNTER — APPOINTMENT (OUTPATIENT)
Dept: HEMATOLOGY/ONCOLOGY | Facility: CLINIC | Age: 70
End: 2025-04-22
Payer: MEDICARE

## 2025-04-22 ENCOUNTER — HOSPITAL ENCOUNTER (OUTPATIENT)
Dept: RADIOLOGY | Facility: HOSPITAL | Age: 70
Discharge: HOME | End: 2025-04-22
Payer: MEDICARE

## 2025-04-22 ENCOUNTER — HOSPITAL ENCOUNTER (OUTPATIENT)
Dept: RADIOLOGY | Facility: CLINIC | Age: 70
End: 2025-04-22
Payer: MEDICARE

## 2025-04-22 VITALS
WEIGHT: 227.9 LBS | HEART RATE: 69 BPM | SYSTOLIC BLOOD PRESSURE: 172 MMHG | RESPIRATION RATE: 16 BRPM | OXYGEN SATURATION: 95 % | TEMPERATURE: 97.9 F | BODY MASS INDEX: 30.3 KG/M2 | DIASTOLIC BLOOD PRESSURE: 74 MMHG

## 2025-04-22 DIAGNOSIS — D3A.8 PRIMARY PANCREATIC NEUROENDOCRINE TUMOR (CMS-HCC): ICD-10-CM

## 2025-04-22 PROCEDURE — 99215 OFFICE O/P EST HI 40 MIN: CPT | Performed by: INTERNAL MEDICINE

## 2025-04-22 PROCEDURE — 1126F AMNT PAIN NOTED NONE PRSNT: CPT | Performed by: INTERNAL MEDICINE

## 2025-04-22 PROCEDURE — 1036F TOBACCO NON-USER: CPT | Performed by: INTERNAL MEDICINE

## 2025-04-22 PROCEDURE — A9575 INJ GADOTERATE MEGLUMI 0.1ML: HCPCS | Mod: JZ | Performed by: INTERNAL MEDICINE

## 2025-04-22 PROCEDURE — 2550000001 HC RX 255 CONTRASTS: Mod: JZ | Performed by: INTERNAL MEDICINE

## 2025-04-22 PROCEDURE — 3077F SYST BP >= 140 MM HG: CPT | Performed by: INTERNAL MEDICINE

## 2025-04-22 PROCEDURE — 4010F ACE/ARB THERAPY RXD/TAKEN: CPT | Performed by: INTERNAL MEDICINE

## 2025-04-22 PROCEDURE — 3078F DIAST BP <80 MM HG: CPT | Performed by: INTERNAL MEDICINE

## 2025-04-22 PROCEDURE — 74183 MRI ABD W/O CNTR FLWD CNTR: CPT

## 2025-04-22 PROCEDURE — 1159F MED LIST DOCD IN RCRD: CPT | Performed by: INTERNAL MEDICINE

## 2025-04-22 PROCEDURE — 74183 MRI ABD W/O CNTR FLWD CNTR: CPT | Performed by: RADIOLOGY

## 2025-04-22 RX ORDER — GADOTERATE MEGLUMINE 376.9 MG/ML
0.2 INJECTION INTRAVENOUS
Status: COMPLETED | OUTPATIENT
Start: 2025-04-22 | End: 2025-04-22

## 2025-04-22 RX ADMIN — GADOTERATE MEGLUMINE 20 ML: 376.9 INJECTION INTRAVENOUS at 08:25

## 2025-04-22 ASSESSMENT — PAIN SCALES - GENERAL: PAINLEVEL_OUTOF10: 0-NO PAIN

## 2025-04-22 NOTE — PROGRESS NOTES
Patient ID: Adam Castillo is a 69 y.o. male.  Visit type: Follow up visit      ONCOLOGIC HISTORY  69-year-old man with PMH of DM, HTN who has been diagnosed with Metastatic Well differentiated Pancreatic Neuroendocrine tumor with liver mets. On 12/10/2019  he had abdominal pain radiated to his back was about 8/10 associated with nausea and vomiting. He went to the ER at [facility] where he got admitted and had a CT scan showed evidence of cirrhosis as well as a 2 cm soft tissue mass at the till the  pancreas. I reviewed his CT scan. There is a calcified 2 cm mass noted at the anterior aspect of the tail the pancreas near the splenic hilum. He does have splenomegaly with spleen measuring about 17 cm or so in greatest dimension. Endoscopic ultrasound  with biopsy showed well-differentiated neuroendocrine tumor. On the description of the endoscopic ultrasound, 2.1 x 1.5 cm oval lesion was noted towards the till the pancreas abutting the till the pancreas. This did not appear to directly involve the  pancreas endoscopically.  He was taken to the OR on 3/17/20 for laprscopic distal pancreatectomy, but was found to have liver mets with laparoscopy with wedge liver biopsy confirmed metastatic neuroendocrine tumor to liver. A 2nd lesion was also visualized  on the surface of the left liver. Final path showed well diff. pNET with Ki67 4% (G2). Initially he started oin LAURYN and after discuss at the TB we agreed on neoadjuvant therapy and restaging for response. He started on cytotoxic chemotherapy with capecitabine  and temozolomide on 06/21/2020. He went to the ER after the finished the w2 weeks of C1 due to fever and his labs only showed reactive HAV AB and he was COVID negative.   He finished 8 cycles so far and tolerated well except for nausea, and constipation and malignancy induced fever which resolved  Restaging after C4 showed mild decrease in his pancreatic mass and liver mets   Due to his myelosuppression we  stopped chemotherapy and switch back to SSA  He had surgery on 08/03/2022  Restaging scans showed post surgical changes and no evidence of new lesions        Past Medical History History of cirrhosis (V12.79) (Z87.19) History of diabetes mellitus (V12.29) (Z86.39) History of hypertension (V12.59) (Z86.79) History of malignant neoplasm  of skin (V10.83) (Z85.828) History of Pancreatic tumor (239.0) (D49.0)   Surgical History History of Hemorrhoidectomy      Family History Family history of lung cancer in hi smother side  (V16.1) (Z80.1)     History of Present Illness:  Chief complaint: Metastatic well differentiated pancreatic neuroendocrine tumor with liver mets    SUBJECTIVE:  Interval History:  Adam Castillo is a 69 y.o. male who presents for follow up and to review scan results. He denies pain, no fever or chills, no shortness of breath or chest pain.    Review of Systems   All other systems reviewed and are negative.      OBJECTIVE:    VS:  There were no vitals taken for this visit.  Weight:   There were no vitals filed for this visit.      BSA: There is no height or weight on file to calculate BSA.    Physical Exam  Vitals reviewed.   Constitutional:       General: He is not in acute distress.     Appearance: Normal appearance.   HENT:      Head: Normocephalic and atraumatic.      Nose: Nose normal.      Mouth/Throat:      Mouth: Mucous membranes are moist.      Pharynx: Oropharynx is clear.   Eyes:      Extraocular Movements: Extraocular movements intact.      Conjunctiva/sclera: Conjunctivae normal.   Cardiovascular:      Rate and Rhythm: Normal rate and regular rhythm.      Pulses: Normal pulses.      Heart sounds: Normal heart sounds.   Pulmonary:      Effort: Pulmonary effort is normal.      Breath sounds: Normal breath sounds.   Abdominal:      General: Abdomen is flat. Bowel sounds are normal.      Palpations: Abdomen is soft.   Musculoskeletal:         General: Normal range of motion.      Cervical back:  Normal range of motion and neck supple.   Skin:     General: Skin is warm and dry.   Neurological:      General: No focal deficit present.      Mental Status: He is alert and oriented to person, place, and time. Mental status is at baseline.   Psychiatric:         Mood and Affect: Mood normal.         Behavior: Behavior normal.         Thought Content: Thought content normal.         Judgment: Judgment normal.           Diagnostic Results     Lab on 04/16/2024   Component Date Value    WBC 04/16/2024 8.2     nRBC 04/16/2024      RBC 04/16/2024 4.22 (L)     Hemoglobin 04/16/2024 10.9 (L)     Hematocrit 04/16/2024 35.2 (L)     MCV 04/16/2024 83     MCH 04/16/2024 25.8 (L)     MCHC 04/16/2024 31.0 (L)     RDW 04/16/2024 16.8 (H)     Platelets 04/16/2024 371     Neutrophils % 04/16/2024 54.0     Immature Granulocytes %,* 04/16/2024 0.1     Lymphocytes % 04/16/2024 26.8     Monocytes % 04/16/2024 13.1     Eosinophils % 04/16/2024 4.9     Basophils % 04/16/2024 1.1     Neutrophils Absolute 04/16/2024 4.44     Immature Granulocytes Ab* 04/16/2024 0.01     Lymphocytes Absolute 04/16/2024 2.20     Monocytes Absolute 04/16/2024 1.08 (H)     Eosinophils Absolute 04/16/2024 0.40     Basophils Absolute 04/16/2024 0.09     Glucose 04/16/2024 278 (H)     Sodium 04/16/2024 139     Potassium 04/16/2024 3.9     Chloride 04/16/2024 107     Bicarbonate 04/16/2024 25     Anion Gap 04/16/2024 11     Urea Nitrogen 04/16/2024 14     Creatinine 04/16/2024 0.88     eGFR 04/16/2024 >90     Calcium 04/16/2024 8.7     Albumin 04/16/2024 3.5     Alkaline Phosphatase 04/16/2024 121     Total Protein 04/16/2024 7.0     AST 04/16/2024 34     Bilirubin, Total 04/16/2024 1.1     ALT 04/16/2024 26            MR abdomen w and wo IV contrast  Narrative: Interpreted By:  Juaquin Gandhi,   STUDY:  MR ABDOMEN W AND WO IV CONTRAST;  10/8/2024 9:15 am      INDICATION:  Signs/Symptoms:NET restaging.      ,D3A.8 Other benign neuroendocrine tumors (CMS-HCC)       COMPARISON:  None.      ACCESSION NUMBER(S):  [id number]      ORDERING CLINICIAN:  TROY BOBBY      TECHNIQUE:  MRI LIVER; Multiplanar magnetic resonance images of the abdomen were  obtained including the following sequences; T2-weighted SSFSE with  and without fat saturation, T1-weighted GRE in/opposed phase, DWI,  fat saturated 3D-T1w GRE pre and dynamically post contrast. 16 mL of  Dotarem gadolinium contrast was administered intravenously without  immediate complication.      FINDINGS:  LIVER:  Morphological changes of cirrhosis and steatosis. Stable tiny  subcapsular segment 5 arterial enhancing focus (series 603, image  86). No suspicious lesions.      BILE DUCTS:  No intrahepatic or extrahepatic bile duct dilatation is demonstrated.      GALLBLADDER:  Cholelithiasis      PANCREAS:  Status post distal pancreatectomy with no gross soft tissue mass at  the surgical bed. Residual pancreas appears grossly unchanged with no  duct dilatation or solid masses.      SPLEEN:  Surgically absent.      ADRENAL GLANDS:  Within normal limits.      KIDNEYS:  No hydronephrosis. Few tiny bilateral cortical cysts.      LYMPH NODES:  Stable portacaval lymph node measuring 1.1 cm in short axis dimension  (series 603, image 69).      ABDOMINAL VESSELS:  Aorta and the major abdominal arterial vessels demonstrate no gross  abnormality.  Superior mesenteric vein, splenic vein, and main, right  and left portal vein are patent. Hepatic veins are patent.  Perigastric varices noted.      BOWEL:  No bowel dilatation      PERITONEUM/RETROPERITONEUM:  No ascites.      BONES AND LOWER THORAX:  Multilevel degenerative spine changes and facet joint disease. The  visualized lower lung fields are unremarkable. The heart is normal in  size.      Impression: 1. Status post distal pancreatectomy/splenectomy with no evidence of  locoregional recurrence.  2. Morphological changes of cirrhosis. Stable subcapsular segment 5  tiny enhancing focus  likely vascular shunt, unchanged since 2020. No  suspicious high-grade LI-RADS observations.  3. No new concerning findings in the abdomen or pelvis.          MACRO:  None      Signed by: Juaquin Gandhi 10/8/2024 12:47 PM  Dictation workstation:   [id number]       Assessment/Plan   Metastatic pNET (G2) with liver mets   69-year-old man with PMH of DM, HTN who has been diagnosed with Metastatic Well differentiated Pancreatic Neuroendocrine tumor with liver mets. On 12/10/2019 he had abdominal pain radiated to his back  was about 8/10 associated with nausea and vomiting. He went to the ER at [facility] where he got admitted and had a CT scan showed evidence of cirrhosis as well as a 2 cm soft tissue mass at the till the pancreas. I reviewed his CT scan. There is  a calcified 2 cm mass noted at the anterior aspect of the tail the pancreas near the splenic hilum. He does have splenomegaly with spleen measuring about 17 cm or so in greatest dimension. Endoscopic ultrasound with biopsy showed well-differentiated neuroendocrine  tumor. On the description of the endoscopic ultrasound, 2.1 x 1.5 cm oval lesion was noted towards the till the pancreas abutting the till the pancreas. This did not appear to directly involve the pancreas endoscopically.  He was taken to the OR on 3/17/20  for laprscopic distal pancreatectomy, but was found to have liver mets with laparoscopy with wedge liver biopsy confirmed metastatic neuroendocrine tumor to liver. A 2nd lesion was also visualized on the surface of the left liver. Final path showed well  diff. pNET with Ki67 4% (G2). PET-Ga68 showed somatostatin avid lesions in the pancreas and liver lesions. MRI confirmed same findings of Pancreatic lesions which is almost same size like CT on 12/2019, liver lesions 5 of them all subcentimeter and RP  LN. Pt started on SSA after that and is tolerated lanreotide well.     We discussed the case at the NET TB and he has limited disease mainly  pancreatic and 5-6 liver lesions. We agreed on starting on neoadjuvant chemotherapy with CAPTEM and restaging for surgical resection   He started on cytotoxic chemotherapy with capecitabine and temozolomide on 06/21/2020 and finished 2 cycles. He went to the ER after the finished the w2 weeks of C1 due to  fever and his labs only showed reactive HAV AB and IgM was negative and he was COVID negative.  He finished 8 cycles so far and tolerated well except for nausea, and constipation and malignancy induced fever which resolved. However pt don't want to resume more chemo at this point   Restaging after C4 showed mild decrease in his pancreatic mass and liver mets   Restaging scans after C8 showed stable disease mainly 3 small liver lesions and stable pancreatic tail mass  He met with our surgeon Dr. Reardon and he is not a candidate for surgery given high risk due to his liver cirrhosis  We started him on SSA with lanreotide  Restaging MRI AP on 08/20/2021 showed stable disease arterial enhancing 1.6 x 2.2 cm mass of the pancreatic tail and Vague approximate 4 mm arterial enhancing foci in the lateral aspect of the right hepatic lobe and anteriorly in the left hepatic lobe  similar to prior. No new focal hepatic lesion identified.  His last restaging scans on 12/16/2021 and 04/2022 showed stable disease  Serum glucagon is 243  08/03/2022: Pt had distal pancreatectomy and spelnectomny with final path confirmed G1 Well diff. PanNet with Li67 1.4%  Restaging scans showed post surgical changes and no evidence of new lesions . However there was a fluid collection concern for abscess and he finished 1 week of Abs and also showed splenic vein thrombus   Restaging MRI showed only post surgical changes and no evidence of disease. There is concern of progressed portal vein thrombosis  Started on anticoagulation and sent to hematology for follow up  Per last CT AP excluded recurrent/metastatic disease scan there is concern about  liver cirrhosis   Restaging MRI excluded evidence of recurrence/metastatic diseases   07/2023: Restaging CT AP excluded recurrent/metastatic disease except for a small omental nodule and a new  rim enhancing  1 cm lesion along the anterior capsule of the left liver     MRI Liver showed arterially enhancing 0.4 cm lesion in hepatic segment VI without washout compared to 04/10/2023 MRI. Findings stranding may represent LI-RADS 3 lesion in the setting of liver cirrhosis versus  neuroendocrine metastasis.Focal peritoneal thickening with slight anterior omental nodularity in the left upper quadrant may represent omental fat necrosis and post operative scaring      PET-CU64 excluded any SS avid lesions     12/4/23 MRI AP excludes recurrent/metastatic disease    04/01/2024 and 10/08/2024 and 04/22/2025: MRI AP excluded recurrent or metastatic disease      Plan:    1- Patient is doing well and has no complaints  2- Reviewed scan results with patient which excludes recurrent/metastatic disease  3- MRI AP in 6 months (ordered)  5- RTC after scans for follow up and to review results            Erin Cui M.D.   and Director of the Neuroendocrine Tumor Program  Gastrointestinal Medical Oncology  Department of Hematology and Oncology  [facility], [place] [address]  Phone (Office): [phone]  Fax:  [phone]   Email: [email or web address]  Learn more about [facility] Comprehensive Neuroendocrine Tumor Program: Click Here  Learn more about [place] [employer]: [email or web address]

## 2025-05-02 ENCOUNTER — OFFICE VISIT (OUTPATIENT)
Age: 70
End: 2025-05-02
Payer: MEDICARE

## 2025-05-02 VITALS
OXYGEN SATURATION: 95 % | BODY MASS INDEX: 31.87 KG/M2 | WEIGHT: 235 LBS | DIASTOLIC BLOOD PRESSURE: 68 MMHG | RESPIRATION RATE: 16 BRPM | HEART RATE: 81 BPM | SYSTOLIC BLOOD PRESSURE: 126 MMHG

## 2025-05-02 DIAGNOSIS — I15.9 SECONDARY HYPERTENSION: Primary | ICD-10-CM

## 2025-05-02 PROCEDURE — 99214 OFFICE O/P EST MOD 30 MIN: CPT | Performed by: INTERNAL MEDICINE

## 2025-05-02 PROCEDURE — 3078F DIAST BP <80 MM HG: CPT | Performed by: INTERNAL MEDICINE

## 2025-05-02 PROCEDURE — 1123F ACP DISCUSS/DSCN MKR DOCD: CPT | Performed by: INTERNAL MEDICINE

## 2025-05-02 PROCEDURE — 3074F SYST BP LT 130 MM HG: CPT | Performed by: INTERNAL MEDICINE

## 2025-05-02 PROCEDURE — 1159F MED LIST DOCD IN RCRD: CPT | Performed by: INTERNAL MEDICINE

## 2025-05-02 ASSESSMENT — ENCOUNTER SYMPTOMS
APNEA: 0
RHINORRHEA: 0
EYE REDNESS: 0
VOMITING: 0
SHORTNESS OF BREATH: 1
NAUSEA: 0
CONSTIPATION: 0
COUGH: 0
CHEST TIGHTNESS: 0
ABDOMINAL PAIN: 0
WHEEZING: 0
COLOR CHANGE: 0
DIARRHEA: 0

## 2025-05-02 NOTE — PROGRESS NOTES
HEMORRHOID SURGERY      JOINT REPLACEMENT Right     hip    PANCREATECTOMY  08/04/2022    SPLENECTOMY, PARTIAL  08/04/2022    UPPER GASTROINTESTINAL ENDOSCOPY N/A 04/04/2022    EGD ESOPHAGOGASTRODUODENOSCOPY performed by García Rashid MD at [facility]    UPPER GASTROINTESTINAL ENDOSCOPY N/A 01/27/2023    EGD DIAGNOSTIC ONLY performed by García Rashid MD at [facility]    UPPER GASTROINTESTINAL ENDOSCOPY N/A 10/11/2024    ESOPHAGOGASTRODUODENOSCOPY with biopsies and clip placement with banding performed by García Rashid MD at [facility]    UPPER GASTROINTESTINAL ENDOSCOPY N/A 11/22/2024    ESOPHAGOGASTRODUODENOSCOPY DIAGNOSTIC with banding performed by García Rashid MD at [facility]    UPPER GASTROINTESTINAL ENDOSCOPY N/A 1/24/2025    ESOPHAGOGASTRODUODENOSCOPY DIAGNOSTIC ONLY with biopsy performed by García Rashid MD at [facility]       Social History     Socioeconomic History    Marital status:    Tobacco Use    Smoking status: Never     Passive exposure: Never    Smokeless tobacco: Never    Tobacco comments:     Never used   Vaping Use    Vaping status: Never Used   Substance and Sexual Activity    Alcohol use: No    Drug use: No    Sexual activity: Yes     Partners: Female     Social Drivers of Health     Financial Resource Strain: Low Risk  (5/28/2024)    Overall Financial Resource Strain (CARDIA)     Difficulty of Paying Living Expenses: Not hard at all   Food Insecurity: No Food Insecurity (3/13/2025)    Hunger Vital Sign     Worried About Running Out of Food in the Last Year: Never true     Ran Out of Food in the Last Year: Never true   Transportation Needs: No Transportation Needs (3/13/2025)    PRAPARE - Transportation     Lack of Transportation (Medical): No     Lack of Transportation (Non-Medical): No   Physical Activity: Insufficiently Active (11/19/2024)    Exercise Vital Sign     Days of Exercise per Week: 7 days     Minutes of Exercise per Session: 20 min

## 2025-05-09 NOTE — PROGRESS NOTES
Gastroenterology Clinic Follow up Visit    Singh Lim  [id number]  Chief Complaint   Patient presents with    Follow-up       HPI: 69 y.o. male following up after last GI clinic on 2/6/2025     Interval change: Patient is follow-up to GI clinic with constipation, and cirrhosis.  Patient is followed by  oncology with recent MRI.  Patient was seen at [facility] Dr. Tali Arce for further discussion of liver transplant as patient requested.  Patient continues Nexium  40 mg daily.  Patient has been taking furosemide 60 mg twice daily.  Patient endorses starting milk thistle, however denies any jaundice, abdominal distention, pruritus, nor liver related hospitalizations.  Patient continues follow-up with  oncology with MRI 4/15/2025.  Patient does endorse having muscle aches that have increased over the last 1 to 2 months.  Denies any unintentional weight loss, dysphagia, hematemesis, hematochezia, nor melena.    HPI fro with last MRIm last GI clinic visit on 2/6/2025 summarized below:  Patient is follow-up to GI clinic after EGD 1/24/2025 noting grade 1 small varix, previous banded sites with scarring and mild portal hypertensive gastropathy noted.  Patient denies any acid reflux symptoms taking Nexium 40 mg daily.  Patient denies any lower extremity edema nor abdominal distention.  Patient has bowel movement once every 2 to 3 days reporting small and hard in consistency.  Patient takes MiraLAX on an almost daily basis.  Patient has used MiraLAX Gatorade cleanout, with significant improvement in constipation symptoms however returning to every 2 to 3 days.  Patient does endorse having some intermittent dizziness, and has follow-up appointment with cardiology in the a.m.  Patient currently takes furosemide 40 mg and spironolactone 25 mg daily.  Patient does endorse having inflammation in bilateral breasts.  Patient was advised to discontinue spironolactone, however he wishes to continue until he discusses with

## 2025-05-12 ENCOUNTER — OFFICE VISIT (OUTPATIENT)
Dept: GASTROENTEROLOGY | Age: 70
End: 2025-05-12
Payer: MEDICARE

## 2025-05-12 VITALS — BODY MASS INDEX: 31.42 KG/M2 | OXYGEN SATURATION: 96 % | HEIGHT: 72 IN | WEIGHT: 232 LBS | HEART RATE: 75 BPM

## 2025-05-12 DIAGNOSIS — K74.69 OTHER CIRRHOSIS OF LIVER (HCC): Primary | ICD-10-CM

## 2025-05-12 DIAGNOSIS — Z90.81 H/O SPLENECTOMY: ICD-10-CM

## 2025-05-12 DIAGNOSIS — Z90.410 HISTORY OF PANCREATECTOMY: ICD-10-CM

## 2025-05-12 PROCEDURE — 1159F MED LIST DOCD IN RCRD: CPT | Performed by: NURSE PRACTITIONER

## 2025-05-12 PROCEDURE — 99213 OFFICE O/P EST LOW 20 MIN: CPT | Performed by: NURSE PRACTITIONER

## 2025-05-12 PROCEDURE — 1123F ACP DISCUSS/DSCN MKR DOCD: CPT | Performed by: NURSE PRACTITIONER

## 2025-05-12 RX ORDER — POLYETHYLENE GLYCOL 3350 17 G/17G
17 POWDER, FOR SOLUTION ORAL DAILY
COMMUNITY

## 2025-05-12 ASSESSMENT — ENCOUNTER SYMPTOMS
TROUBLE SWALLOWING: 0
NAUSEA: 0
DIARRHEA: 0
GASTROINTESTINAL NEGATIVE: 1
CONSTIPATION: 0
VOMITING: 0
ABDOMINAL DISTENTION: 0
RECTAL PAIN: 0
ANAL BLEEDING: 0
BLOOD IN STOOL: 0
ABDOMINAL PAIN: 0

## 2025-05-29 ENCOUNTER — OFFICE VISIT (OUTPATIENT)
Age: 70
End: 2025-05-29
Payer: MEDICARE

## 2025-05-29 VITALS
SYSTOLIC BLOOD PRESSURE: 133 MMHG | DIASTOLIC BLOOD PRESSURE: 67 MMHG | WEIGHT: 229 LBS | HEART RATE: 77 BPM | HEIGHT: 72 IN | BODY MASS INDEX: 31.02 KG/M2

## 2025-05-29 DIAGNOSIS — E11.59 TYPE 2 DIABETES MELLITUS WITH OTHER CIRCULATORY COMPLICATIONS (HCC): Primary | ICD-10-CM

## 2025-05-29 DIAGNOSIS — M13.0 POLYARTHRITIS: ICD-10-CM

## 2025-05-29 DIAGNOSIS — Z96.41 INSULIN PUMP IN PLACE: ICD-10-CM

## 2025-05-29 DIAGNOSIS — E11.59 TYPE 2 DIABETES MELLITUS WITH OTHER CIRCULATORY COMPLICATIONS (HCC): ICD-10-CM

## 2025-05-29 DIAGNOSIS — M79.10 MYALGIA: ICD-10-CM

## 2025-05-29 LAB
CHP ED QC CHECK: NORMAL
CK SERPL-CCNC: 289 U/L (ref 0–190)
CREAT UR-MCNC: 208.7 MG/DL
CRP SERPL HS-MCNC: <3 MG/L (ref 0–5)
ERYTHROCYTE [SEDIMENTATION RATE] IN BLOOD BY WESTERGREN METHOD: 16 MM (ref 0–20)
GLUCOSE BLD-MCNC: 145 MG/DL
HBA1C MFR BLD: 8 %
MICROALBUMIN UR-MCNC: <1.2 MG/DL
MICROALBUMIN/CREAT UR-RTO: NORMAL MG/G (ref 0–30)

## 2025-05-29 PROCEDURE — 83036 HEMOGLOBIN GLYCOSYLATED A1C: CPT | Performed by: INTERNAL MEDICINE

## 2025-05-29 PROCEDURE — 99214 OFFICE O/P EST MOD 30 MIN: CPT | Performed by: INTERNAL MEDICINE

## 2025-05-29 PROCEDURE — 82962 GLUCOSE BLOOD TEST: CPT | Performed by: INTERNAL MEDICINE

## 2025-05-29 PROCEDURE — 3052F HG A1C>EQUAL 8.0%<EQUAL 9.0%: CPT | Performed by: INTERNAL MEDICINE

## 2025-05-29 PROCEDURE — 3075F SYST BP GE 130 - 139MM HG: CPT | Performed by: INTERNAL MEDICINE

## 2025-05-29 PROCEDURE — 1123F ACP DISCUSS/DSCN MKR DOCD: CPT | Performed by: INTERNAL MEDICINE

## 2025-05-29 PROCEDURE — 1159F MED LIST DOCD IN RCRD: CPT | Performed by: INTERNAL MEDICINE

## 2025-05-29 PROCEDURE — 3078F DIAST BP <80 MM HG: CPT | Performed by: INTERNAL MEDICINE

## 2025-05-29 PROCEDURE — 1125F AMNT PAIN NOTED PAIN PRSNT: CPT | Performed by: INTERNAL MEDICINE

## 2025-05-29 RX ORDER — INSULIN LISPRO 100 [IU]/ML
INJECTION, SOLUTION INTRAVENOUS; SUBCUTANEOUS
Qty: 80 ML | Refills: 3 | Status: SHIPPED | OUTPATIENT
Start: 2025-05-29

## 2025-05-29 NOTE — PROGRESS NOTES
5/29/2025    Assessment:       Diagnosis Orders   1. Type 2 diabetes mellitus with other circulatory complications (HCC)  POCT Glucose    POCT glycosylated hemoglobin (Hb A1C)    Basic Metabolic Panel    Hemoglobin A1C    Albumin/Creatinine Ratio, Urine    Lipid Panel    insulin lispro (HUMALOG,ADMELOG) 100 UNIT/ML SOLN injection vial      2. Insulin pump in place        3. Polyarthritis  Sedimentation Rate    C-Reactive Protein    Cyclic Citrul Peptide Antibody, IgG    CK    Donell An DO, Rheumatology [facility]      4. Myalgia              PLAN:     Orders Placed This Encounter   Procedures    Basic Metabolic Panel     Standing Status:   Future     Expected Date:   5/29/2025     Expiration Date:   5/29/2026    Hemoglobin A1C     Standing Status:   Future     Expected Date:   5/29/2025     Expiration Date:   5/29/2026    Albumin/Creatinine Ratio, Urine     Standing Status:   Future     Expected Date:   5/29/2025     Expiration Date:   5/29/2026    Lipid Panel     Standing Status:   Future     Expected Date:   5/29/2025     Expiration Date:   5/29/2026    Sedimentation Rate     Standing Status:   Future     Expected Date:   5/29/2025     Expiration Date:   5/29/2026    C-Reactive Protein     Standing Status:   Future     Expected Date:   5/29/2025     Expiration Date:   5/29/2026    Cyclic Citrul Peptide Antibody, IgG     Standing Status:   Future     Expected Date:   5/29/2025     Expiration Date:   5/29/2026    CK     Standing Status:   Future     Expected Date:   5/29/2025     Expiration Date:   5/29/2026    Donell An DO, [facility]     Referral Priority:   Routine     Referral Type:   Eval and Treat     Referral Reason:   Specialty Services Required     Referred to Provider:   Donell Asher DO     Requested Specialty:   Rheumatology     Number of Visits Requested:   1    POCT Glucose    POCT glycosylated hemoglobin (Hb A1C)     Orders Placed This Encounter

## 2025-05-30 LAB — CCP AB SER IA-ACNC: 2.2 U/ML (ref 0–7)

## 2025-06-09 ENCOUNTER — OFFICE VISIT (OUTPATIENT)
Age: 70
End: 2025-06-09
Payer: MEDICARE

## 2025-06-09 VITALS
WEIGHT: 228 LBS | DIASTOLIC BLOOD PRESSURE: 76 MMHG | BODY MASS INDEX: 30.22 KG/M2 | SYSTOLIC BLOOD PRESSURE: 138 MMHG | OXYGEN SATURATION: 97 % | HEART RATE: 68 BPM | HEIGHT: 73 IN

## 2025-06-09 DIAGNOSIS — R74.8 ELEVATED CK: ICD-10-CM

## 2025-06-09 DIAGNOSIS — M79.10 MYALGIA: Primary | ICD-10-CM

## 2025-06-09 DIAGNOSIS — M79.10 MYALGIA: ICD-10-CM

## 2025-06-09 DIAGNOSIS — M25.50 POLYARTHRALGIA: ICD-10-CM

## 2025-06-09 LAB
CK SERPL-CCNC: 306 U/L (ref 0–190)
CRP SERPL HS-MCNC: <3 MG/L (ref 0–5)
ERYTHROCYTE [SEDIMENTATION RATE] IN BLOOD BY WESTERGREN METHOD: 15 MM (ref 0–20)

## 2025-06-09 PROCEDURE — 1123F ACP DISCUSS/DSCN MKR DOCD: CPT | Performed by: INTERNAL MEDICINE

## 2025-06-09 PROCEDURE — 3075F SYST BP GE 130 - 139MM HG: CPT | Performed by: INTERNAL MEDICINE

## 2025-06-09 PROCEDURE — 1125F AMNT PAIN NOTED PAIN PRSNT: CPT | Performed by: INTERNAL MEDICINE

## 2025-06-09 PROCEDURE — 99204 OFFICE O/P NEW MOD 45 MIN: CPT | Performed by: INTERNAL MEDICINE

## 2025-06-09 PROCEDURE — 1159F MED LIST DOCD IN RCRD: CPT | Performed by: INTERNAL MEDICINE

## 2025-06-09 PROCEDURE — 3078F DIAST BP <80 MM HG: CPT | Performed by: INTERNAL MEDICINE

## 2025-06-09 ASSESSMENT — ENCOUNTER SYMPTOMS
EYE REDNESS: 0
SHORTNESS OF BREATH: 0
BACK PAIN: 1
EYE PAIN: 0
ABDOMINAL PAIN: 0

## 2025-06-09 NOTE — PROGRESS NOTES
Reason for Referral:       Chi Tejada MD  [id number] [facility]  [address] [place] [id number]    Chief Complaint   Patient presents with    Establish Care     Patient presents with soreness and weakness in muscles.           HISTORY OF PRESENT ILLNESS: Mr.Randy Lim is a 70 y.o. male the courtesy of Dr. Tejada for evaluation of polyarthritis.  The patient states that he was in his usual state of health until he was diagnosed with primary pancreatic neuroendocrine tumor approximately 5 years ago.  Since that time he has had multiple issues including a myocardial infarction and has developed cirrhosis of the liver.  Has had diabetes and was previously treated with metformin alone but in recent years states that his sugars have been \"out of control\".  Seem to be doing fairly well until about 1-1/2 or 2 months ago when he started having pain around his shoulder girdle region.  He noticed difficulty raising his arms overhead and had significant pain with any motion.  It was worse in the morning.  Also describes it up around his neck both anteriorly and posteriorly.  Now describes some pain mainly in his right thigh and sometimes in his lower back.  He states that he gets winded easily because of this pain and has to stop.  When he tries to walk to his mailbox, for instance, he has quite a bit of pain and has to slow down.  He describes a diffuse head pain which is very dull and has been present for about 2 months now.  It does not seem to be isolated over his temporal region.  Denies any jaw claudication symptoms or any visual changes.    Recent blood work has revealed a normal sedimentation rate and C-reactive protein.  His CK was elevated.  CCP was negative.  A referral is made for evaluation.    Denies any swollen red or hot joints.  No new rashes.  No oral or nasal ulcers.  Denies any pleuritic symptoms.         Past Medical,Family, and Social History reviewed.  Patient's PMH/PSH,SH,PSYCH Hx, MEDs, ALLERGIES, and ROS

## 2025-06-09 NOTE — PATIENT INSTRUCTIONS
Labs today and I will contact you with results.    I may start you on prednisone 15 mg a day and I will discuss this with Dr. Tejada prior to doing so.  If we do start the prednisone, I would want to hear back from you by this Thursday morning.  Let me know if you have complete resolution of the pain around your shoulders or hips or if you have no improvement at all.    I am going to plan on seeing you back in about 1 week and monitoring this closely.

## 2025-06-10 ENCOUNTER — RESULTS FOLLOW-UP (OUTPATIENT)
Age: 70
End: 2025-06-10

## 2025-06-10 DIAGNOSIS — M79.10 MYALGIA: Primary | ICD-10-CM

## 2025-06-10 LAB — RHEUMATOID FACTOR: <10 IU/ML (ref 0–13)

## 2025-06-10 RX ORDER — PREDNISONE 5 MG/1
TABLET ORAL
Qty: 30 TABLET | Refills: 0 | Status: SHIPPED | OUTPATIENT
Start: 2025-06-10

## 2025-06-12 LAB — ALDOLASE SERPL-CCNC: 5.9 U/L (ref 1.2–7.6)

## 2025-06-13 LAB
ANA PAT SER IF-IMP: ABNORMAL
ANA PAT SER IF-IMP: ABNORMAL
NUCLEAR IGG SER QL IF: DETECTED
NUCLEAR IGG TITR SER IF: ABNORMAL {TITER}

## 2025-06-16 DIAGNOSIS — I25.10 CAD S/P PERCUTANEOUS CORONARY ANGIOPLASTY: ICD-10-CM

## 2025-06-16 DIAGNOSIS — Z98.61 CAD S/P PERCUTANEOUS CORONARY ANGIOPLASTY: ICD-10-CM

## 2025-06-16 RX ORDER — METOPROLOL SUCCINATE 50 MG/1
50 TABLET, EXTENDED RELEASE ORAL NIGHTLY
Qty: 90 TABLET | Refills: 3 | Status: SHIPPED | OUTPATIENT
Start: 2025-06-16

## 2025-06-16 NOTE — TELEPHONE ENCOUNTER
Requesting medication refill. Please approve or deny this request.    Rx requested:  Requested Prescriptions     Pending Prescriptions Disp Refills    metoprolol succinate (TOPROL XL) 50 MG extended release tablet [Pharmacy Med Name: Metoprolol Succinate ER Oral Tablet Extended Release 24 Hour 50 MG] 90 tablet 0     Sig: TAKE ONE TABLET BY MOUTH EVERY EVENING         Last Office Visit:   5/2/2025      Next Visit Date:  Future Appointments   Date Time Provider Department Center   6/18/2025  1:00 PM Donell Asher,  [facility]   9/29/2025  2:45 PM Chi Tejada MD [facility]   11/10/2025 10:00 AM Snyder, Dane, MD [facility]   11/12/2025  1:00 PM Slavik-Bosworth, Kelly J, APRN - CNP [place] [facility]   11/20/2025  1:15 PM Joey Keller MD [facility]

## 2025-06-18 ENCOUNTER — OFFICE VISIT (OUTPATIENT)
Age: 70
End: 2025-06-18
Payer: MEDICARE

## 2025-06-18 VITALS
SYSTOLIC BLOOD PRESSURE: 138 MMHG | HEART RATE: 72 BPM | WEIGHT: 230 LBS | DIASTOLIC BLOOD PRESSURE: 74 MMHG | OXYGEN SATURATION: 96 % | BODY MASS INDEX: 30.34 KG/M2

## 2025-06-18 DIAGNOSIS — R74.8 ELEVATED CK: ICD-10-CM

## 2025-06-18 DIAGNOSIS — R76.8 POSITIVE ANA (ANTINUCLEAR ANTIBODY): ICD-10-CM

## 2025-06-18 DIAGNOSIS — M79.10 MYALGIA: Primary | ICD-10-CM

## 2025-06-18 DIAGNOSIS — M79.10 MYALGIA: ICD-10-CM

## 2025-06-18 LAB
ALBUMIN SERPL-MCNC: 3.8 G/DL (ref 3.5–4.6)
ALP SERPL-CCNC: 164 U/L (ref 35–104)
ALT SERPL-CCNC: 38 U/L (ref 0–41)
ANION GAP SERPL CALCULATED.3IONS-SCNC: 10 MEQ/L (ref 9–15)
AST SERPL-CCNC: 46 U/L (ref 0–40)
BASOPHILS # BLD: 0.1 K/UL (ref 0–0.2)
BASOPHILS NFR BLD: 0.9 %
BILIRUB SERPL-MCNC: 0.7 MG/DL (ref 0.2–0.7)
BILIRUB UR QL STRIP: NEGATIVE
BUN SERPL-MCNC: 13 MG/DL (ref 8–23)
CALCIUM SERPL-MCNC: 9.5 MG/DL (ref 8.5–9.9)
CHLORIDE SERPL-SCNC: 104 MEQ/L (ref 95–107)
CK SERPL-CCNC: 224 U/L (ref 0–190)
CLARITY UR: CLEAR
CO2 SERPL-SCNC: 27 MEQ/L (ref 20–31)
COLOR UR: YELLOW
CREAT SERPL-MCNC: 0.98 MG/DL (ref 0.7–1.2)
EOSINOPHIL # BLD: 0 K/UL (ref 0–0.7)
EOSINOPHIL NFR BLD: 0.3 %
ERYTHROCYTE [DISTWIDTH] IN BLOOD BY AUTOMATED COUNT: 18 % (ref 11.5–14.5)
GLOBULIN SER CALC-MCNC: 4.3 G/DL (ref 2.3–3.5)
GLUCOSE SERPL-MCNC: 297 MG/DL (ref 70–99)
GLUCOSE UR STRIP-MCNC: >=1000 MG/DL
HCT VFR BLD AUTO: 36.6 % (ref 42–52)
HGB BLD-MCNC: 11.5 G/DL (ref 14–18)
HGB UR QL STRIP: NEGATIVE
KETONES UR STRIP-MCNC: NEGATIVE MG/DL
LEUKOCYTE ESTERASE UR QL STRIP: NEGATIVE
LYMPHOCYTES # BLD: 2 K/UL (ref 1–4.8)
LYMPHOCYTES NFR BLD: 18 %
MCH RBC QN AUTO: 25.8 PG (ref 27–31.3)
MCHC RBC AUTO-ENTMCNC: 31.4 % (ref 33–37)
MCV RBC AUTO: 82.2 FL (ref 79–92.2)
MONOCYTES # BLD: 0.7 K/UL (ref 0.2–0.8)
MONOCYTES NFR BLD: 6 %
NEUTROPHILS # BLD: 8.2 K/UL (ref 1.4–6.5)
NEUTS SEG NFR BLD: 74.2 %
NITRITE UR QL STRIP: NEGATIVE
PH UR STRIP: 5.5 [PH] (ref 5–9)
PLATELET # BLD AUTO: 401 K/UL (ref 130–400)
POTASSIUM SERPL-SCNC: 4.4 MEQ/L (ref 3.4–4.9)
PROT SERPL-MCNC: 8.1 G/DL (ref 6.3–8)
PROT UR STRIP-MCNC: NEGATIVE MG/DL
RBC # BLD AUTO: 4.45 M/UL (ref 4.7–6.1)
SODIUM SERPL-SCNC: 141 MEQ/L (ref 135–144)
SP GR UR STRIP: 1.03 (ref 1–1.03)
UROBILINOGEN UR STRIP-ACNC: 1 E.U./DL
WBC # BLD AUTO: 11.1 K/UL (ref 4.8–10.8)

## 2025-06-18 PROCEDURE — 99214 OFFICE O/P EST MOD 30 MIN: CPT | Performed by: INTERNAL MEDICINE

## 2025-06-18 PROCEDURE — 1123F ACP DISCUSS/DSCN MKR DOCD: CPT | Performed by: INTERNAL MEDICINE

## 2025-06-18 PROCEDURE — 3075F SYST BP GE 130 - 139MM HG: CPT | Performed by: INTERNAL MEDICINE

## 2025-06-18 PROCEDURE — 1159F MED LIST DOCD IN RCRD: CPT | Performed by: INTERNAL MEDICINE

## 2025-06-18 PROCEDURE — 3078F DIAST BP <80 MM HG: CPT | Performed by: INTERNAL MEDICINE

## 2025-06-18 NOTE — PATIENT INSTRUCTIONS
Labs and urinalysis today and I will contact you with results    Stop the prednisone    Hold your atorvastatin through the weekend and I am going to contact your cardiologist and your PCP to let them know you are doing this.  We will see if this is a possible cause for your muscle pain    I will also contact your oncologist to let them know what is going on.  If your labs are not helpful, I may want to proceed with muscle and nerve studies of 1 upper and 1 lower extremity to see if your muscles or nerves are acting abnormally.    Tentatively plan to follow-up here within 4 weeks.

## 2025-06-18 NOTE — PROGRESS NOTES
Reason for Referral:       No referring provider defined for this encounter.    Chief Complaint   Patient presents with    Follow-up           HISTORY OF PRESENT ILLNESS: Mr.Randy Lim is a 70 y.o. male today regarding his muscle pain.  In the interim I started him on 15 mg of prednisone daily to assess for the possibility of polymyalgia rheumatica.  The patient did not have any improvement with this medication and has since stopped it.  Reports ongoing pain across the upper part of his shoulders and lower neck area as well as across his lumbosacral region.  It is all muscular.    At last visit the MARIZA came back positive.  The patient does not describe any erythematous rashes including on his face and has not had any oral or nasal ulcers, history of pleurisy or pericarditis or any swollen red or hot joints.  On further questioning though he tells me that he has had some weakness for several months now especially in his left leg.  His CK has been mildly elevated but his aldolase level was normal and his acute phase reactants normal.  He therefore returns today for further evaluation.         Past Medical,Family, and Social History reviewed.  Patient's PMH/PSH,SH,PSYCH Hx, MEDs, ALLERGIES, and ROS were all reviewed and updated in the appropriate sections.    PAST MEDICAL HISTORY:  Past Medical History:   Diagnosis Date    Cancer (HCC)     Pancreatic    History of skin cancer     HTN (hypertension)     Malignant neoplasm of other parts of pancreas (HCC)     Type II or unspecified type diabetes mellitus without mention of complication, not stated as uncontrolled        Past Surgical History:   Procedure Laterality Date    ABDOMEN SURGERY      COLONOSCOPY  02/20/2015    DR DIETZ    COLONOSCOPY N/A 01/20/2020    COLONOSCOPY DIAGNOSTIC performed by García Rashid MD at [facility]    ENDOSCOPIC ULTRASOUND (LOWER) N/A 12/17/2019    EUS performed by García Rashid MD at [facility] OR    HEMORRHOID SURGERY      JOINT

## 2025-06-19 LAB
C3 SERPL-MCNC: 128 MG/DL (ref 90–180)
C4 SERPL-MCNC: 17 MG/DL (ref 10–40)

## 2025-06-20 LAB — DSDNA AB TITR SER CLIF: 13 IU (ref 0–24)

## 2025-06-22 LAB
ENA RNP IGG SER IA-ACNC: 6 UNITS (ref 0–19)
ENA SM IGG SER-ACNC: 8 AU/ML (ref 0–40)
ENA SS-A 60KD AB SER-ACNC: 1 AU/ML (ref 0–40)
ENA SS-A IGG SER IA-ACNC: 2 AU/ML (ref 0–40)
ENA SS-B IGG SER IA-ACNC: 0 AU/ML (ref 0–40)

## 2025-06-23 ENCOUNTER — RESULTS FOLLOW-UP (OUTPATIENT)
Age: 70
End: 2025-06-23

## 2025-06-23 DIAGNOSIS — M62.81 MUSCLE WEAKNESS: ICD-10-CM

## 2025-06-23 DIAGNOSIS — M79.10 MYALGIA: Primary | ICD-10-CM

## 2025-06-30 DIAGNOSIS — I15.9 SECONDARY HYPERTENSION: ICD-10-CM

## 2025-06-30 RX ORDER — LOSARTAN POTASSIUM 25 MG/1
50 TABLET ORAL DAILY
Qty: 180 TABLET | Refills: 2 | Status: SHIPPED | OUTPATIENT
Start: 2025-06-30

## 2025-06-30 NOTE — TELEPHONE ENCOUNTER
Requesting medication refill. Please approve or deny this request.    Rx requested:  Requested Prescriptions     Pending Prescriptions Disp Refills    losartan (COZAAR) 25 MG tablet [Pharmacy Med Name: Losartan Potassium Oral Tablet 25 MG] 90 tablet 0     Sig: TAKE TWO TABLETS BY MOUTH DAILY         Last Office Visit:   5/2/2025      Next Visit Date:  Future Appointments   Date Time Provider Department Center   7/15/2025 11:30 AM Donell Asher DO [facility]   9/9/2025  9:30 AM Roman Lisa MD [facility]   9/23/2025  9:30 AM Roman Lisa MD [facility] [place]   9/29/2025  2:45 PM [facility] Te[facility] [place]   11/10/2025 10:00 AM Dane Snyder MD [facility] [facility] [place]   11/12/2025  1:00 PM Slavik-Bosworth, Kelly J, APRN - CNP [place] [facility] [place]   11/20/2025  1:15 PM Joey Keller MD [facility] DEP

## 2025-07-15 ENCOUNTER — OFFICE VISIT (OUTPATIENT)
Age: 70
End: 2025-07-15
Payer: MEDICARE

## 2025-07-15 VITALS
BODY MASS INDEX: 29.55 KG/M2 | WEIGHT: 224 LBS | DIASTOLIC BLOOD PRESSURE: 70 MMHG | OXYGEN SATURATION: 96 % | SYSTOLIC BLOOD PRESSURE: 130 MMHG | HEART RATE: 88 BPM

## 2025-07-15 DIAGNOSIS — M79.10 MYALGIA: Primary | ICD-10-CM

## 2025-07-15 DIAGNOSIS — R76.8 POSITIVE ANA (ANTINUCLEAR ANTIBODY): ICD-10-CM

## 2025-07-15 PROCEDURE — 3075F SYST BP GE 130 - 139MM HG: CPT | Performed by: INTERNAL MEDICINE

## 2025-07-15 PROCEDURE — 1123F ACP DISCUSS/DSCN MKR DOCD: CPT | Performed by: INTERNAL MEDICINE

## 2025-07-15 PROCEDURE — 99213 OFFICE O/P EST LOW 20 MIN: CPT | Performed by: INTERNAL MEDICINE

## 2025-07-15 PROCEDURE — 3078F DIAST BP <80 MM HG: CPT | Performed by: INTERNAL MEDICINE

## 2025-07-15 PROCEDURE — 1126F AMNT PAIN NOTED NONE PRSNT: CPT | Performed by: INTERNAL MEDICINE

## 2025-07-15 NOTE — PATIENT INSTRUCTIONS
Lets plan for 1 more visit in about 3 months.  We can do this via telemedicine.  If you start to have any muscle pain recur please let me know.  Stay off the atorvastatin for now and I will reach out to your cardiologist and primary care doctor let them know that you are doing very well off the medicine.    If you continue to do well over the next 4 weeks, we can probably cancel those EMG tests in September.  Please let me know.

## 2025-07-15 NOTE — PROGRESS NOTES
Reason for Referral:       No referring provider defined for this encounter.    Chief Complaint   Patient presents with    Follow-up     Patient presents today for follow up myalgia.           HISTORY OF PRESENT ILLNESS: Mr.Randy Lim is a 70 y.o. male here today for follow-up of his myalgias.  After last visit I stopped his atorvastatin and within weeks he felt significantly better.  He now tells me that he is having no muscle pain at all and has not noticed any weakness.  In fact he feels better overall.  He states his blood pressure is better and he is requiring less insulin.  He has lost some weight because of this.  He only had 1 episode where he felt somewhat short of breath and got very fatigued.  He was walking his grandchild in a park when a hot day.  He was fatigued the rest of the day but was better by the next.  He did not have any chest pain.  Reports no other issues today.       Past Medical,Family, and Social History reviewed.  Patient's PMH/PSH,SH,PSYCH Hx, MEDs, ALLERGIES, and ROS were all reviewed and updated in the appropriate sections.    PAST MEDICAL HISTORY:  Past Medical History:   Diagnosis Date    Cancer (HCC)     Pancreatic    History of skin cancer     HTN (hypertension)     Malignant neoplasm of other parts of pancreas (HCC)     Type II or unspecified type diabetes mellitus without mention of complication, not stated as uncontrolled        Past Surgical History:   Procedure Laterality Date    ABDOMEN SURGERY      COLONOSCOPY  02/20/2015    DR DIETZ    COLONOSCOPY N/A 01/20/2020    COLONOSCOPY DIAGNOSTIC performed by García Rashid MD at [facility]    ENDOSCOPIC ULTRASOUND (LOWER) N/A 12/17/2019    EUS performed by García Rashid MD at [facility] OR    HEMORRHOID SURGERY      JOINT REPLACEMENT Right     hip    PANCREATECTOMY  08/04/2022    SPLENECTOMY, PARTIAL  08/04/2022    UPPER GASTROINTESTINAL ENDOSCOPY N/A 04/04/2022    EGD ESOPHAGOGASTRODUODENOSCOPY performed by García Rashid

## 2025-08-14 ENCOUNTER — TELEPHONE (OUTPATIENT)
Dept: PHARMACY | Facility: CLINIC | Age: 70
End: 2025-08-14

## (undated) DEVICE — ENDO CARRY-ON PROCEDURE KIT: Brand: ENDO CARRY-ON PROCEDURE KIT

## (undated) DEVICE — TUBE SET 96 MM 64 MM H2O PERISTALTIC STD AUX CHANNEL

## (undated) DEVICE — GLOVE ORANGE PI 8   MSG9080

## (undated) DEVICE — TUBING IRRIGATION 140/160/180/190 SER GI ENDOSCP SMARTCAP

## (undated) DEVICE — ADAPTER FLSH PMP FLD MGMT GI IRRIG OFP 2 DISPOSABLE

## (undated) DEVICE — SINGLE PORT MANIFOLD: Brand: NEPTUNE 2

## (undated) DEVICE — CONMED SCOPE SAVER BITE BLOCK, 20X27 MM: Brand: SCOPE SAVER

## (undated) DEVICE — BRUSH ENDO CLN L90.5IN SHTH DIA1.7MM BRIST DIA5-7MM 2-6MM

## (undated) DEVICE — GLOVE ORANGE PI 8 1/2   MSG9085

## (undated) DEVICE — 4-PORT MANIFOLD: Brand: NEPTUNE 2

## (undated) DEVICE — TUBING, SUCTION, 1/4" X 10', STRAIGHT: Brand: MEDLINE

## (undated) DEVICE — JELLY,LUBE,STERILE,FLIP TOP,TUBE,2-OZ: Brand: MEDLINE

## (undated) DEVICE — ENDOSCOPIC TRAY TRNSPRT 20.5X16.5X4.1 IN RECYCL SUGAR PULP

## (undated) DEVICE — SUPPLEMENT DIGESTIVE H2O SOL GI-EASE

## (undated) DEVICE — BRUSH ENDO COMBO

## (undated) DEVICE — FORCEPS BX L240CM JAW DIA2.8MM L CAP W/ NDL MIC MESH TOOTH

## (undated) DEVICE — Device: Brand: ENDO SMARTCAP

## (undated) DEVICE — LIGATOR ENDOSCP L2.8MM DIA8.6-11.5MM MULT BND SPEEDBAND

## (undated) DEVICE — SONY PRINTER PAPER

## (undated) DEVICE — GOWN,AURORA,NONREINFORCED,LARGE: Brand: MEDLINE

## (undated) DEVICE — Device: Brand: BALLOON3

## (undated) DEVICE — LABEL MED MINI W/ MARKER

## (undated) DEVICE — ENDOSCOPIC ULTRASOUND ASPIRATION NEEDLE: Brand: EXPECT SLIMLINE SL

## (undated) DEVICE — FORCEPS BX L240CM JAW DIA2.4MM ORNG L CAP W/ NDL DISP RAD

## (undated) DEVICE — LIGATOR ENDOSCP DIA8.6-11.5MM MULT DISP SPDBND LIGATOR SUP